# Patient Record
Sex: FEMALE | Race: WHITE | NOT HISPANIC OR LATINO | Employment: OTHER | URBAN - METROPOLITAN AREA
[De-identification: names, ages, dates, MRNs, and addresses within clinical notes are randomized per-mention and may not be internally consistent; named-entity substitution may affect disease eponyms.]

---

## 2017-01-05 ENCOUNTER — TELEPHONE (OUTPATIENT)
Dept: INTERNAL MEDICINE | Facility: CLINIC | Age: 67
End: 2017-01-05

## 2017-01-05 NOTE — TELEPHONE ENCOUNTER
----- Message from Kerry Larson sent at 1/5/2017  2:46 PM EST -----  Contact: KARINE MONTALVO PH:[phone]  KARINE MONTALVO SAW EAVNSKEENA HENRY FOR BRONCHITIS SHE WAS PRESCRIBED SOMETHING FOR HER COUGH BUT IT HAS NOT HELPED. SHE WOULD LIKE SOMETHING ELSE FOR THE COUGH AS IT KEEPS HER UP AT NIGHT. SHE USES THE [facility] ON [facility]. SHE CAN BE REACHED [phone]

## 2017-01-10 RX ORDER — LEVOTHYROXINE SODIUM 175 UG/1
TABLET ORAL
Qty: 30 TABLET | Refills: 0 | Status: SHIPPED | OUTPATIENT
Start: 2017-01-10 | End: 2017-03-13 | Stop reason: SDUPTHER

## 2017-01-10 RX ORDER — MONTELUKAST SODIUM 10 MG/1
TABLET ORAL
Qty: 90 TABLET | Refills: 0 | Status: SHIPPED | OUTPATIENT
Start: 2017-01-10 | End: 2017-03-23 | Stop reason: SDUPTHER

## 2017-01-10 RX ORDER — IBUPROFEN 600 MG/1
TABLET ORAL
Qty: 90 TABLET | Refills: 1 | Status: SHIPPED | OUTPATIENT
Start: 2017-01-10 | End: 2017-05-15 | Stop reason: SDUPTHER

## 2017-01-18 ENCOUNTER — TELEPHONE (OUTPATIENT)
Dept: INTERNAL MEDICINE | Facility: CLINIC | Age: 67
End: 2017-01-18

## 2017-02-27 RX ORDER — LEVOTHYROXINE SODIUM 175 UG/1
TABLET ORAL
Qty: 30 TABLET | Refills: 0 | Status: SHIPPED | OUTPATIENT
Start: 2017-02-27 | End: 2017-03-23 | Stop reason: SDUPTHER

## 2017-02-27 RX ORDER — LOSARTAN POTASSIUM 100 MG/1
TABLET ORAL
Qty: 90 TABLET | Refills: 0 | Status: SHIPPED | OUTPATIENT
Start: 2017-02-27 | End: 2017-03-13 | Stop reason: DRUGHIGH

## 2017-02-27 RX ORDER — AMLODIPINE BESYLATE 5 MG/1
TABLET ORAL
Qty: 90 TABLET | Refills: 0 | Status: SHIPPED | OUTPATIENT
Start: 2017-02-27 | End: 2017-03-23 | Stop reason: SDUPTHER

## 2017-02-27 RX ORDER — LEVOCETIRIZINE DIHYDROCHLORIDE 5 MG/1
TABLET, FILM COATED ORAL
Qty: 30 TABLET | Refills: 5 | Status: SHIPPED | OUTPATIENT
Start: 2017-02-27 | End: 2017-08-31

## 2017-03-13 ENCOUNTER — OFFICE VISIT (OUTPATIENT)
Dept: INTERNAL MEDICINE | Facility: CLINIC | Age: 67
End: 2017-03-13

## 2017-03-13 VITALS
BODY MASS INDEX: 33.04 KG/M2 | HEIGHT: 68 IN | WEIGHT: 218 LBS | HEART RATE: 68 BPM | RESPIRATION RATE: 18 BRPM | SYSTOLIC BLOOD PRESSURE: 130 MMHG | DIASTOLIC BLOOD PRESSURE: 72 MMHG | TEMPERATURE: 97.7 F

## 2017-03-13 DIAGNOSIS — Z12.31 ENCOUNTER FOR SCREENING MAMMOGRAM FOR BREAST CANCER: ICD-10-CM

## 2017-03-13 DIAGNOSIS — Z00.00 PHYSICAL EXAM: Primary | ICD-10-CM

## 2017-03-13 DIAGNOSIS — E03.8 OTHER SPECIFIED HYPOTHYROIDISM: ICD-10-CM

## 2017-03-13 DIAGNOSIS — K21.9 GASTROESOPHAGEAL REFLUX DISEASE WITHOUT ESOPHAGITIS: ICD-10-CM

## 2017-03-13 DIAGNOSIS — E78.5 HYPERLIPIDEMIA, UNSPECIFIED HYPERLIPIDEMIA TYPE: ICD-10-CM

## 2017-03-13 DIAGNOSIS — I10 ESSENTIAL HYPERTENSION: ICD-10-CM

## 2017-03-13 LAB
ALBUMIN SERPL-MCNC: 4.1 G/DL (ref 3.2–4.8)
ALBUMIN/GLOB SERPL: 1.6 G/DL (ref 1.5–2.5)
ALP SERPL-CCNC: 97 U/L (ref 25–100)
ALT SERPL W P-5'-P-CCNC: 24 U/L (ref 7–40)
ANION GAP SERPL CALCULATED.3IONS-SCNC: 5 MMOL/L (ref 3–11)
ARTICHOKE IGE QN: 138 MG/DL (ref 0–130)
AST SERPL-CCNC: 25 U/L (ref 0–33)
BASOPHILS # BLD AUTO: 0.04 10*3/MM3 (ref 0–0.2)
BASOPHILS NFR BLD AUTO: 0.8 % (ref 0–1)
BILIRUB SERPL-MCNC: 0.3 MG/DL (ref 0.3–1.2)
BUN BLD-MCNC: 15 MG/DL (ref 9–23)
BUN/CREAT SERPL: 16.7 (ref 7–25)
CALCIUM SPEC-SCNC: 9.9 MG/DL (ref 8.7–10.4)
CHLORIDE SERPL-SCNC: 99 MMOL/L (ref 99–109)
CHOLEST SERPL-MCNC: 225 MG/DL (ref 0–200)
CO2 SERPL-SCNC: 36 MMOL/L (ref 20–31)
CREAT BLD-MCNC: 0.9 MG/DL (ref 0.6–1.3)
DEPRECATED RDW RBC AUTO: 43 FL (ref 37–54)
EOSINOPHIL # BLD AUTO: 0.11 10*3/MM3 (ref 0.1–0.3)
EOSINOPHIL NFR BLD AUTO: 2.1 % (ref 0–3)
ERYTHROCYTE [DISTWIDTH] IN BLOOD BY AUTOMATED COUNT: 13.6 % (ref 11.3–14.5)
GFR SERPL CREATININE-BSD FRML MDRD: 63 ML/MIN/1.73
GLOBULIN UR ELPH-MCNC: 2.6 GM/DL
GLUCOSE BLD-MCNC: 94 MG/DL (ref 70–100)
HCT VFR BLD AUTO: 41 % (ref 34.5–44)
HCV AB SER DONR QL: NORMAL
HDLC SERPL-MCNC: 52 MG/DL (ref 40–60)
HGB BLD-MCNC: 13.6 G/DL (ref 11.5–15.5)
IMM GRANULOCYTES # BLD: 0.01 10*3/MM3 (ref 0–0.03)
IMM GRANULOCYTES NFR BLD: 0.2 % (ref 0–0.6)
LYMPHOCYTES # BLD AUTO: 1.81 10*3/MM3 (ref 0.6–4.8)
LYMPHOCYTES NFR BLD AUTO: 34.2 % (ref 24–44)
MCH RBC QN AUTO: 28.6 PG (ref 27–31)
MCHC RBC AUTO-ENTMCNC: 33.2 G/DL (ref 32–36)
MCV RBC AUTO: 86.3 FL (ref 80–99)
MONOCYTES # BLD AUTO: 0.44 10*3/MM3 (ref 0–1)
MONOCYTES NFR BLD AUTO: 8.3 % (ref 0–12)
NEUTROPHILS # BLD AUTO: 2.89 10*3/MM3 (ref 1.5–8.3)
NEUTROPHILS NFR BLD AUTO: 54.4 % (ref 41–71)
PLATELET # BLD AUTO: 286 10*3/MM3 (ref 150–450)
PMV BLD AUTO: 9.9 FL (ref 6–12)
POTASSIUM BLD-SCNC: 3.7 MMOL/L (ref 3.5–5.5)
PROT SERPL-MCNC: 6.7 G/DL (ref 5.7–8.2)
RBC # BLD AUTO: 4.75 10*6/MM3 (ref 3.89–5.14)
SODIUM BLD-SCNC: 140 MMOL/L (ref 132–146)
TRIGL SERPL-MCNC: 124 MG/DL (ref 0–150)
TSH SERPL DL<=0.05 MIU/L-ACNC: 0.16 MIU/ML (ref 0.35–5.35)
WBC NRBC COR # BLD: 5.3 10*3/MM3 (ref 3.5–10.8)

## 2017-03-13 PROCEDURE — 80053 COMPREHEN METABOLIC PANEL: CPT | Performed by: INTERNAL MEDICINE

## 2017-03-13 PROCEDURE — 86803 HEPATITIS C AB TEST: CPT | Performed by: INTERNAL MEDICINE

## 2017-03-13 PROCEDURE — 36415 COLL VENOUS BLD VENIPUNCTURE: CPT | Performed by: INTERNAL MEDICINE

## 2017-03-13 PROCEDURE — 84443 ASSAY THYROID STIM HORMONE: CPT | Performed by: INTERNAL MEDICINE

## 2017-03-13 PROCEDURE — 80061 LIPID PANEL: CPT | Performed by: INTERNAL MEDICINE

## 2017-03-13 PROCEDURE — 99213 OFFICE O/P EST LOW 20 MIN: CPT | Performed by: INTERNAL MEDICINE

## 2017-03-13 PROCEDURE — 85025 COMPLETE CBC W/AUTO DIFF WBC: CPT | Performed by: INTERNAL MEDICINE

## 2017-03-13 PROCEDURE — 99397 PER PM REEVAL EST PAT 65+ YR: CPT | Performed by: INTERNAL MEDICINE

## 2017-03-13 RX ORDER — LOSARTAN POTASSIUM 50 MG/1
50 TABLET ORAL DAILY
COMMUNITY
End: 2017-08-03 | Stop reason: DRUGHIGH

## 2017-03-13 RX ORDER — PHENOL 1.4 %
1 AEROSOL, SPRAY (ML) MUCOUS MEMBRANE NIGHTLY PRN
COMMUNITY

## 2017-03-13 NOTE — PROGRESS NOTES
Chief Complaint   Patient presents with   • EXTENDED FOLLOW UP AND PHYSICAL EXAMINATION       History of Present Illness      The patient presents for an established patient physical examination and health maintenance visit. The patient has had a colonoscopy. The last colonoscopy was on January 9, 2014. Most recent colonoscopy findings: did not reveal polyps. She has had a prior mammogram. Her last mammogram was in December of 2013 and it revealed no abnormalities.    The patient is on ranitidine for her gastroesophageal reflux. The medication is taken on a regular basis and gives complete relief of the symptoms. She reports no abdominal pain, belching, chest pain, diarrhea, dysphagia, early satiety, heartburn, hoarseness, nausea, rectal bleeding, vomiting or weight loss. The GERD has no known aggravating factors.    The patient presents for a follow-up related to hyperlipidemia. She is following a low fat diet. She reports that she is exercising. She is not taking medication for hyperlipidemia. She denies shortness of breath, orthopnea, paroxysmal nocturnal dyspnea, dyspnea on exertion, edema, palpitations or syncope.    The patient presents for a follow-up related to hypertension. The patient reports that she has had no headaches or blurred vision. She states that she is taking her medication as prescribed. She is not having medication side effects. She checks her blood pressures at home. The home readings are normal.    As relates to hypothyroidism, the patient reports a good energy level. She reports no hair loss, heat intolerance, cold intolerance, constipation or sweats. She is taking her medication as prescribed her medication as prescribed.    Review of Systems    GENERAL- Denies Fever, Chills or Sweats.    HEENT- Denies Eye Pain, Eye Drainage, Nasal Discharge, Sore Throat, Ear Pain, Ear Drainage, Decreased Vision, Sinus Pain, Nasal Congestion or Decreased Hearing.    NECK- Denies Decreased Range of Motion,  Stiffness, Thyroid Nodules, Enlarged Lymph Nodes or Goiter.    CARDIOVASCULAR- Denies Claudication.    PULMONARY- Denies Wheezing, Sputum Production, Cough or Hemoptysis.    GENITOURINARY- Denies Dysuria, Hematuria, Urinary Frequency, Urinary Leakage, Pelvic Pain, Vaginal Prolapse, Vaginal Discharge, Dyspareunia or Abnormal Menses.    ENDOCRINE- Denies Depression, Memory Loss, Polydypsia, Polyphagia, Polyuria, Sleep Disturbance or Weight Gain.    NEUROLOGIC- Denies Seizures, Confusion or Excessive Daytime Sleepiness.    MUSCULOSKELETAL- Denies Joint Pain, Joint Stiffness, Decreased Range of Motion, Joint Swelling or Erythema of Joints.    INTEGUMENTARY- Denies Rash, Lumps, Sores, Itching, Dryness, Color Change, Changes in Hair, Brittle Nails, Discolored Nails, Thickened Nails or Growths.    Medications      Current Outpatient Prescriptions:   •  albuterol (PROAIR HFA) 108 (90 BASE) MCG/ACT inhaler, Inhale 2 puffs every 6 (six) hours as needed., Disp: , Rfl:   •  amLODIPine (NORVASC) 5 MG tablet, TAKE ONE TABLET BY MOUTH ONCE DAILY, Disp: 90 tablet, Rfl: 0  •  aspirin 81 MG EC tablet, Take 1 tablet by mouth daily., Disp: , Rfl:   •  Cholecalciferol (VITAMIN D PO), Take 1,000 Units by mouth daily. Vitamin D 1000 UNIT CAPS; TAKE 1 TABLET DAILY, Disp: , Rfl:   •  citalopram (CeleXA) 40 MG tablet, Take 1 tablet by mouth daily. For: Anxiety, Disp: , Rfl:   •  dicyclomine (BENTYL) 10 MG capsule, Take 1 capsule by mouth 4 (four) times a day as needed. For: Abdominal bloating, Disp: , Rfl:   •  diphenhydrAMINE (BENADRYL) 25 mg capsule, Take 1 capsule by mouth every 6 (six) hours as needed., Disp: , Rfl:   •  diphenhydrAMINE (BENADRYL) 50 MG/ML injection, INJECT AS NEEDED FOR ALLERGIC REACTION, Disp: 100 mL, Rfl: 0  •  EPINEPHrine (EPIPEN 2-KIRA) 0.3 MG/0.3ML solution auto-injector injection, Inject 1 Units into the shoulder, thigh, or buttocks. as directed; For: Allergic rhinitis, Disp: , Rfl:   •  guaiFENesin (MUCINEX) 600  MG 12 hr tablet, Take 1 tablet by mouth 2 (two) times a day., Disp: , Rfl:   •  hydrOXYzine (ATARAX) 25 MG tablet, Take 1 tablet by mouth at night as needed. at bedtime as needed, Disp: , Rfl:   •  ibuprofen (ADVIL,MOTRIN) 600 MG tablet, TAKE ONE TABLET BY MOUTH THREE TIMES DAILY AS NEEDED, Disp: 90 tablet, Rfl: 1  •  levocetirizine (XYZAL) 5 MG tablet, TAKE ONE TABLET BY MOUTH ONCE DAILY, Disp: 30 tablet, Rfl: 5  •  levothyroxine (SYNTHROID, LEVOTHROID) 175 MCG tablet, TAKE ONE TABLET BY MOUTH ONCE DAILY, Disp: 30 tablet, Rfl: 0  •  losartan (COZAAR) 50 MG tablet, Take 50 mg by mouth Daily., Disp: , Rfl:   •  Melatonin 10 MG tablet, Take 1 tablet by mouth At Night As Needed., Disp: , Rfl:   •  montelukast (SINGULAIR) 10 MG tablet, TAKE ONE TABLET BY MOUTH ONCE DAILY, Disp: 90 tablet, Rfl: 0  •  ranitidine (ZANTAC) 150 MG tablet, Take 1 tablet by mouth 2 (two) times a day as needed. AM&PM, Disp: , Rfl:   •  Triamcinolone Acetonide (NASACORT ALLERGY 24HR) 55 MCG/ACT nasal inhaler, 2 sprays into each nostril daily., Disp: , Rfl:   •  triamterene-hydrochlorothiazide (MAXZIDE-25) 37.5-25 MG per tablet, TAKE ONE TABLET BY MOUTH ONCE DAILY, Disp: 30 tablet, Rfl: 5     Allergies    Allergies   Allergen Reactions   • Other      MSG - HIVES    • Cefuroxime      Other reaction(s): UNKNOWN   • Cephalexin    • Clindamycin Nausea Only and Nausea And Vomiting   • Erythromycin Nausea Only   • Flu Virus Vaccine    • Metronidazole      Other reaction(s): UNKNOWN   • Naproxen Dizziness   • Thimerosal      Other reaction(s): UNKNOWN   • Varicella Virus Vaccine Live    • Zostavax [Zoster Vaccine Live]      59598 UNT /0.65 ML subcutaneous solution Recontituted    • Penicillins Rash   • Sulfa Antibiotics Nausea Only and Rash     Other reaction(s): Headache, Vomiting       Problem List    Patient Active Problem List   Diagnosis   • Atopic rhinitis   • Anxiety   • Dysphagia   • Gastroesophageal reflux disease without esophagitis   •  "Hyperlipidemia   • Hypertension   • Hypothyroidism   • Fatigue   • Petechial rash   • Acute pharyngitis   • Cataract   • Upper respiratory infection       Physical Examination    Visit Vitals   • /72 (BP Location: Left arm, Patient Position: Sitting, Cuff Size: Adult)   • Pulse 68   • Temp 97.7 °F (36.5 °C) (Axillary)   • Resp 18   • Ht 68\" (172.7 cm)   • Wt 218 lb (98.9 kg)   • LMP Comment: LAST PAP 5 YEARS AGO     • Breastfeeding No   • BMI 33.15 kg/m2       HEENT: Head- Normocephalic Atraumatic. Facies- Within normal limits. Pinnas- Normal texture and shape bilaterally. Canals- Normal bilaterally. TMs- Normal bilaterally. Nares- Patent bilaterally. Nasal Septum- is normal. There is no tenderness to palpation over the frontal or maxillary sinuses. Lids- Normal bilaterally. Conjunctiva- Clear bilaterally. Sclera- Anicteric bilaterally. Oropharynx- Moist with no lesions. Tonsils- No enlargement, erythema or exudate.    Neck: Thyroid- non enlarged, symmetric and has no nodules. No bruits are detected. ROM- Normal Range of Motion with no rigidity.    Lymph Nodes: Cervical- no enlarged lymph nodes noted. Clavicular- Deferred. Axillary- Deferred. Inguinal- Deferred.    Lungs: Auscultation- Clear to auscultation bilaterally. There are no retractions, clubbing or cyanosis. The Expiratory to Inspiratory ratio is equal.    Cardiovascular: There are no carotid bruits. Heart- Normal Rate with Regular rhythm and no murmurs. There are no gallops. There are no rubs. In the lower extremities there is no edema. The upper extremities do not have edema.    Abdomen: Soft, benign, non-tender with no masses, hernias, organomegaly or scars.    Breast: Normal contours bilaterally with no masses, discharge, skin changes or lumps. There are no scars noted.    The patient has no worrisome or suspicious skin lesions noted.    Impression and Assessment    Normal Physical Examination.    Gastroesophageal Reflux Disease. "     Hyperlipidemia.     Hypertension.     Hypothyroidism.    Plan    Gastroesophageal Reflux Disease Plan: The current plan was continued.    Hyperlipidemia Plan: She was instructed to eat a low fat diet. She was encouraged to exercise daily.    Hypertension Plan: The current plan was continued.    Hypothyroidism Plan: The current plan was continued.    Counseling was provided regarding: Adequate Aerobic Exercise, Dental Visits, Flossing Teeth, Heart Healthy Diet, Ideal Body Weight and Seat Belt Utilization.    The following was ordered for screening and health maintenance: Hepatitis C Antibody and Screening Mammogram.       Immunizations Ordered and Administered: None.    Sarah was seen today for extended follow up.    Diagnoses and all orders for this visit:    Physical exam  -     Mammo Screening Digital Tomosynthesis Bilateral With CAD; Future  -     Hepatitis C Antibody    Encounter for screening mammogram for breast cancer  -     Mammo Screening Digital Tomosynthesis Bilateral With CAD; Future    Other specified hypothyroidism  -     TSH    Essential hypertension  -     Comprehensive Metabolic Panel  -     TSH  -     POC Urinalysis Dipstick, Automated    Hyperlipidemia, unspecified hyperlipidemia type  -     Comprehensive Metabolic Panel  -     Lipid Panel    Gastroesophageal reflux disease without esophagitis  -     CBC & Differential          Return to Office    The patient was instructed to return for follow-up in 6 months. Next Visit: Follow-up.    The patient was instructed to return sooner if the condition changes, worsens, or doesn't resolve.

## 2017-03-19 ENCOUNTER — TELEPHONE (OUTPATIENT)
Dept: INTERNAL MEDICINE | Facility: CLINIC | Age: 67
End: 2017-03-19

## 2017-03-19 DIAGNOSIS — Z12.31 ENCOUNTER FOR SCREENING MAMMOGRAM FOR MALIGNANT NEOPLASM OF BREAST: Primary | ICD-10-CM

## 2017-03-19 NOTE — TELEPHONE ENCOUNTER
----- Message from Marianne Jay sent at 3/14/2017 11:17 AM EDT -----  Concerning pts mammogram--they need dx code to be Z12.31 instead of z00.00  Please reenter order.

## 2017-03-23 RX ORDER — CITALOPRAM 40 MG/1
TABLET ORAL
Qty: 90 TABLET | Refills: 1 | Status: SHIPPED | OUTPATIENT
Start: 2017-03-23 | End: 2017-08-31

## 2017-03-24 RX ORDER — LOSARTAN POTASSIUM 100 MG/1
TABLET ORAL
Qty: 90 TABLET | Refills: 0 | Status: SHIPPED | OUTPATIENT
Start: 2017-03-24 | End: 2017-08-31

## 2017-03-24 RX ORDER — LEVOTHYROXINE SODIUM 175 UG/1
TABLET ORAL
Qty: 30 TABLET | Refills: 0 | Status: SHIPPED | OUTPATIENT
Start: 2017-03-24 | End: 2017-08-31

## 2017-03-24 RX ORDER — AMLODIPINE BESYLATE 5 MG/1
TABLET ORAL
Qty: 90 TABLET | Refills: 0 | Status: SHIPPED | OUTPATIENT
Start: 2017-03-24 | End: 2017-08-31

## 2017-03-24 RX ORDER — MONTELUKAST SODIUM 10 MG/1
TABLET ORAL
Qty: 90 TABLET | Refills: 0 | Status: SHIPPED | OUTPATIENT
Start: 2017-03-24 | End: 2017-08-31

## 2017-03-24 RX ORDER — LEVOTHYROXINE SODIUM 175 UG/1
TABLET ORAL
Qty: 30 TABLET | Refills: 0 | Status: SHIPPED | OUTPATIENT
Start: 2017-03-24 | End: 2017-07-03 | Stop reason: SDUPTHER

## 2017-03-30 ENCOUNTER — TRANSCRIBE ORDERS (OUTPATIENT)
Dept: INTERNAL MEDICINE | Facility: CLINIC | Age: 67
End: 2017-03-30

## 2017-04-13 ENCOUNTER — TELEPHONE (OUTPATIENT)
Dept: INTERNAL MEDICINE | Facility: CLINIC | Age: 67
End: 2017-04-13

## 2017-04-13 NOTE — TELEPHONE ENCOUNTER
----- Message from Kishor Perkins sent at 4/13/2017 11:27 AM EDT -----  PT IS IN [place], SHE HAD HAD A COUGH AND YELLOW MUCUS. SHE IS WANTING TO SPEAK TO YOU.     [phone]

## 2017-04-13 NOTE — TELEPHONE ENCOUNTER
S/W patient and she states she is running a slight fever.  She is hoarse and has a cough.  She is coughing up yellow mucus.  She has been sick for 5 days.  She has been taking mucinex, nyquil cough and benadryl.  She is flying home next.    [facility] on [address]-call in and tell them not to fill it and she will have them fill an rx in [place].

## 2017-04-13 NOTE — TELEPHONE ENCOUNTER
Called rx into [facility] pharmacy on [facility] [address] and asked them to place the rx on hold because the patient is going to have it transferred to [place].  Notified patient that rx had been called in.  She verbalized understanding and appreciation.

## 2017-04-20 ENCOUNTER — OFFICE VISIT (OUTPATIENT)
Dept: INTERNAL MEDICINE | Facility: CLINIC | Age: 67
End: 2017-04-20

## 2017-04-20 VITALS
DIASTOLIC BLOOD PRESSURE: 92 MMHG | WEIGHT: 218.2 LBS | TEMPERATURE: 97.4 F | BODY MASS INDEX: 33.07 KG/M2 | HEART RATE: 72 BPM | HEIGHT: 68 IN | SYSTOLIC BLOOD PRESSURE: 158 MMHG | RESPIRATION RATE: 16 BRPM

## 2017-04-20 DIAGNOSIS — Z13.820 SCREENING FOR OSTEOPOROSIS: ICD-10-CM

## 2017-04-20 DIAGNOSIS — Z00.00 MEDICARE WELCOME VISIT: Primary | ICD-10-CM

## 2017-04-20 PROCEDURE — G0402 INITIAL PREVENTIVE EXAM: HCPCS | Performed by: PHYSICIAN ASSISTANT

## 2017-04-20 RX ORDER — DOXYCYCLINE 100 MG/1
100 CAPSULE ORAL DAILY
COMMUNITY
End: 2017-06-08 | Stop reason: SDUPTHER

## 2017-04-20 NOTE — PATIENT INSTRUCTIONS
Breast Self-Awareness  Breast self-awareness allows you to notice a breast problem early while it is still small. Do a breast self-exam:  · Every month, 5-7 days after your period (menstrual period).  · At the same time each month if you do not have periods anymore.  Look for any:  · Difference between your breasts (size, shape, or position).  · Change in breast shape or size.  · Fluid or blood coming from your nipples.  · Changes in your nipples (dimpling, nipple movement).  ·  Change in skin color or texture (redness, scaly areas).  Feel for:  · Lumps.  · Bumps.  · Dips.  · Any other changes.  HOW TO DO A BREAST SELF-EXAM  Look at your breasts and nipples.  1. Take off all your clothes above your waist.  2.  front of a mirror in a room with good lighting.  3. Put your hands on your hips and push your hands downward.  Feel your breasts.   1. Lie flat on your back or  the shower or tub. If you are in the shower or tub, have wet, soapy hands.  2. Place your right arm above your head.  3. Place your left hand in the right underarm area.  4. Make small circles using the pads (not the fingertips) of your 3 middle fingers. Press lightly and then with medium and firm pressure.  5. Move your fingers a little lower and make the small circles at the 3 pressures (light, medium, and firm).  6. Continue moving your fingers lower and making circles until you reach the bottom of your breast.  7. Move your fingers one finger-width towards the center of the body.  8. Continue making the circles, this time moving upward until you reach the bottom of your neck.  9. Move your fingers one finger-width towards the center of your body.  10. Make circles downward when starting at the bottom of the neck. Make circles upward when starting at the bottom of the breast. Stop when you reach the middle of the chest.  11.  Repeat these steps on the other breast.  Write down what looks and feels normal for each breast. Also  write down any changes you notice.  GET HELP RIGHT AWAY IF:  · You see any changes in your breasts or nipples.  · You see skin changes.  · You have unusual discharge from your nipples.  · You feel a new lump.  · You feel unusually thick areas.     This information is not intended to replace advice given to you by your health care provider. Make sure you discuss any questions you have with your health care provider.     Document Released: 06/05/2009 Document Revised: 12/04/2013 Document Reviewed: 11/06/2016  [employer] Interactive Patient Education ©2016 [employer]      Exercising to Lose Weight  Exercising can help you to lose weight. In order to lose weight through exercise, you need to do vigorous-intensity exercise. You can tell that you are exercising with vigorous intensity if you are breathing very hard and fast and cannot hold a conversation while exercising.  Moderate-intensity exercise helps to maintain your current weight. You can tell that you are exercising at a moderate level if you have a higher heart rate and faster breathing, but you are still able to hold a conversation.  HOW OFTEN SHOULD I EXERCISE?  Choose an activity that you enjoy and set realistic goals. Your health care provider can help you to make an activity plan that works for you. Exercise regularly as directed by your health care provider. This may include:  · Doing resistance training twice each week, such as:    Push-ups.    Sit-ups.    Lifting weights.    Using resistance bands.  · Doing a given intensity of exercise for a given amount of time. Choose from these options:    150 minutes of moderate-intensity exercise every week.    75 minutes of vigorous-intensity exercise every week.    A mix of moderate-intensity and vigorous-intensity exercise every week.  Children, pregnant women, people who are out of shape, people who are overweight, and older adults may need to consult a health care provider for individual recommendations. If  you have any sort of medical condition, be sure to consult your health care provider before starting a new exercise program.  WHAT ARE SOME ACTIVITIES THAT CAN HELP ME TO LOSE WEIGHT?   · Walking at a rate of at least 4.5 miles an hour.  · Jogging or running at a rate of 5 miles per hour.  · Biking at a rate of at least 10 miles per hour.  · Lap swimming.  · Roller-skating or in-line skating.  · Cross-country skiing.  · Vigorous competitive sports, such as football, basketball, and soccer.  · Jumping rope.  · Aerobic dancing.  HOW CAN I BE MORE ACTIVE IN MY DAY-TO-DAY ACTIVITIES?  · Use the stairs instead of the elevator.  · Take a walk during your lunch break.  · If you drive, park your car farther away from work or school.  · If you take public transportation, get off one stop early and walk the rest of the way.  · Make all of your phone calls while standing up and walking around.  · Get up, stretch, and walk around every 30 minutes throughout the day.  WHAT GUIDELINES SHOULD I FOLLOW WHILE EXERCISING?  · Do not exercise so much that you hurt yourself, feel dizzy, or get very short of breath.  · Consult your health care provider prior to starting a new exercise program.  · Wear comfortable clothes and shoes with good support.  · Drink plenty of water while you exercise to prevent dehydration or heat stroke. Body water is lost during exercise and must be replaced.  · Work out until you breathe faster and your heart beats faster.     This information is not intended to replace advice given to you by your health care provider. Make sure you discuss any questions you have with your health care provider.     Document Released: 01/20/2012 Document Revised: 01/08/2016 Document Reviewed: 05/21/2015  1[employer] Interactive Patient Education ©2016 [employer]    Fall Prevention in the Home   Falls can cause injuries and can affect people from all age groups. There are many simple things that you can do to make your home  safe and to help prevent falls.  WHAT CAN I DO ON THE OUTSIDE OF MY HOME?  · Regularly repair the edges of walkways and driveways and fix any cracks.  · Remove high doorway thresholds.  · Trim any shrubbery on the main path into your home.  · Use bright outdoor lighting.  · Clear walkways of debris and clutter, including tools and rocks.  · Regularly check that handrails are securely fastened and in good repair. Both sides of any steps should have handrails.  · Install guardrails along the edges of any raised decks or porches.  · Have leaves, snow, and ice cleared regularly.  · Use sand or salt on walkways during winter months.  · In the garage, clean up any spills right away, including grease or oil spills.  WHAT CAN I DO IN THE BATHROOM?  · Use night lights.  · Install grab bars by the toilet and in the tub and shower. Do not use towel bars as grab bars.  · Use non-skid mats or decals on the floor of the tub or shower.  · If you need to sit down while you are in the shower, use a plastic, non-slip stool..  · Keep the floor dry. Immediately clean up any water that spills on the floor.  · Remove soap buildup in the tub or shower on a regular basis.  · Attach bath mats securely with double-sided non-slip rug tape.  · Remove throw rugs and other tripping hazards from the floor.  WHAT CAN I DO IN THE BEDROOM?  · Use night lights.  · Make sure that a bedside light is easy to reach.  · Do not use oversized bedding that drapes onto the floor.  · Have a firm chair that has side arms to use for getting dressed.  · Remove throw rugs and other tripping hazards from the floor.  WHAT CAN I DO IN THE KITCHEN?   · Clean up any spills right away.  · Avoid walking on wet floors.  · Place frequently used items in easy-to-reach places.  · If you need to reach for something above you, use a sturdy step stool that has a grab bar.  · Keep electrical cables out of the way.  · Do not use floor polish or wax that makes floors slippery. If  you have to use wax, make sure that it is non-skid floor wax.  · Remove throw rugs and other tripping hazards from the floor.  WHAT CAN I DO IN THE STAIRWAYS?  · Do not leave any items on the stairs.  · Make sure that there are handrails on both sides of the stairs. Fix handrails that are broken or loose. Make sure that handrails are as long as the stairways.  · Check any carpeting to make sure that it is firmly attached to the stairs. Fix any carpet that is loose or worn.  · Avoid having throw rugs at the top or bottom of stairways, or secure the rugs with carpet tape to prevent them from moving.  · Make sure that you have a light switch at the top of the stairs and the bottom of the stairs. If you do not have them, have them installed.  WHAT ARE SOME OTHER FALL PREVENTION TIPS?  · Wear closed-toe shoes that fit well and support your feet. Wear shoes that have rubber soles or low heels.  · When you use a stepladder, make sure that it is completely opened and that the sides are firmly locked. Have someone hold the ladder while you are using it. Do not climb a closed stepladder.  · Add color or contrast paint or tape to grab bars and handrails in your home. Place contrasting color strips on the first and last steps.  · Use mobility aids as needed, such as canes, walkers, scooters, and crutches.  · Turn on lights if it is dark. Replace any light bulbs that burn out.  · Set up furniture so that there are clear paths. Keep the furniture in the same spot.  · Fix any uneven floor surfaces.  · Choose a carpet design that does not hide the edge of steps of a stairway.  · Be aware of any and all pets.  · Review your medicines with your healthcare provider. Some medicines can cause dizziness or changes in blood pressure, which increase your risk of falling.  Talk with your health care provider about other ways that you can decrease your risk of falls. This may include working with a physical therapist or  to improve  your strength, balance, and endurance.     This information is not intended to replace advice given to you by your health care provider. Make sure you discuss any questions you have with your health care provider.     Document Released: 12/08/2003 Document Revised: 05/03/2016 Document Reviewed: 01/22/2016  [employer] Interactive Patient Education ©2016 [employer]

## 2017-04-20 NOTE — PROGRESS NOTES
QUICK REFERENCE INFORMATION:  The ABCs of the Annual Wellness Visit    [facility]    HEALTH RISK ASSESSMENT    1950    Recent Hospitalizations:  No recent hospitalization(s)..      Current Medical Providers:  Patient Care Team:  Tyrese Recinos MD as PCP - General      Smoking Status:  History   Smoking Status   • Never Smoker   Smokeless Tobacco   • Never Used       Alcohol Consumption:  History   Alcohol Use   • Yes     Comment: one or less per week       Depression Screen:   PHQ-9 Depression Screening 4/20/2017   Little interest or pleasure in doing things 0   Feeling down, depressed, or hopeless 0   Trouble falling or staying asleep, or sleeping too much 1   Feeling tired or having little energy 0   Poor appetite or overeating 0   Feeling bad about yourself - or that you are a failure or have let yourself or your family down 0   Trouble concentrating on things, such as reading the newspaper or watching television 0   Moving or speaking so slowly that other people could have noticed. Or the opposite - being so fidgety or restless that you have been moving around a lot more than usual 0   Thoughts that you would be better off dead, or of hurting yourself in some way 0   PHQ-9 Total Score 1   If you checked off any problems, how difficult have these problems made it for you to do your work, take care of things at home, or get along with other people? Not difficult at all       Health Habits and Functional and Cognitive Screening:  Functional & Cognitive Status 4/20/2017   Do you have difficulty preparing food and eating? No   Do you have difficulty bathing yourself? No   Do you have difficulty getting dressed? No   Do you have difficulty using the toilet? No   Do you have difficulty moving around from place to place? No   In the past year have you fallen or experienced a near fall? No   Do you need help using the phone?  No   Are you deaf or do you have serious difficulty hearing?  No   Do  you need help with transportation? No   Do you need help shopping? No   Do you need help preparing meals?  No   Do you need help with housework?  No   Do you need help with laundry? No   Do you need help taking your medications? No   Do you need help managing money? No       Health Habits  Current Diet: Well Balanced Diet  Dental Exam: Up to date  Eye Exam: Up to date  Exercise (times per week): 2 times per week  Current Exercise Activities Include: Swimming ([place], AQUA EXERCISE)        Does the patient have evidence of cognitive impairment? No    Aspirin use counseling? Taking ASA appropriately as indicated      Recent Lab Results:  CMP:  Lab Results   Component Value Date    BUN 15 03/13/2017    CREATININE 0.90 03/13/2017    EGFRIFNONA 63 03/13/2017    BCR 16.7 03/13/2017     03/13/2017    K 3.7 03/13/2017    CO2 36.0 (H) 03/13/2017    CALCIUM 9.9 03/13/2017    ALBUMIN 4.10 03/13/2017    LABIL2 1.6 03/13/2017    BILITOT 0.3 03/13/2017    ALKPHOS 97 03/13/2017    AST 25 03/13/2017    ALT 24 03/13/2017     Lipid Panel:  Lab Results   Component Value Date    TRIG 124 03/13/2017    HDL 52 03/13/2017          Visual Acuity:   Visual Acuity Screening    Right eye Left eye Both eyes   Without correction:      With correction: 20/20 20/200 20/20       Age-appropriate Screening Schedule:  Refer to the list below for future screening recommendations based on patient's age, sex and/or medical conditions. Orders for these recommended tests are listed in the plan section. The patient has been provided with a written plan.    Health Maintenance   Topic Date Due   • MAMMOGRAM  05/06/2016   • PNEUMOCOCCAL VACCINES (65+ LOW/MEDIUM RISK) (2 of 2 - PPSV23) 09/08/2017   • LIPID PANEL  03/13/2018   • PAP SMEAR  03/13/2020   • TDAP/TD VACCINES (2 - Td) 11/01/2022   • COLONOSCOPY  01/09/2024   • INFLUENZA VACCINE  Completed   • ZOSTER VACCINE  Completed        Subjective   History of Present Illness    Sarah Raphael is a 66 y.o.  female an established patient presenting for a Welcome to Medicare Visit.     The following portions of the patient's history were reviewed and updated as appropriate: allergies, current medications, past family history, past medical history, past social history, past surgical history and problem list.    Outpatient Medications Prior to Visit   Medication Sig Dispense Refill   • albuterol (PROAIR HFA) 108 (90 BASE) MCG/ACT inhaler Inhale 2 puffs every 6 (six) hours as needed.     • amLODIPine (NORVASC) 5 MG tablet TAKE ONE TABLET BY MOUTH ONCE DAILY 90 tablet 0   • aspirin 81 MG EC tablet Take 1 tablet by mouth daily.     • Cholecalciferol (VITAMIN D PO) Take 1,000 Units by mouth daily. Vitamin D 1000 UNIT CAPS; TAKE 1 TABLET DAILY     • citalopram (CeleXA) 40 MG tablet TAKE ONE TABLET BY MOUTH ONCE DAILY 90 tablet 1   • dicyclomine (BENTYL) 10 MG capsule Take 1 capsule by mouth 4 (four) times a day as needed. For: Abdominal bloating     • diphenhydrAMINE (BENADRYL) 25 mg capsule Take 1 capsule by mouth every 6 (six) hours as needed.     • diphenhydrAMINE (BENADRYL) 50 MG/ML injection INJECT AS NEEDED FOR ALLERGIC REACTION 100 mL 0   • EPINEPHrine (EPIPEN 2-KIRA) 0.3 MG/0.3ML solution auto-injector injection Inject 1 Units into the shoulder, thigh, or buttocks. as directed; For: Allergic rhinitis     • guaiFENesin (MUCINEX) 600 MG 12 hr tablet Take 1 tablet by mouth 2 (two) times a day.     • hydrOXYzine (ATARAX) 25 MG tablet Take 1 tablet by mouth at night as needed. at bedtime as needed     • ibuprofen (ADVIL,MOTRIN) 600 MG tablet TAKE ONE TABLET BY MOUTH THREE TIMES DAILY AS NEEDED 90 tablet 1   • levocetirizine (XYZAL) 5 MG tablet TAKE ONE TABLET BY MOUTH ONCE DAILY 30 tablet 5   • levothyroxine (SYNTHROID, LEVOTHROID) 175 MCG tablet TAKE ONE TABLET BY MOUTH ONCE DAILY 30 tablet 0   • levothyroxine (SYNTHROID, LEVOTHROID) 175 MCG tablet TAKE ONE TABLET BY MOUTH ONCE DAILY 30 tablet 0   • losartan (COZAAR) 100 MG  "tablet TAKE ONE TABLET BY MOUTH ONCE DAILY 90 tablet 0   • losartan (COZAAR) 50 MG tablet Take 50 mg by mouth Daily.     • Melatonin 10 MG tablet Take 1 tablet by mouth At Night As Needed.     • montelukast (SINGULAIR) 10 MG tablet TAKE ONE TABLET BY MOUTH ONCE DAILY 90 tablet 0   • ranitidine (ZANTAC) 150 MG tablet Take 1 tablet by mouth 2 (two) times a day as needed. AM&PM     • Triamcinolone Acetonide (NASACORT ALLERGY 24HR) 55 MCG/ACT nasal inhaler 2 sprays into each nostril daily.     • triamterene-hydrochlorothiazide (MAXZIDE-25) 37.5-25 MG per tablet TAKE ONE TABLET BY MOUTH ONCE DAILY 30 tablet 5     No facility-administered medications prior to visit.        Patient Active Problem List   Diagnosis   • Atopic rhinitis   • Anxiety   • Dysphagia   • Gastroesophageal reflux disease without esophagitis   • Hyperlipidemia   • Hypertension   • Hypothyroidism   • Fatigue   • Petechial rash   • Acute pharyngitis   • Cataract   • Upper respiratory infection       Advance Care Planning:  has an advance directive - a copy has been provided and is in file    Identification of Risk Factors:  Risk factors include: weight  and vision limitations.    Review of Systems    Compared to one year ago, the patient feels her physical health is better.  Compared to one year ago, the patient feels her mental health is better.    Objective    Physical Exam    Vitals:    04/20/17 1259   BP: 158/92   Pulse: 72   Resp: 16   Temp: 97.4 °F (36.3 °C)   TempSrc: Temporal Artery    Weight: 218 lb 3.2 oz (99 kg)   Height: 68\" (172.7 cm)   PainSc: 0-No pain     Finger Rub Hearing{Test (right ear):passed  Finger Rub Hearing{Test (left ear):passed   Right eye: 20/20 with correction  Left eye: 20/200 with correction  Both: 20/20 with correction      Body mass index is 33.18 kg/(m^2).  Discussed the patient's BMI with her. The BMI is above average; no BMI management plan is appropriate..    Procedure   Procedures       Assessment/Plan   Patient " Self-Management and Personalized Health Advice  The patient has been provided with information about: diet, exercise and fall prevention and preventive services including:   · Bone densitometry screening, Exercise counseling provided, Nutrition counseling provided.    Visit Diagnoses:    ICD-10-CM ICD-9-CM   1. Medicare welcome visit Z00.00 V70.0   2. Screening for osteoporosis Z13.820 V82.81       Orders Placed This Encounter   Procedures   • Dexa Bone Density, Axial (Every 2 Years)     Standing Status:   Future     Standing Expiration Date:   4/20/2018     Order Specific Question:   Reason for Exam:     Answer:   SCREENING       Outpatient Encounter Prescriptions as of 4/20/2017   Medication Sig Dispense Refill   • albuterol (PROAIR HFA) 108 (90 BASE) MCG/ACT inhaler Inhale 2 puffs every 6 (six) hours as needed.     • amLODIPine (NORVASC) 5 MG tablet TAKE ONE TABLET BY MOUTH ONCE DAILY 90 tablet 0   • aspirin 81 MG EC tablet Take 1 tablet by mouth daily.     • Cholecalciferol (VITAMIN D PO) Take 1,000 Units by mouth daily. Vitamin D 1000 UNIT CAPS; TAKE 1 TABLET DAILY     • citalopram (CeleXA) 40 MG tablet TAKE ONE TABLET BY MOUTH ONCE DAILY 90 tablet 1   • dicyclomine (BENTYL) 10 MG capsule Take 1 capsule by mouth 4 (four) times a day as needed. For: Abdominal bloating     • diphenhydrAMINE (BENADRYL) 25 mg capsule Take 1 capsule by mouth every 6 (six) hours as needed.     • diphenhydrAMINE (BENADRYL) 50 MG/ML injection INJECT AS NEEDED FOR ALLERGIC REACTION 100 mL 0   • doxycycline (MONODOX) 100 MG capsule Take 100 mg by mouth Daily.     • EPINEPHrine (EPIPEN 2-KIRA) 0.3 MG/0.3ML solution auto-injector injection Inject 1 Units into the shoulder, thigh, or buttocks. as directed; For: Allergic rhinitis     • guaiFENesin (MUCINEX) 600 MG 12 hr tablet Take 1 tablet by mouth 2 (two) times a day.     • hydrOXYzine (ATARAX) 25 MG tablet Take 1 tablet by mouth at night as needed. at bedtime as needed     • ibuprofen  (ADVIL,MOTRIN) 600 MG tablet TAKE ONE TABLET BY MOUTH THREE TIMES DAILY AS NEEDED 90 tablet 1   • levocetirizine (XYZAL) 5 MG tablet TAKE ONE TABLET BY MOUTH ONCE DAILY 30 tablet 5   • levothyroxine (SYNTHROID, LEVOTHROID) 175 MCG tablet TAKE ONE TABLET BY MOUTH ONCE DAILY 30 tablet 0   • levothyroxine (SYNTHROID, LEVOTHROID) 175 MCG tablet TAKE ONE TABLET BY MOUTH ONCE DAILY 30 tablet 0   • losartan (COZAAR) 100 MG tablet TAKE ONE TABLET BY MOUTH ONCE DAILY 90 tablet 0   • losartan (COZAAR) 50 MG tablet Take 50 mg by mouth Daily.     • Melatonin 10 MG tablet Take 1 tablet by mouth At Night As Needed.     • montelukast (SINGULAIR) 10 MG tablet TAKE ONE TABLET BY MOUTH ONCE DAILY 90 tablet 0   • ranitidine (ZANTAC) 150 MG tablet Take 1 tablet by mouth 2 (two) times a day as needed. AM&PM     • Triamcinolone Acetonide (NASACORT ALLERGY 24HR) 55 MCG/ACT nasal inhaler 2 sprays into each nostril daily.     • triamterene-hydrochlorothiazide (MAXZIDE-25) 37.5-25 MG per tablet TAKE ONE TABLET BY MOUTH ONCE DAILY 30 tablet 5     No facility-administered encounter medications on file as of 4/20/2017.        Reviewed use of high risk medication in the elderly: yes  Reviewed for potential of harmful drug interactions in the elderly: yes    Follow Up:  Return in 1 year (on 4/20/2018).     An After Visit Summary and PPPS with all of these plans were given to the patient.

## 2017-04-24 ENCOUNTER — HOSPITAL ENCOUNTER (OUTPATIENT)
Dept: MAMMOGRAPHY | Facility: HOSPITAL | Age: 67
Discharge: HOME | End: 2017-04-24
Attending: INTERNAL MEDICINE | Admitting: INTERNAL MEDICINE

## 2017-04-24 DIAGNOSIS — Z12.31 ENCOUNTER FOR SCREENING MAMMOGRAM FOR MALIGNANT NEOPLASM OF BREAST: ICD-10-CM

## 2017-04-24 PROCEDURE — G0202 SCR MAMMO BI INCL CAD: HCPCS | Performed by: RADIOLOGY

## 2017-04-24 PROCEDURE — G0202 SCR MAMMO BI INCL CAD: HCPCS

## 2017-04-24 PROCEDURE — 77063 BREAST TOMOSYNTHESIS BI: CPT

## 2017-04-24 PROCEDURE — 77063 BREAST TOMOSYNTHESIS BI: CPT | Performed by: RADIOLOGY

## 2017-04-25 DIAGNOSIS — R60.9 EDEMA: ICD-10-CM

## 2017-04-25 RX ORDER — TRIAMTERENE AND HYDROCHLOROTHIAZIDE 37.5; 25 MG/1; MG/1
TABLET ORAL
Qty: 30 TABLET | Refills: 5 | Status: SHIPPED | OUTPATIENT
Start: 2017-04-25 | End: 2017-08-31

## 2017-04-25 RX ORDER — DIPHENHYDRAMINE HYDROCHLORIDE 50 MG/ML
INJECTION INTRAMUSCULAR; INTRAVENOUS
Qty: 100 ML | Refills: 0 | Status: SHIPPED | OUTPATIENT
Start: 2017-04-25 | End: 2017-06-19 | Stop reason: SDUPTHER

## 2017-05-01 ENCOUNTER — CLINICAL SUPPORT (OUTPATIENT)
Dept: FAMILY MEDICINE CLINIC | Facility: CLINIC | Age: 67
End: 2017-05-01

## 2017-05-01 DIAGNOSIS — R29.890 HEIGHT LOSS: ICD-10-CM

## 2017-05-01 DIAGNOSIS — Z13.820 OSTEOPOROSIS SCREENING: Primary | ICD-10-CM

## 2017-05-01 DIAGNOSIS — Z78.0 POSTMENOPAUSAL: ICD-10-CM

## 2017-05-01 PROCEDURE — 77080 DXA BONE DENSITY AXIAL: CPT | Performed by: FAMILY MEDICINE

## 2017-05-08 DIAGNOSIS — Z13.820 SCREENING FOR OSTEOPOROSIS: ICD-10-CM

## 2017-05-09 ENCOUNTER — HOSPITAL ENCOUNTER (OUTPATIENT)
Dept: ULTRASOUND IMAGING | Facility: HOSPITAL | Age: 67
Discharge: HOME | End: 2017-05-09

## 2017-05-09 ENCOUNTER — HOSPITAL ENCOUNTER (OUTPATIENT)
Dept: MAMMOGRAPHY | Facility: HOSPITAL | Age: 67
Discharge: HOME | End: 2017-05-09
Admitting: INTERNAL MEDICINE

## 2017-05-09 ENCOUNTER — TRANSCRIBE ORDERS (OUTPATIENT)
Dept: MAMMOGRAPHY | Facility: HOSPITAL | Age: 67
End: 2017-05-09

## 2017-05-09 DIAGNOSIS — R92.8 ABNORMAL MAMMOGRAM: ICD-10-CM

## 2017-05-09 DIAGNOSIS — R92.8 ABNORMAL MAMMOGRAM: Primary | ICD-10-CM

## 2017-05-09 PROCEDURE — 76642 ULTRASOUND BREAST LIMITED: CPT | Performed by: RADIOLOGY

## 2017-05-09 PROCEDURE — G0279 TOMOSYNTHESIS, MAMMO: HCPCS

## 2017-05-09 PROCEDURE — G0204 DX MAMMO INCL CAD BI: HCPCS | Performed by: RADIOLOGY

## 2017-05-09 PROCEDURE — 76642 ULTRASOUND BREAST LIMITED: CPT

## 2017-05-09 PROCEDURE — G0204 DX MAMMO INCL CAD BI: HCPCS

## 2017-05-09 PROCEDURE — G0279 TOMOSYNTHESIS, MAMMO: HCPCS | Performed by: RADIOLOGY

## 2017-05-15 ENCOUNTER — TELEPHONE (OUTPATIENT)
Dept: INTERNAL MEDICINE | Facility: CLINIC | Age: 67
End: 2017-05-15

## 2017-05-15 RX ORDER — IBUPROFEN 600 MG/1
TABLET ORAL
Qty: 90 TABLET | Refills: 0 | Status: SHIPPED | OUTPATIENT
Start: 2017-05-15 | End: 2017-08-04 | Stop reason: SDUPTHER

## 2017-06-06 ENCOUNTER — HOSPITAL ENCOUNTER (OUTPATIENT)
Dept: MAMMOGRAPHY | Facility: HOSPITAL | Age: 67
Discharge: HOME | End: 2017-06-06

## 2017-06-06 ENCOUNTER — HOSPITAL ENCOUNTER (OUTPATIENT)
Dept: ULTRASOUND IMAGING | Facility: HOSPITAL | Age: 67
Discharge: HOME | End: 2017-06-06

## 2017-06-06 ENCOUNTER — HOSPITAL ENCOUNTER (OUTPATIENT)
Dept: MAMMOGRAPHY | Facility: HOSPITAL | Age: 67
Discharge: HOME | End: 2017-06-06
Admitting: RADIOLOGY

## 2017-06-06 DIAGNOSIS — R92.8 ABNORMAL MAMMOGRAM: ICD-10-CM

## 2017-06-06 PROCEDURE — 88360 TUMOR IMMUNOHISTOCHEM/MANUAL: CPT | Performed by: RADIOLOGY

## 2017-06-06 PROCEDURE — G0206 DX MAMMO INCL CAD UNI: HCPCS | Performed by: RADIOLOGY

## 2017-06-06 PROCEDURE — 88305 TISSUE EXAM BY PATHOLOGIST: CPT | Performed by: RADIOLOGY

## 2017-06-06 PROCEDURE — 19081 BX BREAST 1ST LESION STRTCTC: CPT | Performed by: RADIOLOGY

## 2017-06-06 PROCEDURE — 88342 IMHCHEM/IMCYTCHM 1ST ANTB: CPT | Performed by: RADIOLOGY

## 2017-06-06 PROCEDURE — 19083 BX BREAST 1ST LESION US IMAG: CPT | Performed by: RADIOLOGY

## 2017-06-06 RX ORDER — LIDOCAINE HYDROCHLORIDE 10 MG/ML
5 INJECTION, SOLUTION INFILTRATION; PERINEURAL ONCE
Status: COMPLETED | OUTPATIENT
Start: 2017-06-06 | End: 2017-06-06

## 2017-06-06 RX ORDER — LIDOCAINE HYDROCHLORIDE AND EPINEPHRINE 10; 10 MG/ML; UG/ML
10 INJECTION, SOLUTION INFILTRATION; PERINEURAL ONCE
Status: COMPLETED | OUTPATIENT
Start: 2017-06-06 | End: 2017-06-06

## 2017-06-06 RX ADMIN — LIDOCAINE HYDROCHLORIDE,EPINEPHRINE BITARTRATE 10 ML: 10; .01 INJECTION, SOLUTION INFILTRATION; PERINEURAL at 09:56

## 2017-06-06 RX ADMIN — LIDOCAINE HYDROCHLORIDE 4 ML: 10 INJECTION, SOLUTION INFILTRATION; PERINEURAL at 09:55

## 2017-06-06 RX ADMIN — LIDOCAINE HYDROCHLORIDE 1 ML: 10 INJECTION, SOLUTION INFILTRATION; PERINEURAL at 08:43

## 2017-06-06 RX ADMIN — LIDOCAINE HYDROCHLORIDE,EPINEPHRINE BITARTRATE 2 ML: 10; .01 INJECTION, SOLUTION INFILTRATION; PERINEURAL at 08:43

## 2017-06-07 LAB
CYTO UR: NORMAL
LAB AP CASE REPORT: NORMAL
LAB AP CLINICAL INFORMATION: NORMAL
LAB AP DIAGNOSIS COMMENT: NORMAL
Lab: NORMAL
PATH REPORT.FINAL DX SPEC: NORMAL
PATH REPORT.GROSS SPEC: NORMAL

## 2017-06-08 ENCOUNTER — TELEPHONE (OUTPATIENT)
Dept: INTERNAL MEDICINE | Facility: CLINIC | Age: 67
End: 2017-06-08

## 2017-06-08 LAB
CYTO UR: NORMAL
LAB AP CASE REPORT: NORMAL
LAB AP CLINICAL INFORMATION: NORMAL
LAB AP DIAGNOSIS COMMENT: NORMAL
LAB AP SPECIAL STAINS: NORMAL
Lab: NORMAL
PATH REPORT.FINAL DX SPEC: NORMAL
PATH REPORT.GROSS SPEC: NORMAL

## 2017-06-08 RX ORDER — DOXYCYCLINE 100 MG/1
100 CAPSULE ORAL 2 TIMES DAILY
Qty: 20 CAPSULE | Refills: 0 | Status: SHIPPED | OUTPATIENT
Start: 2017-06-08 | End: 2017-06-18

## 2017-06-08 NOTE — TELEPHONE ENCOUNTER
----- Message from Becky Blevins sent at 6/8/2017  3:02 PM EDT -----  [phone]    PT HAS REALLY BAD PRODUCTIVE COUGH/CONGESTION/YELLOW MUCUS/ NO FEVER.  CAN YOU CALL IN MEDS?    [facility] [place]

## 2017-06-08 NOTE — TELEPHONE ENCOUNTER
RX SENT VIA [facility].    S/W PT, INFORMED OF RX WITH DIRECTIONS GIVEN.  VERB GOOD UNDERSTANDING AND GREAT APPREC.  INST TO CALL IF WORSENS OR DOESN'T IMPROVE.  AGREED.

## 2017-06-09 ENCOUNTER — TELEPHONE (OUTPATIENT)
Dept: MAMMOGRAPHY | Facility: HOSPITAL | Age: 67
End: 2017-06-09

## 2017-06-09 ENCOUNTER — TELEPHONE (OUTPATIENT)
Dept: INTERNAL MEDICINE | Facility: CLINIC | Age: 67
End: 2017-06-09

## 2017-06-09 NOTE — TELEPHONE ENCOUNTER
06.09.17 @ 1445: PCP office notified pathology returned as cancer & patient will be notified. Pathology results and recommendation given to pt. Verbalizes understanding. Denies discomfort. Denies any signs and symptoms of infection.Patient desires Dr Green for surgical consult. Patient notified of appointment on 07.06.17 @ 1100. Told to bring photo ID, insurance cards & list of current medications. Patient was encouraged to call back with any questions or concerns. Patient verbalized understanding. Breast cancer information packet offered and accepted.

## 2017-06-09 NOTE — TELEPHONE ENCOUNTER
----- Message from Gaby Del Valle sent at 6/9/2017 10:36 AM EDT -----  Contact: [facility]  [facility] SAYS THAT THE PATIENT HAS BEEN DIAGNOSED WITH BREAST CANCER AND WILL BE INFORMED OF THAT TODAY. A GOOD CALL BACK NUMBER [phone]. THANK YOU.

## 2017-06-15 ENCOUNTER — TELEPHONE (OUTPATIENT)
Dept: INTERNAL MEDICINE | Facility: CLINIC | Age: 67
End: 2017-06-15

## 2017-06-15 RX ORDER — METHYLPREDNISOLONE 4 MG/1
TABLET ORAL
Qty: 21 TABLET | Refills: 0 | Status: SHIPPED | OUTPATIENT
Start: 2017-06-15 | End: 2017-08-03

## 2017-06-15 RX ORDER — DOXYCYCLINE HYCLATE 100 MG/1
100 TABLET, DELAYED RELEASE ORAL 2 TIMES DAILY
Qty: 20 TABLET | Refills: 0 | Status: SHIPPED | OUTPATIENT
Start: 2017-06-15 | End: 2017-06-25

## 2017-06-15 NOTE — TELEPHONE ENCOUNTER
Please call in a medrol dose pack and doxycycline 100 mg po BID x 10 days,.   If symptoms worsen, don't improve, develops a fever needs to be seen.   Tyrese Recinos MD  1:13 PM  06/15/17

## 2017-06-15 NOTE — TELEPHONE ENCOUNTER
RX's sent to pharmacy . Spoke with Pt and informed them of Rx. Pt verbalized understanding and much appr'c.

## 2017-06-15 NOTE — TELEPHONE ENCOUNTER
----- Message from Cindy He sent at 6/15/2017 11:17 AM EDT -----  PT CALLING  STATED HAS TAKEN ALL OF ANTIBIOTIC BUT STILL HAVING A LOT COUGHING WITH MUCUS HEADACHE EAR PAIN.     [phone]    [address] [phone] [facility] [phone] FX

## 2017-06-20 RX ORDER — DIPHENHYDRAMINE HYDROCHLORIDE 50 MG/ML
INJECTION INTRAMUSCULAR; INTRAVENOUS
Qty: 100 ML | Refills: 3 | Status: SHIPPED | OUTPATIENT
Start: 2017-06-20 | End: 2017-08-03 | Stop reason: SDUPTHER

## 2017-07-03 RX ORDER — LEVOTHYROXINE SODIUM 175 UG/1
TABLET ORAL
Qty: 30 TABLET | Refills: 5 | Status: SHIPPED | OUTPATIENT
Start: 2017-07-03 | End: 2017-08-03 | Stop reason: SDUPTHER

## 2017-07-11 ENCOUNTER — DOCUMENTATION (OUTPATIENT)
Dept: OTHER | Facility: HOSPITAL | Age: 67
End: 2017-07-11

## 2017-07-11 NOTE — PROGRESS NOTES
I saw the patient with Dr. SHANKAR. He discussed the pathology report and surgical options - left breast Stage IA LG IDC, ER/VT positive. The patient also had a biopsy of her right breast which was benign. Dr. SHANKAR has ordered a MRI as patient prefers BCT. Arm measurements completed and given to Eusebia Issa for EMR. Educational and Supportive materials were reviewed and given to patient. She agreed to be consented for GRAIL study and she was consented for decision study.

## 2017-07-17 ENCOUNTER — HOSPITAL ENCOUNTER (OUTPATIENT)
Dept: MRI IMAGING | Facility: HOSPITAL | Age: 67
Discharge: HOME | End: 2017-07-17
Attending: SURGERY | Admitting: SURGERY

## 2017-07-17 DIAGNOSIS — C50.412 BREAST CANCER OF UPPER-OUTER QUADRANT OF LEFT FEMALE BREAST (HCC): ICD-10-CM

## 2017-07-17 PROCEDURE — 0 GADOBENATE DIMEGLUMINE 529 MG/ML SOLUTION: Performed by: SURGERY

## 2017-07-17 PROCEDURE — 77059 MRI BREAST BILATERAL DIAGNOSTIC W WO CONTRAST: CPT | Performed by: RADIOLOGY

## 2017-07-17 PROCEDURE — A9577 INJ MULTIHANCE: HCPCS | Performed by: SURGERY

## 2017-07-17 PROCEDURE — 0159T PR BREAST MRI, COMPUTER AIDED DETECTION: CPT | Performed by: RADIOLOGY

## 2017-07-17 PROCEDURE — C8908 MRI W/O FOL W/CONT, BREAST,: HCPCS

## 2017-07-17 RX ADMIN — GADOBENATE DIMEGLUMINE 20 ML: 529 INJECTION, SOLUTION INTRAVENOUS at 14:46

## 2017-07-18 ENCOUNTER — DOCUMENTATION (OUTPATIENT)
Dept: OTHER | Facility: HOSPITAL | Age: 67
End: 2017-07-18

## 2017-07-18 ENCOUNTER — TELEPHONE (OUTPATIENT)
Dept: MRI IMAGING | Facility: HOSPITAL | Age: 67
End: 2017-07-18

## 2017-07-18 NOTE — TELEPHONE ENCOUNTER
Called pt with results of her MRI. Pt has decided on a left mastectomy. She verbalized understanding of her MRI results. No Biopsies will be scheduled.

## 2017-07-18 NOTE — PROGRESS NOTES
Pt called and was wanting to talk to Dr. SHANKAR about having a mastectomy instead of a lumpectomy. Pt says that she does not want to have to come back often for repeat mammograms after a lumpectomy and has decided that she wants a mastectomy. I called [facility] office and made pt and appointment with Dr. SHANKAR on 7/27/2017 at 10am to discuss setting up a mastectomy. Pt does have questions about postoperative recovery for a mastectomy. I called pt back and let her know the time/date of her FU with Dr. SHANKAR and pt verbalized understanding. I encouraged pt to call back if she had any other questions. AG

## 2017-08-03 ENCOUNTER — OFFICE VISIT (OUTPATIENT)
Dept: INTERNAL MEDICINE | Facility: CLINIC | Age: 67
End: 2017-08-03

## 2017-08-03 VITALS
TEMPERATURE: 97.8 F | RESPIRATION RATE: 16 BRPM | SYSTOLIC BLOOD PRESSURE: 160 MMHG | HEART RATE: 64 BPM | WEIGHT: 217.4 LBS | DIASTOLIC BLOOD PRESSURE: 88 MMHG | BODY MASS INDEX: 33.06 KG/M2

## 2017-08-03 DIAGNOSIS — F41.9 ANXIETY: ICD-10-CM

## 2017-08-03 DIAGNOSIS — E78.5 HYPERLIPIDEMIA, UNSPECIFIED HYPERLIPIDEMIA TYPE: ICD-10-CM

## 2017-08-03 DIAGNOSIS — E03.8 OTHER SPECIFIED HYPOTHYROIDISM: Primary | ICD-10-CM

## 2017-08-03 DIAGNOSIS — K21.9 GASTROESOPHAGEAL REFLUX DISEASE WITHOUT ESOPHAGITIS: ICD-10-CM

## 2017-08-03 DIAGNOSIS — I10 ESSENTIAL HYPERTENSION: ICD-10-CM

## 2017-08-03 LAB
ALBUMIN SERPL-MCNC: 4.3 G/DL (ref 3.2–4.8)
ALBUMIN/GLOB SERPL: 1.5 G/DL (ref 1.5–2.5)
ALP SERPL-CCNC: 115 U/L (ref 25–100)
ALT SERPL W P-5'-P-CCNC: 26 U/L (ref 7–40)
ANION GAP SERPL CALCULATED.3IONS-SCNC: 8 MMOL/L (ref 3–11)
ARTICHOKE IGE QN: 161 MG/DL (ref 0–130)
AST SERPL-CCNC: 25 U/L (ref 0–33)
BILIRUB BLD-MCNC: NEGATIVE MG/DL
BILIRUB SERPL-MCNC: 0.3 MG/DL (ref 0.3–1.2)
BUN BLD-MCNC: 14 MG/DL (ref 9–23)
BUN/CREAT SERPL: 15.6 (ref 7–25)
CALCIUM SPEC-SCNC: 9.4 MG/DL (ref 8.7–10.4)
CHLORIDE SERPL-SCNC: 103 MMOL/L (ref 99–109)
CHOLEST SERPL-MCNC: 233 MG/DL (ref 0–200)
CLARITY, POC: CLEAR
CO2 SERPL-SCNC: 27 MMOL/L (ref 20–31)
COLOR UR: YELLOW
CREAT BLD-MCNC: 0.9 MG/DL (ref 0.6–1.3)
EXPIRATION DATE: NORMAL
GFR SERPL CREATININE-BSD FRML MDRD: 63 ML/MIN/1.73
GLOBULIN UR ELPH-MCNC: 2.8 GM/DL
GLUCOSE BLD-MCNC: 87 MG/DL (ref 70–100)
GLUCOSE UR STRIP-MCNC: NEGATIVE MG/DL
HDLC SERPL-MCNC: 49 MG/DL (ref 40–60)
KETONES UR QL: NEGATIVE
LEUKOCYTE EST, POC: NEGATIVE
Lab: NORMAL
MAGNESIUM SERPL-MCNC: 1.9 MG/DL (ref 1.3–2.7)
NITRITE UR-MCNC: NEGATIVE MG/ML
PH UR: 5 [PH] (ref 5–8)
POTASSIUM BLD-SCNC: 3.7 MMOL/L (ref 3.5–5.5)
PROT SERPL-MCNC: 7.1 G/DL (ref 5.7–8.2)
PROT UR STRIP-MCNC: NEGATIVE MG/DL
RBC # UR STRIP: NEGATIVE /UL
SODIUM BLD-SCNC: 138 MMOL/L (ref 132–146)
SP GR UR: 1.01 (ref 1–1.03)
T4 FREE SERPL-MCNC: 1.27 NG/DL (ref 0.89–1.76)
TRIGL SERPL-MCNC: 139 MG/DL (ref 0–150)
TSH SERPL DL<=0.05 MIU/L-ACNC: 1.25 MIU/ML (ref 0.35–5.35)
UROBILINOGEN UR QL: NORMAL

## 2017-08-03 PROCEDURE — 84439 ASSAY OF FREE THYROXINE: CPT | Performed by: INTERNAL MEDICINE

## 2017-08-03 PROCEDURE — 36415 COLL VENOUS BLD VENIPUNCTURE: CPT | Performed by: INTERNAL MEDICINE

## 2017-08-03 PROCEDURE — 99214 OFFICE O/P EST MOD 30 MIN: CPT | Performed by: INTERNAL MEDICINE

## 2017-08-03 PROCEDURE — 83735 ASSAY OF MAGNESIUM: CPT | Performed by: INTERNAL MEDICINE

## 2017-08-03 PROCEDURE — 80061 LIPID PANEL: CPT | Performed by: INTERNAL MEDICINE

## 2017-08-03 PROCEDURE — 80053 COMPREHEN METABOLIC PANEL: CPT | Performed by: INTERNAL MEDICINE

## 2017-08-03 PROCEDURE — 81003 URINALYSIS AUTO W/O SCOPE: CPT | Performed by: INTERNAL MEDICINE

## 2017-08-03 PROCEDURE — 84443 ASSAY THYROID STIM HORMONE: CPT | Performed by: INTERNAL MEDICINE

## 2017-08-03 NOTE — PROGRESS NOTES
Chief Complaint   Patient presents with   • Follow-up       History of Present Illness    The patient is on ranitidine for her gastroesophageal reflux. The medication is taken on a regular basis and gives complete relief of the symptoms. She reports no abdominal pain, belching, chest pain, diarrhea, dysphagia, early satiety, heartburn, hoarseness, nausea, odynophagia, rectal bleeding, vomiting or weight loss. The GERD has no known aggravating factors.    The patient presents for a follow-up related to hyperlipidemia. She is following a low fat diet. She reports that she is exercising. She is not taking medication for hyperlipidemia. She denies shortness of breath, orthopnea, paroxysmal nocturnal dyspnea, dyspnea on exertion, edema, palpitations or syncope.    The patient presents for a follow-up related to hypertension. The patient reports that she has had no headaches or blurred vision. She states that she is taking her medication as prescribed. She is not having medication side effects. She does not check her blood pressures at home.    As relates to hypothyroidism, the patient reports a good energy level. She reports no hair loss, heat intolerance, cold intolerance, constipation or sweats. She is taking her medication as prescribed her medication as prescribed.    The patient presents for follow-up of anxiety. She denies currently having anxiety symptoms. She is not having panic attacks. Her energy level is good. She denies agorophobia. She sleeps well. She is currently taking a medication. She states that her current anxiety symptoms are stable. The current medication regimen consists of citalopram. The patient denies having medication side effects including nausea, headaches, anxiety, increased depression or fatigue.    Medications      Current Outpatient Prescriptions:   •  albuterol (PROAIR HFA) 108 (90 BASE) MCG/ACT inhaler, Inhale 2 puffs every 6 (six) hours as needed., Disp: , Rfl:   •  amLODIPine  (NORVASC) 5 MG tablet, TAKE ONE TABLET BY MOUTH ONCE DAILY, Disp: 90 tablet, Rfl: 0  •  Cholecalciferol (VITAMIN D PO), Take 1,000 Units by mouth daily. Vitamin D 1000 UNIT CAPS; TAKE 1 TABLET DAILY, Disp: , Rfl:   •  citalopram (CeleXA) 40 MG tablet, TAKE ONE TABLET BY MOUTH ONCE DAILY, Disp: 90 tablet, Rfl: 1  •  dicyclomine (BENTYL) 10 MG capsule, Take 1 capsule by mouth 4 (four) times a day as needed. For: Abdominal bloating, Disp: , Rfl:   •  diphenhydrAMINE (BENADRYL) 25 mg capsule, Take 1 capsule by mouth every 6 (six) hours as needed., Disp: , Rfl:   •  diphenhydrAMINE (BENADRYL) 50 MG/ML injection, INJECT AS NEEDED FOR ALLERGIC REACTION, Disp: 100 mL, Rfl: 0  •  EPINEPHrine (EPIPEN 2-KIRA) 0.3 MG/0.3ML solution auto-injector injection, Inject 1 Units into the shoulder, thigh, or buttocks. as directed; For: Allergic rhinitis, Disp: , Rfl:   •  guaiFENesin (MUCINEX) 600 MG 12 hr tablet, Take 1 tablet by mouth 2 (two) times a day., Disp: , Rfl:   •  hydrOXYzine (ATARAX) 25 MG tablet, Take 1 tablet by mouth at night as needed. at bedtime as needed, Disp: , Rfl:   •  ibuprofen (ADVIL,MOTRIN) 600 MG tablet, TAKE ONE TABLET BY MOUTH THREE TIMES DAILY AS NEEDED, Disp: 90 tablet, Rfl: 0  •  levocetirizine (XYZAL) 5 MG tablet, TAKE ONE TABLET BY MOUTH ONCE DAILY, Disp: 30 tablet, Rfl: 5  •  levothyroxine (SYNTHROID, LEVOTHROID) 175 MCG tablet, TAKE ONE TABLET BY MOUTH ONCE DAILY, Disp: 30 tablet, Rfl: 0  •  losartan (COZAAR) 100 MG tablet, TAKE ONE TABLET BY MOUTH ONCE DAILY, Disp: 90 tablet, Rfl: 0  •  Melatonin 10 MG tablet, Take 1 tablet by mouth At Night As Needed., Disp: , Rfl:   •  montelukast (SINGULAIR) 10 MG tablet, TAKE ONE TABLET BY MOUTH ONCE DAILY, Disp: 90 tablet, Rfl: 0  •  ranitidine (ZANTAC) 150 MG tablet, Take 1 tablet by mouth 2 (two) times a day as needed. AM&PM, Disp: , Rfl:   •  Triamcinolone Acetonide (NASACORT ALLERGY 24HR) 55 MCG/ACT nasal inhaler, 2 sprays into each nostril daily., Disp: ,  Rfl:   •  triamterene-hydrochlorothiazide (MAXZIDE-25) 37.5-25 MG per tablet, TAKE ONE TABLET BY MOUTH ONCE DAILY, Disp: 30 tablet, Rfl: 5  •  aspirin 81 MG EC tablet, Take 1 tablet by mouth daily., Disp: , Rfl:      Allergies    Allergies   Allergen Reactions   • Other      MSG - HIVES    • Cefuroxime      Other reaction(s): UNKNOWN   • Cephalexin    • Clindamycin Nausea Only and Nausea And Vomiting   • Erythromycin Nausea Only   • Flu Virus Vaccine    • Metronidazole      Other reaction(s): UNKNOWN   • Naproxen Dizziness   • Thimerosal      Other reaction(s): UNKNOWN   • Varicella Virus Vaccine Live    • Zostavax [Zoster Vaccine Live]      24293 UNT /0.65 ML subcutaneous solution Recontituted    • Penicillins Rash   • Sulfa Antibiotics Nausea Only and Rash     Other reaction(s): Headache, Vomiting       Problem List    Patient Active Problem List   Diagnosis   • Anxiety   • Gastroesophageal reflux disease without esophagitis   • Hyperlipidemia   • Hypertension   • Hypothyroidism   • Cataract       Medications, Allergies, Problems List and Past History were reviewed and updated.    Physical Examination    /88 (BP Location: Left arm, Patient Position: Sitting, Cuff Size: Adult)  Pulse 64  Temp 97.8 °F (36.6 °C) (Temporal Artery )   Resp 16  Wt 217 lb 6.4 oz (98.6 kg)  BMI 33.06 kg/m2    Neck: Thyroid- non enlarged, symmetric and has no nodules. No bruits are detected.    Lungs: Auscultation- Clear to auscultation bilaterally. There are no retractions, clubbing or cyanosis. The Expiratory to Inspiratory ratio is equal.    Cardiovascular: There are no carotid bruits. Heart- Normal Rate with Regular rhythm and no murmurs. There are no gallops. There are no rubs. In the lower extremities there is no edema. The upper extremities do not have edema.    Abdomen: Soft, benign, non-tender with no masses, hernias, organomegaly or scars.    Impression and Assessment    Gastroesophageal Reflux  Disease.    Hyperlipidemia.    Essential Hypertension.    Hypothyroidism.    Anxiety Disorder.    Plan    Gastroesophageal Reflux Disease Plan: The current plan was continued.    Hyperlipidemia Plan: She was instructed to eat a low fat diet. She was encouraged to exercise daily.    Essential Hypertension Plan: The current plan was continued.    Hypothyroidism Plan: The current plan was continued.    Anxiety Disorder Plan: The condition is stable. No change is needed in the current plan.    Sarah was seen today for follow-up.    Diagnoses and all orders for this visit:    Other specified hypothyroidism  -     TSH  -     T4, Free    Essential hypertension  -     Comprehensive Metabolic Panel  -     POC Urinalysis Dipstick, Automated    Hyperlipidemia, unspecified hyperlipidemia type  -     Comprehensive Metabolic Panel  -     Lipid Panel    Gastroesophageal reflux disease without esophagitis  -     Comprehensive Metabolic Panel  -     Magnesium    Anxiety  -     TSH        Return to Office    The patient was instructed to return for follow-up in 3 months.    The patient was instructed to return sooner if the condition changes, worsens, or doesn't resolve.

## 2017-08-04 RX ORDER — IBUPROFEN 600 MG/1
TABLET ORAL
Qty: 90 TABLET | Refills: 0 | Status: SHIPPED | OUTPATIENT
Start: 2017-08-04 | End: 2017-08-31

## 2017-08-22 ENCOUNTER — TELEPHONE (OUTPATIENT)
Dept: INTERNAL MEDICINE | Facility: CLINIC | Age: 67
End: 2017-08-22

## 2017-08-23 NOTE — TELEPHONE ENCOUNTER
Attempted to call patient.    No answer.  Please call and let her know that her lab results are essentially normal.  Tyrese Recinos MD  9:19 PM  08/22/17

## 2017-08-23 NOTE — TELEPHONE ENCOUNTER
S/W PT, INFORMED THAT DR BLANC SAID HER LABS WERE ESSENTIALLY NORMAL.  VERB UNDERSTANDING AND APPREC.

## 2017-08-23 NOTE — TELEPHONE ENCOUNTER
----- Message from Zina Larson sent at 8/16/2017  2:24 PM EDT -----  PT CALLED WANTING HER LAB WORK RESULTS. SHE CAN BE REACHED [phone]

## 2017-08-31 ENCOUNTER — APPOINTMENT (OUTPATIENT)
Dept: PREADMISSION TESTING | Facility: HOSPITAL | Age: 67
End: 2017-08-31

## 2017-08-31 VITALS — WEIGHT: 208.78 LBS | BODY MASS INDEX: 29.89 KG/M2 | HEIGHT: 70 IN

## 2017-08-31 LAB
ALBUMIN SERPL-MCNC: 4.4 G/DL (ref 3.2–4.8)
ALBUMIN/GLOB SERPL: 1.6 G/DL (ref 1.5–2.5)
ALP SERPL-CCNC: 122 U/L (ref 25–100)
ALT SERPL W P-5'-P-CCNC: 30 U/L (ref 7–40)
ANION GAP SERPL CALCULATED.3IONS-SCNC: 13 MMOL/L (ref 3–11)
AST SERPL-CCNC: 36 U/L (ref 0–33)
BILIRUB SERPL-MCNC: 0.3 MG/DL (ref 0.3–1.2)
BUN BLD-MCNC: 29 MG/DL (ref 9–23)
BUN/CREAT SERPL: 22.3 (ref 7–25)
CALCIUM SPEC-SCNC: 9.5 MG/DL (ref 8.7–10.4)
CHLORIDE SERPL-SCNC: 101 MMOL/L (ref 99–109)
CO2 SERPL-SCNC: 23 MMOL/L (ref 20–31)
CREAT BLD-MCNC: 1.3 MG/DL (ref 0.6–1.3)
DEPRECATED RDW RBC AUTO: 40.5 FL (ref 37–54)
ERYTHROCYTE [DISTWIDTH] IN BLOOD BY AUTOMATED COUNT: 13.4 % (ref 11.3–14.5)
GFR SERPL CREATININE-BSD FRML MDRD: 41 ML/MIN/1.73
GLOBULIN UR ELPH-MCNC: 2.8 GM/DL
GLUCOSE BLD-MCNC: 115 MG/DL (ref 70–100)
HCT VFR BLD AUTO: 42 % (ref 34.5–44)
HGB BLD-MCNC: 14.4 G/DL (ref 11.5–15.5)
MCH RBC QN AUTO: 28.3 PG (ref 27–31)
MCHC RBC AUTO-ENTMCNC: 34.3 G/DL (ref 32–36)
MCV RBC AUTO: 82.7 FL (ref 80–99)
PLATELET # BLD AUTO: 238 10*3/MM3 (ref 150–450)
PMV BLD AUTO: 9.4 FL (ref 6–12)
POTASSIUM BLD-SCNC: 3.3 MMOL/L (ref 3.5–5.5)
PROT SERPL-MCNC: 7.2 G/DL (ref 5.7–8.2)
RBC # BLD AUTO: 5.08 10*6/MM3 (ref 3.89–5.14)
SODIUM BLD-SCNC: 137 MMOL/L (ref 132–146)
WBC NRBC COR # BLD: 5.84 10*3/MM3 (ref 3.5–10.8)

## 2017-08-31 PROCEDURE — 85027 COMPLETE CBC AUTOMATED: CPT | Performed by: ANESTHESIOLOGY

## 2017-08-31 PROCEDURE — 80053 COMPREHEN METABOLIC PANEL: CPT | Performed by: SURGERY

## 2017-08-31 PROCEDURE — 93010 ELECTROCARDIOGRAM REPORT: CPT | Performed by: INTERNAL MEDICINE

## 2017-08-31 PROCEDURE — 93005 ELECTROCARDIOGRAM TRACING: CPT

## 2017-08-31 PROCEDURE — 36415 COLL VENOUS BLD VENIPUNCTURE: CPT

## 2017-08-31 RX ORDER — IBUPROFEN 600 MG/1
600 TABLET ORAL EVERY 6 HOURS PRN
COMMUNITY
End: 2018-02-06 | Stop reason: SDUPTHER

## 2017-08-31 RX ORDER — AMLODIPINE BESYLATE 5 MG/1
5 TABLET ORAL DAILY
COMMUNITY
End: 2017-09-25 | Stop reason: SDUPTHER

## 2017-08-31 RX ORDER — LANOLIN ALCOHOL/MO/W.PET/CERES
1000 CREAM (GRAM) TOPICAL DAILY
COMMUNITY

## 2017-08-31 RX ORDER — CITALOPRAM 40 MG/1
40 TABLET ORAL DAILY
COMMUNITY
End: 2018-02-06 | Stop reason: SDUPTHER

## 2017-08-31 RX ORDER — LEVOCETIRIZINE DIHYDROCHLORIDE 5 MG/1
5 TABLET, FILM COATED ORAL 2 TIMES DAILY
COMMUNITY
End: 2018-02-06 | Stop reason: SDUPTHER

## 2017-08-31 RX ORDER — TRIAMTERENE AND HYDROCHLOROTHIAZIDE 37.5; 25 MG/1; MG/1
1 TABLET ORAL DAILY
COMMUNITY
End: 2018-02-06 | Stop reason: SDUPTHER

## 2017-08-31 RX ORDER — CEFAZOLIN SODIUM 2 G/100ML
2 INJECTION, SOLUTION INTRAVENOUS ONCE
Status: CANCELLED | OUTPATIENT
Start: 2017-09-05

## 2017-08-31 RX ORDER — MONTELUKAST SODIUM 10 MG/1
10 TABLET ORAL NIGHTLY
COMMUNITY
End: 2017-10-30 | Stop reason: SDUPTHER

## 2017-08-31 RX ORDER — LOSARTAN POTASSIUM 100 MG/1
100 TABLET ORAL DAILY
COMMUNITY
End: 2017-10-30 | Stop reason: SDUPTHER

## 2017-08-31 RX ORDER — LEVOTHYROXINE SODIUM 175 UG/1
175 TABLET ORAL DAILY
COMMUNITY
End: 2018-02-06 | Stop reason: SDUPTHER

## 2017-08-31 RX ORDER — CELECOXIB 200 MG/1
200 CAPSULE ORAL ONCE
Status: CANCELLED | OUTPATIENT
Start: 2017-09-05

## 2017-09-05 ENCOUNTER — HOSPITAL ENCOUNTER (OUTPATIENT)
Dept: NUCLEAR MEDICINE | Facility: HOSPITAL | Age: 67
Discharge: HOME | End: 2017-09-05

## 2017-09-05 ENCOUNTER — HOSPITAL ENCOUNTER (OUTPATIENT)
Facility: HOSPITAL | Age: 67
Discharge: HOME | End: 2017-09-06
Attending: SURGERY | Admitting: SURGERY

## 2017-09-05 ENCOUNTER — ANESTHESIA (OUTPATIENT)
Dept: PERIOP | Facility: HOSPITAL | Age: 67
End: 2017-09-05

## 2017-09-05 ENCOUNTER — ANESTHESIA EVENT (OUTPATIENT)
Dept: PERIOP | Facility: HOSPITAL | Age: 67
End: 2017-09-05

## 2017-09-05 DIAGNOSIS — C50.912 MALIGNANT NEOPLASM OF LEFT FEMALE BREAST (HCC): ICD-10-CM

## 2017-09-05 DIAGNOSIS — C50.919 BREAST CA (HCC): ICD-10-CM

## 2017-09-05 LAB — POTASSIUM BLDA-SCNC: 3.42 MMOL/L (ref 3.5–5.3)

## 2017-09-05 PROCEDURE — 38792 RA TRACER ID OF SENTINL NODE: CPT

## 2017-09-05 PROCEDURE — 25010000003 CEFAZOLIN IN DEXTROSE 2-4 GM/100ML-% SOLUTION: Performed by: NURSE ANESTHETIST, CERTIFIED REGISTERED

## 2017-09-05 PROCEDURE — 88331 PATH CONSLTJ SURG 1 BLK 1SPC: CPT | Performed by: SURGERY

## 2017-09-05 PROCEDURE — 0 TECHNETIUM FILTERED SULFUR COLLOID: Performed by: SURGERY

## 2017-09-05 PROCEDURE — 25010000002 PROPOFOL 10 MG/ML EMULSION: Performed by: NURSE ANESTHETIST, CERTIFIED REGISTERED

## 2017-09-05 PROCEDURE — 88307 TISSUE EXAM BY PATHOLOGIST: CPT | Performed by: SURGERY

## 2017-09-05 PROCEDURE — 25010000002 BUPRENORPHINE PER 0.1 MG: Performed by: NURSE ANESTHETIST, CERTIFIED REGISTERED

## 2017-09-05 PROCEDURE — 25010000002 DEXAMETHASONE SODIUM PHOSPHATE 10 MG/ML SOLUTION 1 ML VIAL: Performed by: NURSE ANESTHETIST, CERTIFIED REGISTERED

## 2017-09-05 PROCEDURE — 88360 TUMOR IMMUNOHISTOCHEM/MANUAL: CPT | Performed by: SURGERY

## 2017-09-05 PROCEDURE — 84132 ASSAY OF SERUM POTASSIUM: CPT | Performed by: SURGERY

## 2017-09-05 PROCEDURE — 25010000002 NEOSTIGMINE PER 0.5 MG: Performed by: NURSE ANESTHETIST, CERTIFIED REGISTERED

## 2017-09-05 PROCEDURE — 25010000003 CEFAZOLIN IN DEXTROSE 2-4 GM/100ML-% SOLUTION: Performed by: SURGERY

## 2017-09-05 PROCEDURE — 25010000002 HYDROMORPHONE PER 4 MG: Performed by: NURSE ANESTHETIST, CERTIFIED REGISTERED

## 2017-09-05 PROCEDURE — A9541 TC99M SULFUR COLLOID: HCPCS | Performed by: SURGERY

## 2017-09-05 PROCEDURE — 88342 IMHCHEM/IMCYTCHM 1ST ANTB: CPT | Performed by: SURGERY

## 2017-09-05 PROCEDURE — 25010000002 ONDANSETRON PER 1 MG: Performed by: NURSE ANESTHETIST, CERTIFIED REGISTERED

## 2017-09-05 PROCEDURE — 25010000002 DEXAMETHASONE PER 1 MG: Performed by: NURSE ANESTHETIST, CERTIFIED REGISTERED

## 2017-09-05 RX ORDER — GUAIFENESIN 600 MG/1
600 TABLET, EXTENDED RELEASE ORAL 2 TIMES DAILY
Status: DISCONTINUED | OUTPATIENT
Start: 2017-09-05 | End: 2017-09-06 | Stop reason: HOSPADM

## 2017-09-05 RX ORDER — DIPHENHYDRAMINE HYDROCHLORIDE 50 MG/ML
12.5 INJECTION INTRAMUSCULAR; INTRAVENOUS EVERY 6 HOURS PRN
Status: DISCONTINUED | OUTPATIENT
Start: 2017-09-05 | End: 2017-09-06 | Stop reason: HOSPADM

## 2017-09-05 RX ORDER — GLYCOPYRROLATE 0.2 MG/ML
INJECTION INTRAMUSCULAR; INTRAVENOUS AS NEEDED
Status: DISCONTINUED | OUTPATIENT
Start: 2017-09-05 | End: 2017-09-05 | Stop reason: SURG

## 2017-09-05 RX ORDER — ONDANSETRON 2 MG/ML
4 INJECTION INTRAMUSCULAR; INTRAVENOUS EVERY 6 HOURS PRN
Status: DISCONTINUED | OUTPATIENT
Start: 2017-09-05 | End: 2017-09-06 | Stop reason: HOSPADM

## 2017-09-05 RX ORDER — OXYCODONE HYDROCHLORIDE 5 MG/1
5 TABLET ORAL EVERY 4 HOURS PRN
Status: DISCONTINUED | OUTPATIENT
Start: 2017-09-05 | End: 2017-09-06 | Stop reason: HOSPADM

## 2017-09-05 RX ORDER — BACITRACIN ZINC AND POLYMYXIN B SULFATE 500; 10000 [USP'U]/G; [USP'U]/G
OINTMENT TOPICAL AS NEEDED
Status: DISCONTINUED | OUTPATIENT
Start: 2017-09-05 | End: 2017-09-05 | Stop reason: HOSPADM

## 2017-09-05 RX ORDER — HYDROCODONE BITARTRATE AND ACETAMINOPHEN 5; 325 MG/1; MG/1
1 TABLET ORAL ONCE AS NEEDED
Status: DISCONTINUED | OUTPATIENT
Start: 2017-09-05 | End: 2017-09-05 | Stop reason: HOSPADM

## 2017-09-05 RX ORDER — PROMETHAZINE HYDROCHLORIDE 25 MG/ML
6.25 INJECTION, SOLUTION INTRAMUSCULAR; INTRAVENOUS EVERY 6 HOURS PRN
Status: DISCONTINUED | OUTPATIENT
Start: 2017-09-05 | End: 2017-09-06 | Stop reason: HOSPADM

## 2017-09-05 RX ORDER — TRIAMTERENE AND HYDROCHLOROTHIAZIDE 37.5; 25 MG/1; MG/1
1 TABLET ORAL DAILY
Status: DISCONTINUED | OUTPATIENT
Start: 2017-09-05 | End: 2017-09-06 | Stop reason: HOSPADM

## 2017-09-05 RX ORDER — PREGABALIN 75 MG/1
75 CAPSULE ORAL ONCE
Status: COMPLETED | OUTPATIENT
Start: 2017-09-05 | End: 2017-09-05

## 2017-09-05 RX ORDER — LEVOTHYROXINE SODIUM 175 UG/1
175 TABLET ORAL
Status: DISCONTINUED | OUTPATIENT
Start: 2017-09-05 | End: 2017-09-06 | Stop reason: HOSPADM

## 2017-09-05 RX ORDER — ACETAMINOPHEN 500 MG
1000 TABLET ORAL EVERY 6 HOURS SCHEDULED
Status: DISCONTINUED | OUTPATIENT
Start: 2017-09-05 | End: 2017-09-06 | Stop reason: HOSPADM

## 2017-09-05 RX ORDER — ENALAPRILAT 2.5 MG/2ML
1.25 INJECTION INTRAVENOUS EVERY 6 HOURS PRN
Status: DISCONTINUED | OUTPATIENT
Start: 2017-09-05 | End: 2017-09-06 | Stop reason: HOSPADM

## 2017-09-05 RX ORDER — ATRACURIUM BESYLATE 10 MG/ML
INJECTION, SOLUTION INTRAVENOUS AS NEEDED
Status: DISCONTINUED | OUTPATIENT
Start: 2017-09-05 | End: 2017-09-05 | Stop reason: SURG

## 2017-09-05 RX ORDER — ONDANSETRON 2 MG/ML
INJECTION INTRAMUSCULAR; INTRAVENOUS AS NEEDED
Status: DISCONTINUED | OUTPATIENT
Start: 2017-09-05 | End: 2017-09-05 | Stop reason: SURG

## 2017-09-05 RX ORDER — EPINEPHRINE 0.3 MG/.3ML
0.3 INJECTION SUBCUTANEOUS AS NEEDED
Status: DISCONTINUED | OUTPATIENT
Start: 2017-09-05 | End: 2017-09-06 | Stop reason: HOSPADM

## 2017-09-05 RX ORDER — FAMOTIDINE 20 MG/1
20 TABLET, FILM COATED ORAL 2 TIMES DAILY
Status: DISCONTINUED | OUTPATIENT
Start: 2017-09-05 | End: 2017-09-06 | Stop reason: HOSPADM

## 2017-09-05 RX ORDER — LIDOCAINE HYDROCHLORIDE 10 MG/ML
INJECTION, SOLUTION INFILTRATION; PERINEURAL AS NEEDED
Status: DISCONTINUED | OUTPATIENT
Start: 2017-09-05 | End: 2017-09-05 | Stop reason: SURG

## 2017-09-05 RX ORDER — SODIUM CHLORIDE 0.9 % (FLUSH) 0.9 %
1-10 SYRINGE (ML) INJECTION AS NEEDED
Status: DISCONTINUED | OUTPATIENT
Start: 2017-09-05 | End: 2017-09-05 | Stop reason: HOSPADM

## 2017-09-05 RX ORDER — PROPOFOL 10 MG/ML
VIAL (ML) INTRAVENOUS CONTINUOUS PRN
Status: DISCONTINUED | OUTPATIENT
Start: 2017-09-05 | End: 2017-09-05 | Stop reason: SURG

## 2017-09-05 RX ORDER — PROMETHAZINE HYDROCHLORIDE 25 MG/ML
6.25 INJECTION, SOLUTION INTRAMUSCULAR; INTRAVENOUS ONCE AS NEEDED
Status: DISCONTINUED | OUTPATIENT
Start: 2017-09-05 | End: 2017-09-05 | Stop reason: HOSPADM

## 2017-09-05 RX ORDER — HYDROMORPHONE HYDROCHLORIDE 1 MG/ML
0.3 INJECTION, SOLUTION INTRAMUSCULAR; INTRAVENOUS; SUBCUTANEOUS
Status: DISCONTINUED | OUTPATIENT
Start: 2017-09-05 | End: 2017-09-06 | Stop reason: HOSPADM

## 2017-09-05 RX ORDER — CEFAZOLIN SODIUM 2 G/100ML
2 INJECTION, SOLUTION INTRAVENOUS EVERY 8 HOURS
Status: COMPLETED | OUTPATIENT
Start: 2017-09-05 | End: 2017-09-06

## 2017-09-05 RX ORDER — NALOXONE HCL 0.4 MG/ML
0.1 VIAL (ML) INJECTION
Status: DISCONTINUED | OUTPATIENT
Start: 2017-09-05 | End: 2017-09-06 | Stop reason: HOSPADM

## 2017-09-05 RX ORDER — SODIUM CHLORIDE 9 MG/ML
75 INJECTION, SOLUTION INTRAVENOUS CONTINUOUS
Status: DISCONTINUED | OUTPATIENT
Start: 2017-09-05 | End: 2017-09-06 | Stop reason: HOSPADM

## 2017-09-05 RX ORDER — SODIUM CHLORIDE, SODIUM LACTATE, POTASSIUM CHLORIDE, CALCIUM CHLORIDE 600; 310; 30; 20 MG/100ML; MG/100ML; MG/100ML; MG/100ML
9 INJECTION, SOLUTION INTRAVENOUS CONTINUOUS
Status: DISCONTINUED | OUTPATIENT
Start: 2017-09-05 | End: 2017-09-05

## 2017-09-05 RX ORDER — FAMOTIDINE 10 MG/ML
20 INJECTION, SOLUTION INTRAVENOUS ONCE
Status: DISCONTINUED | OUTPATIENT
Start: 2017-09-05 | End: 2017-09-05

## 2017-09-05 RX ORDER — CITALOPRAM 40 MG/1
40 TABLET ORAL DAILY
Status: DISCONTINUED | OUTPATIENT
Start: 2017-09-05 | End: 2017-09-06 | Stop reason: HOSPADM

## 2017-09-05 RX ORDER — PROMETHAZINE HYDROCHLORIDE 25 MG/1
25 TABLET ORAL ONCE AS NEEDED
Status: DISCONTINUED | OUTPATIENT
Start: 2017-09-05 | End: 2017-09-05 | Stop reason: HOSPADM

## 2017-09-05 RX ORDER — HYDROXYZINE HYDROCHLORIDE 25 MG/1
25 TABLET, FILM COATED ORAL NIGHTLY PRN
Status: DISCONTINUED | OUTPATIENT
Start: 2017-09-05 | End: 2017-09-06 | Stop reason: HOSPADM

## 2017-09-05 RX ORDER — SCOLOPAMINE TRANSDERMAL SYSTEM 1 MG/1
1 PATCH, EXTENDED RELEASE TRANSDERMAL ONCE
Status: DISCONTINUED | OUTPATIENT
Start: 2017-09-05 | End: 2017-09-05

## 2017-09-05 RX ORDER — PROMETHAZINE HYDROCHLORIDE 25 MG/1
25 SUPPOSITORY RECTAL ONCE AS NEEDED
Status: DISCONTINUED | OUTPATIENT
Start: 2017-09-05 | End: 2017-09-05 | Stop reason: HOSPADM

## 2017-09-05 RX ORDER — HYDROMORPHONE HYDROCHLORIDE 1 MG/ML
0.5 INJECTION, SOLUTION INTRAMUSCULAR; INTRAVENOUS; SUBCUTANEOUS
Status: DISCONTINUED | OUTPATIENT
Start: 2017-09-05 | End: 2017-09-05 | Stop reason: HOSPADM

## 2017-09-05 RX ORDER — DEXAMETHASONE SODIUM PHOSPHATE 4 MG/ML
INJECTION, SOLUTION INTRA-ARTICULAR; INTRALESIONAL; INTRAMUSCULAR; INTRAVENOUS; SOFT TISSUE AS NEEDED
Status: DISCONTINUED | OUTPATIENT
Start: 2017-09-05 | End: 2017-09-05 | Stop reason: SURG

## 2017-09-05 RX ORDER — LOSARTAN POTASSIUM 50 MG/1
100 TABLET ORAL DAILY
Status: DISCONTINUED | OUTPATIENT
Start: 2017-09-05 | End: 2017-09-06 | Stop reason: HOSPADM

## 2017-09-05 RX ORDER — AMLODIPINE BESYLATE 5 MG/1
5 TABLET ORAL DAILY
Status: DISCONTINUED | OUTPATIENT
Start: 2017-09-05 | End: 2017-09-06 | Stop reason: HOSPADM

## 2017-09-05 RX ORDER — DICYCLOMINE HYDROCHLORIDE 10 MG/1
10 CAPSULE ORAL 4 TIMES DAILY PRN
Status: DISCONTINUED | OUTPATIENT
Start: 2017-09-05 | End: 2017-09-06 | Stop reason: HOSPADM

## 2017-09-05 RX ORDER — ONDANSETRON 2 MG/ML
4 INJECTION INTRAMUSCULAR; INTRAVENOUS ONCE AS NEEDED
Status: DISCONTINUED | OUTPATIENT
Start: 2017-09-05 | End: 2017-09-05 | Stop reason: HOSPADM

## 2017-09-05 RX ORDER — FENTANYL CITRATE 50 UG/ML
50 INJECTION, SOLUTION INTRAMUSCULAR; INTRAVENOUS
Status: DISCONTINUED | OUTPATIENT
Start: 2017-09-05 | End: 2017-09-05 | Stop reason: HOSPADM

## 2017-09-05 RX ORDER — LIDOCAINE HYDROCHLORIDE 10 MG/ML
0.5 INJECTION, SOLUTION EPIDURAL; INFILTRATION; INTRACAUDAL; PERINEURAL ONCE AS NEEDED
Status: COMPLETED | OUTPATIENT
Start: 2017-09-05 | End: 2017-09-05

## 2017-09-05 RX ORDER — DIAZEPAM 2 MG/1
2 TABLET ORAL EVERY 8 HOURS PRN
Status: DISCONTINUED | OUTPATIENT
Start: 2017-09-05 | End: 2017-09-06 | Stop reason: HOSPADM

## 2017-09-05 RX ORDER — PROPOFOL 10 MG/ML
VIAL (ML) INTRAVENOUS AS NEEDED
Status: DISCONTINUED | OUTPATIENT
Start: 2017-09-05 | End: 2017-09-05 | Stop reason: SURG

## 2017-09-05 RX ORDER — FLUTICASONE PROPIONATE 50 MCG
2 SPRAY, SUSPENSION (ML) NASAL 2 TIMES DAILY
Status: DISCONTINUED | OUTPATIENT
Start: 2017-09-05 | End: 2017-09-06 | Stop reason: HOSPADM

## 2017-09-05 RX ORDER — FAMOTIDINE 20 MG/1
20 TABLET, FILM COATED ORAL ONCE
Status: COMPLETED | OUTPATIENT
Start: 2017-09-05 | End: 2017-09-05

## 2017-09-05 RX ORDER — CEFAZOLIN SODIUM 2 G/100ML
INJECTION, SOLUTION INTRAVENOUS AS NEEDED
Status: DISCONTINUED | OUTPATIENT
Start: 2017-09-05 | End: 2017-09-05 | Stop reason: SURG

## 2017-09-05 RX ORDER — MAGNESIUM HYDROXIDE 1200 MG/15ML
LIQUID ORAL AS NEEDED
Status: DISCONTINUED | OUTPATIENT
Start: 2017-09-05 | End: 2017-09-05 | Stop reason: HOSPADM

## 2017-09-05 RX ADMIN — LIDOCAINE HYDROCHLORIDE 50 MG: 10 INJECTION, SOLUTION INFILTRATION; PERINEURAL at 08:05

## 2017-09-05 RX ADMIN — CEFAZOLIN SODIUM 2 G: 2 INJECTION, SOLUTION INTRAVENOUS at 08:00

## 2017-09-05 RX ADMIN — LEVOTHYROXINE SODIUM 175 MCG: 175 TABLET ORAL at 15:44

## 2017-09-05 RX ADMIN — CEFAZOLIN SODIUM 2 G: 2 INJECTION, SOLUTION INTRAVENOUS at 23:31

## 2017-09-05 RX ADMIN — LIDOCAINE HYDROCHLORIDE 0.5 ML: 10 INJECTION, SOLUTION EPIDURAL; INFILTRATION; INTRACAUDAL; PERINEURAL at 06:32

## 2017-09-05 RX ADMIN — LOSARTAN POTASSIUM 100 MG: 50 TABLET ORAL at 15:46

## 2017-09-05 RX ADMIN — DEXAMETHASONE SODIUM PHOSPHATE 60 ML: 10 INJECTION, SOLUTION INTRAMUSCULAR; INTRAVENOUS at 08:06

## 2017-09-05 RX ADMIN — CEFAZOLIN SODIUM 2 G: 2 INJECTION, SOLUTION INTRAVENOUS at 17:46

## 2017-09-05 RX ADMIN — TRIAMTERENE AND HYDROCHLOROTHIAZIDE 1 TABLET: 37.5; 25 TABLET ORAL at 15:46

## 2017-09-05 RX ADMIN — FAMOTIDINE 20 MG: 20 TABLET ORAL at 06:31

## 2017-09-05 RX ADMIN — PREGABALIN 75 MG: 75 CAPSULE ORAL at 06:31

## 2017-09-05 RX ADMIN — ACETAMINOPHEN 1000 MG: 500 TABLET ORAL at 17:46

## 2017-09-05 RX ADMIN — CITALOPRAM HYDROBROMIDE 40 MG: 40 TABLET ORAL at 17:47

## 2017-09-05 RX ADMIN — ATRACURIUM BESYLATE 50 MG: 10 INJECTION, SOLUTION INTRAVENOUS at 08:05

## 2017-09-05 RX ADMIN — HYDROMORPHONE HYDROCHLORIDE 0.5 MG: 1 INJECTION, SOLUTION INTRAMUSCULAR; INTRAVENOUS; SUBCUTANEOUS at 11:44

## 2017-09-05 RX ADMIN — FLUTICASONE PROPIONATE 2 SPRAY: 50 SPRAY, METERED NASAL at 17:46

## 2017-09-05 RX ADMIN — SODIUM CHLORIDE, POTASSIUM CHLORIDE, SODIUM LACTATE AND CALCIUM CHLORIDE 9 ML/HR: 600; 310; 30; 20 INJECTION, SOLUTION INTRAVENOUS at 06:31

## 2017-09-05 RX ADMIN — GUAIFENESIN 600 MG: 600 TABLET, EXTENDED RELEASE ORAL at 17:47

## 2017-09-05 RX ADMIN — ACETAMINOPHEN 1000 MG: 500 TABLET ORAL at 15:44

## 2017-09-05 RX ADMIN — ONDANSETRON 4 MG: 2 INJECTION INTRAMUSCULAR; INTRAVENOUS at 10:20

## 2017-09-05 RX ADMIN — HYDROMORPHONE HYDROCHLORIDE 0.5 MG: 1 INJECTION, SOLUTION INTRAMUSCULAR; INTRAVENOUS; SUBCUTANEOUS at 11:23

## 2017-09-05 RX ADMIN — Medication 2 MG: at 11:04

## 2017-09-05 RX ADMIN — AMLODIPINE BESYLATE 5 MG: 5 TABLET ORAL at 15:45

## 2017-09-05 RX ADMIN — PROPOFOL 188 MCG/KG/MIN: 10 INJECTION, EMULSION INTRAVENOUS at 08:05

## 2017-09-05 RX ADMIN — DEXAMETHASONE SODIUM PHOSPHATE 4 MG: 4 INJECTION, SOLUTION INTRAMUSCULAR; INTRAVENOUS at 08:42

## 2017-09-05 RX ADMIN — ROBINUL 0.2 MG: 0.2 INJECTION INTRAMUSCULAR; INTRAVENOUS at 11:04

## 2017-09-05 RX ADMIN — FAMOTIDINE 20 MG: 20 TABLET ORAL at 17:46

## 2017-09-05 RX ADMIN — ACETAMINOPHEN 1000 MG: 500 TABLET ORAL at 23:30

## 2017-09-05 RX ADMIN — ROBINUL 0.2 MG: 0.2 INJECTION INTRAMUSCULAR; INTRAVENOUS at 08:44

## 2017-09-05 RX ADMIN — ATRACURIUM BESYLATE 10 MG: 10 INJECTION, SOLUTION INTRAVENOUS at 09:08

## 2017-09-05 RX ADMIN — SCOPALAMINE 1 PATCH: 1 PATCH, EXTENDED RELEASE TRANSDERMAL at 06:31

## 2017-09-05 RX ADMIN — SODIUM CHLORIDE 75 ML/HR: 9 INJECTION, SOLUTION INTRAVENOUS at 23:30

## 2017-09-05 RX ADMIN — PROPOFOL 200 MG: 10 INJECTION, EMULSION INTRAVENOUS at 08:05

## 2017-09-05 RX ADMIN — TECHNETIUM TC 99M SULFUR COLLOID 1 DOSE: KIT at 08:13

## 2017-09-05 NOTE — BRIEF OP NOTE
BREAST MASTECTOMY WITH SENTINEL NODE BIOPSY AND AXILLARY NODE DISSECTION BILATERAL  Procedure Note    Sarah Raphael  9/5/2017    Pre-op Diagnosis:   Left breast cancer  Post-op Diagnosis:     Post-Op Diagnosis Codes:     * Carcinoma of breast upper outer quadrant, left [C50.412]    Procedure/CPT® Codes:      Procedure(s):  LEFT BREAST MASTECTOMY WITH LEFT  SENTINEL NODE BIOPSY AND RIGHT PROPHYLACTIC MASTECTOMY    Surgeon(s):  Caitlin Green MD    Anesthesia: General    Staff:   Circulator: Karina Beth RN; Em Fontana RN  Scrub Person: Alejandra Ramachandran  Nursing Assistant: Audrey Humphrey  Assistant: EDILMA Ely    Estimated Blood Loss: *No blood loss documented*  Urine Voided: * No values recorded between 9/5/2017  8:00 AM and 9/5/2017 10:56 AM *    Specimens:                  ID Type Source Tests Collected by Time Destination   A : SUTURE IS LATERAL Tissue Breast, Right TISSUE EXAM Caitlin Green MD 9/5/2017 0918    B : suture is lateral, 1.85kg Tissue Breast, Left TISSUE EXAM Caitlin Green MD 9/5/2017 1019    C : left sentinel node Tissue [place] Lymph Node TISSUE EXAM Caitlin Green MD 9/5/2017 0907          Drains:   Drain/Device Site 09/05/17 1034 Right breast collapsible closed device 1 (Active)       Drain/Device Site 09/05/17 1035 Left breast collapsible closed device 1 (Active)           Findings: 2 left SLN negative    Complications: none      Caitlin Green MD     Date: 9/5/2017  Time: 10:56 AM

## 2017-09-05 NOTE — PROGRESS NOTES
Sarah Raphael  1950  [id number]    Surgery Post - Operative Note    Date of visit: 9/5/2017    Subjective:sleeping comfortably    Objective:    /77  Pulse 62  Temp 98.3 °F (36.8 °C)  Resp 16  SpO2 92%    Intake/Output Summary (Last 24 hours) at 09/05/17 1436  Last data filed at 09/05/17 1240   Gross per 24 hour   Intake              600 ml   Output               35 ml   Net              565 ml         CV: Regular rate and rhythm  L: normal air entry  BREAST:dressings dry with no swelling. SHANON drainage adequate      LABS:      Results from last 7 days  Lab Units 08/31/17  0928   WBC 10*3/mm3 5.84   HEMOGLOBIN g/dL 14.4   HEMATOCRIT % 42.0   PLATELETS 10*3/mm3 238       Results from last 7 days  Lab Units 08/31/17  1027   SODIUM mmol/L 137   POTASSIUM mmol/L 3.3*   CHLORIDE mmol/L 101   CO2 mmol/L 23.0   BUN mg/dL 29*   CREATININE mg/dL 1.30   CALCIUM mg/dL 9.5   BILIRUBIN mg/dL 0.3   ALK PHOS U/L 122*   ALT (SGPT) U/L 30   AST (SGOT) U/L 36*   GLUCOSE mg/dL 115*       Results from last 7 days  Lab Units 08/31/17  1027   SODIUM mmol/L 137   POTASSIUM mmol/L 3.3*   CHLORIDE mmol/L 101   CO2 mmol/L 23.0   BUN mg/dL 29*   CREATININE mg/dL 1.30   GLUCOSE mg/dL 115*   CALCIUM mg/dL 9.5     No results found for: LIPASE    Assessment/ Plan: stable post-op course  Continue current management    Problem List Items Addressed This Visit     Breast CA    Relevant Orders    Tissue Pathology Exam            Caitlin Green MD  9/5/2017  2:36 PM

## 2017-09-05 NOTE — ANESTHESIA PROCEDURE NOTES
Airway  Urgency: elective    Airway not difficult    General Information and Staff    Patient location during procedure: OR  CRNA: LANCE MEHTA    Indications and Patient Condition  Indications for airway management: airway protection    Preoxygenated: yes  MILS not maintained throughout  Mask difficulty assessment: 1 - vent by mask    Final Airway Details  Final airway type: endotracheal airway      Successful airway: ETT  Cuffed: yes   Successful intubation technique: direct laryngoscopy  Endotracheal tube insertion site: oral  Blade: Jennie  Blade size: #3  ETT size: 7.0 mm  Cormack-Lehane Classification: grade I - full view of glottis  Placement verified by: chest auscultation and capnometry   Measured from: lips  ETT to lips (cm): 20  Number of attempts at approach: 1    Additional Comments  Negative epigastric sounds, Breath sound equal bilaterally with symmetric chest rise and fall

## 2017-09-05 NOTE — ANESTHESIA POSTPROCEDURE EVALUATION
Patient: Sarah Raphael    Procedure Summary     Date Anesthesia Start Anesthesia Stop Room / Location    09/05/17 0800   [facility] / [facility]       Procedure Diagnosis Surgeon Provider    LEFT BREAST MASTECTOMY WITH LEFT  SENTINEL NODE BIOPSY AND RIGHT PROPHYLACTIC MASTECTOMY (Bilateral Breast) Carcinoma of breast upper outer quadrant, left MD Bibi Santillan MD          Anesthesia Type: general  Last vitals  BP        Temp        Pulse       Resp        SpO2          Post Anesthesia Care and Evaluation    Patient location during evaluation: PACU  Patient participation: complete - patient participated  Level of consciousness: awake and alert  Pain score: 0  Pain management: adequate  Airway patency: patent  Anesthetic complications: No anesthetic complications  PONV Status: none  Cardiovascular status: hemodynamically stable and acceptable  Respiratory status: nonlabored ventilation, acceptable and nasal cannula  Hydration status: acceptable

## 2017-09-05 NOTE — ANESTHESIA PREPROCEDURE EVALUATION
Anesthesia Evaluation     Patient summary reviewed and Nursing notes reviewed   no history of anesthetic complications:  NPO Solid Status: > 8 hours  NPO Liquid Status: > 8 hours     Airway   Mallampati: I  TM distance: >3 FB  Neck ROM: full  no difficulty expected  Dental      Pulmonary - negative pulmonary ROS    breath sounds clear to auscultation  Cardiovascular   Exercise tolerance: good (4-7 METS)    ECG reviewed  Rhythm: regular  Rate: normal    (+) hypertension, hyperlipidemia  (-) dysrhythmias, angina, GRIER      Neuro/Psych  (+) psychiatric history Anxiety and Depression,    GI/Hepatic/Renal/Endo    (+)  GERD well controlled, hepatitis (as child) A, hypothyroidism,   (-) liver disease, no renal disease, diabetes    Musculoskeletal     (+) arthralgias, back pain,   Abdominal   (+) obese,    Substance History - negative use     OB/GYN          Other   (+) arthritis   history of cancer                                    Anesthesia Plan    ASA 3     general   (Labs/studies reviwed  pecs blocks)  intravenous induction   Anesthetic plan and risks discussed with patient.  Use of blood products discussed with consented to blood products.   Plan discussed with CRNA.

## 2017-09-05 NOTE — H&P
Pre-Op H&P  Sarah Raphael  [id number]  1950    Chief complaint: Left breast Cancer    HPI:    Patient is a 67 y.o.female presents with left breast cancer, Grade I invasive ductal carcinoma and here today to undergo left mastectomy, left sentinel node biopsy, possible axillary dissection, prophylactic right mastectomy     Review of Systems:  General ROS: negative for chills, fever or skin lesions;  No changes since last office visit  Cardiovascular ROS: no chest pain or dyspnea on exertion  Respiratory ROS: no cough, shortness of breath, or wheezing    Allergies:   Allergies   Allergen Reactions   • Nuts Shortness Of Breath     rash   • Other      MSG - HIVES    • Penicillins Shortness Of Breath     rash   • Soybean-Containing Drug Products Shortness Of Breath     rash   • Sunflower Oil Shortness Of Breath     rash   • Cefuroxime      Stomach problems   • Cephalexin      Stomach problems   • Clindamycin Nausea Only and Nausea And Vomiting   • Erythromycin Nausea Only   • Flu Virus Vaccine      Redness and red rash to area   • Gluten Meal    • Metronidazole Hives   • Naproxen Dizziness   • Thimerosal      Redness and swelling at site of injection   • Varicella Virus Vaccine Live      Redness and swelling at site   • Zostavax [Zoster Vaccine Live]      85789 UNT /0.65 ML subcutaneous solution Recontituted  - redness and swelling at site   • Sulfa Antibiotics Nausea Only and Rash     Other reaction(s): Headache, Vomiting       Home Meds:    Prescriptions Prior to Admission   Medication Sig Dispense Refill Last Dose   • albuterol (PROAIR HFA) 108 (90 BASE) MCG/ACT inhaler Inhale 2 puffs Every 6 (Six) Hours As Needed for Wheezing or Shortness of Air.   Past Month at Unknown time   • amLODIPine (NORVASC) 5 MG tablet Take 5 mg by mouth Daily.   9/4/2017 at 1900   • Cholecalciferol (VITAMIN D PO) Take 1,000 Units by mouth daily. Vitamin D 1000 UNIT CAPS; TAKE 1 TABLET DAILY   9/4/2017 at 1900   • citalopram (CeleXA)  40 MG tablet Take 40 mg by mouth Daily.   9/4/2017 at 1900   • dicyclomine (BENTYL) 10 MG capsule Take 1 capsule by mouth 4 (Four) Times a Day As Needed (abd bloating). For: Abdominal bloating   9/4/2017 at 1900   • diphenhydrAMINE (BENADRYL) 25 mg capsule Take 1 capsule by mouth Every 6 (Six) Hours As Needed for Itching or Allergies.   9/4/2017 at 1600   • guaiFENesin (MUCINEX) 600 MG 12 hr tablet Take 600 mg by mouth 2 (Two) Times a Day.   9/4/2017 at 1900   • hydrOXYzine (ATARAX) 25 MG tablet Take 25 mg by mouth At Night As Needed for Allergies. at bedtime as needed   9/4/2017 at 1900   • levocetirizine (XYZAL) 5 MG tablet Take 5 mg by mouth 2 (Two) Times a Day.   9/4/2017 at 1700   • levothyroxine (SYNTHROID, LEVOTHROID) 175 MCG tablet Take 175 mcg by mouth Daily.   9/4/2017 at 1700   • losartan (COZAAR) 100 MG tablet Take 100 mg by mouth Daily.   9/4/2017 at 1900   • Melatonin 10 MG tablet Take 1 tablet by mouth At Night As Needed (sleep).   9/4/2017 at 2100   • montelukast (SINGULAIR) 10 MG tablet Take 10 mg by mouth Every Night.   9/4/2017 at 1900   • ranitidine (ZANTAC) 150 MG tablet Take 150 mg by mouth 2 (Two) Times a Day. AM&PM   9/4/2017 at 1900   • Triamcinolone Acetonide (NASACORT ALLERGY 24HR) 55 MCG/ACT nasal inhaler 2 sprays into each nostril daily.   9/5/2017 at 0430   • triamterene-hydrochlorothiazide (MAXZIDE-25) 37.5-25 MG per tablet Take 1 tablet by mouth Daily.   9/4/2017 at 1900   • vitamin B-12 (CYANOCOBALAMIN) 1000 MCG tablet Take 1,000 mcg by mouth Daily.   9/4/2017 at 1900   • aspirin 81 MG EC tablet Take 81 mg by mouth Daily.   More than a month at Unknown time   • EPINEPHrine (EPIPEN 2-KIRA) 0.3 MG/0.3ML solution auto-injector injection Inject 1 Units into the shoulder, thigh, or buttocks As Needed (allergy). as directed; For: Allergic rhinitis   Taking   • ibuprofen (ADVIL,MOTRIN) 600 MG tablet Take 600 mg by mouth Every 6 (Six) Hours As Needed for Mild Pain .   8/23/2017       PMH:    Past Medical History:   Diagnosis Date   • Arthritis    • Back pain    • Breast cancer    • Celiac disease    • Disease of thyroid gland    • GERD (gastroesophageal reflux disease)    • Hypertension    • MSG intolerance     Hives     PSH:    Past Surgical History:   Procedure Laterality Date   • COLONOSCOPY      2011   • EYE SURGERY      bilateral cataracts removed   • HYSTERECTOMY  05/1983   • OOPHORECTOMY     • SECONDARY INTRAOCULAR LENSE IMPLANTATION  12/2015    also in 12/2016   • SHOULDER ARTHROSCOPY Left 06/04/2014   • SKIN CANCER EXCISION  10/2015    basal cell   • THYROID SURGERY  06/1996   • TONSILLECTOMY  11/1976       Immunization History:  Influenza: no  Pneumococcal: yes 2016  Tetanus: unknown    Social History:   Tobacco:   History   Smoking Status   • Never Smoker   Smokeless Tobacco   • Never Used      Alcohol:     History   Alcohol Use   • Yes     Comment: one or less per week       Vitals:           /66 (BP Location: Right arm, Patient Position: Lying)  Pulse 60  Temp 98 °F (36.7 °C) (Temporal Artery )   Resp 20  SpO2 94%    Physical Exam:  General Appearance:    Alert, cooperative, no distress, appears stated age   Head:    Normocephalic, without obvious abnormality, atraumatic   Lungs:     Clear to auscultation bilaterally, respirations unlabored    Heart:   Regular rate and rhythm, S1 and S2 normal, no murmur, rub    or gallop    Abdomen:    Soft, non-tender.  +bowel sounds   Breast Exam:    deferred   Genitalia:    deferred   Extremities:   Extremities normal, atraumatic, no cyanosis or edema   Skin:   Skin color, texture, turgor normal, no rashes or lesions   Neurologic:   Grossly intact   Results Review  I reviewed the patient's new clinical results.    Cancer Staging (if applicable)  Cancer Patient: _x_ yes __no __unknown; If yes, clinical stage T:_1_ N:_X_M:_x_, stage group or __N/A    Impression: Left breast cancer, Grade I invasive ductal carcinoma    Plan:  Left mastectomy,  left sentinel node biopsy, possible axillary dissection, prophylactic right mastectomy     Jennifer Daniels, APRN   9/5/2017   6:37 AM

## 2017-09-05 NOTE — ANESTHESIA PROCEDURE NOTES
PECS I/II Block    Patient location during procedure: OR  Start time: 9/5/2017 8:06 AM  Reason for block: at surgeon's request and post-op pain management  Performed by  CRNA: GWEN WERNER  Assisted by: ADELAIDE STEWARD  Preanesthetic Checklist  Completed: patient identified, site marked, surgical consent, pre-op evaluation, timeout performed, IV checked, risks and benefits discussed and monitors and equipment checked  Prep:  Sterile barriers:cap, gloves, gown and mask  Prep: ChloraPrep  Patient monitoring: blood pressure monitoring, continuous pulse oximetry and EKG  Procedure    Guidance:ultrasound guided and landmark technique  Images:still images obtained    Laterality:Bilateral  Block Type:PECS I and PECS II  Injection Technique:single-shot  Needle Type:short-bevel  Needle Gauge:20 G  Resistance on Injection: none  Medications  Preservative Free Saline:10ml  Comment:Block Injection:  Total volume of LA divided between Right and Left sided blocks       Adjuncts:   Buprenorphine 0.30 mcg ,Decadron 4 mg PSF  Local Injected:bupivacaine 0.25% Local Amount Injected:60mL  Post Assessment  Injection Assessment: negative aspiration for heme and incremental injection  Patient Tolerance:comfortable throughout block  Complications:no  Additional Notes  The pt. Was placed in the Supine Position and GA was induced     The insertion site was prepped with CHG and Ultrasound guidance with In-Plane techniquewas  a 4inch BBraun 360 degree echogenic needle was visualized.  Normal Saline PSF was  utilized for hydrodissection of tissue. PECS 1 Block- Pectoralis Major and Minor where identified and LA was injected between PMM and PmM at the level of the 3rd Rib(10ml),  PECS 2-  Pectoralis Minor and Serratus muscle where identified and the needle was advanced laterally in-plane with the 4th rib as a backstop, pleura was monitored.  LA was injected between SA and PmM at the level of 4th rib( 20ml).  LA injection spread was  visualized, injection was incremental 1-5ml, normal or low injection pressure, no intravascular injection, no pneumothorax appreciated.  Thank You.

## 2017-09-05 NOTE — OP NOTE
Operative Note    Date of Surgery:  9/5/2017    Pre-Operative Diagnosis: Left breast cancer in the upper outer quadrant    Post-Operative Diagnosis: Same    Procedure:  Left sentinel lymph node injection                        Left simple mastectomy                        Left sentinel lymph node biopsy                        Right prophylactic simple mastectomy    Anesthesia:  General    Surgeon:  Caitlin Green MD    Assistant:  Vinnie Alonzo    Estimated Blood Loss:  Very minimal    Findings: 2 left sentinel lymph nodes negative for metastases    Complications: None      Indication for Procedure: Mrs. Raphael is a 67 years old pleasant lady who presented with abnormal left breast mammogram with a finding of low-grade invasive ductal carcinoma stage I in the upper outer quadrant with MRI study showing multiple other similar foci.  She opted to proceed with the above procedure with no immediate or delayed reconstruction.    Procedure: Patient was taken to the operating room by anesthesia and placed supine on the table.  Following induction of general endotracheal anesthesia she received 2 g of Kefzol IV. TEDs and SCDs were placed.  Anesthesia placed bilateral ultrasound-guided pectoralis nerve blocks.  515 mCi of radioactive technetium was injected in the left subareolar region.  The breasts, chest, axilla and upper arms were then prepped and draped and a sterile fashion.  Timeout was observed.  We proceeded with the prophylactic right simple mastectomy first which was done via transverse elliptical incision encompassing the areola and nipple complex.  Superior and inferior flaps were raised dissecting the breast tissue away from the flaps down toward the muscle was exposed.  We then proceeded by removing the breast off of the underlying pectoralis major muscle taking the fascia along with the specimen starting medially and proceeding laterally with no difficulty.  Some of the larger vessels of the breasts were  ligated with 3-0 silk suture.  The breast was then tagged with a silk suture laterally and removed as a whole and sent fresh to pathology.  It weighed 1.77 kg.  The wound was then irrigated with warm water with clear fluid noted.  We then proceeded with the left simple mastectomy which was done in a similar fashion however once in the axilla we identified 2 sentinel lymph nodes that had high radioactive count.  They were somewhat large.  No other radioactivity was noted in the axilla.  The nodes were sent for frozen section and noted to be negative for metastases.  The breast was also removed off the underlying pectoralis major muscle taking the fascia along with the specimen.  The breast was packed with a silk suture laterally and removed as a whole and sent fresh to pathology.  It weighed 1.8 kg.  The wound was then irrigated with warm water with clear fluid noted.  15 Maltese round Luis-Da Silva drains were placed bilaterally and percutaneously through the inferior flaps and anchored to the skin with 2-0 silk suture.  The subcutaneous tissue was then approximated with interrupted 3-0 Vicryl sutures.  The skin incisions were approximated with staples.  Polysporin ointment, with Covaderm was then applied.  The patient tolerated the procedure well with no complications.  She was extubated and taken to the recovery room in a stable condition.  Sponge count and needle count were correct at the end of the procedure.    Caitlin Green MD  09/05/17  4:16 PM

## 2017-09-06 VITALS
WEIGHT: 208.78 LBS | HEIGHT: 70 IN | TEMPERATURE: 98.6 F | BODY MASS INDEX: 29.89 KG/M2 | HEART RATE: 63 BPM | OXYGEN SATURATION: 94 % | RESPIRATION RATE: 18 BRPM | DIASTOLIC BLOOD PRESSURE: 71 MMHG | SYSTOLIC BLOOD PRESSURE: 155 MMHG

## 2017-09-06 RX ORDER — OXYCODONE HYDROCHLORIDE 5 MG/1
5 TABLET ORAL EVERY 4 HOURS PRN
Qty: 20 TABLET | Refills: 0 | Status: SHIPPED | OUTPATIENT
Start: 2017-09-06 | End: 2017-09-15

## 2017-09-06 RX ADMIN — ACETAMINOPHEN 1000 MG: 500 TABLET ORAL at 06:00

## 2017-09-06 RX ADMIN — DIAZEPAM 2 MG: 2 TABLET ORAL at 06:18

## 2017-09-06 NOTE — PLAN OF CARE
Problem: Patient Care Overview (Adult)  Goal: Plan of Care Review  Outcome: Ongoing (interventions implemented as appropriate)    09/05/17 2000 09/06/17 0518   Outcome Evaluation   Outcome Summary/Follow up Plan --  Patient resting well during the night. VSS, dressing clean, dry, intact. Pain well controlled with scheduled medication.   Coping/Psychosocial Response Interventions   Plan Of Care Reviewed With patient --        Goal: Adult Individualization and Mutuality  Outcome: Ongoing (interventions implemented as appropriate)  Goal: Discharge Needs Assessment  Outcome: Ongoing (interventions implemented as appropriate)    Problem: Perioperative Period (Adult)  Goal: Signs and Symptoms of Listed Potential Problems Will be Absent or Manageable (Perioperative Period)  Outcome: Ongoing (interventions implemented as appropriate)

## 2017-09-06 NOTE — PROGRESS NOTES
Discharge Planning Assessment  [facility]     Patient Name: Sarah Raphael  MRN: [id number]  Today's Date: 9/6/2017    Admit Date: 9/5/2017          Discharge Needs Assessment       09/06/17 1050    Living Environment    Lives With alone    Living Arrangements house   3 level home    Home Accessibility no concerns    Type of Financial/Environmental Concern none    Transportation Available car    Living Environment    Provides Primary Care For no one    Primary Care Provided By other (see comments)   Her sister and daughter to help as needed    Quality Of Family Relationships supportive    Able to Return to Prior Living Arrangements yes    Discharge Needs Assessment    Concerns To Be Addressed no discharge needs identified;basic needs concerns;grief and loss concerns    Readmission Within The Last 30 Days no previous admission in last 30 days    Anticipated Changes Related to Illness other (see comments)   Post surgical recuperation    Equipment Currently Used at Home none    Equipment Needed After Discharge none    Discharge Disposition home or self-care    Discharge Contact Information if Applicable [phone]            Discharge Plan       09/06/17 1052    Case Management/Social Work Plan    Plan Home    Patient/Family In Agreement With Plan yes    Additional Comments Anticipates discharge to home. Anticipates discharge to home with no discharge planning needs identified. Will follow. Jodie Griffin RN         Discharge Placement     No information found        Expected Discharge Date and Time     Expected Discharge Date Expected Discharge Time    Sep 6, 2017               Demographic Summary       09/06/17 1047    Referral Information    Admission Type outpatient in a bed    Referral Source admission list    Record Reviewed medical record    Contact Information    Permission Granted to Share Information With     Primary Care Physician Information    Name Tyrese Polanco  Status       09/06/17 1049    Functional Status Current    Ambulation 2-->assistive person    Transferring 0-->independent    Toileting 2-->assistive person    Bathing 2-->assistive person    Dressing 0-->independent    Eating 0-->independent    Communication 0-->understands/communicates without difficulty    Change in Functional Status Since Onset of Current Illness/Injury no    Functional Status Prior    Ambulation 0-->independent    Transferring 0-->independent    Toileting 0-->independent    Bathing 0-->independent    Dressing 0-->independent    Eating 0-->independent    Communication 0-->understands/communicates without difficulty    IADL    Medications independent    Meal Preparation independent    Housekeeping independent    Laundry independent    Shopping independent    Oral Care independent    Activity Tolerance    Current Activity Limitations other (see comments)   Post surgical restrictions    Usual Activity Tolerance good    Current Activity Tolerance good    Employment/Financial    Employment/Finance Comments Has [employer] Medicare            Psychosocial     None            Abuse/Neglect     None            Legal     None            Substance Abuse     None            Patient Forms     None          Jodie Griffin RN

## 2017-09-06 NOTE — PROGRESS NOTES
"Sarah Raphael  1950  [id number]    Surgery Progress Note    Date of visit: 9/6/2017    Subjective:no complaints of pain    Objective:    /71  Pulse 63  Temp 98.6 °F (37 °C)  Resp 18  Ht 70\" (177.8 cm)  Wt 208 lb 12.4 oz (94.7 kg)  SpO2 94%  BMI 29.96 kg/m2    Intake/Output Summary (Last 24 hours) at 09/06/17 0917  Last data filed at 09/06/17 0910   Gross per 24 hour   Intake              800 ml   Output             1845 ml   Net            -1045 ml       CV: Regular rate and rhythm  L: normal air entry  BREAST: dressings dry. No swelling. SHANON drainage adequate      LABS:      Results from last 7 days  Lab Units 08/31/17  0928   WBC 10*3/mm3 5.84   HEMOGLOBIN g/dL 14.4   HEMATOCRIT % 42.0   PLATELETS 10*3/mm3 238       Results from last 7 days  Lab Units 08/31/17  1027   SODIUM mmol/L 137   POTASSIUM mmol/L 3.3*   CHLORIDE mmol/L 101   CO2 mmol/L 23.0   BUN mg/dL 29*   CREATININE mg/dL 1.30   CALCIUM mg/dL 9.5   BILIRUBIN mg/dL 0.3   ALK PHOS U/L 122*   ALT (SGPT) U/L 30   AST (SGOT) U/L 36*   GLUCOSE mg/dL 115*       Results from last 7 days  Lab Units 08/31/17  1027   SODIUM mmol/L 137   POTASSIUM mmol/L 3.3*   CHLORIDE mmol/L 101   CO2 mmol/L 23.0   BUN mg/dL 29*   CREATININE mg/dL 1.30   GLUCOSE mg/dL 115*   CALCIUM mg/dL 9.5     No results found for: LIPASE      Assessment/ Plan: stable post-op course  DC home   F/U in one week    Problem List Items Addressed This Visit     Breast CA    Relevant Orders    Tissue Pathology Exam            Caitlin Green MD  9/6/2017  9:17 AM    "

## 2017-09-13 ENCOUNTER — CONSULT (OUTPATIENT)
Dept: ONCOLOGY | Facility: CLINIC | Age: 67
End: 2017-09-13

## 2017-09-13 VITALS
HEIGHT: 67 IN | BODY MASS INDEX: 32.02 KG/M2 | SYSTOLIC BLOOD PRESSURE: 126 MMHG | TEMPERATURE: 97.3 F | WEIGHT: 204 LBS | DIASTOLIC BLOOD PRESSURE: 83 MMHG | HEART RATE: 85 BPM | RESPIRATION RATE: 16 BRPM

## 2017-09-13 DIAGNOSIS — C50.811 CANCER OF OVERLAPPING SITES OF RIGHT FEMALE BREAST (HCC): Primary | ICD-10-CM

## 2017-09-13 PROCEDURE — 99205 OFFICE O/P NEW HI 60 MIN: CPT | Performed by: INTERNAL MEDICINE

## 2017-09-13 NOTE — PROGRESS NOTES
Subjective     PROBLEM LIST:  1.  Left breast cancer  A.  Left mastectomy for well-differentiated invasive ductal breast cancer, stage T1b(2) N1a M0, stage IIA  B.  [place] lymph nodes negative but one positive intramammary node  C.  ER and [place] positive and HER-2 negative   2.  Atypical ductal hyperplasia of the right breast, status post right simple mastectomy  3.  Hypothyroidism after prior thyroid surgery for goiter  4.  Hypertension      CHIEF COMPLAINT: Here to discuss adjuvant treatment of breast cancer      HISTORY OF PRESENT ILLNESS:  The patient is a 67 y.o. year old female, referred for recommendations about a new diagnosis of breast cancer.  She had an abnormal mammogram and biopsy followed by stereotactic biopsy on the left breast showing well differentiated invasive ductal cancer along with a right breast biopsy done via ultrasound showing atypical ductal hyperplasia.  She had an MRI of the breasts showing multiple abnormal areas and at this point she decided to have mastectomies versus additional biopsies and attempted breast conservation.  She is recovering well from her surgery.  She does not have any symptoms of metastatic disease.  She is here to discuss adjuvant treatment options.    REVIEW OF SYSTEMS:  The review of systems page was discussed with her and is included in the record.  She noted pain after her surgery but no symptoms to suggest metastatic disease.  A 14 point review of systems was performed and is negative except as noted above.    Past Medical History:   Diagnosis Date   • Arthritis    • Back pain    • Breast cancer    • Celiac disease    • Disease of thyroid gland    • GERD (gastroesophageal reflux disease)    • Hypertension    • MSG intolerance     Hives   She had a hysterectomy done in 1983      Current Outpatient Prescriptions on File Prior to Visit   Medication Sig Dispense Refill   • albuterol (PROAIR HFA) 108 (90 BASE) MCG/ACT inhaler Inhale 2 puffs Every 6 (Six) Hours As  Needed for Wheezing or Shortness of Air.     • amLODIPine (NORVASC) 5 MG tablet Take 5 mg by mouth Daily.     • Cholecalciferol (VITAMIN D PO) Take 1,000 Units by mouth daily. Vitamin D 1000 UNIT CAPS; TAKE 1 TABLET DAILY     • citalopram (CeleXA) 40 MG tablet Take 40 mg by mouth Daily.     • dicyclomine (BENTYL) 10 MG capsule Take 1 capsule by mouth 4 (Four) Times a Day As Needed (abd bloating). For: Abdominal bloating     • diphenhydrAMINE (BENADRYL) 25 mg capsule Take 1 capsule by mouth Every 6 (Six) Hours As Needed for Itching or Allergies.     • EPINEPHrine (EPIPEN 2-KIRA) 0.3 MG/0.3ML solution auto-injector injection Inject 1 Units into the shoulder, thigh, or buttocks As Needed (allergy). as directed; For: Allergic rhinitis     • guaiFENesin (MUCINEX) 600 MG 12 hr tablet Take 600 mg by mouth 2 (Two) Times a Day.     • hydrOXYzine (ATARAX) 25 MG tablet Take 25 mg by mouth At Night As Needed for Allergies. at bedtime as needed     • ibuprofen (ADVIL,MOTRIN) 600 MG tablet Take 600 mg by mouth Every 6 (Six) Hours As Needed for Mild Pain .     • levocetirizine (XYZAL) 5 MG tablet Take 5 mg by mouth 2 (Two) Times a Day.     • levothyroxine (SYNTHROID, LEVOTHROID) 175 MCG tablet Take 175 mcg by mouth Daily.     • losartan (COZAAR) 100 MG tablet Take 100 mg by mouth Daily.     • Melatonin 10 MG tablet Take 1 tablet by mouth At Night As Needed (sleep).     • montelukast (SINGULAIR) 10 MG tablet Take 10 mg by mouth Every Night.     • oxyCODONE (ROXICODONE) 5 MG immediate release tablet Take 1 tablet by mouth Every 4 (Four) Hours As Needed for Severe Pain  for up to 9 days. 20 tablet 0   • ranitidine (ZANTAC) 150 MG tablet Take 150 mg by mouth 2 (Two) Times a Day. AM&PM     • Triamcinolone Acetonide (NASACORT ALLERGY 24HR) 55 MCG/ACT nasal inhaler 2 sprays into each nostril daily.     • triamterene-hydrochlorothiazide (MAXZIDE-25) 37.5-25 MG per tablet Take 1 tablet by mouth Daily.     • vitamin B-12 (CYANOCOBALAMIN)  1000 MCG tablet Take 1,000 mcg by mouth Daily.       No current facility-administered medications on file prior to visit.        Allergies   Allergen Reactions   • Nuts Shortness Of Breath     rash   • Other      MSG - HIVES    • Penicillins Shortness Of Breath     rash   • Soybean-Containing Drug Products Shortness Of Breath     rash   • Sunflower Oil Shortness Of Breath     rash   • Apple    • Cefuroxime      Stomach problems   • Cephalexin      Stomach problems   • Chicken Allergy    • Clindamycin Nausea Only and Nausea And Vomiting   • Erythromycin Nausea Only   • Flu Virus Vaccine      Redness and red rash to area   • Gluten Meal    • Metronidazole Hives   • Naproxen Dizziness   • Parsley Seed    • Thimerosal      Redness and swelling at site of injection   • Varicella Virus Vaccine Live      Redness and swelling at site   • Zostavax [Zoster Vaccine Live]      78852 UNT /0.65 ML subcutaneous solution Recontituted  - redness and swelling at site   • Sulfa Antibiotics Nausea Only and Rash     Other reaction(s): Headache, Vomiting       Past Surgical History:   Procedure Laterality Date   • COLONOSCOPY      2011   • EYE SURGERY      bilateral cataracts removed   • HYSTERECTOMY  05/1983   • MASTECTOMY WITH SENTINEL NODE BIOPSY AND AXILLARY NODE DISSECTION Bilateral 9/5/2017    Procedure: LEFT BREAST MASTECTOMY WITH LEFT  SENTINEL NODE BIOPSY AND RIGHT PROPHYLACTIC MASTECTOMY;  Surgeon: Caitlin Green MD;  Location: [facility];  Service:    • OOPHORECTOMY     • SECONDARY INTRAOCULAR LENSE IMPLANTATION  12/2015    also in 12/2016   • SHOULDER ARTHROSCOPY Left 06/04/2014   • SKIN CANCER EXCISION  10/2015    basal cell   • THYROID SURGERY  06/1996   • TONSILLECTOMY  11/1976       Social History     Social History   • Marital status:      Spouse name: N/A   • Number of children: N/A   • Years of education: N/A     Social History Main Topics   • Smoking status: Never Smoker   • Smokeless tobacco: Never Used  "  • Alcohol use Yes      Comment: one or less per week   • Drug use: No   • Sexual activity: Defer     Other Topics Concern   • None     Social History Narrative   She lives in [place] and lives alone.  She is accompanied by her sister during the interview today.  She is a retired teacher.  She does not smoke cigarettes and never did    Family History   Problem Relation Age of Onset   • Hypertension Mother    • Hyperlipidemia Mother    • Thyroid disease Mother    • Aneurysm Father    • Heart disease Father    • Breast cancer Neg Hx    • Ovarian cancer Neg Hx    Mother  at 73 from complications of Parkinson's disease.  Her father  at 83 from heart disease after previous CABG    Objective     /83 Comment: RUE  Pulse 85  Temp 97.3 °F (36.3 °C) (Temporal Artery )   Resp 16  Ht 67\" (170.2 cm)  Wt 204 lb (92.5 kg)  BMI 31.95 kg/m2  Performance Status: ECOG 0  General: well appearing female in no acute distress  Neuro: alert and oriented, normal gait and station  HEENT: sclera anicteric, oropharynx clear with no ulcers or candida  Lymphatics: no cervical, supraclavicular, or axillary adenopathy with examination of the axilla limited on the left by her recent incision  Cardiovascular: regular rate and rhythm, no murmurs  Lungs: clear to auscultation bilaterally without crackles or wheezes  Abdomen: soft, nontender, nondistended.  No palpable organomegaly  Breasts: She has bilateral mastectomy incisions without exudate or abnormal erythema  Extremeties: no lower extremity edema  Joints: No erythema  or joint effusions or any chronic joint deformity  Skin: no rashes, lesions, bruising, or petechiae  Psych: mood and affect appropriate    Labs: Her pathology reports were reviewed showing 2 separate small primary lesions in the breast with the largest 1 cm with negative sentinel lymph nodes but with involvement of an intramammary node.  The cancer was low-grade with positive staining for estrogen receptor " and progesterone receptor and negative for HER-2.        Assessment/Plan     Sarah Raphael is a 67 y.o. year old female with a new diagnosis of stage II breast cancer with a single positive node that was an intramammary node.  She is generally in good health.    Plan: 1.  I discussed in detail with Mrs. Raphael and her sister the current findings.  We discussed options about adjuvant treatment.  I have strongly recommended adjuvant endocrine therapy either with tamoxifen or an aromatase inhibitor and after discussing the rationale for this she she agrees with this part of her treatment.  2.  We then discussed the possibility of adjuvant chemotherapy noting that the incremental benefit is smaller and with more side effects and risks.  She is agreeable to chemotherapy if it is clearly indicated.  We then discussed  molecular profiling either with Oncotype or Mammaprint and after discussing this option she is very interested and I recommendeda good way to help estimate potential benefit from adjuvant chemotherapy and she is agreeable to this we have ordered Oncotype and I'll see her back in a few weeks when it should be available to make final decisions.  I discussed this case with Dr. SHANKAR and multiple other physicians at our multidisciplinary breast conference also.    I would like to thank Dr. SHANKAR asking me to see this nice lady with him       Lm Holloway MD    9/13/2017

## 2017-09-21 RX ORDER — IBUPROFEN 600 MG/1
TABLET ORAL
Qty: 90 TABLET | Refills: 3 | Status: SHIPPED | OUTPATIENT
Start: 2017-09-21 | End: 2018-02-06 | Stop reason: SDUPTHER

## 2017-09-25 RX ORDER — AMLODIPINE BESYLATE 5 MG/1
5 TABLET ORAL DAILY
Qty: 90 TABLET | Refills: 1 | Status: SHIPPED | OUTPATIENT
Start: 2017-09-25 | End: 2018-02-06 | Stop reason: SDUPTHER

## 2017-09-27 ENCOUNTER — OFFICE VISIT (OUTPATIENT)
Dept: ONCOLOGY | Facility: CLINIC | Age: 67
End: 2017-09-27

## 2017-09-27 VITALS
BODY MASS INDEX: 32.49 KG/M2 | HEART RATE: 59 BPM | HEIGHT: 67 IN | TEMPERATURE: 97.2 F | DIASTOLIC BLOOD PRESSURE: 65 MMHG | RESPIRATION RATE: 16 BRPM | SYSTOLIC BLOOD PRESSURE: 119 MMHG | WEIGHT: 207 LBS

## 2017-09-27 DIAGNOSIS — C50.811 CANCER OF OVERLAPPING SITES OF RIGHT FEMALE BREAST (HCC): Primary | ICD-10-CM

## 2017-09-27 PROCEDURE — 99214 OFFICE O/P EST MOD 30 MIN: CPT | Performed by: INTERNAL MEDICINE

## 2017-09-27 RX ORDER — TAMOXIFEN CITRATE 20 MG/1
20 TABLET ORAL DAILY
Qty: 90 TABLET | Refills: 1 | Status: SHIPPED | OUTPATIENT
Start: 2017-09-27 | End: 2017-11-08 | Stop reason: SDUPTHER

## 2017-09-27 NOTE — PROGRESS NOTES
PROBLEM LIST:  1.  Left breast cancer  A.  Left mastectomy for well-differentiated invasive ductal breast cancer        stage T1b(2) N1a M0, stage IIA  B.  [place] lymph nodes negative but one positive intramammary node  C.  ER and [place] positive and HER-2 negative   2.  Atypical ductal hyperplasia of the right breast, status post right simple mastectomy  3.  Hypothyroidism after prior thyroid surgery for goiter  4.  Hypertension    Subjective     HISTORY OF PRESENT ILLNESS:   Chief complaint: Here about adjuvant treatment of breast cancer.  Ms. Raphael hasn't noted any bone pain, cough, unexplained weight loss or other symptoms to suggest metastatic breast cancer.  She is following up with Dr. SHANKAR in surgery and she does have an area of impaired healing along the right mastectomy incision.  He has evidently suggested he may need to do some revision on this.  She hasn't noticed any other new problems are made any recent medicine changes.    Past Medical History, Past Surgical History, Social History, Family History have been reviewed and are without significant changes except as mentioned.    Review of Systems   A comprehensive 14 point review of systems was performed and was negative except as mentioned.    Medications:  The current medication list was reviewed in the EMR    ALLERGIES:    Allergies   Allergen Reactions   • Nuts Shortness Of Breath     rash   • Other      MSG - HIVES    • Penicillins Shortness Of Breath     rash   • Soybean-Containing Drug Products Shortness Of Breath     rash   • Sunflower Oil Shortness Of Breath     rash   • Apple    • Cefuroxime      Stomach problems   • Cephalexin      Stomach problems   • Chicken Allergy    • Clindamycin Nausea Only and Nausea And Vomiting   • Erythromycin Nausea Only   • Flu Virus Vaccine      Redness and red rash to area   • Gluten Meal    • Metronidazole Hives   • Naproxen Dizziness   • Parsley Seed    • Thimerosal      Redness and swelling at site of  "injection   • Varicella Virus Vaccine Live      Redness and swelling at site   • Zostavax [Zoster Vaccine Live]      64909 UNT /0.65 ML subcutaneous solution Recontituted  - redness and swelling at site   • Sulfa Antibiotics Nausea Only and Rash     Other reaction(s): Headache, Vomiting       Objective      /65 Comment: RUE  Pulse 59  Temp 97.2 °F (36.2 °C) (Temporal Artery )   Resp 16  Ht 67\" (170.2 cm)  Wt 207 lb (93.9 kg)  BMI 32.42 kg/m2     Performance Status: ECOG 0    General: well appearing, in no acute distress  HEENT: sclera anicteric, oropharynx clear  Lymphatics: no cervical, supraclavicular, or axillary adenopathy  Cardiovascular: regular rate and rhythm, no murmurs  Lungs: clear to auscultation bilaterally  Abdomen: soft, nontender, nondistended.  No palpable organomegaly  Extremeties: no lower extremity edema  Skin: no rashes, lesions, bruising, or petechiae    RECENT LABS:   WBC   Date Value Ref Range Status   08/31/2017 5.84 3.50 - 10.80 10*3/mm3 Final     Hemoglobin   Date Value Ref Range Status   08/31/2017 14.4 11.5 - 15.5 g/dL Final     Hematocrit   Date Value Ref Range Status   08/31/2017 42.0 34.5 - 44.0 % Final     MCV   Date Value Ref Range Status   08/31/2017 82.7 80.0 - 99.0 fL Final     RDW   Date Value Ref Range Status   08/31/2017 13.4 11.3 - 14.5 % Final     MPV   Date Value Ref Range Status   08/31/2017 9.4 6.0 - 12.0 fL Final     Platelets   Date Value Ref Range Status   08/31/2017 238 150 - 450 10*3/mm3 Final     Immature Grans %   Date Value Ref Range Status   03/13/2017 0.2 0.0 - 0.6 % Final     Neutrophils, Absolute   Date Value Ref Range Status   03/13/2017 2.89 1.50 - 8.30 10*3/mm3 Final     Lymphocytes, Absolute   Date Value Ref Range Status   03/13/2017 1.81 0.60 - 4.80 10*3/mm3 Final     Monocytes, Absolute   Date Value Ref Range Status   03/13/2017 0.44 0.00 - 1.00 10*3/mm3 Final     Eosinophils, Absolute   Date Value Ref Range Status   03/13/2017 0.11 0.10 - " 0.30 10*3/mm3 Final     Basophils, Absolute   Date Value Ref Range Status   03/13/2017 0.04 0.00 - 0.20 10*3/mm3 Final     Immature Grans, Absolute   Date Value Ref Range Status   03/13/2017 0.01 0.00 - 0.03 10*3/mm3 Final       Glucose   Date Value Ref Range Status   08/31/2017 115 (H) 70 - 100 mg/dL Final     Sodium   Date Value Ref Range Status   08/31/2017 137 132 - 146 mmol/L Final     Potassium   Date Value Ref Range Status   08/31/2017 3.3 (L) 3.5 - 5.5 mmol/L Final     CO2   Date Value Ref Range Status   08/31/2017 23.0 20.0 - 31.0 mmol/L Final     Chloride   Date Value Ref Range Status   08/31/2017 101 99 - 109 mmol/L Final     Anion Gap   Date Value Ref Range Status   08/31/2017 13.0 (H) 3.0 - 11.0 mmol/L Final     Creatinine   Date Value Ref Range Status   08/31/2017 1.30 0.60 - 1.30 mg/dL Final     BUN   Date Value Ref Range Status   08/31/2017 29 (H) 9 - 23 mg/dL Final     BUN/Creatinine Ratio   Date Value Ref Range Status   08/31/2017 22.3 7.0 - 25.0 Final     Calcium   Date Value Ref Range Status   08/31/2017 9.5 8.7 - 10.4 mg/dL Final     eGFR Non  Amer   Date Value Ref Range Status   08/31/2017 41 (L) >60 mL/min/1.73 Final     Alkaline Phosphatase   Date Value Ref Range Status   08/31/2017 122 (H) 25 - 100 U/L Final     Total Protein   Date Value Ref Range Status   08/31/2017 7.2 5.7 - 8.2 g/dL Final     ALT (SGPT)   Date Value Ref Range Status   08/31/2017 30 7 - 40 U/L Final     AST (SGOT)   Date Value Ref Range Status   08/31/2017 36 (H) 0 - 33 U/L Final     Total Bilirubin   Date Value Ref Range Status   08/31/2017 0.3 0.3 - 1.2 mg/dL Final     Albumin   Date Value Ref Range Status   08/31/2017 4.40 3.20 - 4.80 g/dL Final     Globulin   Date Value Ref Range Status   08/31/2017 2.8 gm/dL Final     A/G Ratio   Date Value Ref Range Status   08/31/2017 1.6 1.5 - 2.5 g/dL Final       No results found for: LDH, URICACID    No results found for: MSPIKE, KAPPALAMB, IGLFLC, FREEKAPPAL         Oncotype DX recurrence score was 16 and in the low risk group.    Assessment/Plan   Impression: 1.  Breast cancer.  This is an ER and [place] positive and HER-2 negative breast cancer with a low risk group Oncotype DX recurrence score    Plan: 1.  I reviewed with Ms. Raphael the Oncotype DX recurrent score.  I reinforced that this was only valid when she did take tamoxifen or other adjuvant endocrine therapy.  We also discussed their data suggesting no benefit from adjuvant chemotherapy.  She seems to understand this well and is comfortable with the idea of adjuvant endocrine therapy.  We then discussed treatment options and I have suggested tamoxifen but we did discuss the use of aromatase inhibitors.  I reviewed with her the potential risks and benefits including a small increased risk of deep vein thrombosis over placebo and the most common side effects and she seems to understand this well.  We will start her on tamoxifen 20 mg daily and I'll have her return in a month to see [facility] nurse practitioner to review her tolerance and side effects.      Lm Holloway MD   [facility] Hematology and Oncology    9/27/2017          CC:

## 2017-10-24 RX ORDER — CITALOPRAM 40 MG/1
TABLET ORAL
Qty: 90 TABLET | Refills: 1 | Status: SHIPPED | OUTPATIENT
Start: 2017-10-24 | End: 2018-02-06 | Stop reason: SDUPTHER

## 2017-10-24 RX ORDER — LEVOCETIRIZINE DIHYDROCHLORIDE 5 MG/1
TABLET, FILM COATED ORAL
Qty: 30 TABLET | Refills: 5 | Status: SHIPPED | OUTPATIENT
Start: 2017-10-24 | End: 2019-09-16

## 2017-10-30 RX ORDER — LOSARTAN POTASSIUM 100 MG/1
100 TABLET ORAL DAILY
Qty: 90 TABLET | Refills: 1 | Status: SHIPPED | OUTPATIENT
Start: 2017-10-30 | End: 2017-11-01 | Stop reason: SDUPTHER

## 2017-10-30 RX ORDER — MONTELUKAST SODIUM 10 MG/1
10 TABLET ORAL NIGHTLY
Qty: 90 TABLET | Refills: 1 | Status: SHIPPED | OUTPATIENT
Start: 2017-10-30 | End: 2017-11-01 | Stop reason: SDUPTHER

## 2017-11-01 RX ORDER — MONTELUKAST SODIUM 10 MG/1
10 TABLET ORAL NIGHTLY
Qty: 90 TABLET | Refills: 1 | Status: SHIPPED | OUTPATIENT
Start: 2017-11-01 | End: 2018-02-06 | Stop reason: SDUPTHER

## 2017-11-01 RX ORDER — LOSARTAN POTASSIUM 100 MG/1
100 TABLET ORAL DAILY
Qty: 90 TABLET | Refills: 1 | Status: SHIPPED | OUTPATIENT
Start: 2017-11-01 | End: 2018-02-06 | Stop reason: SDUPTHER

## 2017-11-08 ENCOUNTER — OFFICE VISIT (OUTPATIENT)
Dept: ONCOLOGY | Facility: CLINIC | Age: 67
End: 2017-11-08

## 2017-11-08 VITALS
WEIGHT: 206 LBS | BODY MASS INDEX: 32.33 KG/M2 | HEART RATE: 61 BPM | HEIGHT: 67 IN | TEMPERATURE: 97.6 F | DIASTOLIC BLOOD PRESSURE: 59 MMHG | SYSTOLIC BLOOD PRESSURE: 125 MMHG | RESPIRATION RATE: 16 BRPM

## 2017-11-08 DIAGNOSIS — Z17.0 MALIGNANT NEOPLASM OF OVERLAPPING SITES OF RIGHT BREAST IN FEMALE, ESTROGEN RECEPTOR POSITIVE (HCC): Primary | ICD-10-CM

## 2017-11-08 DIAGNOSIS — C50.811 MALIGNANT NEOPLASM OF OVERLAPPING SITES OF RIGHT BREAST IN FEMALE, ESTROGEN RECEPTOR POSITIVE (HCC): Primary | ICD-10-CM

## 2017-11-08 PROCEDURE — 99213 OFFICE O/P EST LOW 20 MIN: CPT | Performed by: NURSE PRACTITIONER

## 2017-11-08 RX ORDER — TAMOXIFEN CITRATE 20 MG/1
20 TABLET ORAL DAILY
Qty: 90 TABLET | Refills: 1 | Status: SHIPPED | OUTPATIENT
Start: 2017-11-08 | End: 2018-09-10 | Stop reason: SDUPTHER

## 2017-11-08 NOTE — PROGRESS NOTES
PROBLEM LIST:  1.  Left breast cancer  A.  Left mastectomy for well-differentiated invasive ductal breast cancer        stage T1b(2) N1a M0, stage IIA  B.  [place] lymph nodes negative but one positive intramammary node  C.  ER and [place] positive and HER-2 negative   2.  Atypical ductal hyperplasia of the right breast, status post right simple mastectomy  3.  Hypothyroidism after prior thyroid surgery for goiter  4.  Hypertension     Chief complaint: Here about adjuvant treatment of breast cancer.    Subjective     HISTORY OF PRESENT ILLNESS:   Ms. Raphael is here for follow up evaluation of breast cancer management. She continues on tamoxifen for adjuvant endocrine therapy and is tolerating it well. She denies any hot flashes or night sweats. She has occasional muscle cramping in the bilateral upper chest wall areas that improve with massage. She has arthritic pain that has not changed  in frequency or intensity. No cough or wheezing, headaches or diplopia. She is concerned that her right mastectomy incision has yellow drainage from incision.     Past Medical History, Past Surgical History, Social History, Family History have been reviewed and are without significant changes except as mentioned.    Review of Systems   A comprehensive 14 point review of systems was performed and was negative except as mentioned.    Medications:  The current medication list was reviewed in the EMR    ALLERGIES:    Allergies   Allergen Reactions   • Nuts Shortness Of Breath     rash   • Other      MSG - HIVES    • Penicillins Shortness Of Breath     rash   • Soybean-Containing Drug Products Shortness Of Breath     rash   • Sunflower Oil Shortness Of Breath     rash   • Apple    • Cefuroxime      Stomach problems   • Cephalexin      Stomach problems   • Chicken Allergy    • Clindamycin Nausea Only and Nausea And Vomiting   • Erythromycin Nausea Only   • Flu Virus Vaccine      Redness and red rash to area   • Gluten Meal    •  "Metronidazole Hives   • Naproxen Dizziness   • Parsley Seed    • Thimerosal      Redness and swelling at site of injection   • Varicella Virus Vaccine Live      Redness and swelling at site   • Zostavax [Zoster Vaccine Live]      24533 UNT /0.65 ML subcutaneous solution Recontituted  - redness and swelling at site   • Sulfa Antibiotics Nausea Only and Rash     Other reaction(s): Headache, Vomiting       Objective      /59  Pulse 61  Temp 97.6 °F (36.4 °C)  Resp 16  Ht 67\" (170.2 cm)  Wt 206 lb (93.4 kg)  BMI 32.26 kg/m2         General: well appearing, in no acute distress   HEENT: sclera anicteric, oropharynx clear  Lymphatics: no cervical, supraclavicular, or axillary adenopathy  Cardiovascular: regular rate and rhythm, no murmurs  Lungs: clear to auscultation bilaterally  Abdomen: soft, nontender, nondistended.  No palpable masses or organomegaly  Extremeties: no lower extremity edema, cords or calf tenderness  Skin: no rashes, lesions, bruising, or petechiae  Breasts: left mastectomy incision healing well with no drainage or erythema. Right mastectomy incision with small amount of erythema and yellow discharge noted No masses noted bilaterally     RECENT LABS:  Hematology WBC   Date Value Ref Range Status   08/31/2017 5.84 3.50 - 10.80 10*3/mm3 Final     Hemoglobin   Date Value Ref Range Status   08/31/2017 14.4 11.5 - 15.5 g/dL Final     Hematocrit   Date Value Ref Range Status   08/31/2017 42.0 34.5 - 44.0 % Final     MCV   Date Value Ref Range Status   08/31/2017 82.7 80.0 - 99.0 fL Final     RDW   Date Value Ref Range Status   08/31/2017 13.4 11.3 - 14.5 % Final     MPV   Date Value Ref Range Status   08/31/2017 9.4 6.0 - 12.0 fL Final     Platelets   Date Value Ref Range Status   08/31/2017 238 150 - 450 10*3/mm3 Final     Immature Grans %   Date Value Ref Range Status   03/13/2017 0.2 0.0 - 0.6 % Final     Neutrophils, Absolute   Date Value Ref Range Status   03/13/2017 2.89 1.50 - 8.30 " 10*3/mm3 Final     Lymphocytes, Absolute   Date Value Ref Range Status   03/13/2017 1.81 0.60 - 4.80 10*3/mm3 Final     Monocytes, Absolute   Date Value Ref Range Status   03/13/2017 0.44 0.00 - 1.00 10*3/mm3 Final     Eosinophils, Absolute   Date Value Ref Range Status   03/13/2017 0.11 0.10 - 0.30 10*3/mm3 Final     Basophils, Absolute   Date Value Ref Range Status   03/13/2017 0.04 0.00 - 0.20 10*3/mm3 Final     Immature Grans, Absolute   Date Value Ref Range Status   03/13/2017 0.01 0.00 - 0.03 10*3/mm3 Final       Glucose   Date Value Ref Range Status   08/31/2017 115 (H) 70 - 100 mg/dL Final     Sodium   Date Value Ref Range Status   08/31/2017 137 132 - 146 mmol/L Final     Potassium   Date Value Ref Range Status   08/31/2017 3.3 (L) 3.5 - 5.5 mmol/L Final     CO2   Date Value Ref Range Status   08/31/2017 23.0 20.0 - 31.0 mmol/L Final     Chloride   Date Value Ref Range Status   08/31/2017 101 99 - 109 mmol/L Final     Anion Gap   Date Value Ref Range Status   08/31/2017 13.0 (H) 3.0 - 11.0 mmol/L Final     Creatinine   Date Value Ref Range Status   08/31/2017 1.30 0.60 - 1.30 mg/dL Final     BUN   Date Value Ref Range Status   08/31/2017 29 (H) 9 - 23 mg/dL Final     BUN/Creatinine Ratio   Date Value Ref Range Status   08/31/2017 22.3 7.0 - 25.0 Final     Calcium   Date Value Ref Range Status   08/31/2017 9.5 8.7 - 10.4 mg/dL Final     eGFR Non  Amer   Date Value Ref Range Status   08/31/2017 41 (L) >60 mL/min/1.73 Final     Alkaline Phosphatase   Date Value Ref Range Status   08/31/2017 122 (H) 25 - 100 U/L Final     Total Protein   Date Value Ref Range Status   08/31/2017 7.2 5.7 - 8.2 g/dL Final     ALT (SGPT)   Date Value Ref Range Status   08/31/2017 30 7 - 40 U/L Final     AST (SGOT)   Date Value Ref Range Status   08/31/2017 36 (H) 0 - 33 U/L Final     Total Bilirubin   Date Value Ref Range Status   08/31/2017 0.3 0.3 - 1.2 mg/dL Final     Albumin   Date Value Ref Range Status   08/31/2017  4.40 3.20 - 4.80 g/dL Final     Globulin   Date Value Ref Range Status   08/31/2017 2.8 gm/dL Final     A/G Ratio   Date Value Ref Range Status   08/31/2017 1.6 1.5 - 2.5 g/dL Final       No results found for: LDH, URICACID       Assessment/Plan   Impression: 1.  Breast cancer.  This is an ER and [place] positive and HER-2 negative breast cancer with a low risk group Oncotype DX recurrence score. She was started on Tamoxifen 9/27/2017 and is tolerating it well. She does have some drainage and small amount of erythema around her mastectomy incision. I recommend she follow back up with Dr. AVELAR for evaluation.      Plan:   1. Continue tamoxifen unchanged.   2. Return to see Dr. AVELAR Regarding right mastectomy incision.   3. We will see her back in 6 months for follow up evaluation. She has been instructed to contact us in the interm if any new symptoms arise.             CHRISTY Onofre  [facility] Hematology and Oncology    11/8/2017          CC:

## 2017-11-13 ENCOUNTER — FLU SHOT (OUTPATIENT)
Dept: INTERNAL MEDICINE | Facility: CLINIC | Age: 67
End: 2017-11-13

## 2017-11-13 PROCEDURE — 90662 IIV NO PRSV INCREASED AG IM: CPT | Performed by: INTERNAL MEDICINE

## 2017-11-13 PROCEDURE — G0008 ADMIN INFLUENZA VIRUS VAC: HCPCS | Performed by: INTERNAL MEDICINE

## 2017-12-06 ENCOUNTER — TRANSCRIBE ORDERS (OUTPATIENT)
Dept: PHYSICAL THERAPY | Facility: HOSPITAL | Age: 67
End: 2017-12-06

## 2017-12-06 DIAGNOSIS — C50.412 MALIGNANT NEOPLASM OF UPPER-OUTER QUADRANT OF LEFT FEMALE BREAST, UNSPECIFIED ESTROGEN RECEPTOR STATUS (HCC): Primary | ICD-10-CM

## 2017-12-07 ENCOUNTER — HOSPITAL ENCOUNTER (OUTPATIENT)
Dept: PHYSICAL THERAPY | Facility: HOSPITAL | Age: 67
Setting detail: THERAPIES SERIES
Discharge: HOME | End: 2017-12-07

## 2017-12-07 DIAGNOSIS — L90.5 SCAR CONDITIONS AND FIBROSIS OF SKIN: Primary | ICD-10-CM

## 2017-12-07 DIAGNOSIS — M79.602 PAIN IN LEFT ARM: ICD-10-CM

## 2017-12-07 DIAGNOSIS — I97.2 POSTMASTECTOMY LYMPHEDEMA SYNDROME OF LEFT UPPER EXTREMITY: ICD-10-CM

## 2017-12-07 PROCEDURE — G8985 CARRY GOAL STATUS: HCPCS | Performed by: PHYSICAL THERAPIST

## 2017-12-07 PROCEDURE — G8984 CARRY CURRENT STATUS: HCPCS | Performed by: PHYSICAL THERAPIST

## 2017-12-07 PROCEDURE — 97162 PT EVAL MOD COMPLEX 30 MIN: CPT | Performed by: PHYSICAL THERAPIST

## 2017-12-07 PROCEDURE — 97140 MANUAL THERAPY 1/> REGIONS: CPT | Performed by: PHYSICAL THERAPIST

## 2017-12-07 NOTE — THERAPY EVALUATION
Physical Therapy Lymphedema Initial Evaluation  [facility]     Patient Name: Sarah Raphael  : 1950  MRN: [id number]  Today's Date: 2017      Visit Date: 2017    Visit Dx:    ICD-10-CM ICD-9-CM   1. Scar conditions and fibrosis of skin L90.5 709.2   2. 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 Pain in left arm M79.602 729.5       Patient Active Problem List   Diagnosis   • Anxiety   • Gastroesophageal reflux disease without esophagitis   • Hyperlipidemia   • Hypertension   • Hypothyroidism   • Cataract   • Cancer of overlapping sites of right female breast        Past Medical History:   Diagnosis Date   • Arthritis    • Back pain    • Breast cancer    • Celiac disease    • Disease of thyroid gland    • GERD (gastroesophageal reflux disease)    • Hypertension    • MSG intolerance     Hives        Past Surgical History:   Procedure Laterality Date   • COLONOSCOPY         • EYE SURGERY      bilateral cataracts removed   • HYSTERECTOMY  1983   • MASTECTOMY WITH SENTINEL NODE BIOPSY AND AXILLARY NODE DISSECTION Bilateral 2017    Procedure: LEFT BREAST MASTECTOMY WITH LEFT  SENTINEL NODE BIOPSY AND RIGHT PROPHYLACTIC MASTECTOMY;  Surgeon: Caitlin Green MD;  Location: Replaced by [facility];  Service:    • OOPHORECTOMY     • SECONDARY INTRAOCULAR LENSE IMPLANTATION  2015    also in 2016   • SHOULDER ARTHROSCOPY Left 2014   • SKIN CANCER EXCISION  10/2015    basal cell   • THYROID SURGERY  1996   • TONSILLECTOMY  1976       Visit Dx:    ICD-10-CM ICD-9-CM   1. Scar conditions and fibrosis of skin L90.5 709.2   2. 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 Pain in left arm M79.602 729.5             Patient History       17 0900             Chief Complaint Pain;Other 1 (comment)   Tightness left shoulder and bilateral chest   -MW    Type of Pain Upper Extremity / Arm   Left posterior arm, axilla and chest    -MW    Date Current Problem(s) Began 09/06/17  -MW    Brief Description of Current Complaint Pt reports right chest was slower to heal, but left is more painful. Left posterior arm, underarm and chest are tight, but whole chest feels tight. She reports relatively good shoulder motion but has more pain at night, especially if she has been busy. She presents to PT for assistance with decreasing pain and tightness.   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 No  -MW    How has patient tried to help current problem? being active and pain meds  -MW       Pain Location Arm;Chest   Lt posterior arm, axilla and bilateral chest.   -MW    Pain at Present 2  -MW    Pain at Best 0  -MW    Pain at Worst 9  -MW    Pain Frequency Constant/continuous;Intermittent   intermittent intense pain   -MW    Pain Description Tightness;Aching;Sharp  -MW    What Performance Factors Make the Current Problem(s) WORSE? driving and being active   -MW    What Performance Factors Make the Current Problem(s) BETTER? rest and medication   -MW    Is your sleep disturbed? Yes   difficulty falling asleep   -MW    Is medication used to assist with sleep? Yes  -MW    Difficulties with ADL's? over head reaching   -MW       Primary Language English  -MW    Are you able to read Yes  -MW    Are you able to write Yes  -MW    How does patient learn best? Reading  -MW    Teaching needs identified Management of Condition;Home Exercise Program  -MW    Patient is concerned  about/has problems with Reaching over head;Repetitive movements of the hand, arm, shoulder;Other (comment)   driving and sleeping   -MW    Does patient have problems with the following? Depression  -MW    Barriers to learning None  -MW    Pt Participated in POC and Goals Yes  -MW       Are you being hurt, hit, or frightened by anyone at home or in your life? No  -MW    Are you being neglected by a caregiver No  -MW      User Key  (r) = Recorded By, (t) = Taken By, (c) = Cosigned By    Initials Name Provider Type    MW Jennifer Davis, PT Physical Therapist                Lymphedema       12/07/17 0900             Subjective Comments Pain limits driving; pain increases with fatigue. Pain is worse at night when trying to fall asleep.   -MW       Lymphedema Classification LUE:;secondary;stage 1 (Spontaneously Reversible)  -MW    Lymphedema Cancer Related Sx radical mastectomy;sentinel node biopsy  -MW    Lymphedema Surgery Comments Lt mastectomy for diffuse dz; Rt profilactic. LT axillary node sampling.   -MW    Lymph Nodes Removed # 3  -MW    Positive Lymph Nodes # 1   intramamary   -MW    Stage of Cancer Stage II  -MW    Chemo Received yes  -MW    Chemo Treatments #/Timeframe Tamoxafin   -MW    Radiation Therapy Received no  -MW    Infections or Cellulitis? no  -MW       Able to rate subjective pain? yes  -MW    Pre-Treatment Pain Level 2  -MW    Post-Treatment Pain Level 2  -MW       Ptting Edema Category By severity  -MW    Pitting Edema Mild  -MW    Edema Assessment Comment mild Lt upper arm and trunk wall edema.   -MW       Location/Assessment Upper Quadrant;Upper Extremity  -MW    Upper Extremity Conditions bilateral:;intact;clean  -MW    Upper Quadrant Conditions bilateral:;intact;clean;dry;crust   min crusts in incision line folds   -MW    Upper Quadrant Color/Pigment right:;erythema;bilateral:;fibrosis   Rt incision line pink, but intact  -MW    Upper Quadrant Skin Details Bilat incision lines with severe  soft tissue restrictions; adherent to chest wall. LT anterior axilla with thick area of restriction from inferior lateral breast area edge into upper arm   -MW    Skin Observations Comment many freckles, blemishes, mole like areas   -MW       Lymphedema Sensation Comments numb around incision and into lateral chest   -MW       Lymphedema Pulses/Capillary Refill capillary refill  -MW    Capillary Refill upper extremity capillary refill  -MW    Upper Extremity Capillary Refill right:;left:;less than 3 seconds  -MW       Measurement Type(s) Quick Girth  -MW    Quick Girth Areas Upper extremities  -MW       Axilla 35 cm  -MW    Mid upper arm 33.3 cm  -MW    Elbow 28.3 cm  -MW    Mid forearm 23.8 cm  -MW    Wrist crease 17 cm  -MW    Web space 18.8 cm  -MW    Met-heads 17.8 cm  -MW       Axilla 33.5 cm  -MW    Mid upper arm 32 cm  -MW    Elbow 27.4 cm  -MW    Mid forearm 22.2 cm  -MW    Wrist crease 16.4 cm  -MW    Web space 19.5 cm  -MW    Met-heads 18.7 cm  -MW       Manual Lymphatic Drainage initial sequence;opened regional lymph nodes;opened anastamoses;extremity treatment;astym  -MW    Initial Sequence supraclavicular;shoulder collectors  -MW    Supraclavicular right;left  -MW    Shoulder Collectors right;left  -MW    Opened Regional Lymph Nodes inguinal  -MW    Inguinal left  -MW    Opened Anastamoses axillo-inguinal  -MW    Axillo-Inguinal left  -MW    Extremity Treatment simple/brief MLD  -MW    Simple/Brief MLD LUE focused on upper arm   -MW    Astym mastectomy protocol;scar(s) / incision line(s);UE sequence  -MW    Mastectomy Protocol supine  -MW    Mastectomy Protocol Comment Bilateral chest, lateral trunk; then repositioned in shoulder flex/ ABD to open axilla. Evaluator and localizer to all. Isolator in LT axilla. Extra strokes at Lt axilla over thigh tissue restriction. Noted min fine soft tissue disruptions in RT chest, lateral trunk and medial upper arm. Lt chest with min to moderate soft tissue  "disruptions some rough to fine in superior chest, lateral trunk/ ribs, and fine disruptions into medial upper arm.   -MW    Scar(s) / Incision Line(s) Localizer and isolator to Lt chest incision line; deferred Rt due to less mature scar, incision pink to red. Rough disruptions noted around Lt incision line.   -MW    UE Sequence left  -MW    UE Sequence Comment Modified; completed only flexor surface with evaluator and localizer. Min fine disruptions noted in forearm/ wrist.   -MW    Manual Lymphatic Drainage Comments MLD completed post ASTYM / STM   -MW       Compression/Skin Care Comments cooca butter   -MW      User Key  (r) = Recorded By, (t) = Taken By, (c) = Cosigned By    Initials Name Provider Type    MW Jennifer Davis, PT Physical Therapist                  PT Ortho       12/07/17 0900    Posture/Observations    Alignment Options Forward head;Thoracic kyphosis;Rounded shoulders  -MW    Forward Head Mild  -MW    Thoracic Kyphosis Mild;Increased  -MW    Rounded Shoulders Bilateral:;Mild  -MW    ROM (Range of Motion)    General ROM upper extremity range of motion deficits identified  -MW    Left Shoulder    Flexion AROM Deficit 170  -MW    Extension AROM Deficit 70  -MW    ABduction AROM Deficit 145  -MW    Horizontal ABd AROM Deficit 30  -MW    External Rotation AROM Deficit WFL   -MW    Internal Rotation AROM Deficit WFL (hand to mid back) notes tighness   -MW    Right Shoulder    Flexion AROM Deficit 155  -MW    Extension AROM Deficit 30  -MW    ABduction AROM Deficit 150 then begins to substitue   -MW    Horizontal ABd AROM Deficit 15  -MW    External Rotation AROM Deficit 45 painful   -MW    Internal Rotation AROM Deficit mild limitation compared to Lt   -MW    General UE Assessment    ROM Detail elbow, wrist/ hand WFL   -MW    MMT (Manual Muscle Testing)    General MMT Assessment upper extremity strength deficits identified  -MW    General MMT Assessment Detail Rt shoulder deferred due to known \"old\" " shoulder problems  -MW    Upper Extremity    Upper Ext Manual Muscle Testing Detail  strength second position: RT 50, 58, 55; LT 62, 55, 60  -MW    Upper Extremity Flexibility    Overall UE Flexibility Mildly limited  -MW    UE Flexibility Comments Lt median nerve tension tests positive in upper arm (discomfort), wrist and hand (tension to pain); fingers tingling when position held > 10 seconds.   -MW      User Key  (r) = Recorded By, (t) = Taken By, (c) = Cosigned By    Initials Name Provider Type    KEYA Davis PT Physical Therapist                          Therapy Education       12/07/17 0900          Therapy Education    Education Details HEP: draw sword, median nerve stretch, Self MLD (towel under arm)   -MW      Given HEP;Symptoms/condition management;Edema management  -MW      Program New  -MW      How Provided Written;Demonstration;Verbal  -MW      Provided to Patient  -MW      Level of Understanding Verbalized;Demonstrated;Teach back education performed  -MW        User Key  (r) = Recorded By, (t) = Taken By, (c) = Cosigned By    Initials Name Provider Type    KEYA Davis PT Physical Therapist                  Exercises       12/07/17 0900          Subjective Comments    Subjective Comments Pain limits driving; pain increases with fatigue. Pain is worse at night when trying to fall asleep.   -MW      Subjective Pain    Able to rate subjective pain? yes  -MW      Pre-Treatment Pain Level 2  -MW      Post-Treatment Pain Level 2  -MW      Exercise 1    Exercise Name 1 draw sword (Lt)   -MW      Cueing 1 Demo;Verbal  -MW      Reps 1 3  -MW      Additional Comments focused on Lt; Rt prn; bilat if tolerated   -MW      Exercise 2    Exercise Name 2 Median nerve 1  -MW      Cueing 2 Demo;Verbal  -MW      Reps 2 2  -MW      Additional Comments moving between head position and elbow ext; focus on 30 seconds at less tension vs max tension  -MW        User Key  (r) = Recorded By, (t) = Taken By,  (c) = Cosigned By    Initials Name Provider Type    KEYA Davis, PT Physical Therapist                       Manual Rx (last 36 hours)      Manual Treatments       12/07/17 0900          Manual Rx 1    Manual Rx 1 Location Lt chest/ Axilla   -MW      Manual Rx 1 Type STM: tissue bending; long slow stretch at anterior axillar fold   -MW      Manual Rx 1 Grade Moderate pressures   -MW      Manual Rx 1 Duration 5  -MW        User Key  (r) = Recorded By, (t) = Taken By, (c) = Cosigned By    Initials Name Provider Type    MW Jennifer Davis PT Physical Therapist                PT OP Goals       12/07/17 0900    PT Short Term Goals    STG Date to Achieve 01/04/18  -MW    STG 1 Pt independent with home program for improved mobility and lymph massage.   -MW    STG 2 Pt to demonstrate / report at least 25% improvment in Lt shoulder flexibility/ decreased tightness to allow improved function.   -MW    STG 3 Pt to report at least 25% decrease in pain at night / DASH sleep rating to improve to at least 3/5.   -MW    STG 4 Pt to report at least 25% improvement in driving; able to drive greater than 30 miles without pain greater than 4/10.   -MW    Long Term Goals    LTG 1 Pt to demonstrate / report greater than  50% improvment in Lt shoulder flexibility/ decreased tightness to allow improved function.   -MW    LTG 2 Pt to report greater than 50% decrease in pain at night / DASH sleep rating to improve to at least 3/5.   -MW    LTG 3 Pt to report greater than 50% improvement in driving; able to drive greater than 30 miles without pain greater than 4/10.   -MW    LTG 4 Soft tissue restrictions to decrease by > 50% Lt chest to allow improved upper quarter flexibility and decreased pain.   -MW    LTG 5 Pt independent with lymphedema management, including self massage and compression garment as needed.   -MW    Time Calculation    PT Goal Re-Cert Due Date 03/07/18  -MW      User Key  (r) = Recorded By, (t) = Taken By, (c) =  Cosigned By    Initials Name Provider Type    MW Jennifer Davis, PT Physical Therapist                PT Assessment/Plan       12/07/17 0900          Functional Limitations Performance in self-care ADL;Limitation in home management;Limitations in community activities  -MW    Impairments Pain;Range of motion;Impaired flexibility;Impaired lymphatic circulation;Integumentary integrity;Edema  -MW    Assessment Comments This pt of moderate complexity presents to PT with Lt upper quadrant pain, swelling and soft tissue restrictions s/p mastectomy.  She also has ROM and soft tissue restrictions in the Rt upper quadrant from mastectomy on top of previous rotator cuff impairment; this was previously treated by ortho with injections. The Lt shoulder is also s/p rotator cuff surgery involving biceps tendon work (pt uncertain of exact procedure), which also may be impacting her loss of flexibility. Her Lt upper extremity was also positive for median nerve restriction. She lives alone and with her impaired Rt shoulder will be limited in her ability to complete self lymph massage for the Lt upper extremity.    -MW    Please refer to paper survey for additional self-reported information Yes  -MW    Rehab Potential Good  -MW    Patient/caregiver participated in establishment of treatment plan and goals Yes  -MW    Patient would benefit from skilled therapy intervention Yes  -MW       PT Frequency 2x/week  -MW    Predicted Duration of Therapy Intervention (days/wks) 12 weeks   -MW    Planned CPT's? PT EVAL MOD COMPLELITY: 23900;PT MANUAL THERAPY EA 15 MIN: 31767;PT THER PROC EA 15 MIN: 87321  -MW    Physical Therapy Interventions (Optional Details) manual therapy techniques;manual lymphatic drainage;patient/family education;stretching;ROM (Range of Motion);taping;home exercise program;strengthening  -MW    PT Plan Comments Cont ASTYM, MLD, progress HEP as tolerated. Add Kinesiotape as tolerated to lift and loosen soft tissue  restrictions.   -MW      User Key  (r) = Recorded By, (t) = Taken By, (c) = Cosigned By    Initials Name Provider Type    KEYA Davis PT Physical Therapist                 Outcome Measures       12/07/17 0900          DASH    Open a tight or new jar. 3  -MW      Write 1  -MW      Turn a key 1  -MW      Prepare a meal 1  -MW      Push open a heavy door 1  -MW      Place an object on a shelf above your head 3  -MW      Do heavy household chores (e.g., wash walls, wash floors) 3  -MW      Garden or do yard work 2  -MW      Make a bed 1  -MW      Carry a shopping bag or briefcase 1  -MW      Carry a heavy object (over 10 lbs) 2  -MW      Change a lightbulb overhead 3  -MW      Wash or blow dry your hair 2  -MW      Wash your back 1  -MW      Put on a pullover sweater 1  -MW      Use a knife to cut food 1  -MW      Recreational activities in which require little effort (e.g., cardplaying, knitting, etc.) 1  -MW      Recreational activities in which you take some force or impact through your arm, should or hand (e.g. golf, hammering, tennis, etc.) 5  -MW      Recreational Activities in which you move your arm freely (e.g., frisbee, badminton, etc.) 5  -MW      Manage transportation needs (getting from one place to another) 1  -MW      During the past week, to what extent has your arm, shoulder, or hand problem interfered with your normal social activites with family, friends, neighbors or groups? 3  -MW      During the past week, were you limited in your work or other regular daily activities as a result of your arm, shoulder or hand problem? 3  -MW      Arm, Shoulder, or hand pain 5  -MW      Arm, shoulder or hand pain when you performed any specific activity 5  -MW      Tingling (pins and needles) in your arm, shoulder, or hand 5  -MW      Weakness in your arm, shoulder or hand 3  -MW      Stiffness in your arm, shoulder or hand 2  -MW      During the past week, how much difficulty have you had sleeping because  of the pain in your arm, shoulder or hand? 4  -MW      I feel less capable, less confident or less useful because of my arm, shoulder or hand problem 2  -MW      DASH Sum  71  -MW      Number of Questions Answered 29  -MW      DASH Score 36.21  -MW      Functional Assessment    Outcome Measure Options Disabilities of the Arm, Shoulder, and Hand (DASH)  -MW        User Key  (r) = Recorded By, (t) = Taken By, (c) = Cosigned By    Initials Name Provider Type    MW Jennifer Davsi, PT Physical Therapist            Time Calculation:   Start Time: 0900  Total Timed Code Minutes- PT: 45 minute(s)     Therapy Charges for Today     Code Description Service Date Service Provider Modifiers Qty    30671340355 HC PT CARRY MOV HAND OBJ CURRENT 12/7/2017 Jennifer Davis, PT GP, CJ 1    39681147311 HC PT CARRY MOV HAND OBJ PROJECTED 12/7/2017 Jennifer Davis, PT GP, CI 1    27534628446 HC PT EVAL MOD COMPLEXITY 4 12/7/2017 Jennifer Davis, PT GP 1    13979402231 HC PT MANUAL THERAPY EA 15 MIN 12/7/2017 Jennifer Davis, PT GP 3          PT G-Codes  PT Professional Judgement Used?: Yes  Outcome Measure Options: Disabilities of the Arm, Shoulder, and Hand (DASH)  Score: raw score 17; 36% impaired.  Rt shoulder limited by previous rotator cuff impairment; Lt shoulder currently with pain from soft tissue restictions s/p mastectomy and axillary node sampling.   Functional Limitation: Carrying, moving and handling objects  Carrying, Moving and Handling Objects Current Status (): At least 20 percent but less than 40 percent impaired, limited or restricted  Carrying, Moving and Handling Objects Goal Status (): At least 1 percent but less than 20 percent impaired, limited or restricted         Jennifer Davis PT  12/7/2017

## 2017-12-12 ENCOUNTER — HOSPITAL ENCOUNTER (OUTPATIENT)
Dept: PHYSICAL THERAPY | Facility: HOSPITAL | Age: 67
Setting detail: THERAPIES SERIES
Discharge: HOME | End: 2017-12-12

## 2017-12-12 DIAGNOSIS — I97.2 POSTMASTECTOMY LYMPHEDEMA SYNDROME OF LEFT UPPER EXTREMITY: ICD-10-CM

## 2017-12-12 DIAGNOSIS — L90.5 SCAR CONDITIONS AND FIBROSIS OF SKIN: Primary | ICD-10-CM

## 2017-12-12 DIAGNOSIS — M79.602 PAIN IN LEFT ARM: ICD-10-CM

## 2017-12-12 PROCEDURE — 97140 MANUAL THERAPY 1/> REGIONS: CPT | Performed by: PHYSICAL THERAPIST

## 2017-12-12 NOTE — THERAPY TREATMENT NOTE
"    Outpatient Physical Therapy Lymphedema Treatment Note  [facility]     Patient Name: Sarah Raphael  : 1950  MRN: [id number]  Today's Date: 2017        Visit Date: 2017    Visit Dx:    ICD-10-CM ICD-9-CM   1. Scar conditions and fibrosis of skin L90.5 709.2   2. 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 Pain in left arm M79.602 729.5       Patient Active Problem List   Diagnosis   • Anxiety   • Gastroesophageal reflux disease without esophagitis   • Hyperlipidemia   • Hypertension   • Hypothyroidism   • Cataract   • Cancer of overlapping sites of right female breast              Lymphedema       17 1030             Subjective Comments Pt reports last night Lt axilla was swollen and pink/ warm but looks fine this morning; tho the Rt is more \"poochy\" now. Also c/o being tender over her sternum.   -MW    Able to rate subjective pain? yes  -MW    Pre-Treatment Pain Level 2  -MW    Post-Treatment Pain Level 2  -MW    Subjective Pain Comment tender at sternum; also shoulder pain with lowering du eto rotator cuff impairment  -MW       Ptting Edema Category By severity  -MW    Pitting Edema Mild  -MW    Edema Assessment Comment mild bilateral axillary edema Lt>Rt; mild RT upper arm edema   -MW       Location/Assessment Upper Quadrant;Upper Extremity  -MW    Upper Extremity Conditions bilateral:;intact;clean  -MW    Upper Quadrant Conditions bilateral:;intact;clean;dry  -MW    Upper Quadrant Color/Pigment right:;erythema;bilateral:;fibrosis   Rt incision line pink, but intact  -MW    Upper Quadrant Skin Details Tight area persists in lateral pectoralis region which extends into upper arm   -MW       Lymphedema Pulses/Capillary Refill capillary refill  -MW    Capillary Refill upper extremity capillary refill  -MW    Upper Extremity Capillary Refill right:;left:;less than 3 seconds  -MW       Measurement Type(s) --  -MW    Quick Girth Areas --  -MW       Manual " Lymphatic Drainage initial sequence;opened regional lymph nodes;opened anastamoses;extremity treatment;astym  -MW    Initial Sequence supraclavicular;shoulder collectors  -MW    Supraclavicular right;left  -MW    Shoulder Collectors right;left  -MW    Opened Regional Lymph Nodes inguinal  -MW    Inguinal left;right  -MW    Opened Anastamoses axillo-inguinal  -MW    Axillo-Inguinal left;right  -MW    Extremity Treatment simple/brief MLD  -MW    Simple/Brief MLD Bilateral lateral trunk/ axillas and LUE upper arm. Moving all towards inguinal nodes   -MW    Astym mastectomy protocol;scar(s) / incision line(s);upper traps  -MW    Mastectomy Protocol supine  -MW    Mastectomy Protocol Comment Bilateral chest, lateral trunk; then repositioned in shoulder flex/ ABD to open axilla. Evaluator and localizer to all. Isolator in LT axilla. Extra strokes at Lt axilla over thigh tissue restriction. Noted min fine soft tissue disruptions in RT chest, lateral trunk and medial upper arm. Lt chest with min to moderate soft tissue disruptions some rough to fine in superior chest, lateral trunk/ ribs, and fine disruptions into medial upper arm.   -MW    Scar(s) / Incision Line(s) Localizer and isolator to Lt chest incision line; deferred Rt due to less mature scar, incision pink to red. Rough disruptions noted around Lt incision line.   -MW    Upper Traps left  -MW    Upper Traps Comment Initiated in sitting: evaluator and localizer to area; min fine soft tissue disruptions noted along supraspinatus and teres mm areas.   -MW    UE Sequence left  -MW    Manual Lymphatic Drainage Comments MLD completed post ASTYM / STM   -MW       Compression/Skin Care skin care  -MW    Skin Care moisturizing lotion applied   cocoa butter   -MW    Compression/Skin Care Comments kinesiotape applied over LT incision line to loosen and lift scar; bilateral axillary drain tapes with 3 fingers placed to facilitate drainage from axilla into inguinal nodes/  lower quadrant nodes   -      User Key  (r) = Recorded By, (t) = Taken By, (c) = Cosigned By    Initials Name Provider Type    KEYA Davis, PT Physical Therapist                              PT Assessment/Plan       12/12/17 1030       PT Assessment    Functional Limitations Performance in self-care ADL;Limitation in home management;Limitations in community activities  -     Impairments Pain;Range of motion;Impaired flexibility;Impaired lymphatic circulation;Integumentary integrity;Edema  -     Assessment Comments Pt returns with report of mid chest sorenss/ tenderness, suspect this may be from her PNF UE exercises as this may pull into the sternal area; encouraged pt to monitor area with exercise. Added Kinesiotape to provide additional scar tissue remodeling at Lt incision line as well as tapes for lymph flow out of axillary region into lower quadrant to decrease lymph pooling in axilla superior to incision lines. Monitor neural tension symptoms.   -     Rehab Potential Good  -MW     Patient/caregiver participated in establishment of treatment plan and goals Yes  -MW     Patient would benefit from skilled therapy intervention Yes  -MW     PT Plan    PT Frequency 2x/week  -     Physical Therapy Interventions (Optional Details) manual therapy techniques;manual lymphatic drainage;patient/family education;stretching;ROM (Range of Motion);taping;home exercise program;strengthening  -     PT Plan Comments Cont ASTYM, MLD, progress HEP as tolerated. Monitor Kinesiotape effects.   -       User Key  (r) = Recorded By, (t) = Taken By, (c) = Cosigned By    Initials Name Provider Type    KEYA Davis, PT Physical Therapist                    Manual Rx (last 36 hours)      Manual Treatments       12/12/17 1030          Manual Rx 1    Manual Rx 1 Location Lt chest/ Axilla   -      Manual Rx 1 Type STM: tissue bending; long slow stretch at anterior axillar fold   -MW      Manual Rx 1 Grade Moderate  pressures   -MW      Manual Rx 1 Duration 10  -MW      Manual Rx 2    Manual Rx 2 Location Rt chest/ axilla   -MW      Manual Rx 2 Type STM: tissue bending   -MW      Manual Rx 2 Grade minimal pressures  -MW      Manual Rx 2 Duration 5  -MW        User Key  (r) = Recorded By, (t) = Taken By, (c) = Cosigned By    Initials Name Provider Type    KEYA Davis PT Physical Therapist                PT OP Goals       12/12/17 1418       Time Calculation    PT Goal Re-Cert Due Date 03/07/18  -MW       User Key  (r) = Recorded By, (t) = Taken By, (c) = Cosigned By    Initials Name Provider Type    KEYA Davis PT Physical Therapist          Therapy Education  Education Details: monitor central chest with drawing sword; reviewed Kinesiotape care and removal  Given: HEP, Symptoms/condition management, Edema management  Program: Reinforced  How Provided: Written, Demonstration, Verbal  Provided to: Patient  Level of Understanding: Verbalized              Time Calculation:   Start Time: 1030  Total Timed Code Minutes- PT: 60 minute(s)     Therapy Charges for Today     Code Description Service Date Service Provider Modifiers Qty    78501197369 HC PT MANUAL THERAPY EA 15 MIN 12/12/2017 Jennifer Davis, PT GP 4                    Jennifer Davis PT  12/12/2017

## 2017-12-19 ENCOUNTER — HOSPITAL ENCOUNTER (OUTPATIENT)
Dept: PHYSICAL THERAPY | Facility: HOSPITAL | Age: 67
Setting detail: THERAPIES SERIES
Discharge: HOME | End: 2017-12-19

## 2017-12-19 DIAGNOSIS — M79.602 PAIN IN LEFT ARM: ICD-10-CM

## 2017-12-19 DIAGNOSIS — I97.2 POSTMASTECTOMY LYMPHEDEMA SYNDROME OF LEFT UPPER EXTREMITY: ICD-10-CM

## 2017-12-19 DIAGNOSIS — L90.5 SCAR CONDITIONS AND FIBROSIS OF SKIN: Primary | ICD-10-CM

## 2017-12-19 PROCEDURE — 97140 MANUAL THERAPY 1/> REGIONS: CPT | Performed by: PHYSICAL THERAPIST

## 2017-12-19 NOTE — THERAPY TREATMENT NOTE
Outpatient Physical Therapy Lymphedema Treatment Note   [facility]     Patient Name: Sarah Raphael  : 1950  MRN: [id number]  Today's Date: 2017        Visit Date: 2017    Visit Dx:    ICD-10-CM ICD-9-CM   1. Scar conditions and fibrosis of skin L90.5 709.2   2. 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 Pain in left arm M79.602 729.5       Patient Active Problem List   Diagnosis   • Anxiety   • Gastroesophageal reflux disease without esophagitis   • Hyperlipidemia   • Hypertension   • Hypothyroidism   • Cataract   • Cancer of overlapping sites of right female breast              Lymphedema       17 1500          Subjective Comments    Subjective Comments Pt reports tape on sides is bugging her, one on Lt chest is good. Lt underarm seems less full, Rt unchanged.   -MW      Subjective Pain    Able to rate subjective pain? yes  -MW      Pre-Treatment Pain Level 1  -MW      Post-Treatment Pain Level 1  -MW      Subjective Pain Comment shoulder aches   -MW      Lymphedema Edema Assessment    Ptting Edema Category By severity  -MW      Pitting Edema Mild  -MW      Edema Assessment Comment mild to moderate axillary edema Rt > LT   -MW      Skin Changes/Observations    Location/Assessment Upper Quadrant;Upper Extremity  -MW      Upper Extremity Conditions bilateral:;intact;clean  -MW      Upper Quadrant Conditions bilateral:;intact;clean  -MW      Upper Quadrant Color/Pigment right:;bilateral:;fibrosis   LT incision line Kinesiotape intact; RT incision pink   -MW      Upper Quadrant Skin Details Noted area of creeking tissue Rt chest, superior and lateral; denies tenderness but mostly numb in area   -MW      Lymphedema Pulses/Capillary Refill    Lymphedema Pulses/Capillary Refill --  -MW      Capillary Refill --  -MW      Upper Extremity Capillary Refill --  -MW      Manual Lymphatic Drainage    Manual Lymphatic Drainage initial sequence;opened regional lymph  nodes;opened anastamoses;extremity treatment;astym  -MW      Initial Sequence supraclavicular;shoulder collectors  -MW      Supraclavicular right;left  -MW      Shoulder Collectors right;left  -MW      Opened Regional Lymph Nodes inguinal  -MW      Inguinal left;right  -MW      Opened Anastamoses axillo-inguinal  -MW      Axillo-Inguinal left;right  -MW      Extremity Treatment simple/brief MLD  -MW      Simple/Brief MLD Bilat axillas/ lateral trunk moving towards inguinal nodes   -MW      Astym mastectomy protocol;scar(s) / incision line(s);upper traps;other astym  -MW      Mastectomy Protocol supine  -MW      Mastectomy Protocol Comment Bilateral chest, lateral trunk/ ribs with evaluator and localizer. Moderate soft tissue disruptions bilaterally fine to course with thick disruptions / scar fibrosis superior and inferior to incision lines.   -MW      Scar(s) / Incision Line(s) Localizer and isolator to bilat chest incision lines working perpendicular and parallel to incision line.   -MW      Upper Traps right  -MW      Upper Traps Comment Initiated in sitting: evaluator and localizer to area; min fine to coures soft tissue disruptions noted along supraspinatus and teres mm areas.   -MW      UE Sequence --  -MW      Other Astym Repositioned in shoulder flex/ ABD to open axilla. Evaluator and localizer bilaterally; isolator in LT axilla for extra strokes at tight tissue restriction. Noted min to mod fine soft tissue disruptions in bilateral medial upper arms. Moderate course soft tissue disruptions in LT axilla.   -MW      Manual Lymphatic Drainage Comments MLD completed post ASTYM / STM   -MW      Compression/Skin Care    Compression/Skin Care skin care  -MW      Skin Care moisturizing lotion applied   cocoa butter   -MW      Compression/Skin Care Comments Kinesiotape intact at Lt chest; applied to Rt chest incision line with tension over deepest scar areas (ant -lat) with posterior tail into mid back. No axillary  "drains placed due to [facility] visiting and unsure of her reaction to new image of her post surgery, and tx.   -        User Key  (r) = Recorded By, (t) = Taken By, (c) = Cosigned By    Initials Name Provider Type    KEYA Davis, PT Physical Therapist                              PT Assessment/Plan       12/19/17 1500       PT Assessment    Functional Limitations Performance in self-care ADL;Limitation in home management;Limitations in community activities  -     Impairments Pain;Range of motion;Impaired flexibility;Impaired lymphatic circulation;Integumentary integrity;Edema  -     Assessment Comments Pt returns with Kinesiotape in place but with mixed response; Lt axilla less full, Rt seems unchanged both axillary drain tapes were \"irritating\" but incision line tape was \"good.\" Added Rt chest incision line tape to attempt to lift and loosen scar tissue, wrapped tail farther posterior to attempt to assist with fluid movement posteriorly rather than inferiorly. Noted area of unusual tissue texture at Rt ant-lateral chest superior to incision line; will monitor.   -     Rehab Potential Good  -MW     Patient/caregiver participated in establishment of treatment plan and goals Yes  -MW     Patient would benefit from skilled therapy intervention Yes  -MW     PT Plan    PT Frequency 2x/week  -     Physical Therapy Interventions (Optional Details) manual therapy techniques;manual lymphatic drainage;patient/family education;stretching;ROM (Range of Motion);taping;home exercise program;strengthening  -     PT Plan Comments Cont ASTYM, MLD, progress HEP as tolerated. Monitor Kinesiotape effects.   -       User Key  (r) = Recorded By, (t) = Taken By, (c) = Cosigned By    Initials Name Provider Type    KEYA Davis, PT Physical Therapist                    Manual Rx (last 36 hours)      Manual Treatments       12/19/17 1500          Manual Rx 1    Manual Rx 1 Location Lt chest/ Axilla   -      " Manual Rx 1 Type STM: tissue bending; long slow stretch at anterior axillar fold   -MW      Manual Rx 1 Grade Moderate pressures   -MW      Manual Rx 1 Duration 10  -MW      Manual Rx 2    Manual Rx 2 Location Rt chest/ axilla   -MW      Manual Rx 2 Type STM: tissue bending, long slow fascial stretches at lateral pec area and adjacent to incision line   -MW      Manual Rx 2 Grade minimal pressures  -MW      Manual Rx 2 Duration 10  -MW        User Key  (r) = Recorded By, (t) = Taken By, (c) = Cosigned By    Initials Name Provider Type    KEYA Davis PT Physical Therapist                PT OP Goals       12/19/17 1714       Time Calculation    PT Goal Re-Cert Due Date 03/07/18  -       User Key  (r) = Recorded By, (t) = Taken By, (c) = Cosigned By    Initials Name Provider Type    KEYA Davis PT Physical Therapist          Therapy Education  Education Details: monitor Kinesiotape placement, trim prn   Given: Symptoms/condition management, Edema management  Program: Reinforced  How Provided: Verbal  Provided to: Patient  Level of Understanding: Verbalized              Time Calculation:   Start Time: 1500  Total Timed Code Minutes- PT: 60 minute(s)     Therapy Charges for Today     Code Description Service Date Service Provider Modifiers Qty    38189325592 HC PT MANUAL THERAPY EA 15 MIN 12/19/2017 Jennifer Davis, PT GP 4                    Jennifer Davis PT  12/19/2017

## 2017-12-28 ENCOUNTER — HOSPITAL ENCOUNTER (OUTPATIENT)
Dept: PHYSICAL THERAPY | Facility: HOSPITAL | Age: 67
Setting detail: THERAPIES SERIES
Discharge: HOME | End: 2017-12-28

## 2017-12-28 DIAGNOSIS — M79.602 PAIN IN LEFT ARM: ICD-10-CM

## 2017-12-28 DIAGNOSIS — L90.5 SCAR CONDITIONS AND FIBROSIS OF SKIN: Primary | ICD-10-CM

## 2017-12-28 DIAGNOSIS — I97.2 POSTMASTECTOMY LYMPHEDEMA SYNDROME OF LEFT UPPER EXTREMITY: ICD-10-CM

## 2017-12-28 PROCEDURE — 97140 MANUAL THERAPY 1/> REGIONS: CPT | Performed by: PHYSICAL THERAPIST

## 2017-12-28 NOTE — THERAPY TREATMENT NOTE
"    Outpatient Physical Therapy Lymphedema Treatment Note  [facility]     Patient Name: Sarah Raphael  : 1950  MRN: [id number]  Today's Date: 2017        Visit Date: 2017    Visit Dx:    ICD-10-CM ICD-9-CM   1. Scar conditions and fibrosis of skin L90.5 709.2   2. 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 Pain in left arm M79.602 729.5       Patient Active Problem List   Diagnosis   • Anxiety   • Gastroesophageal reflux disease without esophagitis   • Hyperlipidemia   • Hypertension   • Hypothyroidism   • Cataract   • Cancer of overlapping sites of right female breast              Lymphedema       17 0900          Subjective Comments    Subjective Comments Pt reports tape was a \"little itchy\" so she took it off in the shower yesterday, but her skin is still not happy. Also generally feels \"tight\" in chest / shoulders.   -MW      Subjective Pain    Able to rate subjective pain? yes  -MW      Pre-Treatment Pain Level 2  -MW      Post-Treatment Pain Level 1  -MW      Lymphedema Edema Assessment    Ptting Edema Category By severity  -MW      Pitting Edema Mild  -MW      Skin Changes/Observations    Location/Assessment Upper Quadrant;Upper Extremity  -MW      Upper Extremity Conditions bilateral:;intact;clean  -MW      Upper Quadrant Conditions bilateral:;intact;clean;inflamed  -MW      Upper Quadrant Color/Pigment right:;bilateral:;fibrosis;erythema;blanchable  -MW      Upper Quadrant Skin Details Bilateral areas of erythema / irritation in adhesive pattern from Kinesiotape especially along lateral aspect of trunk Rt>Lt. Also posterior lateral area of irritation and tenderness LT; deep in skin fold.   -MW      LUE Quick Girth (cm)    Axilla 34.3 cm  -MW      Mid upper arm 33.7 cm  -MW      Elbow 28 cm  -MW      Mid forearm 24.2 cm  -MW      Wrist crease 16.8 cm  -MW      Manual Lymphatic Drainage    Manual Lymphatic Drainage initial sequence;opened regional " lymph nodes;opened anastamoses;extremity treatment;astym  -MW      Initial Sequence supraclavicular;shoulder collectors  -MW      Supraclavicular right;left  -MW      Shoulder Collectors right;left  -MW      Opened Regional Lymph Nodes inguinal  -MW      Inguinal left  -MW      Opened Anastamoses axillo-inguinal  -MW      Axillo-Inguinal left  -MW      Extremity Treatment simple/brief MLD  -MW      Simple/Brief MLD LT axilla/ LUE   -MW      Astym mastectomy protocol;upper traps;other astym  -MW      Mastectomy Protocol supine  -MW      Mastectomy Protocol Comment Bilateral chest, lateral trunk/ ribs with evaluator and localizer. Moderate soft tissue disruptions bilaterally; avoided areas of irritated skin lateral trunk / along incision lines.   -MW      Upper Traps right;left  -MW      Upper Traps Comment Initiated in sitting bilateral UT/ scapular regions: evaluator and localizer to area; min fine soft tissue disruptions noted along supraspinatus and teres mm areas.   -MW      Other Astym Repositioned in shoulder flex/ ABD to open axilla. Evaluator and localizer bilaterally; isolator in LT axilla for extra strokes at tight tissue restriction. Noted min to mod fine soft tissue disruptions in bilateral medial upper arms. Moderate course soft tissue disruptions in LT axilla; min fine soft tissue disruptions Rt axilla.   -MW      Manual Lymphatic Drainage Comments MLD completed post ASTYM / STM   -MW      Compression/Skin Care    Compression/Skin Care skin care  -MW      Skin Care moisturizing lotion applied   cocoa butter   -MW      Compression/Skin Care Comments Encouraged pt to use healing / skin calming topical of her choice on irritated skin.   -MW        User Key  (r) = Recorded By, (t) = Taken By, (c) = Cosigned By    Initials Name Provider Type    KEYA Davis, PT Physical Therapist                              PT Assessment/Plan       12/28/17 0900       PT Assessment    Functional Limitations  "Performance in self-care ADL;Limitation in home management;Limitations in community activities  -MW     Impairments Pain;Range of motion;Impaired flexibility;Impaired lymphatic circulation;Integumentary integrity;Edema  -MW     Assessment Comments Pt returns with moderate skin irritation from Kinesiotape over bilateral chest incision lines; more irritation laterally than anteriorly. Less soft tissue disruptions noted with ASTYM. LUE circumferential measurements stable; minor fluctuations (0.5 cm changes) but some are decreased and some are increased.   -MW     Rehab Potential Good  -MW     Patient/caregiver participated in establishment of treatment plan and goals Yes  -MW     Patient would benefit from skilled therapy intervention Yes  -MW     PT Plan    PT Frequency 2x/week  -MW     Physical Therapy Interventions (Optional Details) manual therapy techniques;manual lymphatic drainage;patient/family education;stretching;ROM (Range of Motion);taping;home exercise program;strengthening  -MW     PT Plan Comments Cont ASTYM, MLD, progress HEP as tolerated. Monitor skin post Kinesiotape irritation.   -MW       User Key  (r) = Recorded By, (t) = Taken By, (c) = Cosigned By    Initials Name Provider Type    MW Jennifer Davis, PT Physical Therapist                     Exercises       12/28/17 0900          Subjective Comments    Subjective Comments Pt reports tape was a \"little itchy\" so she took it off in the shower yesterday, but her skin is still not happy. Also generally feels \"tight\" in chest / shoulders.   -MW      Subjective Pain    Able to rate subjective pain? yes  -MW      Pre-Treatment Pain Level 2  -MW      Post-Treatment Pain Level 1  -MW      Exercise 1    Exercise Name 1 Lt Median nerve tension   -MW      Cueing 1 Tactile;Verbal  -MW      Reps 1 3  -MW      Additional Comments various angles of shoulder ABD; wrist ext. Also with elbow flexion and pronation / supination and wrist ext.   -MW        User Key  " (r) = Recorded By, (t) = Taken By, (c) = Cosigned By    Initials Name Provider Type    KEYA Davis PT Physical Therapist                       Manual Rx (last 36 hours)      Manual Treatments       12/28/17 0900          Manual Rx 1    Manual Rx 1 Location Lt chest/ Axilla   -MW      Manual Rx 1 Type STM: tissue bending; long slow stretch at anterior axillar fold   -MW      Manual Rx 1 Grade Min to moderate  -MW      Manual Rx 1 Duration 8  -MW      Manual Rx 2    Manual Rx 2 Location Rt chest/ axilla   -MW      Manual Rx 2 Type STM: tissue bending, at lateral pec area   -MW      Manual Rx 2 Grade minimal pressures  -MW      Manual Rx 2 Duration 5  -MW        User Key  (r) = Recorded By, (t) = Taken By, (c) = Cosigned By    Initials Name Provider Type    KEYA Davis PT Physical Therapist                PT OP Goals       12/28/17 1627       Time Calculation    PT Goal Re-Cert Due Date 03/07/18  -MW       User Key  (r) = Recorded By, (t) = Taken By, (c) = Cosigned By    Initials Name Provider Type    KEYA Davis PT Physical Therapist          Therapy Education  Education Details: Cont gentle skin care; healing or calming topicals. Cont HEP / ROM   Given: Symptoms/condition management, Edema management  Program: Modified  How Provided: Verbal  Provided to: Patient  Level of Understanding: Verbalized              Time Calculation:   Start Time: 0900  Total Timed Code Minutes- PT: 45 minute(s)     Therapy Charges for Today     Code Description Service Date Service Provider Modifiers Qty    82449310879 HC PT MANUAL THERAPY EA 15 MIN 12/28/2017 Jennifer Davis PT GP 3                    Jennifer Davis PT  12/28/2017

## 2018-01-02 ENCOUNTER — APPOINTMENT (OUTPATIENT)
Dept: PHYSICAL THERAPY | Facility: HOSPITAL | Age: 68
End: 2018-01-02

## 2018-01-04 ENCOUNTER — APPOINTMENT (OUTPATIENT)
Dept: PHYSICAL THERAPY | Facility: HOSPITAL | Age: 68
End: 2018-01-04

## 2018-01-09 ENCOUNTER — HOSPITAL ENCOUNTER (OUTPATIENT)
Dept: PHYSICAL THERAPY | Facility: HOSPITAL | Age: 68
Setting detail: THERAPIES SERIES
Discharge: HOME | End: 2018-01-09

## 2018-01-09 DIAGNOSIS — L90.5 SCAR CONDITIONS AND FIBROSIS OF SKIN: Primary | ICD-10-CM

## 2018-01-09 DIAGNOSIS — I97.2 POSTMASTECTOMY LYMPHEDEMA SYNDROME OF LEFT UPPER EXTREMITY: ICD-10-CM

## 2018-01-09 DIAGNOSIS — M79.602 PAIN IN LEFT ARM: ICD-10-CM

## 2018-01-09 PROCEDURE — 97140 MANUAL THERAPY 1/> REGIONS: CPT | Performed by: PHYSICAL THERAPIST

## 2018-01-09 PROCEDURE — G8985 CARRY GOAL STATUS: HCPCS | Performed by: PHYSICAL THERAPIST

## 2018-01-09 PROCEDURE — G8984 CARRY CURRENT STATUS: HCPCS | Performed by: PHYSICAL THERAPIST

## 2018-01-09 NOTE — THERAPY PROGRESS REPORT/RE-CERT
Outpatient Physical Therapy Lymphedema Progress Note   [place]     Patient Name: Sarah Raphael  : 1950  MRN: [id number]  Today's Date: 2018        Visit Date: 2018    Visit Dx:    ICD-10-CM ICD-9-CM   1. Scar conditions and fibrosis of skin L90.5 709.2   2. 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 Pain in left arm M79.602 729.5       Patient Active Problem List   Diagnosis   • Anxiety   • Gastroesophageal reflux disease without esophagitis   • Hyperlipidemia   • Hypertension   • Hypothyroidism   • Cataract   • Cancer of overlapping sites of right female breast              Lymphedema       18 1300             Subjective Comments Reports new onset Lt underarm/ posterior arm pain; mild soreness constant but intermittent sharp, grabbing / mm spasm pain. Also notes soreness in Rt central chest/ tender to touch. tight over sternum    -MW       Able to rate subjective pain? yes  -MW    Pre-Treatment Pain Level 3  -MW    Post-Treatment Pain Level 2  -MW    Subjective Pain Comment Lt underarm / posterior arm   -MW       Ptting Edema Category By severity  -MW    Pitting Edema Mild  -MW    Edema Assessment Comment mild to moderate fullness lt lateral trunk / axilla   -MW       Location/Assessment Upper Quadrant;Upper Extremity  -MW    Upper Extremity Conditions bilateral:;intact;clean  -MW    Upper Quadrant Conditions bilateral:;intact;clean  -MW    Upper Quadrant Color/Pigment bilateral:;fibrosis;right:;red   Right incsions pink/ red   -MW       Manual Lymphatic Drainage initial sequence;opened regional lymph nodes;opened anastamoses;extremity treatment;astym  -MW    Initial Sequence supraclavicular;shoulder collectors  -MW    Supraclavicular right;left  -MW    Shoulder Collectors right;left  -MW    Opened Regional Lymph Nodes inguinal  -MW    Inguinal left  -MW    Opened Anastamoses axillo-inguinal  -MW    Axillo-Inguinal left  -MW    Extremity Treatment  simple/brief MLD  -MW    Simple/Brief MLD LT axilla/ lateral trunk post ASTYM / STM   -MW    Astym mastectomy protocol;upper traps;other astym  -MW    Mastectomy Protocol supine  -MW    Mastectomy Protocol Comment Bilateral chest with evaluator and localizer working around tissue folds. Localizer and isolator into lateral trunk / ribs. Moderate course soft tissue disruptions anteriorly and lateral trunk, moderate fine soft tissue disruptions in superior chest and over sternum/ mid chest bilaterally.   -MW    Scar(s) / Incision Line(s) Localizer and isolator to bilat chest incision lines working perpendicular and parallel to incision line; between skin folds as able.   -MW    Upper Traps right;left  -MW    Upper Traps Comment Initiated in sitting bilateral UT/ scapular regions: evaluator and localizer to area; min fine soft tissue disruptions noted along supraspinatus and teres mm areas. Tender over Lt teres mm group.  Also worked Lt triceps region in sitting; moderate soft tissue disruptions noted over triceps.   -MW    Other Astym Repositioned in shoulder flex/ ABD to open axilla. Evaluator and localizer bilaterally; localizer and isolator in both axilla for extra strokes at tight tissue restriction. Noted min to mod fine soft tissue disruptions in bilateral medial upper arms. Moderate course soft tissue disruptions in LT axilla; min fine soft tissue disruptions Rt axilla.   -MW    Manual Lymphatic Drainage Comments MLD completed post ASTYM / STM   -MW       Compression/Skin Care skin care  -MW    Skin Care moisturizing lotion applied   cocoa butter   -      User Key  (r) = Recorded By, (t) = Taken By, (c) = Cosigned By    Initials Name Provider Type    KEYA Davis, PT Physical Therapist                              PT Assessment/Plan       01/09/18 1300       PT Assessment    Functional Limitations Performance in self-care ADL;Limitation in home management;Limitations in community activities  -      Impairments Pain;Range of motion;Impaired flexibility;Impaired lymphatic circulation;Integumentary integrity;Edema  -MW     Assessment Comments Pt returns with c/o increased Lt axilla/ trunk pain and new areas of pain Rt chest. Unsure if tissues are responding to longer gap between sessions or increased stress from family visiting and anniversary of her 's death are contributing to increased c/o pain. Tender intermittently with ASTYM. Monitor pain and modify treatment prn; cont with POC.   -MW     Rehab Potential Good  -MW     Patient/caregiver participated in establishment of treatment plan and goals Yes  -MW     Patient would benefit from skilled therapy intervention Yes  -MW     PT Plan    PT Frequency 2x/week  -MW     Predicted Duration of Therapy Intervention (days/wks) 8 weeks   -MW     Planned CPT's? PT MANUAL THERAPY EA 15 MIN: 99065;PT THER PROC EA 15 MIN: 68167  -MW     Physical Therapy Interventions (Optional Details) manual therapy techniques;stretching;ROM (Range of Motion);home exercise program;manual lymphatic drainage;patient/family education  -     PT Plan Comments Cont ASTYM/ STM, MLD; progress HEP as able; monitor pain.   -MW       User Key  (r) = Recorded By, (t) = Taken By, (c) = Cosigned By    Initials Name Provider Type    MW Jennifer Davis, PT Physical Therapist                             PT OP Goals       01/09/18 1300    PT Short Term Goals    STG Date to Achieve 01/04/18  -MW    STG 1 Pt independent with home program for improved mobility and lymph massage.   -MW    STG 1 Progress Met  -MW    STG 2 Pt to demonstrate / report at least 25% improvment in Lt shoulder flexibility/ decreased tightness to allow improved function.   -MW    STG 2 Progress Partially Met  -MW    STG 3 Pt to report at least 25% decrease in pain at night / DASH sleep rating to improve to at least 3/5.   -MW    STG 3 Progress Ongoing  -MW    STG 4 Pt to report at least 25% improvement in driving; able to  drive greater than 30 miles without pain greater than 4/10.   -MW    STG 4 Progress Ongoing  -MW    Long Term Goals    LTG 1 Pt to demonstrate / report greater than  50% improvment in Lt shoulder flexibility/ decreased tightness to allow improved function.   -MW    LTG 1 Progress Ongoing  -MW    LTG 2 Pt to report greater than 50% decrease in pain at night / DASH sleep rating to improve to at least 3/5.   -MW    LTG 2 Progress Ongoing  -MW    LTG 3 Pt to report greater than 50% improvement in driving; able to drive greater than 30 miles without pain greater than 4/10.   -MW    LTG 3 Progress Ongoing  -MW    LTG 4 Soft tissue restrictions to decrease by > 50% Lt chest to allow improved upper quarter flexibility and decreased pain.   -MW    LTG 4 Progress Ongoing  -MW    LTG 5 Pt independent with lymphedema management, including self massage and compression garment as needed.   -MW    LTG 5 Progress Partially Met  -MW    Time Calculation    PT Goal Re-Cert Due Date 03/07/18  -MW      User Key  (r) = Recorded By, (t) = Taken By, (c) = Cosigned By    Initials Name Provider Type    KEYA Davis, PT Physical Therapist          Therapy Education  Education Details: Monitor Lt axilla/ arm pain. Ice prn post ASTYM.   Given: Symptoms/condition management, Edema management  Program: Progressed  How Provided: Verbal  Provided to: Patient  Level of Understanding: Verbalized    Outcome Measure Options: Disabilities of the Arm, Shoulder, and Hand (DASH)  DASH  Open a tight or new jar.: Moderate Difficulty  Write: No Difficulty  Turn a key: Mild Difficulty  Prepare a meal: Mild Difficulty  Push open a heavy door: Mild Difficulty  Place an object on a shelf above your head: Moderate Difficulty  Do heavy household chores (e.g., wash walls, wash floors): Moderate Difficulty  Garden or do yard work: Moderate Difficulty  Make a bed: No Difficulty  Carry a shopping bag or briefcase: No Difficulty  Carry a heavy object (over 10  lbs): Mild Difficulty  Change a lightbulb overhead: Moderate Difficulty  Wash or blow dry your hair: Moderate Difficulty  Wash your back: Mild Difficulty  Put on a pullover sweater: No Difficulty  Use a knife to cut food: No Difficulty  Recreational activities in which require little effort (e.g., cardplaying, knitting, etc.): No Difficulty  Recreational activities in which you take some force or impact through your arm, should or hand (e.g. golf, hammering, tennis, etc.): Unable  Recreational Activities in which you move your arm freely (e.g., frisbee, badminton, etc.): Unable  Manage transportation needs (getting from one place to another): No Difficulty  During the past week, to what extent has your arm, shoulder, or hand problem interfered with your normal social activites with family, friends, neighbors or groups?: Moderately  During the past week, were you limited in your work or other regular daily activities as a result of your arm, shoulder or hand problem?: Moderately Limited  Arm, Shoulder, or hand pain: Extreme  Arm, shoulder or hand pain when you performed any specific activity: Extreme  Tingling (pins and needles) in your arm, shoulder, or hand: Moderate  Weakness in your arm, shoulder or hand: Moderate  Stiffness in your arm, shoulder or hand: Mild  During the past week, how much difficulty have you had sleeping because of the pain in your arm, shoulder or hand?: Severe Difficulty  I feel less capable, less confident or less useful because of my arm, shoulder or hand problem: Disagree  DASH Sum : 75  Number of Questions Answered: 29  DASH Score: 39.66  DASH COMMENTS: Increased pain in Lt axilla/ arm; new c/o pain Rt chest.          Time Calculation:   Start Time: 1300  Total Timed Code Minutes- PT: 55 minute(s)     Therapy Charges for Today     Code Description Service Date Service Provider Modifiers Qty    86179647825 HC PT CARRY MOV HAND OBJ CURRENT 1/9/2018 Jennifer Davis, PT GP, CJ 1     70412043091 HC PT CARRY MOV HAND OBJ PROJECTED 1/9/2018 Jennifer Davis, PT GP, CI 1    30593519977 HC PT MANUAL THERAPY EA 15 MIN 1/9/2018 Jennifer Davis, PT GP 4          PT G-Codes  PT Professional Judgement Used?: Yes  Outcome Measure Options: Disabilities of the Arm, Shoulder, and Hand (DASH)  Score: raw score 75; 40% impaired; having more pain this visit.   Functional Limitation: Carrying, moving and handling objects  Carrying, Moving and Handling Objects Current Status (): At least 20 percent but less than 40 percent impaired, limited or restricted  Carrying, Moving and Handling Objects Goal Status (): At least 1 percent but less than 20 percent impaired, limited or restricted         Jennifer Davis, PT  1/9/2018

## 2018-01-11 ENCOUNTER — APPOINTMENT (OUTPATIENT)
Dept: PHYSICAL THERAPY | Facility: HOSPITAL | Age: 68
End: 2018-01-11

## 2018-01-12 ENCOUNTER — TELEPHONE (OUTPATIENT)
Dept: INTERNAL MEDICINE | Facility: CLINIC | Age: 68
End: 2018-01-12

## 2018-01-12 ENCOUNTER — OFFICE VISIT (OUTPATIENT)
Dept: INTERNAL MEDICINE | Facility: CLINIC | Age: 68
End: 2018-01-12

## 2018-01-12 VITALS
TEMPERATURE: 98.9 F | BODY MASS INDEX: 32.73 KG/M2 | SYSTOLIC BLOOD PRESSURE: 132 MMHG | HEART RATE: 64 BPM | WEIGHT: 209 LBS | DIASTOLIC BLOOD PRESSURE: 76 MMHG

## 2018-01-12 DIAGNOSIS — R05.9 COUGH: Primary | ICD-10-CM

## 2018-01-12 DIAGNOSIS — J40 BRONCHITIS: ICD-10-CM

## 2018-01-12 DIAGNOSIS — J06.9 ACUTE URI: ICD-10-CM

## 2018-01-12 LAB
EXPIRATION DATE: NORMAL
FLUAV AG NPH QL: NEGATIVE
FLUBV AG NPH QL: NEGATIVE
INTERNAL CONTROL: NORMAL
Lab: NORMAL

## 2018-01-12 PROCEDURE — 87804 INFLUENZA ASSAY W/OPTIC: CPT | Performed by: NURSE PRACTITIONER

## 2018-01-12 PROCEDURE — 99213 OFFICE O/P EST LOW 20 MIN: CPT | Performed by: NURSE PRACTITIONER

## 2018-01-12 RX ORDER — METHYLPREDNISOLONE 4 MG/1
TABLET ORAL
Qty: 1 EACH | Refills: 0 | Status: SHIPPED | OUTPATIENT
Start: 2018-01-12 | End: 2018-02-06

## 2018-01-12 RX ORDER — DOXYCYCLINE HYCLATE 100 MG/1
100 TABLET, DELAYED RELEASE ORAL 2 TIMES DAILY
Qty: 20 TABLET | Refills: 0 | Status: SHIPPED | OUTPATIENT
Start: 2018-01-12 | End: 2018-02-06 | Stop reason: SINTOL

## 2018-01-12 RX ORDER — BENZONATATE 200 MG/1
200 CAPSULE ORAL 3 TIMES DAILY PRN
Qty: 30 CAPSULE | Refills: 0 | Status: SHIPPED | OUTPATIENT
Start: 2018-01-12 | End: 2018-08-07

## 2018-01-12 NOTE — PROGRESS NOTES
Chief Complaint   Patient presents with   • Sinus Problem     cough x 1 week        Subjective     History of Present Illness   Patient here today and reports that her granddaughters recently been sick with a non-flu virus she's had upper respiratory symptoms for a week she's been using her albuterol last use was last p.m. and also using Mucinex and Nasacort as well as nasal saline rinses she's had no fever or nausea vomiting or diarrhea.      The following portions of the patient's history were reviewed and updated as appropriate: allergies, current medications, past family history, past medical history, past social history, past surgical history and problem list.    Review of Systems   HENT: Positive for congestion, postnasal drip and sinus pressure.    Respiratory: Positive for cough.    All other systems reviewed and are negative.        Objective   Physical Exam   Constitutional: She is oriented to person, place, and time. She appears well-developed and well-nourished.   HENT:   Head: Normocephalic and atraumatic.   Right Ear: Hearing, tympanic membrane, external ear and ear canal normal.   Left Ear: Hearing, tympanic membrane, external ear and ear canal normal.   Nose: Right sinus exhibits no maxillary sinus tenderness and no frontal sinus tenderness. Left sinus exhibits no maxillary sinus tenderness and no frontal sinus tenderness.   Mouth/Throat: Uvula is midline and mucous membranes are normal.   Nasal mucosa boggy clear postnasal drainage   Eyes: Conjunctivae are normal. Pupils are equal, round, and reactive to light. Right eye exhibits no discharge. Left eye exhibits no discharge. No scleral icterus.   Neck: Normal range of motion. Neck supple. No thyromegaly present.   Cardiovascular: Normal rate, regular rhythm, normal heart sounds and intact distal pulses.  Exam reveals no friction rub.    No murmur heard.  Pulmonary/Chest: Effort normal and breath sounds normal.   Congested cough   Abdominal: Soft.  Bowel sounds are normal. She exhibits no distension and no mass. There is no tenderness. There is no rebound and no guarding. No hernia.   Musculoskeletal: Normal range of motion. She exhibits no edema.   Lymphadenopathy:     She has no cervical adenopathy.   Neurological: She is alert and oriented to person, place, and time. She has normal reflexes.   Skin: Skin is warm and dry.   Psychiatric: She has a normal mood and affect. Her behavior is normal. Judgment and thought content normal.   Nursing note and vitals reviewed.          Results for orders placed or performed in visit on 01/12/18   POCT Influenza A/B   Result Value Ref Range    Rapid Influenza A Ag NEGATIVE     Rapid Influenza B Ag NEGATIVE     Internal Control Passed Passed    Lot Number [id number]     Expiration Date 6/19         Assessment/Plan   Sarah was seen today for sinus problem.    Diagnoses and all orders for this visit:    Cough  -     POCT Influenza A/B    Bronchitis  -     MethylPREDNISolone (MEDROL, KIRA,) 4 MG tablet; Take as directed on package instructions.  -     doxycycline (DORYX) 100 MG enteric coated tablet; Take 1 tablet by mouth 2 (Two) Times a Day.  -     benzonatate (TESSALON) 200 MG capsule; Take 1 capsule by mouth 3 (Three) Times a Day As Needed for Cough.  -     Discontinue: HYDROcod Polst-CPM Polst ER (TUSSIONEX PENNKINETIC) 10-8 MG/5ML ER suspension; Take 5 mL by mouth Every 12 (Twelve) Hours As Needed for Cough.  -     HYDROcod Polst-CPM Polst ER (TUSSIONEX PENNKINETIC) 10-8 MG/5ML ER suspension; Take 5 mL by mouth Every 12 (Twelve) Hours As Needed for Cough.    Acute URI    csa uds    increase to bid albuterol  Use afrin 5 d      Return if symptoms worsen or fail to improve.  RTC/call  If symptoms worsen  Meds MOA and SE's reviewed and pt v/u

## 2018-01-16 ENCOUNTER — HOSPITAL ENCOUNTER (OUTPATIENT)
Dept: PHYSICAL THERAPY | Facility: HOSPITAL | Age: 68
Setting detail: THERAPIES SERIES
Discharge: HOME | End: 2018-01-16

## 2018-01-16 DIAGNOSIS — M79.602 PAIN IN LEFT ARM: ICD-10-CM

## 2018-01-16 DIAGNOSIS — L90.5 SCAR CONDITIONS AND FIBROSIS OF SKIN: Primary | ICD-10-CM

## 2018-01-16 DIAGNOSIS — I97.2 POSTMASTECTOMY LYMPHEDEMA SYNDROME OF LEFT UPPER EXTREMITY: ICD-10-CM

## 2018-01-16 PROCEDURE — 97140 MANUAL THERAPY 1/> REGIONS: CPT | Performed by: PHYSICAL THERAPIST

## 2018-01-16 NOTE — THERAPY TREATMENT NOTE
"    Outpatient Physical Therapy Lymphedema Treatment Note   [facility]     Patient Name: Sarah Raphael  : 1950  MRN: [id number]  Today's Date: 2018        Visit Date: 2018    Visit Dx:    ICD-10-CM ICD-9-CM   1. Scar conditions and fibrosis of skin L90.5 709.2   2. 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 Pain in left arm M79.602 729.5       Patient Active Problem List   Diagnosis   • Anxiety   • Gastroesophageal reflux disease without esophagitis   • Hyperlipidemia   • Hypertension   • Hypothyroidism   • Cataract   • Cancer of overlapping sites of right female breast              Lymphedema       18 1300             Subjective Comments Reports has been on Antibx since Thursday for severe sinus infx; some of pain at last visit was lung tightness and should have used her inhaler (as she felt better after using her inhaler).   -MW       Able to rate subjective pain? yes  -MW    Pre-Treatment Pain Level 2  -MW    Post-Treatment Pain Level 1  -MW    Subjective Pain Comment tightness / a little sore not really 'pain\"   -MW       Ptting Edema Category By severity  -MW    Pitting Edema Mild  -MW    Edema Assessment Comment mild to moderate fullness lt lateral trunk / axilla   -MW       Location/Assessment Upper Quadrant;Upper Extremity  -MW    Upper Extremity Conditions bilateral:;intact;clean  -MW    Upper Quadrant Conditions bilateral:;intact;clean  -MW    Upper Quadrant Color/Pigment bilateral:;fibrosis;right:;red   Right incsions pink/ red   -MW    Upper Quadrant Skin Details mild areas of red dots scattered acros her back \"Itching from mild antibx allergy\"   -MW       Manual Lymphatic Drainage initial sequence;opened regional lymph nodes;opened anastamoses;extremity treatment;astym  -MW    Initial Sequence supraclavicular;shoulder collectors  -MW    Supraclavicular right;left  -MW    Shoulder Collectors right;left  -MW    Opened Regional Lymph Nodes " inguinal  -MW    Inguinal left  -MW    Opened Anastamoses axillo-inguinal  -MW    Axillo-Inguinal left  -MW    Extremity Treatment simple/brief MLD  -MW    Simple/Brief MLD LT axilla/ lateral trunk post ASTYM / STM; working down AIA several times   -MW    Astym mastectomy protocol;upper traps;other astym  -MW    Mastectomy Protocol supine  -MW    Mastectomy Protocol Comment Bilateral chest with evaluator and localizer working around tissue folds. Localizer and isolator into lateral trunk / ribs. Minimal to moderate course to fine soft tissue disruptions anteriorly and laterally around trunk. Moderate thick, rough disruptions adjacent and superior to incision lines, mid chest/ breast area Rt >LT.    -MW    Scar(s) / Incision Line(s) Localizer and isolator to bilat chest incision lines working perpendicular and parallel to incision line; between skin folds as able. Noted moderate rough disruptions superior but adjacent to incision lines Rt> Lt.   -MW    Upper Traps right;left  -MW    Upper Traps Comment Initiated in sitting Lt UT/ scapular region: evaluator and localizer to area; min fine soft tissue disruptions noted along supraspinatus and teres mm areas.   -MW    Other Astym Repositioned in shoulder flex/ ABD to open axilla. Evaluator and localizer bilaterally; localizer and isolator in both axilla for extra strokes at tight tissue restriction. Noted min to mod fine soft tissue disruptions in bilateral medial upper arms. Moderate course soft tissue disruptions in LT axilla; min fine soft tissue disruptions Rt axilla. Noted tight fine cord in LT axilla/ anterior fold into lateral upper arm; tender.   -MW    Manual Lymphatic Drainage Comments MLD completed post ASTYM / STM   -MW       Compression/Skin Care skin care  -MW    Skin Care moisturizing lotion applied   cocoa butter   -MW      User Key  (r) = Recorded By, (t) = Taken By, (c) = Cosigned By    Initials Name Provider Type    MW Jennifer Davis, PT Physical  Therapist                              PT Assessment/Plan       01/16/18 1300       PT Assessment    Functional Limitations Performance in self-care ADL;Limitation in home management;Limitations in community activities  -     Impairments Pain;Range of motion;Impaired flexibility;Impaired lymphatic circulation;Integumentary integrity;Edema  -     Assessment Comments Chest pain/ tightness back to typical (seems intense pain last visit was related to lung restrictions/ need for inhaler use). Overall soft tissue disruptions seem to be decreasing - smoother in most areas, but noted new areas of thickness superior to incision lines Rt > Lt. Also fine axillary cord noted on LT; area of soft tissue tightness/ restriction is persistent as well. Encouraged pt to try compression tony to aid in lateral trunk/ breast area fullness.   -MW     Rehab Potential Good  -MW     Patient/caregiver participated in establishment of treatment plan and goals Yes  -MW     Patient would benefit from skilled therapy intervention Yes  -MW     PT Plan    PT Frequency 2x/week  -MW     Physical Therapy Interventions (Optional Details) manual therapy techniques;stretching;ROM (Range of Motion);home exercise program;manual lymphatic drainage;patient/family education  -     PT Plan Comments Cont ASTYM/ STM, MLD; progress HEP as able; monitor pain. Reassess after travel next week.   -       User Key  (r) = Recorded By, (t) = Taken By, (c) = Cosigned By    Initials Name Provider Type    KEYA Davis, PT Physical Therapist                     Exercises       01/16/18 1300          Subjective Comments    Subjective Comments Reports has been on Antibx since Thursday for severe sinus infx; some of pain at last visit was lung tightness and should have used her inhaler (as she felt better after using her inhaler).   -MW      Subjective Pain    Able to rate subjective pain? yes  -MW      Pre-Treatment Pain Level 2  -MW      Post-Treatment Pain  "Level 1  -MW      Subjective Pain Comment tightness / a little sore not really 'pain\"   -MW      Exercise 1    Exercise Name 1 Lt Median nerve tension   -MW      Cueing 1 Tactile;Verbal  -MW      Reps 1 3  -MW      Additional Comments various angles; moderate tingling with full elbow ext and wrist ext, finger ext. No tingles with wrist / fingers in neutral.   -MW      Exercise 2    Exercise Name 2 Rt median nerve tension   -MW      Reps 2 1  -MW      Additional Comments denies tingling; mild tightness   -MW        User Key  (r) = Recorded By, (t) = Taken By, (c) = Cosigned By    Initials Name Provider Type    KEYA Davis PT Physical Therapist                       Manual Rx (last 36 hours)      Manual Treatments       01/16/18 1300          Manual Rx 1    Manual Rx 1 Location Lt chest/ Axilla   -MW      Manual Rx 1 Type STM: tissue bending; long slow stretch at anterior axillar fold; struming at corded area   -MW      Manual Rx 1 Grade Min to moderate  -MW      Manual Rx 1 Duration 10  -MW      Manual Rx 2    Manual Rx 2 Location Rt chest/ axilla   -MW      Manual Rx 2 Type STM: tissue bending, at lateral pec area   -MW      Manual Rx 2 Grade minimal pressures  -MW      Manual Rx 2 Duration 8  -MW        User Key  (r) = Recorded By, (t) = Taken By, (c) = Cosigned By    Initials Name Provider Type    KEYA Davis PT Physical Therapist                PT OP Goals       01/16/18 1520       Time Calculation    PT Goal Re-Cert Due Date 03/07/18  -MW       User Key  (r) = Recorded By, (t) = Taken By, (c) = Cosigned By    Initials Name Provider Type    KEYA Davis PT Physical Therapist          Therapy Education  Education Details: Consider gentle trunk compression to decrease axillary / lateral trunk fullness.   Given: Edema management, Symptoms/condition management  Program: Reinforced  How Provided: Verbal (reviewed HEP )  Provided to: Patient  Level of Understanding: Verbalized        "       Time Calculation:   Start Time: 1300  Total Timed Code Minutes- PT: 55 minute(s)     Therapy Charges for Today     Code Description Service Date Service Provider Modifiers Qty    50976445526 HC PT MANUAL THERAPY EA 15 MIN 1/16/2018 Jennifer Davis, PT GP 4                    Jennifer Davis, PT  1/16/2018

## 2018-01-18 ENCOUNTER — TELEPHONE (OUTPATIENT)
Dept: INTERNAL MEDICINE | Facility: CLINIC | Age: 68
End: 2018-01-18

## 2018-01-18 RX ORDER — ALBUTEROL SULFATE 90 UG/1
2 AEROSOL, METERED RESPIRATORY (INHALATION) EVERY 6 HOURS PRN
Qty: 1 INHALER | Refills: 5 | Status: SHIPPED | OUTPATIENT
Start: 2018-01-18

## 2018-01-18 NOTE — TELEPHONE ENCOUNTER
Please call in 6 month supply (Either 1 month with RF 5 or 3 months with RF 1).  Tyrese Recinos MD  5:08 PM  01/18/18

## 2018-01-18 NOTE — TELEPHONE ENCOUNTER
----- Message from Jaqueline Guerrero sent at 1/18/2018  4:30 PM EST -----  [phone]  REFILL ON [employer] AIR INHALER   [facility] / [facility]

## 2018-01-30 ENCOUNTER — HOSPITAL ENCOUNTER (OUTPATIENT)
Dept: PHYSICAL THERAPY | Facility: HOSPITAL | Age: 68
Setting detail: THERAPIES SERIES
Discharge: HOME | End: 2018-01-30

## 2018-01-30 DIAGNOSIS — L90.5 SCAR CONDITIONS AND FIBROSIS OF SKIN: Primary | ICD-10-CM

## 2018-01-30 DIAGNOSIS — I97.2 POSTMASTECTOMY LYMPHEDEMA SYNDROME OF LEFT UPPER EXTREMITY: ICD-10-CM

## 2018-01-30 DIAGNOSIS — M79.602 PAIN IN LEFT ARM: ICD-10-CM

## 2018-01-30 PROCEDURE — 97140 MANUAL THERAPY 1/> REGIONS: CPT | Performed by: PHYSICAL THERAPIST

## 2018-01-30 NOTE — THERAPY TREATMENT NOTE
Outpatient Physical Therapy Lymphedema Treatment Note   [facility]     Patient Name: Sarah Raphael  : 1950  MRN: [id number]  Today's Date: 2018        Visit Date: 2018    Visit Dx:    ICD-10-CM ICD-9-CM   1. Scar conditions and fibrosis of skin L90.5 709.2   2. 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 Pain in left arm M79.602 729.5       Patient Active Problem List   Diagnosis   • Anxiety   • Gastroesophageal reflux disease without esophagitis   • Hyperlipidemia   • Hypertension   • Hypothyroidism   • Cataract   • Cancer of overlapping sites of right female breast              Lymphedema       18 1300             Subjective Comments Reports trip went well other than in bed by 9pm every night; stamina is still low. Also notes fullness Lt >RT lateral trunk - axilla   -MW       Able to rate subjective pain? yes  -MW    Pre-Treatment Pain Level 1  -MW    Post-Treatment Pain Level 1  -MW    Subjective Pain Comment tightness chest, back, underarms full   -MW       Ptting Edema Category By severity  -MW    Pitting Edema Mild;Moderate  -MW    Edema Assessment Comment mild to moderate fullness Rt lateral trunk; moderate fullness Lt lateral trunk   -MW       Location/Assessment Upper Quadrant;Upper Extremity  -MW    Upper Extremity Conditions bilateral:;intact;clean  -MW    Upper Quadrant Conditions bilateral:;intact;clean  -MW    Upper Quadrant Color/Pigment bilateral:;fibrosis;right:   Right incisions bright pink to pale pink   -MW       Axilla 33.7 cm  -MW    Mid upper arm 32.9 cm  -MW    Elbow 27.9 cm  -MW    Mid forearm 22.3 cm  -MW    Wrist crease 16.4 cm  -MW    Web space 20 cm  -MW    Met-heads 18.4 cm  -MW       Manual Lymphatic Drainage initial sequence;opened regional lymph nodes;opened anastamoses;extremity treatment;astym  -MW    Initial Sequence supraclavicular;shoulder collectors  -MW    Supraclavicular right;left  -MW    Shoulder Collectors  right;left  -MW    Opened Regional Lymph Nodes inguinal  -MW    Inguinal left  -MW    Opened Anastamoses axillo-inguinal  -MW    Axillo-Inguinal left  -MW    Extremity Treatment simple/brief MLD  -MW    Simple/Brief MLD RT and LT axilla / lateral trunk working toward AIA; greater focus on LT. Pt positioned in hand to opposite shoulder to open lateral trunk and posterior axilla for MLD.    -MW    Astym mastectomy protocol;upper traps;other astym  -MW    Mastectomy Protocol supine  -MW    Mastectomy Protocol Comment Bilateral chest with evaluator and localizer working around / across tissue folds. Localizer and isolator into lateral trunk / ribs. Minimal to moderate course to fine soft tissue disruptions anteriorly and laterally around trunk. Moderate thick, rough disruptions adjacent and superior to incision lines, mid chest/ breast area Rt >LT. Localizer over sternum laterally and parallel; moderate disruptions.   -MW    Scar(s) / Incision Line(s) Localizer and isolator to bilat chest incision lines working perpendicular and parallel to incision line; between skin folds as able. Noted moderate rough disruptions superior but adjacent to incision lines Rt> Lt.   -MW    Upper Traps right;left  -MW    Upper Traps Comment Initiated in sitting Bilat UT/ scapular region: evaluator and localizer to area; min fine soft tissue disruptions noted along teres mm areas. Also notes tenderness Lt posterior axillary fold/ teres area.   -MW    Manual Lymphatic Drainage Comments MLD completed post ASTYM / STM   -MW       Compression/Skin Care skin care  -MW    Skin Care moisturizing lotion applied   cocoa butter   -MW    Compression/Skin Care Comments Cleaned Lt trunk with Theraworx foam. Applied skin prep, then placed Kinesiotape with anchor below ribs, fingers into anteior and mid lateral trunk, just inferior to axilla.   -MW      User Key  (r) = Recorded By, (t) = Taken By, (c) = Cosigned By    Initials Name Provider Type    MW  Jennifer Davis, PT Physical Therapist                              PT Assessment/Plan       01/30/18 1300       PT Assessment    Functional Limitations Performance in self-care ADL;Limitation in home management;Limitations in community activities  -MW     Impairments Pain;Range of motion;Impaired flexibility;Impaired lymphatic circulation;Integumentary integrity;Edema  -MW     Assessment Comments Pt returns after cruise; LUE measurements slightly decreased but LT >Rt trunk fullness into axilla. Soft tissue restricitons seem more tight, but soft tissue disruptions (roughness with ASTYM) stable from previous visit. Resuming Kinesiotape with skin prep to attempt to minimize skin irritation but improve lateral trunk drainage.   -MW     Rehab Potential Good  -MW     Patient/caregiver participated in establishment of treatment plan and goals Yes  -MW     Patient would benefit from skilled therapy intervention Yes  -MW     PT Plan    PT Frequency 2x/week  -MW     Physical Therapy Interventions (Optional Details) manual therapy techniques;stretching;ROM (Range of Motion);home exercise program;manual lymphatic drainage;patient/family education  -     PT Plan Comments Cont ASTYM/ STM, MLD; ck skin and effectiveness of KT LT trunk.   -MW       User Key  (r) = Recorded By, (t) = Taken By, (c) = Cosigned By    Initials Name Provider Type    KEYA Davis, PT Physical Therapist                     Exercises       01/30/18 1300          Subjective Comments    Subjective Comments Reports trip went well other than in bed by 9pm every night; stamina is still low. Also notes fullness Lt >RT lateral trunk - axilla   -MW      Subjective Pain    Able to rate subjective pain? yes  -MW      Pre-Treatment Pain Level 1  -MW      Post-Treatment Pain Level 1  -MW      Subjective Pain Comment tightness chest, back, underarms full   -MW        User Key  (r) = Recorded By, (t) = Taken By, (c) = Cosigned By    Initials Name Provider Type     KEYA Davis, PT Physical Therapist                       Manual Rx (last 36 hours)      Manual Treatments       01/30/18 1300          Manual Rx 1    Manual Rx 1 Location Lt chest/ Axilla   -MW      Manual Rx 1 Type STM: tissue bending; long slow stretch at anterior axillar fold  -MW      Manual Rx 1 Grade Min to moderate  -MW      Manual Rx 1 Duration 8  -MW      Manual Rx 2    Manual Rx 2 Location Rt chest/ axilla   -MW      Manual Rx 2 Type STM: tissue bending, at lateral pec area   -MW      Manual Rx 2 Grade minimal pressures  -MW      Manual Rx 2 Duration 5  -MW        User Key  (r) = Recorded By, (t) = Taken By, (c) = Cosigned By    Initials Name Provider Type    KEYA Davis PT Physical Therapist                PT OP Goals       01/30/18 1649       Time Calculation    PT Goal Re-Cert Due Date 03/07/18  -MW       User Key  (r) = Recorded By, (t) = Taken By, (c) = Cosigned By    Initials Name Provider Type    KEYA Davis PT Physical Therapist          Therapy Education  Education Details: Monitor skin / KT; remove gently if itching or irritation occur.   Given: Edema management, Symptoms/condition management  Program: Reinforced  How Provided: Verbal  Provided to: Patient  Level of Understanding: Verbalized              Time Calculation:   Start Time: 1300  Total Timed Code Minutes- PT: 55 minute(s)     Therapy Charges for Today     Code Description Service Date Service Provider Modifiers Qty    [id number] HC PT MANUAL THERAPY EA 15 MIN 1/30/2018 Jennifer Davis, PT GP 4                    Jennifer Davis, PT  1/30/2018

## 2018-02-05 ENCOUNTER — HOSPITAL ENCOUNTER (OUTPATIENT)
Dept: PHYSICAL THERAPY | Facility: HOSPITAL | Age: 68
Setting detail: THERAPIES SERIES
Discharge: HOME | End: 2018-02-05

## 2018-02-05 DIAGNOSIS — M79.602 PAIN IN LEFT ARM: ICD-10-CM

## 2018-02-05 DIAGNOSIS — I97.2 POSTMASTECTOMY LYMPHEDEMA SYNDROME OF LEFT UPPER EXTREMITY: ICD-10-CM

## 2018-02-05 DIAGNOSIS — L90.5 SCAR CONDITIONS AND FIBROSIS OF SKIN: Primary | ICD-10-CM

## 2018-02-05 PROCEDURE — 97140 MANUAL THERAPY 1/> REGIONS: CPT | Performed by: PHYSICAL THERAPIST

## 2018-02-05 NOTE — THERAPY TREATMENT NOTE
Outpatient Physical Therapy Lymphedema Treatment Note   [facility]     Patient Name: Sarah Raphael  : 1950  MRN: [id number]  Today's Date: 2018        Visit Date: 2018    Visit Dx:    ICD-10-CM ICD-9-CM   1. Scar conditions and fibrosis of skin L90.5 709.2   2. 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 Pain in left arm M79.602 729.5       Patient Active Problem List   Diagnosis   • Anxiety   • Gastroesophageal reflux disease without esophagitis   • Hyperlipidemia   • Hypertension   • Hypothyroidism   • Cataract   • Cancer of overlapping sites of right female breast              Lymphedema       18 1400             Subjective Comments Reports tape was too itchy so she removed it on Saturday. Still feels full though some better; but seems more tight especially in Lt chest / axilla   -MW       Able to rate subjective pain? yes  -MW    Pre-Treatment Pain Level 1  -MW    Post-Treatment Pain Level 1  -MW    Subjective Pain Comment tightness   -MW       Ptting Edema Category By severity  -MW    Pitting Edema Mild;Moderate  -MW    Edema Assessment Comment mild to moderate fullness Rt lateral trunk; moderate fullness Lt lateral trunk   -MW       Location/Assessment Upper Quadrant;Upper Extremity  -MW    Upper Extremity Conditions bilateral:;intact;clean  -MW    Upper Quadrant Conditions bilateral:;intact;clean  -MW    Upper Quadrant Color/Pigment bilateral:;fibrosis;right:   Right incisions pale pink; bright pink post ASTYM   -MW    Skin Observations Comment very faint pink lines Lt post lateral trunk from previous Kinesiotape   -MW       Manual Lymphatic Drainage initial sequence;opened regional lymph nodes;opened anastamoses;extremity treatment;astym  -MW    Initial Sequence supraclavicular;shoulder collectors  -MW    Supraclavicular right;left  -MW    Shoulder Collectors right;left  -MW    Opened Regional Lymph Nodes inguinal  -MW    Inguinal left;right  -MW     Opened Anastamoses axillo-inguinal  -MW    Axillo-Inguinal left;right  -MW    Extremity Treatment simple/brief MLD  -MW    Simple/Brief MLD RT and LT axilla / lateral trunk working toward AIA. Pt positioned in hand to opposite shoulder to open lateral trunk and posterior axilla for MLD.    -MW    Astym mastectomy protocol;upper traps;other astym  -MW    Mastectomy Protocol supine  -MW    Mastectomy Protocol Comment Bilateral chest with evaluator and localizer working around / across tissue folds. Localizer and isolator into lateral trunk / ribs. Minimal to moderate course to fine soft tissue disruptions anteriorly and laterally around trunk. Moderate thick, rough disruptions adjacent and superior to incision lines, mid chest/ breast area Rt >LT. Localizer over sternum parallel; moderate rough disruptions.   -MW    Scar(s) / Incision Line(s) Localizer and isolator to bilat chest incision lines working perpendicular and parallel to incision line; between skin folds as able. Noted moderate rough disruptions superior but adjacent to incision lines Rt> Lt.   -MW    Upper Traps right;left  -MW    Upper Traps Comment Initiated in sitting Bilat UT/ scapular region: evaluator and localizer to area; min fine soft tissue disruptions noted along teres mm areas.   -MW    Other Astym Repositioned in shoulder flex/ ABD to open axilla. Evaluator and localizer bilaterally; localizer and isolator in LT axilla for extra strokes at tight tissue restriction. Noted min to mod fine soft tissue disruptions in bilateral medial upper arms. Moderate course soft tissue disruptions in LT axilla; min fine soft tissue disruptions Rt axilla. LT axilla/ anterior fold Cord no longer visible or palpable.   -MW    Manual Lymphatic Drainage Comments MLD completed post ASTYM / STM   -MW       Compression/Skin Care skin care  -MW    Skin Care moisturizing lotion applied   cocoa butter   -MW      User Key  (r) = Recorded By, (t) = Taken By, (c) =  Cosigned By    Initials Name Provider Type    KEYA Davis, PT Physical Therapist                              PT Assessment/Plan       02/05/18 1400       PT Assessment    Functional Limitations Performance in self-care ADL;Limitation in home management;Limitations in community activities  -     Impairments Pain;Range of motion;Impaired flexibility;Impaired lymphatic circulation;Integumentary integrity;Edema  -     Assessment Comments Pt recovering from upper respiratory illness; c/o increased chest / axilla tightness especially on LT. Axillary / lateral trunk fullness somewhat improved but difficult to manage with bilateral mastectomies and excess tissue folds at lateral aspect of chest/ residual soft tissue. LT axillary cord not palpable or visible this session. Fair tolerance to Kinesiotape with skin prep but pt did not feel much benefit. Trial of self axillary massage with soft ball; pt to obtain ball.   -     Rehab Potential Good  -MW     Patient/caregiver participated in establishment of treatment plan and goals Yes  -MW     Patient would benefit from skilled therapy intervention Yes  -MW     PT Plan    PT Frequency 2x/week  -     Physical Therapy Interventions (Optional Details) manual therapy techniques;stretching;ROM (Range of Motion);home exercise program;manual lymphatic drainage;patient/family education  -     PT Plan Comments Cont ASTYM/ STM, MLD; trial of ball for axillary massage.   -       User Key  (r) = Recorded By, (t) = Taken By, (c) = Cosigned By    Initials Name Provider Type    KEYA Davis, PT Physical Therapist                     Exercises       02/05/18 1400          Subjective Comments    Subjective Comments Reports tape was too itchy so she removed it on Saturday. Still feels full though some better; but seems more tight especially in Lt chest / axilla   -      Subjective Pain    Able to rate subjective pain? yes  -MW      Pre-Treatment Pain Level 1  -MW       "Post-Treatment Pain Level 1  -MW      Subjective Pain Comment tightness   -MW        User Key  (r) = Recorded By, (t) = Taken By, (c) = Cosigned By    Initials Name Provider Type    KEYA Davis PT Physical Therapist                       Manual Rx (last 36 hours)      Manual Treatments       02/05/18 1400          Manual Rx 1    Manual Rx 1 Location Lt chest/ Axilla   -MW      Manual Rx 1 Type STM: tissue bending; long slow stretch at anterior axillar fold; also at insertion point of tighness along ribs  -MW      Manual Rx 1 Grade Min to moderate  -MW      Manual Rx 1 Duration 12  -MW      Manual Rx 2    Manual Rx 2 Location Rt chest/ axilla   -MW      Manual Rx 2 Type STM: tissue bending, at lateral pec area and incision lines   -MW      Manual Rx 2 Grade minimal pressures  -MW      Manual Rx 2 Duration 8  -MW        User Key  (r) = Recorded By, (t) = Taken By, (c) = Cosigned By    Initials Name Provider Type    KEYA Davis PT Physical Therapist                PT OP Goals       02/05/18 1721       Time Calculation    PT Goal Re-Cert Due Date 03/07/18  -MW       User Key  (r) = Recorded By, (t) = Taken By, (c) = Cosigned By    Initials Name Provider Type    KEYA Davis PT Physical Therapist          Therapy Education  Education Details: Trial of soft 8\" ball for self axillary / lateral trunk massage.   Given: Edema management, Symptoms/condition management  Program: Modified  How Provided: Verbal, Demonstration  Provided to: Patient  Level of Understanding: Verbalized, Demonstrated              Time Calculation:   Start Time: 1400  Total Timed Code Minutes- PT: 60 minute(s)     Therapy Charges for Today     Code Description Service Date Service Provider Modifiers Qty    [id number]  PT MANUAL THERAPY EA 15 MIN 2/5/2018 Jennifer Davis, PT GP 4                    Jennifer Davis PT  2/5/2018     "

## 2018-02-06 ENCOUNTER — OFFICE VISIT (OUTPATIENT)
Dept: INTERNAL MEDICINE | Facility: CLINIC | Age: 68
End: 2018-02-06

## 2018-02-06 VITALS
SYSTOLIC BLOOD PRESSURE: 116 MMHG | BODY MASS INDEX: 30.62 KG/M2 | TEMPERATURE: 97.5 F | HEART RATE: 68 BPM | DIASTOLIC BLOOD PRESSURE: 62 MMHG | HEIGHT: 68 IN | WEIGHT: 202 LBS | RESPIRATION RATE: 18 BRPM

## 2018-02-06 DIAGNOSIS — E78.5 HYPERLIPIDEMIA, UNSPECIFIED HYPERLIPIDEMIA TYPE: ICD-10-CM

## 2018-02-06 DIAGNOSIS — Z23 NEED FOR PNEUMOCOCCAL VACCINE: ICD-10-CM

## 2018-02-06 DIAGNOSIS — K21.9 GASTROESOPHAGEAL REFLUX DISEASE WITHOUT ESOPHAGITIS: ICD-10-CM

## 2018-02-06 DIAGNOSIS — E03.8 OTHER SPECIFIED HYPOTHYROIDISM: ICD-10-CM

## 2018-02-06 DIAGNOSIS — Z00.00 PHYSICAL EXAM: Primary | ICD-10-CM

## 2018-02-06 DIAGNOSIS — I10 ESSENTIAL HYPERTENSION: ICD-10-CM

## 2018-02-06 LAB
BILIRUB BLD-MCNC: NEGATIVE MG/DL
CLARITY, POC: CLEAR
COLOR UR: YELLOW
EXPIRATION DATE: NORMAL
GLUCOSE UR STRIP-MCNC: NEGATIVE MG/DL
KETONES UR QL: NEGATIVE
LEUKOCYTE EST, POC: NEGATIVE
Lab: NORMAL
NITRITE UR-MCNC: NEGATIVE MG/ML
PH UR: 7 [PH] (ref 5–8)
PROT UR STRIP-MCNC: NEGATIVE MG/DL
RBC # UR STRIP: NEGATIVE /UL
SP GR UR: 1.01 (ref 1–1.03)
UROBILINOGEN UR QL: NORMAL

## 2018-02-06 PROCEDURE — 90732 PPSV23 VACC 2 YRS+ SUBQ/IM: CPT | Performed by: INTERNAL MEDICINE

## 2018-02-06 PROCEDURE — 99397 PER PM REEVAL EST PAT 65+ YR: CPT | Performed by: INTERNAL MEDICINE

## 2018-02-06 PROCEDURE — G0009 ADMIN PNEUMOCOCCAL VACCINE: HCPCS | Performed by: INTERNAL MEDICINE

## 2018-02-06 PROCEDURE — 99212 OFFICE O/P EST SF 10 MIN: CPT | Performed by: INTERNAL MEDICINE

## 2018-02-06 PROCEDURE — 81003 URINALYSIS AUTO W/O SCOPE: CPT | Performed by: INTERNAL MEDICINE

## 2018-02-06 RX ORDER — MONTELUKAST SODIUM 10 MG/1
10 TABLET ORAL NIGHTLY
Qty: 90 TABLET | Refills: 1 | Status: SHIPPED | OUTPATIENT
Start: 2018-02-06 | End: 2019-09-17

## 2018-02-06 RX ORDER — DIPHENHYDRAMINE HCL 25 MG
25 CAPSULE ORAL EVERY 6 HOURS PRN
Qty: 360 CAPSULE | Refills: 1 | Status: SHIPPED | OUTPATIENT
Start: 2018-02-06 | End: 2020-02-25 | Stop reason: SDUPTHER

## 2018-02-06 RX ORDER — AMLODIPINE BESYLATE 5 MG/1
5 TABLET ORAL DAILY
Qty: 90 TABLET | Refills: 1 | Status: SHIPPED | OUTPATIENT
Start: 2018-02-06 | End: 2018-09-10 | Stop reason: SDUPTHER

## 2018-02-06 RX ORDER — LOSARTAN POTASSIUM 100 MG/1
100 TABLET ORAL DAILY
Qty: 90 TABLET | Refills: 1 | Status: SHIPPED | OUTPATIENT
Start: 2018-02-06 | End: 2018-11-15 | Stop reason: SDUPTHER

## 2018-02-06 RX ORDER — LEVOTHYROXINE SODIUM 175 UG/1
175 TABLET ORAL DAILY
Qty: 90 TABLET | Refills: 1 | Status: SHIPPED | OUTPATIENT
Start: 2018-02-06 | End: 2018-11-19 | Stop reason: SDUPTHER

## 2018-02-06 RX ORDER — TRIAMTERENE AND HYDROCHLOROTHIAZIDE 37.5; 25 MG/1; MG/1
1 TABLET ORAL DAILY
Qty: 90 TABLET | Refills: 1 | Status: SHIPPED | OUTPATIENT
Start: 2018-02-06 | End: 2018-08-22 | Stop reason: SDUPTHER

## 2018-02-06 RX ORDER — CITALOPRAM 40 MG/1
40 TABLET ORAL DAILY
Qty: 90 TABLET | Refills: 1 | Status: SHIPPED | OUTPATIENT
Start: 2018-02-06 | End: 2018-08-07

## 2018-02-06 RX ORDER — IBUPROFEN 600 MG/1
600 TABLET ORAL EVERY 8 HOURS PRN
Qty: 270 TABLET | Refills: 1 | Status: SHIPPED | OUTPATIENT
Start: 2018-02-06 | End: 2019-03-25 | Stop reason: SDUPTHER

## 2018-02-06 NOTE — PROGRESS NOTES
Chief Complaint   Patient presents with   • Annual Exam       History of Present Illness      The patient presents for an established patient physical examination and health maintenance visit. The patient has had a colonoscopy. The last colonoscopy was in 2014. Most recent colonoscopy findings: did not reveal polyps.    As relates to hypothyroidism, the patient reports a good energy level. She reports no hair loss, heat intolerance, cold intolerance, diarrhea, constipation, sweats or palpitations. She is taking her medication as prescribed.    The patient presents for follow-up of anxiety. She denies currently having anxiety symptoms. She is not having panic attacks. Her energy level is good. She denies agorophobia. She sleeps well. She is currently taking a medication. She states that her current anxiety symptoms are stable. The current medication regimen consists of citalopram. The patient denies having medication side effects including nausea, headaches, anxiety, increased depression, anorgasmia or fatigue.    The patient presents for a follow-up related to hyperlipidemia. She is following a low fat diet. She reports that she is exercising. She is not taking medication for hyperlipidemia. She denies chest pain, shortness of breath, orthopnea, paroxysmal nocturnal dyspnea, dyspnea on exertion, edema or syncope.    The patient presents for a follow-up related to hypertension. The patient reports that she has had no headaches or blurred vision. She states that she is taking her medication as prescribed. She is not having medication side effects.    The patient is on ranitidine for her gastroesophageal reflux. The medication is taken on a regular basis and gives complete relief of the symptoms. She reports no abdominal pain, belching, dysphagia, early satiety, heartburn, hoarseness, nausea, odynophagia, rectal bleeding, vomiting or weight loss. The GERD has no known aggravating factors.    Review of  Systems    GENERAL- Denies Fever, Chills, Sweats, Weakness or Malaise.    HEENT- Reports: Nasal Congestion. Denies: Eye Pain, Eye Drainage, Nasal Discharge, Sore Throat, Ear Pain, Ear Drainage, Decreased Vision, Sinus Pain, Decreased Hearing, Visual Disturbance, Diplopia or Tinnitus.    NECK- Denies Decreased Range of Motion, Stiffness, Thyroid Nodules, Enlarged Lymph Nodes or Goiter.    CARDIOVASCULAR- Denies Claudication.    PULMONARY- Denies Wheezing, Sputum Production, Cough, Hemoptysis or Pleuritic Chest Pain.    GENITOURINARY- Denies Dysuria, Hematuria, Urinary Frequency, Urinary Leakage, Pelvic Pain, Vaginal Prolapse, Vaginal Discharge, Dyspareunia or Abnormal Menses.    ENDOCRINE- Denies Depression, Memory Loss, Polydypsia, Polyphagia, Polyuria or Weight Gain.    NEUROLOGIC- Denies Seizures, Confusion or Excessive Daytime Sleepiness.    MUSCULOSKELETAL- Denies Joint Pain, Joint Stiffness, Decreased Range of Motion, Joint Swelling or Erythema of Joints.    INTEGUMENTARY- Denies Rash, Lumps, Sores, Itching, Dryness, Color Change, Changes in Hair, Brittle Nails, Discolored Nails, Thickened Nails, Growths or Alopecia.    Medications      Current Outpatient Prescriptions:   •  albuterol (PROAIR HFA) 108 (90 Base) MCG/ACT inhaler, Inhale 2 puffs Every 6 (Six) Hours As Needed for Wheezing or Shortness of Air., Disp: 1 inhaler, Rfl: 5  •  amLODIPine (NORVASC) 5 MG tablet, Take 1 tablet by mouth Daily., Disp: 90 tablet, Rfl: 1  •  benzonatate (TESSALON) 200 MG capsule, Take 1 capsule by mouth 3 (Three) Times a Day As Needed for Cough., Disp: 30 capsule, Rfl: 0  •  Cholecalciferol (VITAMIN D PO), Take 1,000 Units by mouth daily. Vitamin D 1000 UNIT CAPS; TAKE 1 TABLET DAILY, Disp: , Rfl:   •  citalopram (CeleXA) 40 MG tablet, Take 1 tablet by mouth Daily., Disp: 90 tablet, Rfl: 1  •  dicyclomine (BENTYL) 10 MG capsule, Take 1 capsule by mouth 4 (Four) Times a Day As Needed (abd bloating). For: Abdominal bloating,  Disp: , Rfl:   •  diphenhydrAMINE (BENADRYL) 25 mg capsule, Take 1 capsule by mouth Every 6 (Six) Hours As Needed for Itching or Allergies., Disp: 360 capsule, Rfl: 1  •  EPINEPHrine (EPIPEN 2-KIRA) 0.3 MG/0.3ML solution auto-injector injection, Inject 1 Units into the shoulder, thigh, or buttocks As Needed (allergy). as directed; For: Allergic rhinitis, Disp: , Rfl:   •  guaiFENesin (MUCINEX) 600 MG 12 hr tablet, Take 600 mg by mouth 2 (Two) Times a Day., Disp: , Rfl:   •  hydrOXYzine (ATARAX) 25 MG tablet, Take 25 mg by mouth At Night As Needed for Allergies. at bedtime as needed, Disp: , Rfl:   •  ibuprofen (ADVIL,MOTRIN) 600 MG tablet, Take 1 tablet by mouth Every 8 (Eight) Hours As Needed for Mild Pain ., Disp: 270 tablet, Rfl: 1  •  levocetirizine (XYZAL) 5 MG tablet, TAKE ONE TABLET BY MOUTH ONCE DAILY, Disp: 30 tablet, Rfl: 5  •  levothyroxine (SYNTHROID, LEVOTHROID) 175 MCG tablet, Take 1 tablet by mouth Daily., Disp: 90 tablet, Rfl: 1  •  losartan (COZAAR) 100 MG tablet, Take 1 tablet by mouth Daily., Disp: 90 tablet, Rfl: 1  •  Melatonin 10 MG tablet, Take 1 tablet by mouth At Night As Needed (sleep)., Disp: , Rfl:   •  montelukast (SINGULAIR) 10 MG tablet, Take 1 tablet by mouth Every Night., Disp: 90 tablet, Rfl: 1  •  ranitidine (ZANTAC) 150 MG tablet, Take 150 mg by mouth 2 (Two) Times a Day. AM&PM, Disp: , Rfl:   •  tamoxifen (NOLVADEX) 20 MG chemo tablet, Take 1 tablet by mouth Daily., Disp: 90 tablet, Rfl: 1  •  Triamcinolone Acetonide (NASACORT ALLERGY 24HR) 55 MCG/ACT nasal inhaler, 2 sprays into each nostril daily., Disp: , Rfl:   •  triamterene-hydrochlorothiazide (MAXZIDE-25) 37.5-25 MG per tablet, Take 1 tablet by mouth Daily., Disp: 90 tablet, Rfl: 1  •  vitamin B-12 (CYANOCOBALAMIN) 1000 MCG tablet, Take 1,000 mcg by mouth Daily., Disp: , Rfl:      Allergies    Allergies   Allergen Reactions   • Nuts Shortness Of Breath     rash   • Other      MSG - HIVES    • Penicillins Shortness Of Breath  "    rash   • Soybean-Containing Drug Products Shortness Of Breath     rash   • Sunflower Oil Shortness Of Breath     rash   • Apple    • Cefuroxime      Stomach problems   • Cephalexin      Stomach problems   • Chicken Allergy    • Clindamycin Nausea Only and Nausea And Vomiting   • Erythromycin Nausea Only   • Flu Virus Vaccine      Redness and red rash to area   • Gluten Meal    • Metronidazole Hives   • Naproxen Dizziness   • Parsley Seed    • Thimerosal      Redness and swelling at site of injection   • Varicella Virus Vaccine Live      Redness and swelling at site   • Zostavax [Zoster Vaccine Live]      54341 UNT /0.65 ML subcutaneous solution Recontituted  - redness and swelling at site   • Doxycycline Itching and Rash   • Sulfa Antibiotics Nausea Only and Rash     Other reaction(s): Headache, Vomiting       Problem List    Patient Active Problem List   Diagnosis   • Anxiety   • Gastroesophageal reflux disease without esophagitis   • Hyperlipidemia   • Hypertension   • Hypothyroidism   • Cataract   • Cancer of overlapping sites of right female breast       Medications, Allergies, Problems List and Past History were reviewed and updated.    Physical Examination    /62 (BP Location: Right arm, Patient Position: Sitting, Cuff Size: Adult)  Pulse 68  Temp 97.5 °F (36.4 °C) (Temporal Artery )   Resp 18  Ht 172.7 cm (68\")  Wt 91.6 kg (202 lb)  LMP  (LMP Unknown)  Breastfeeding? No  BMI 30.71 kg/m2  HEENT: Head- Normocephalic Atraumatic. Facies- Within normal limits. Pinnas- Normal texture and shape bilaterally. Canals- Normal bilaterally. TMs- Normal bilaterally. Nares- Patent bilaterally. Nasal Septum- is normal. There is no tenderness to palpation over the frontal or maxillary sinuses. Lids- Normal bilaterally. Conjunctiva- Clear bilaterally. Sclera- Anicteric bilaterally. Oropharynx- Moist with no lesions. Tonsils- No enlargement, erythema or exudate.    Neck: Thyroid- non enlarged, symmetric and " has no nodules. No bruits are detected. ROM- Normal Range of Motion with no rigidity.    Lymph Nodes: Cervical- no enlarged lymph nodes noted. Clavicular- Deferred. Axillary- Deferred. Inguinal- Deferred.    Lungs: Auscultation- Clear to auscultation bilaterally. There are no retractions, clubbing or cyanosis. The Expiratory to Inspiratory ratio is equal.    Cardiovascular: There are no carotid bruits. Heart- Normal Rate with Regular rhythm and no murmurs. There are no gallops. There are no rubs. In the lower extremities there is no edema. The upper extremities do not have edema.    Abdomen: Soft, benign, non-tender with no masses, hernias, organomegaly or scars.    Breast: Examination reveals that the patient has undergone a right mastectomy and the patient has undergone a left mastectomy. There are scars seen on the right chest wall and on the left chest wall.    The patient has no worrisome or suspicious skin lesions noted.    Impression and Assessment    Normal Physical Examination.    Encounter for Immunization Administration.    Hypothyroidism.    Anxiety Disorder Unspecified.    Hyperlipidemia.    Essential Hypertension.    Gastroesophageal Reflux Disease.    Plan    Gastroesophageal Reflux Disease Plan: The current plan was continued.    Hyperlipidemia Plan: She was instructed to eat a low fat diet. She was encouraged to exercise daily.    Essential Hypertension Plan: The current plan was continued.    Hypothyroidism Plan: Further plans will be formulated after test results are reviewed.    Anxiety Disorder Unspecified Plan: The current plan was continued.    Counseling was provided regarding: Dental Visits, Flossing Teeth, Heart Healthy Diet and Seat Belt Utilization.    No screening tests are indicated at this time.    Counseled regarding immunizations and applicable VIS given.    Immunizations Ordered and Administered: Pneumovax 23.    Sarah was seen today for annual exam.    Diagnoses and all orders for  this visit:    Physical exam    Essential hypertension  -     Comprehensive Metabolic Panel  -     POC Urinalysis Dipstick, Automated    Other specified hypothyroidism  -     TSH    Hyperlipidemia, unspecified hyperlipidemia type  -     Comprehensive Metabolic Panel  -     Lipid Panel    Gastroesophageal reflux disease without esophagitis  -     Comprehensive Metabolic Panel  -     CBC & Differential    Need for pneumococcal vaccine  -     Pneumococcal polysaccharide vaccine 23-valent >= 3yo subcutaneous/IM (PPSV23)    Other orders  -     ibuprofen (ADVIL,MOTRIN) 600 MG tablet; Take 1 tablet by mouth Every 8 (Eight) Hours As Needed for Mild Pain .  -     diphenhydrAMINE (BENADRYL) 25 mg capsule; Take 1 capsule by mouth Every 6 (Six) Hours As Needed for Itching or Allergies.  -     levothyroxine (SYNTHROID, LEVOTHROID) 175 MCG tablet; Take 1 tablet by mouth Daily.  -     amLODIPine (NORVASC) 5 MG tablet; Take 1 tablet by mouth Daily.  -     citalopram (CeleXA) 40 MG tablet; Take 1 tablet by mouth Daily.  -     losartan (COZAAR) 100 MG tablet; Take 1 tablet by mouth Daily.  -     triamterene-hydrochlorothiazide (MAXZIDE-25) 37.5-25 MG per tablet; Take 1 tablet by mouth Daily.  -     montelukast (SINGULAIR) 10 MG tablet; Take 1 tablet by mouth Every Night.        Return to Office    The patient was instructed to return for follow-up in 6 months. Next Visit: Follow-up.    The patient was instructed to return sooner if the condition changes, worsens, or doesn't resolve.

## 2018-02-07 LAB
ALBUMIN SERPL-MCNC: 3.9 G/DL (ref 3.6–4.8)
ALBUMIN/GLOB SERPL: 1.4 {RATIO} (ref 1.2–2.2)
ALP SERPL-CCNC: 80 IU/L (ref 39–117)
ALT SERPL-CCNC: 17 IU/L (ref 0–32)
AMBIG ABBREV CMP14 DEFAULT: NORMAL
AMBIG ABBREV LP DEFAULT: NORMAL
AST SERPL-CCNC: 23 IU/L (ref 0–40)
BASOPHILS # BLD AUTO: 0 X10E3/UL (ref 0–0.2)
BASOPHILS NFR BLD AUTO: 0 %
BILIRUB SERPL-MCNC: 0.2 MG/DL (ref 0–1.2)
BUN SERPL-MCNC: 14 MG/DL (ref 8–27)
BUN/CREAT SERPL: 15 (ref 12–28)
CALCIUM SERPL-MCNC: 9.2 MG/DL (ref 8.7–10.3)
CHLORIDE SERPL-SCNC: 99 MMOL/L (ref 96–106)
CHOLEST SERPL-MCNC: 204 MG/DL (ref 100–199)
CO2 SERPL-SCNC: 26 MMOL/L (ref 18–29)
CREAT SERPL-MCNC: 0.93 MG/DL (ref 0.57–1)
EOSINOPHIL # BLD AUTO: 0.1 X10E3/UL (ref 0–0.4)
EOSINOPHIL NFR BLD AUTO: 3 %
ERYTHROCYTE [DISTWIDTH] IN BLOOD BY AUTOMATED COUNT: 14.6 % (ref 12.3–15.4)
GFR SERPLBLD CREATININE-BSD FMLA CKD-EPI: 64 ML/MIN/1.73
GFR SERPLBLD CREATININE-BSD FMLA CKD-EPI: 74 ML/MIN/1.73
GLOBULIN SER CALC-MCNC: 2.7 G/DL (ref 1.5–4.5)
GLUCOSE SERPL-MCNC: 102 MG/DL (ref 65–99)
HCT VFR BLD AUTO: 38.7 % (ref 34–46.6)
HDLC SERPL-MCNC: 46 MG/DL
HGB BLD-MCNC: 13.2 G/DL (ref 11.1–15.9)
IMM GRANULOCYTES # BLD: 0 X10E3/UL (ref 0–0.1)
IMM GRANULOCYTES NFR BLD: 0 %
LDLC SERPL CALC-MCNC: 119 MG/DL (ref 0–99)
LYMPHOCYTES # BLD AUTO: 2.2 X10E3/UL (ref 0.7–3.1)
LYMPHOCYTES NFR BLD AUTO: 48 %
MCH RBC QN AUTO: 27.4 PG (ref 26.6–33)
MCHC RBC AUTO-ENTMCNC: 34.1 G/DL (ref 31.5–35.7)
MCV RBC AUTO: 81 FL (ref 79–97)
MONOCYTES # BLD AUTO: 0.3 X10E3/UL (ref 0.1–0.9)
MONOCYTES NFR BLD AUTO: 7 %
NEUTROPHILS # BLD AUTO: 1.8 X10E3/UL (ref 1.4–7)
NEUTROPHILS NFR BLD AUTO: 42 %
POTASSIUM SERPL-SCNC: 4.4 MMOL/L (ref 3.5–5.2)
PROT SERPL-MCNC: 6.6 G/DL (ref 6–8.5)
RBC # BLD AUTO: 4.81 X10E6/UL (ref 3.77–5.28)
SODIUM SERPL-SCNC: 140 MMOL/L (ref 134–144)
TRIGL SERPL-MCNC: 195 MG/DL (ref 0–149)
TSH SERPL DL<=0.005 MIU/L-ACNC: 0.25 UIU/ML (ref 0.45–4.5)
VLDLC SERPL CALC-MCNC: 39 MG/DL (ref 5–40)
WBC # BLD AUTO: 4.4 X10E3/UL (ref 3.4–10.8)

## 2018-02-08 ENCOUNTER — HOSPITAL ENCOUNTER (OUTPATIENT)
Dept: PHYSICAL THERAPY | Facility: HOSPITAL | Age: 68
Setting detail: THERAPIES SERIES
Discharge: HOME | End: 2018-02-08

## 2018-02-08 DIAGNOSIS — I97.2 POSTMASTECTOMY LYMPHEDEMA SYNDROME OF LEFT UPPER EXTREMITY: ICD-10-CM

## 2018-02-08 DIAGNOSIS — L90.5 SCAR CONDITIONS AND FIBROSIS OF SKIN: Primary | ICD-10-CM

## 2018-02-08 PROCEDURE — G8984 CARRY CURRENT STATUS: HCPCS | Performed by: PHYSICAL THERAPIST

## 2018-02-08 PROCEDURE — G8985 CARRY GOAL STATUS: HCPCS | Performed by: PHYSICAL THERAPIST

## 2018-02-08 PROCEDURE — 97140 MANUAL THERAPY 1/> REGIONS: CPT | Performed by: PHYSICAL THERAPIST

## 2018-02-08 NOTE — THERAPY PROGRESS REPORT/RE-CERT
Outpatient Physical Therapy Lymphedema Progress Note  [facility]     Patient Name: Sarah Raphael  : 1950  MRN: [id number]  Today's Date: 2018        Visit Date: 2018    Visit Dx:    ICD-10-CM ICD-9-CM   1. Scar conditions and fibrosis of skin L90.5 709.2   2. 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   -MW       Able to rate subjective pain? yes  -MW    Pre-Treatment Pain Level 0  -MW    Post-Treatment Pain Level 0  -MW       Ptting Edema Category By severity  -MW    Pitting Edema Mild;Moderate  -MW    Edema Assessment Comment Mild fullness LT axillar/ lateral trunk; moderate Rt axilla / lateral trunk   -MW       Location/Assessment Upper Quadrant;Upper Extremity  -MW    Upper Extremity Conditions bilateral:;intact;clean  -MW    Upper Quadrant Conditions bilateral:;intact;clean  -MW    Upper Quadrant Color/Pigment bilateral:;fibrosis;right:   Right incisions pale pink; bright pink post ASTYM   -MW       Axilla 33.7 cm  -MW    Mid upper arm 33.3 cm  -MW    Elbow 27.7 cm  -MW    Mid forearm 23.3 cm  -MW    Wrist crease 16.8 cm  -MW    Web space 18.7 cm  -MW    Met-heads 18 cm  -MW       Manual Lymphatic Drainage initial sequence;opened regional lymph nodes;opened anastamoses;extremity treatment;astym  -MW    Initial Sequence supraclavicular;shoulder collectors  -MW    Supraclavicular right;left  -MW    Shoulder Collectors right;left  -MW    Opened Regional Lymph Nodes inguinal  -MW    Inguinal left;right  -MW    Opened Anastamoses axillo-inguinal  -MW    Axillo-Inguinal left;right   -MW    Extremity Treatment simple/brief MLD  -MW    Simple/Brief MLD RT and LT axilla / lateral trunk working toward AIA. Pt positioned in hand to opposite shoulder to open lateral trunk and posterior axilla for MLD.  More focus on RT axilla/ lateral trunk.   -MW    Astym mastectomy protocol;upper traps;other astym  -MW    Mastectomy Protocol supine  -MW    Mastectomy Protocol Comment Bilateral chest with evaluator and localizer working around / across tissue folds. Localizer and isolator into lateral trunk / ribs. Minimal to moderate course to fine soft tissue disruptions anteriorly and laterally around trunk. Moderate thick, rough disruptions adjacent and superior to incision lines, mid chest/ breast area Rt >LT. Localizer over sternum parallel; moderate to minimal rough disruptions.   -MW    Scar(s) / Incision Line(s) Localizer and isolator to bilat chest incision lines working perpendicular and parallel to incision line; between skin folds as able. Noted moderate rough disruptions superior but adjacent to incision lines Rt> Lt.   -MW    Upper Traps right;left  -MW    Upper Traps Comment Initiated in sitting Bilat UT/ scapular region: evaluator and localizer to area; min fine soft tissue disruptions noted along teres mm areas.   -MW    Other Astym Repositioned in shoulder flex/ ABD to open axilla: Evaluator and localizer bilaterally; localizer and isolator in LT axilla for extra strokes at tight tissue restriction. Noted min fine soft tissue disruptions in bilateral medial upper arms. Moderate course soft tissue disruptions in LT axilla; min fine soft tissue disruptions Rt axilla.   -MW    Manual Lymphatic Drainage Comments MLD completed post ASTYM / STM   -MW       Compression/Skin Care skin care  -MW    Skin Care moisturizing lotion applied   cocoa butter   -MW      User Key  (r) = Recorded By, (t) = Taken By, (c) = Cosigned By    Initials Name Provider Type    MW Jennifer Davis, PT Physical Therapist                               PT Assessment/Plan       02/08/18 1400       PT Assessment    Functional Limitations Performance in self-care ADL;Limitation in home management;Limitations in community activities  -     Impairments Pain;Range of motion;Impaired flexibility;Impaired lymphatic circulation;Integumentary integrity;Edema  -     Assessment Comments Pt progressing towards functional goals; with improved selfcare and home management skills. Decreased reports of pain and decrease in numbness tingling in hands. Soft tissue restrictions with moderate improvement including decrease in soft tissue disruptions (tissues smoother during ASTYM treatments). 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 Cont PT toward remaining goals.   -MW     Rehab Potential Good  -MW     Patient/caregiver participated in establishment of treatment plan and goals Yes  -MW     Patient would benefit from skilled therapy intervention Yes  -MW     PT Plan    PT Frequency 2x/week;1x/week   Decrease frequency as symptoms cont to improve   -     Predicted Duration of Therapy Intervention (days/wks) 4 weeks   -MW     Planned CPT's? PT MANUAL THERAPY EA 15 MIN: 91860;PT THER PROC EA 15 MIN: 63122  -     Physical Therapy Interventions (Optional Details) manual therapy techniques;ROM (Range of Motion);strengthening;manual lymphatic drainage;home exercise program  -     PT Plan Comments Cont ASTYM, STM; MLD prn; progress HEP.   -       User Key  (r) = Recorded By, (t) = Taken By, (c) = Cosigned By    Initials Name Provider Type    KEYA Davis, PT Physical Therapist                      Manual Rx (last 36 hours)      Manual Treatments       02/08/18 1400          Manual Rx 1    Manual Rx 1 Location Lt chest/ Axilla   -      Manual Rx 1 Type STM: tissue bending; long slow stretch at anterior axillar fold; also at insertion point of tighness along ribs  -MW      Manual Rx 1 Grade Min to moderate  -MW      Manual Rx 1  Duration 8  -MW      Manual Rx 2    Manual Rx 2 Location Rt chest/ axilla   -MW      Manual Rx 2 Type STM: tissue bending, at lateral pec area and incision lines   -MW      Manual Rx 2 Grade minimal pressures  -MW      Manual Rx 2 Duration 8  -MW        User Key  (r) = Recorded By, (t) = Taken By, (c) = Cosigned By    Initials Name Provider Type    MW Jennifer Davis, PT Physical Therapist                PT OP Goals       02/08/18 1400    PT Short Term Goals    STG Date to Achieve 01/04/18  -MW    STG 1 Pt independent with home program for improved mobility and lymph massage.   -MW    STG 1 Progress Met  -MW    STG 2 Pt to demonstrate / report at least 25% improvment in Lt shoulder flexibility/ decreased tightness to allow improved function.   -MW    STG 2 Progress Met  -MW    STG 3 Pt to report at least 25% decrease in pain at night / DASH sleep rating to improve to at least 3/5.   -MW    STG 3 Progress Met  -MW    STG 4 Pt to report at least 25% improvement in driving; able to drive greater than 30 miles without pain greater than 4/10.   -MW    STG 4 Progress Progressing  -MW    Long Term Goals    LTG 1 Pt to demonstrate / report greater than  50% improvment in Lt shoulder flexibility/ decreased tightness to allow improved function.   -MW    LTG 1 Progress Ongoing;Progressing  -MW    LTG 2 Pt to report greater than 50% decrease in pain at night / DASH sleep rating to improve to at least 3/5.   -MW    LTG 2 Progress Met  -MW    LTG 3 Pt to report greater than 50% improvement in driving; able to drive greater than 30 miles without pain greater than 4/10.   -MW    LTG 3 Progress Ongoing;Progressing  -MW    LTG 4 Soft tissue restrictions to decrease by > 50% Lt chest to allow improved upper quarter flexibility and decreased pain.   -MW    LTG 4 Progress Ongoing;Progressing  -MW    LTG 5 Pt independent with lymphedema management, including self massage and compression garment as needed.   -MW    LTG 5 Progress  Partially Met;Progressing  -MW    Time Calculation    PT Goal Re-Cert Due Date       User Key  (r) = Recorded By, (t) = Taken By, (c) = Cosigned By    Initials Name Provider Type    KEYA Davis PT Physical Therapist          Therapy Education  Education Details: Cont self massage/ ball massage   Given: Edema management, Symptoms/condition management  Program: Reinforced  How Provided: Verbal  Provided to: Patient  Level of Understanding: Verbalized    Outcome Measure Options: Disabilities of the Arm, Shoulder, and Hand (DASH)  DASH  Open a tight or new jar.: Mild Difficulty  Write: No Difficulty  Turn a key: No Difficulty  Prepare a meal: No Difficulty  Push open a heavy door: No Difficulty  Place an object on a shelf above your head: Mild Difficulty  Do heavy household chores (e.g., wash walls, wash floors): Moderate Difficulty  Garden or do yard work: No Difficulty  Make a bed: No Difficulty  Carry a shopping bag or briefcase: No Difficulty  Carry a heavy object (over 10 lbs): Mild Difficulty  Change a lightbulb overhead: Mild Difficulty  Wash or blow dry your hair: No Difficulty  Wash your back: No Difficulty  Put on a pullover sweater: No Difficulty  Use a knife to cut food: No Difficulty  Recreational activities in which require little effort (e.g., cardplaying, knitting, etc.): No Difficulty  Recreational activities in which you take some force or impact through your arm, should or hand (e.g. golf, hammering, tennis, etc.): Unable  Recreational Activities in which you move your arm freely (e.g., frisbee, badminton, etc.): Unable  Manage transportation needs (getting from one place to another): No Difficulty  During the past week, to what extent has your arm, shoulder, or hand problem interfered with your normal social activites with family, friends, neighbors or groups?: Not at all  During the past week, were you limited in your work or other regular daily activities as a result of your arm, shoulder  or hand problem?: Not limited at all  Arm, Shoulder, or hand pain: Mild  Arm, shoulder or hand pain when you performed any specific activity: Mild  Tingling (pins and needles) in your arm, shoulder, or hand: Mild  Weakness in your arm, shoulder or hand: Mild  Stiffness in your arm, shoulder or hand: Mild  During the past week, how much difficulty have you had sleeping because of the pain in your arm, shoulder or hand?: Mild Difficulty  I feel less capable, less confident or less useful because of my arm, shoulder or hand problem: Strongly disagree  DASH Sum : 49  Number of Questions Answered: 29  DASH Score: 17.24         Time Calculation:   Start Time: 1400  Total Timed Code Minutes- PT: 55 minute(s)     Therapy Charges for Today     Code Description Service Date Service Provider Modifiers Qty    42369668906 HC PT CARRY MOV HAND OBJ CURRENT 2/8/2018 Jennifer Davis, PT GP, CI 1    97229028846 HC PT CARRY MOV HAND OBJ PROJECTED 2/8/2018 Jennifer Davis, PT GP, CI 1    16196029065 HC PT MANUAL THERAPY EA 15 MIN 2/8/2018 Jennifer Davis, PT GP 4          PT G-Codes  PT Professional Judgement Used?: Yes  Outcome Measure Options: Disabilities of the Arm, Shoulder, and Hand (DASH)  Score: raw score 49; 17 % impaired   Functional Limitation: Carrying, moving and handling objects  Carrying, Moving and Handling Objects Current Status (): At least 1 percent but less than 20 percent impaired, limited or restricted  Carrying, Moving and Handling Objects Goal Status (): At least 1 percent but less than 20 percent impaired, limited or restricted         Jennifer Davis, PT  2/8/2018

## 2018-02-12 ENCOUNTER — HOSPITAL ENCOUNTER (OUTPATIENT)
Dept: PHYSICAL THERAPY | Facility: HOSPITAL | Age: 68
Setting detail: THERAPIES SERIES
Discharge: HOME | End: 2018-02-12

## 2018-02-12 DIAGNOSIS — I97.2 POSTMASTECTOMY LYMPHEDEMA SYNDROME OF LEFT UPPER EXTREMITY: ICD-10-CM

## 2018-02-12 DIAGNOSIS — L90.5 SCAR CONDITIONS AND FIBROSIS OF SKIN: Primary | ICD-10-CM

## 2018-02-12 PROCEDURE — 97140 MANUAL THERAPY 1/> REGIONS: CPT | Performed by: PHYSICAL THERAPIST

## 2018-02-12 NOTE — THERAPY TREATMENT NOTE
Outpatient Physical Therapy Lymphedema Treatment Note   [place]     Patient Name: Sarah Raphael  : 1950  MRN: [id number]  Today's Date: 2018        Visit Date: 2018    Visit Dx:    ICD-10-CM ICD-9-CM   1. Scar conditions and fibrosis of skin L90.5 709.2   2. 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   -MW       Able to rate subjective pain? yes  -MW    Pre-Treatment Pain Level 0  -MW    Post-Treatment Pain Level 0  -MW       Ptting Edema Category By severity  -MW    Pitting Edema Mild;Moderate  -MW    Edema Assessment Comment Mild to moderate fullness bilat axilla/ lateral trunk; UE stabl e  -MW       Location/Assessment Upper Quadrant;Upper Extremity  -MW    Upper Extremity Conditions bilateral:;intact;clean  -MW    Upper Quadrant Conditions bilateral:;intact;clean  -MW    Upper Quadrant Color/Pigment bilateral:;fibrosis;right:   Right incisions pale pink;   -MW       Manual Lymphatic Drainage initial sequence;opened regional lymph nodes;opened anastamoses;extremity treatment;astym  -MW    Initial Sequence supraclavicular;shoulder collectors  -MW    Supraclavicular right;left  -MW    Shoulder Collectors right;left  -MW    Opened Regional Lymph Nodes inguinal  -MW    Inguinal left;right  -MW    Opened Anastamoses axillo-inguinal  -MW    Axillo-Inguinal left;right  -MW    Extremity Treatment simple/brief MLD  -MW    Simple/Brief MLD RT and LT axilla / lateral trunk working toward AIA. Pt positioned in hand to opposite shoulder to open lateral trunk and  posterior axilla for MLD.    -MW    Astym mastectomy protocol;upper traps;other astym  -MW    Mastectomy Protocol supine  -MW    Mastectomy Protocol Comment Bilateral chest with evaluator and localizer working around / across tissue folds. Localizer and isolator into lateral trunk / ribs. Minimal to moderate course to fine soft tissue disruptions anteriorly and laterally around trunk. Moderate thick, rough disruptions adjacent and superior to incision lines, mid chest/ breast area Rt >LT. Localizer over sternum parallel; moderate to minimal rough disruptions.   -MW    Scar(s) / Incision Line(s) Localizer and isolator to bilat chest incision lines working perpendicular and parallel to incision line; between skin folds as able. Noted moderate rough disruptions superior but adjacent to incision lines Rt> Lt.   -MW    Upper Traps right;left  -MW    Upper Traps Comment Initiated in sitting Bilat UT/ scapular region: evaluator and localizer to area; min fine soft tissue disruptions noted along teres mm areas. Extra strokes at middle traps / rhomboids.   -MW    Other Astym Repositioned in shoulder flex/ ABD to open axilla: Evaluator and localizer bilaterally; localizer and isolator in LT axilla for extra strokes at tight tissue restriction. Noted min fine soft tissue disruptions in bilateral medial upper arms. Moderate course soft tissue disruptions in LT axilla; min fine soft tissue disruptions Rt axilla.   -MW    Manual Lymphatic Drainage Comments MLD completed post ASTYM / STM   -MW       Compression/Skin Care skin care  -MW    Skin Care moisturizing lotion applied   cocoa butter   -MW      User Key  (r) = Recorded By, (t) = Taken By, (c) = Cosigned By    Initials Name Provider Type    KEYA Davis, PT Physical Therapist                              PT Assessment/Plan       02/12/18 1300       PT Assessment    Functional Limitations Performance in self-care ADL;Limitation in home management;Limitations in  "community activities  -MW     Impairments Pain;Range of motion;Impaired flexibility;Impaired lymphatic circulation;Integumentary integrity;Edema  -     Assessment Comments Pt continues to progress with gradual decrease in soft tissue restrictions, improved flexibility though still \"feels tight.\" Less c/o N/T.   -MW     Rehab Potential Good  -MW     Patient/caregiver participated in establishment of treatment plan and goals Yes  -MW     Patient would benefit from skilled therapy intervention Yes  -MW     PT Plan    PT Frequency 2x/week;1x/week   taper as able   -MW     Physical Therapy Interventions (Optional Details) manual therapy techniques;ROM (Range of Motion);strengthening;manual lymphatic drainage;home exercise program  -     PT Plan Comments Cont ASTYM, STM; MLD prn; progress HEP.   -       User Key  (r) = Recorded By, (t) = Taken By, (c) = Cosigned By    Initials Name Provider Type    KEYA Davis, PT Physical Therapist                     Exercises       02/12/18 1300          Subjective Comments    Subjective Comments Pt reports feeling more full after busy weekend; ball massage didn't help as much as expected.   -MW      Subjective Pain    Able to rate subjective pain? yes  -MW      Pre-Treatment Pain Level 0  -MW      Post-Treatment Pain Level 0  -MW        User Key  (r) = Recorded By, (t) = Taken By, (c) = Cosigned By    Initials Name Provider Type    KEYA Davis, PT Physical Therapist                       Manual Rx (last 36 hours)      Manual Treatments       02/12/18 1300          Manual Rx 1    Manual Rx 1 Location Lt chest/ Axilla   -MW      Manual Rx 1 Type STM: tissue bending; long slow stretch at anterior axillar fold; also at insertion point of tighness along ribs  -MW      Manual Rx 1 Grade moderate  -MW      Manual Rx 1 Duration 10  -MW      Manual Rx 2    Manual Rx 2 Location Rt chest/ axilla   -MW      Manual Rx 2 Type STM: tissue bending, at lateral pec area and " incision lines   -MW      Manual Rx 2 Grade minimal to moderate pressures  -MW      Manual Rx 2 Duration 8  -MW        User Key  (r) = Recorded By, (t) = Taken By, (c) = Cosigned By    Initials Name Provider Type    KEYA Davis PT Physical Therapist                PT OP Goals       02/12/18 1800       Time Calculation    PT Goal Re-Cert Due Date 03/07/18  -MW       User Key  (r) = Recorded By, (t) = Taken By, (c) = Cosigned By    Initials Name Provider Type    EKYA Davis PT Physical Therapist                         Time Calculation:   Start Time: 1300  Total Timed Code Minutes- PT: 55 minute(s)     Therapy Charges for Today     Code Description Service Date Service Provider Modifiers Qty    71465189047 HC PT MANUAL THERAPY EA 15 MIN 2/12/2018 Jennifer Davis, PT GP 4                    Jennifer Davis, PT  2/12/2018

## 2018-02-19 ENCOUNTER — TELEPHONE (OUTPATIENT)
Dept: INTERNAL MEDICINE | Facility: CLINIC | Age: 68
End: 2018-02-19

## 2018-02-19 ENCOUNTER — HOSPITAL ENCOUNTER (OUTPATIENT)
Dept: PHYSICAL THERAPY | Facility: HOSPITAL | Age: 68
Setting detail: THERAPIES SERIES
Discharge: HOME | End: 2018-02-19

## 2018-02-19 DIAGNOSIS — I97.2 POSTMASTECTOMY LYMPHEDEMA SYNDROME OF LEFT UPPER EXTREMITY: ICD-10-CM

## 2018-02-19 DIAGNOSIS — L90.5 SCAR CONDITIONS AND FIBROSIS OF SKIN: Primary | ICD-10-CM

## 2018-02-19 PROCEDURE — 97140 MANUAL THERAPY 1/> REGIONS: CPT | Performed by: PHYSICAL THERAPIST

## 2018-02-19 NOTE — TELEPHONE ENCOUNTER
S/W PT, STATES SHE RECEIVED A COPY OF HER LABS AND NOTED THAT HER TSH 0.252.  STATES THERE WAS NO INFO ON WHAT TO DO, STATES SHE FEELS LIKE IT IS REALLY LOW AND WANTS TO KNOW WHAT TO DO.

## 2018-02-19 NOTE — THERAPY TREATMENT NOTE
Outpatient Physical Therapy Lymphedema Treatment Note   [facility]     Patient Name: Sarah Raphael  : 1950  MRN: [id number]  Today's Date: 2018        Visit Date: 2018    Visit Dx:    ICD-10-CM ICD-9-CM   1. Scar conditions and fibrosis of skin L90.5 709.2   2. 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 Tired from busy week so rested this weekend.   -MW       Able to rate subjective pain? yes  -MW    Pre-Treatment Pain Level 0  -MW    Post-Treatment Pain Level 0  -MW       Ptting Edema Category By severity  -MW    Pitting Edema Mild;Moderate  -MW    Edema Assessment Comment Mild fullness Lt axilla; moderate Rt   -MW       Location/Assessment Upper Quadrant;Upper Extremity  -MW    Upper Extremity Conditions bilateral:;intact;clean  -MW    Upper Quadrant Conditions bilateral:;intact;clean  -MW    Upper Quadrant Color/Pigment bilateral:;fibrosis;right:   Right incisions pale pink;   -MW       Manual Lymphatic Drainage initial sequence;opened regional lymph nodes;opened anastamoses;extremity treatment;astym  -MW    Initial Sequence supraclavicular;shoulder collectors  -MW    Supraclavicular right;left  -MW    Shoulder Collectors right;left  -MW    Opened Regional Lymph Nodes inguinal  -MW    Inguinal right;left  -MW    Opened Anastamoses axillo-inguinal  -MW    Axillo-Inguinal right;left  -MW    Extremity Treatment simple/brief MLD  -MW    Simple/Brief MLD Rt > LT axilla MLD post ASTYM / STM   -MW    Astym mastectomy  protocol;upper traps;other astym  -MW    Mastectomy Protocol supine  -MW    Mastectomy Protocol Comment Bilateral chest with evaluator and localizer working around / across tissue folds. Localizer and isolator into lateral trunk / ribs. Minimal to moderate course to fine soft tissue disruptions anteriorly and laterally around trunk RT> LT. Moderate thick, rough disruptions adjacent and superior to incision lines. Localizer over sternum parallel; moderate to minimal rough disruptions.   -MW    Scar(s) / Incision Line(s) Localizer and isolator to bilat chest incision lines working perpendicular and parallel to incision line; between skin folds as able. Noted moderate rough disruptions superior but adjacent to incision lines Rt> Lt.   -MW    Upper Traps right;left  -MW    Upper Traps Comment Initiated in sitting Bilat UT/ scapular region: evaluator and localizer to area; min fine soft tissue disruptions noted along teres mm areas. Extra strokes at middle traps / rhomboids.   -MW    Other Astym Repositioned in shoulder flex/ ABD to open axilla: Evaluator and localizer bilaterally; localizer and isolator in LT axilla for extra strokes at tight tissue restriction. Noted min fine soft tissue disruptions in bilateral medial upper arms. Moderate course soft tissue disruptions in LT axilla; min fine soft tissue disruptions Rt axilla.   -MW    Manual Lymphatic Drainage Comments MLD completed post ASTYM / STM   -MW       Compression/Skin Care skin care  -MW    Skin Care moisturizing lotion applied   cocoa butter   -MW      User Key  (r) = Recorded By, (t) = Taken By, (c) = Cosigned By    Initials Name Provider Type    MW Jennifer Davis, PT Physical Therapist                              PT Assessment/Plan       02/19/18 1400       PT Assessment    Functional Limitations Performance in self-care ADL;Limitation in home management;Limitations in community activities  -MW     Impairments Pain;Range of motion;Impaired  flexibility;Impaired lymphatic circulation;Integumentary integrity;Edema  -MW     Assessment Comments Pt continues to demonstrate less soft tissue disruptions during ASTYM treatment; fibrosis and scarring are becoming smoohter. LT axilla / lateral trunk edema improved; Rt remains persistent.   -MW     Rehab Potential Good  -MW     Patient/caregiver participated in establishment of treatment plan and goals Yes  -MW     Patient would benefit from skilled therapy intervention Yes  -MW     PT Plan    PT Frequency 2x/week;1x/week  -MW     Physical Therapy Interventions (Optional Details) manual therapy techniques;ROM (Range of Motion);strengthening;manual lymphatic drainage;home exercise program  -     PT Plan Comments Cont ASTYM, STM; MLD prn; progress HEP.   -       User Key  (r) = Recorded By, (t) = Taken By, (c) = Cosigned By    Initials Name Provider Type    KEYA Davis PT Physical Therapist                        Manual Rx (last 36 hours)      Manual Treatments       02/19/18 1400          Manual Rx 1    Manual Rx 1 Location Lt chest/ Axilla   -MW      Manual Rx 1 Type STM: tissue bending; long slow stretch at anterior axillar fold; also at insertion point of tighness along ribs  -MW      Manual Rx 1 Grade moderate  -MW      Manual Rx 1 Duration 10  -MW      Manual Rx 2    Manual Rx 2 Location Rt chest/ axilla   -MW      Manual Rx 2 Type STM: tissue bending, at lateral pec area and incision lines   -MW      Manual Rx 2 Grade minimal to moderate pressures  -MW      Manual Rx 2 Duration 8  -MW        User Key  (r) = Recorded By, (t) = Taken By, (c) = Cosigned By    Initials Name Provider Type    KEYA Davis PT Physical Therapist                PT OP Goals       02/19/18 4091       Time Calculation    PT Goal Re-Cert Due Date 03/07/18  -       User Key  (r) = Recorded By, (t) = Taken By, (c) = Cosigned By    Initials Name Provider Type    KEYA Davis PT Physical Therapist                          Time Calculation:   Start Time: 1400  Total Timed Code Minutes- PT: 55 minute(s)     Therapy Charges for Today     Code Description Service Date Service Provider Modifiers Qty    14603378209 HC PT MANUAL THERAPY EA 15 MIN 2/19/2018 Jennifer Davis, PT GP 4                    Jennifer Davis, PT  2/19/2018

## 2018-02-19 NOTE — TELEPHONE ENCOUNTER
----- Message from Kerry Larson sent at 2/19/2018 12:17 PM EST -----  Contact: SELF  KARINE MONTALVO CALLING WITH QUESTIONS REGARDING HER LAB RESULTS. SHE CAN BE REACHED [phone]

## 2018-02-20 NOTE — TELEPHONE ENCOUNTER
This is actually an indication that her thyroid dosage is at the upper end of normal but I don't feel that we need to decrease her dosage at this time.  Tyrese Recinos MD  8:17 AM  02/20/18

## 2018-02-22 ENCOUNTER — HOSPITAL ENCOUNTER (OUTPATIENT)
Dept: PHYSICAL THERAPY | Facility: HOSPITAL | Age: 68
Setting detail: THERAPIES SERIES
Discharge: HOME | End: 2018-02-22

## 2018-02-22 DIAGNOSIS — I97.2 POSTMASTECTOMY LYMPHEDEMA SYNDROME OF LEFT UPPER EXTREMITY: ICD-10-CM

## 2018-02-22 DIAGNOSIS — L90.5 SCAR CONDITIONS AND FIBROSIS OF SKIN: Primary | ICD-10-CM

## 2018-02-22 PROCEDURE — 97140 MANUAL THERAPY 1/> REGIONS: CPT | Performed by: PHYSICAL THERAPIST

## 2018-02-22 NOTE — THERAPY TREATMENT NOTE
"    Outpatient Physical Therapy Lymphedema Treatment Note  [facility]     Patient Name: Sarah Raphael  : 1950  MRN: [id number]  Today's Date: 2018        Visit Date: 2018    Visit Dx:    ICD-10-CM ICD-9-CM   1. Scar conditions and fibrosis of skin L90.5 709.2   2. 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 Also reports that she slipped off the ladder and has bruises but is \"ok\" other than back is more touchy.   -MW       Able to rate subjective pain? yes  -MW    Pre-Treatment Pain Level 2  -MW    Post-Treatment Pain Level 2  -MW    Subjective Pain Comment back   -MW       Ptting Edema Category By severity  -MW    Pitting Edema Mild;Moderate  -MW    Edema Assessment Comment Mild fullness Lt axilla; moderate Rt   -MW       Location/Assessment Upper Quadrant;Upper Extremity  -MW    Upper Extremity Conditions bilateral:;intact;clean  -MW    Upper Quadrant Conditions bilateral:;intact;clean  -MW    Upper Quadrant Color/Pigment bilateral:;fibrosis;right:   Right incisions pale pink;   -MW       Manual Lymphatic Drainage initial sequence;opened regional lymph nodes;opened anastamoses;extremity treatment;astym  -MW    Initial Sequence supraclavicular;shoulder collectors  -MW    Supraclavicular right;left  -MW    Shoulder Collectors right;left  -MW    Opened Regional Lymph Nodes inguinal  -MW    Inguinal right;left  -MW    Opened Anastamoses axillo-inguinal  -MW    Axillo-Inguinal right;left  -MW    Extremity Treatment simple/brief MLD  -MW    Simple/Brief " MLD Rt > LT axilla MLD post ASTYM / STM   -MW    Astym mastectomy protocol;upper traps;other astym  -MW    Mastectomy Protocol supine  -MW    Mastectomy Protocol Comment Bilateral chest with evaluator and localizer working around / across tissue folds. Localizer and isolator into lateral trunk / ribs. Minimal to moderate course to fine soft tissue disruptions anteriorly and laterally around trunk RT> LT. Moderate thick, rough disruptions adjacent and superior to incision lines. Localizer over sternum parallel; moderate to minimal rough disruptions.   -MW    Scar(s) / Incision Line(s) Localizer and isolator to bilat chest incision lines working perpendicular and parallel to incision line; between skin folds as able. Noted min to moderate rough disruptions superior to incision lines Rt> Lt.   -MW    Upper Traps right;left  -MW    Upper Traps Comment Initiated in sitting Bilat UT/ scapular region: evaluator and localizer to area; min fine soft tissue disruptions noted along teres mm areas.   -MW    Other Astym Repositioned in shoulder flex/ ABD to open axilla: Evaluator and localizer bilaterally; localizer and isolator in LT axilla for extra strokes at tight tissue restriction. Noted min fine soft tissue disruptions in bilateral medial upper arms. Moderate course soft tissue disruptions in LT axilla; min fine soft tissue disruptions Rt axilla.   -MW    Manual Lymphatic Drainage Comments MLD completed post ASTYM / STM   -MW       Compression/Skin Care skin care  -MW    Skin Care moisturizing lotion applied   cocoa butter   -MW      User Key  (r) = Recorded By, (t) = Taken By, (c) = Cosigned By    Initials Name Provider Type     Jennifer Davis, PT Physical Therapist                              PT Assessment/Plan       02/22/18 1300       PT Assessment    Functional Limitations Performance in self-care ADL;Limitation in home management;Limitations in community activities  -MW     Impairments Pain;Range of  motion;Impaired flexibility;Impaired lymphatic circulation;Integumentary integrity;Edema  -     Assessment Comments RT axilla / lateral trunk edema improved but remains more full than LT. Pt continues to demonstrate less soft tissue disruptions during ASTYM treatment with smoothing of fibrosis. Pt reports driving has been fine, but will be driving to [place] this weekend which is farther than she has driven yet; expects to be fine.   -MW     Rehab Potential Good  -MW     Patient/caregiver participated in establishment of treatment plan and goals Yes  -MW     Patient would benefit from skilled therapy intervention Yes  -MW     PT Plan    PT Frequency 2x/week;1x/week  -MW     Physical Therapy Interventions (Optional Details) manual therapy techniques;ROM (Range of Motion);strengthening;manual lymphatic drainage;home exercise program  -     PT Plan Comments Trial of UBE for warm up; Cont ASTYM, STM; MLD; progress HEP.   -       User Key  (r) = Recorded By, (t) = Taken By, (c) = Cosigned By    Initials Name Provider Type    KEYA Davis PT Physical Therapist                        Manual Rx (last 36 hours)      Manual Treatments       02/22/18 1300          Manual Rx 1    Manual Rx 1 Location Lt chest/ Axilla   -MW      Manual Rx 1 Type STM: tissue bending; long slow stretch at anterior axillar fold; also at insertion point of tighness along ribs  -MW      Manual Rx 1 Grade moderate  -MW      Manual Rx 1 Duration 10  -MW      Manual Rx 2    Manual Rx 2 Location Rt chest/ axilla   -MW      Manual Rx 2 Type STM: tissue bending, at lateral pec area and incision lines   -MW      Manual Rx 2 Grade minimal to moderate pressures  -MW      Manual Rx 2 Duration 5  -MW        User Key  (r) = Recorded By, (t) = Taken By, (c) = Cosigned By    Initials Name Provider Type    KEYA Davis PT Physical Therapist                PT OP Goals       02/22/18 1641       Time Calculation    PT Goal Re-Cert Due Date  03/07/18  -KEYA       User Key  (r) = Recorded By, (t) = Taken By, (c) = Cosigned By    Initials Name Provider Type    KEYA Davis, PT Physical Therapist                         Time Calculation:   Start Time: 1300  Total Timed Code Minutes- PT: 55 minute(s)     Therapy Charges for Today     Code Description Service Date Service Provider Modifiers Qty    [id number] HC PT MANUAL THERAPY EA 15 MIN 2/22/2018 Jennifer Davis, PT GP 4                    Jennifer Davis, PT  2/22/2018

## 2018-02-26 ENCOUNTER — HOSPITAL ENCOUNTER (OUTPATIENT)
Dept: PHYSICAL THERAPY | Facility: HOSPITAL | Age: 68
Setting detail: THERAPIES SERIES
Discharge: HOME | End: 2018-02-26

## 2018-02-26 DIAGNOSIS — I97.2 POSTMASTECTOMY LYMPHEDEMA SYNDROME OF LEFT UPPER EXTREMITY: ICD-10-CM

## 2018-02-26 DIAGNOSIS — L90.5 SCAR CONDITIONS AND FIBROSIS OF SKIN: Primary | ICD-10-CM

## 2018-02-26 DIAGNOSIS — M79.602 PAIN IN LEFT ARM: ICD-10-CM

## 2018-02-26 PROCEDURE — 97140 MANUAL THERAPY 1/> REGIONS: CPT | Performed by: PHYSICAL THERAPIST

## 2018-02-26 NOTE — THERAPY TREATMENT NOTE
Outpatient Physical Therapy Lymphedema Treatment Note   [place]     Patient Name: Sarah Raphael  : 1950  MRN: [id number]  Today's Date: 2018        Visit Date: 2018    Visit Dx:    ICD-10-CM ICD-9-CM   1. Scar conditions and fibrosis of skin L90.5 709.2   2. 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 Pain in left arm M79.602 729.5       Patient Active Problem List   Diagnosis   • Anxiety   • Gastroesophageal reflux disease without esophagitis   • Hyperlipidemia   • Hypertension   • Hypothyroidism   • Cataract   • Cancer of overlapping sites of right female breast              Lymphedema       18 1300             Subjective Comments less intense but busy weekend. Did ok driving to [place]; was tired on way back home. Noted LUE swelling in upper arm and underarm ache in upper arm. Rt axilla ok, but still has some swelling.   -MW       Able to rate subjective pain? yes  -MW    Pre-Treatment Pain Level 1  -MW    Post-Treatment Pain Level 1  -MW    Subjective Pain Comment Lt upper arm; posterior   -MW       Ptting Edema Category By severity  -MW    Pitting Edema Mild;Moderate  -MW    Edema Assessment Comment midl to moderate fullness bilat axilla; also fullness Lt upper arm   -MW       Location/Assessment Upper Quadrant;Upper Extremity  -MW    Upper Extremity Conditions bilateral:;intact;clean  -MW    Upper Quadrant Conditions bilateral:;intact;clean  -MW    Upper Quadrant Color/Pigment bilateral:;fibrosis;right:   Right incisions pale pink;   -MW       Manual Lymphatic Drainage initial sequence;opened regional lymph nodes;opened anastamoses;extremity treatment;astym  -MW    Initial Sequence supraclavicular;shoulder collectors  -MW    Supraclavicular right;left  -MW    Shoulder Collectors right;left  -MW    Opened Regional Lymph Nodes inguinal  -MW    Inguinal right;left  -MW    Opened Anastamoses axillo-inguinal  -MW    Axillo-Inguinal right;left   -MW    Extremity Treatment simple/brief MLD  -MW    Simple/Brief MLD Bilat axilla into lateral trunk; Lt upper arm   -MW    Astym mastectomy protocol;upper traps;other astym  -MW    Mastectomy Protocol supine  -MW    Mastectomy Protocol Comment Bilateral chest with evaluator and localizer working around / across tissue folds. Localizer and isolator into lateral trunk / ribs. Minimal to moderate fine soft tissue disruptions anteriorly and laterally around trunk RT> LT. Moderate thick, rough disruptions adjacent and superior to incision lines. Localizer over sternum parallel; moderate to minimal rough disruptions.   -MW    Scar(s) / Incision Line(s) Localizer and isolator to bilat chest incision lines working perpendicular and parallel to incision line; between skin folds as able. Noted min to moderate rough disruptions superior to incision lines Rt> Lt.   -MW    Upper Traps right;left  -MW    Upper Traps Comment Initiated in sitting Bilat UT/ scapular region: evaluator and localizer to area; min fine soft tissue disruptions noted along teres mm areas. Extra strokes at supraspinatus and mid traps   -MW    Other Astym Repositioned in shoulder flex/ ABD to open axilla: Evaluator and localizer bilaterally; localizer and isolator in LT axilla for extra strokes at tight tissue restriction. Noted min fine soft tissue disruptions in bilateral medial upper arms. Moderate course soft tissue disruptions in LT axilla; min fine soft tissue disruptions Rt axilla.   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 Driving tolerance has improved but still less than she would prefer. UBE provoked UE N/T bilaterally; monitor and continue nerve glides and stretches. Assist pt to obtain light compression sleeve for prn use.   -MW     Rehab Potential Good  -MW     Patient/caregiver participated in establishment of treatment plan and goals Yes  -MW     Patient would benefit from skilled therapy intervention Yes  -MW     PT Plan    PT Frequency 2x/week;1x/week  -MW     Physical Therapy Interventions (Optional Details) manual therapy techniques;ROM (Range of Motion);strengthening;manual lymphatic drainage;home exercise program  -     PT Plan Comments Hold UBE; cont ASTYM/STM, MLD; monitor LUE edema; sleeve for prn use.   -MW       User Key  (r) = Recorded By, (t) = Taken By, (c) = Cosigned By    Initials Name Provider Type    MW Jennifer Davis, PT Physical Therapist                     Exercises       02/26/18 1300          Subjective Comments    Subjective Comments less intense but busy weekend. Did ok driving to [place]; was tired on way back home. Noted LUE swelling in upper arm and underarm ache in upper arm. Rt axilla ok, but still has some swelling.   -MW      Subjective Pain    Able to rate subjective pain? yes  -MW      Pre-Treatment Pain Level 1  -MW      Post-Treatment Pain Level 1  -MW      Subjective Pain Comment Lt upper arm; posterior   -MW      Exercise 1    Exercise Name 1 UBE warm up  -MW      Time (Minutes) 1 2.5  -MW      Additional Comments only able to tolerate 2.5 of 4 minutes;  fatigue and tingling in both hands   -        User Key  (r) = Recorded By, (t) = Taken By, (c) = Cosigned By    Initials Name Provider Type    KEYA Davis PT Physical Therapist                              PT OP Goals       02/26/18 1607       Time Calculation    PT Goal Re-Cert Due Date 03/07/18  -       User Key  (r) = Recorded By, (t) = Taken By, (c) = Cosigned By    Initials Name Provider Type    KEYA Davis PT Physical Therapist          Therapy Education  Education Details: Cont self massage; monitor LUE swelling   Given: Edema management, Symptoms/condition management  Program: Reinforced  How Provided: Verbal  Provided to: Patient  Level of Understanding: Verbalized              Time Calculation:   Start Time: 1300  Total Timed Code Minutes- PT: 55 minute(s)     Therapy Charges for Today     Code Description Service Date Service Provider Modifiers Qty    67835297492  PT MANUAL THERAPY EA 15 MIN 2/26/2018 Jennifer Davis, PT GP 4                    Jennifer Davis PT  2/26/2018

## 2018-03-01 ENCOUNTER — HOSPITAL ENCOUNTER (OUTPATIENT)
Dept: PHYSICAL THERAPY | Facility: HOSPITAL | Age: 68
Setting detail: THERAPIES SERIES
Discharge: HOME | End: 2018-03-01

## 2018-03-01 DIAGNOSIS — L90.5 SCAR CONDITIONS AND FIBROSIS OF SKIN: Primary | ICD-10-CM

## 2018-03-01 DIAGNOSIS — M79.602 PAIN IN LEFT ARM: ICD-10-CM

## 2018-03-01 DIAGNOSIS — I97.2 POSTMASTECTOMY LYMPHEDEMA SYNDROME OF LEFT UPPER EXTREMITY: ICD-10-CM

## 2018-03-01 PROCEDURE — G8985 CARRY GOAL STATUS: HCPCS | Performed by: PHYSICAL THERAPIST

## 2018-03-01 PROCEDURE — G8984 CARRY CURRENT STATUS: HCPCS | Performed by: PHYSICAL THERAPIST

## 2018-03-01 PROCEDURE — 97140 MANUAL THERAPY 1/> REGIONS: CPT | Performed by: PHYSICAL THERAPIST

## 2018-03-02 NOTE — THERAPY PROGRESS REPORT/RE-CERT
Outpatient Physical Therapy Lymphedema Progress Note   [place]     Patient Name: Sarah Raphael  : 1950  MRN: [id number]  Today's Date: 3/1/2018        Visit Date: 2018    Visit Dx:    ICD-10-CM ICD-9-CM   1. Scar conditions and fibrosis of skin L90.5 709.2   2. 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 Pain in left arm M79.602 729.5       Patient Active Problem List   Diagnosis   • Anxiety   • Gastroesophageal reflux disease without esophagitis   • Hyperlipidemia   • Hypertension   • Hypothyroidism   • Cataract   • Cancer of overlapping sites of right female breast              Lymphedema       18 1300             Subjective Comments pt reports RT shoulder (rotator cuff) is hurting more; also having pain Lt underarm and inner arm.   -MW       Able to rate subjective pain? yes  -MW    Pre-Treatment Pain Level 2  -MW    Post-Treatment Pain Level 2  -MW    Subjective Pain Comment Rt rotator cuff is 3; Lt axilla/ medial arm is 1-2 also tender to touch   -MW       Ptting Edema Category By severity  -MW    Pitting Edema Mild;Moderate  -MW    Edema Assessment Comment Moderate fullness Rt axilla, mild Lt axilla and upper arm   -MW       Location/Assessment Upper Quadrant;Upper Extremity  -MW    Upper Extremity Conditions bilateral:;intact;clean  -MW    Upper Quadrant Conditions bilateral:;intact;clean  -MW    Upper Quadrant Color/Pigment bilateral:;fibrosis   incisions well healed, pink   -MW    Skin Observations Comment Rt chest incision lines bright pink post ASTYM   -MW       Manual Lymphatic Drainage initial sequence;opened regional lymph nodes;opened anastamoses;extremity treatment;astym  -MW    Initial Sequence supraclavicular;shoulder collectors  -MW    Supraclavicular right;left  -MW    Shoulder Collectors right;left  -MW    Opened Regional Lymph Nodes inguinal  -MW    Inguinal right;left  -MW    Opened Anastamoses axillo-inguinal  -MW     Axillo-Inguinal right;left  -MW    Extremity Treatment simple/brief MLD  -MW    Simple/Brief MLD Bilat axilla and lateral trunk into AIA; Lt upper arm   -MW    Astym mastectomy protocol;upper traps;other astym  -MW    Mastectomy Protocol supine  -MW    Mastectomy Protocol Comment Bilateral chest with evaluator and localizer working around / across tissue folds. Localizer and isolator into lateral trunk / ribs. Minimal to moderate fine soft tissue disruptions anteriorly and laterally around trunk RT> LT. Moderate thick, rough disruptions adjacent and superior to incision lines. Localizer over sternum parallel; moderate to minimal rough disruptions.   -MW    Scar(s) / Incision Line(s) Localizer and isolator to bilat chest incision lines working perpendicular and parallel to incision line; between skin folds as able. Noted min to moderate rough disruptions superior to incision lines Rt> Lt.   -MW    Upper Traps right;left  -MW    Upper Traps Comment Initiated in sitting Bilat UT/ scapular region: evaluator and localizer to area; min fine soft tissue disruptions noted along teres mm areas. Extra strokes at supraspinatus and mid traps   -MW    Other Astym Repositioned in shoulder flex/ ABD to open axilla: Evaluator and localizer bilaterally; localizer and isolator in LT axilla for extra strokes at tight tissue restriction. Noted min fine soft tissue disruptions in bilateral medial upper arms. Moderate course soft tissue disruptions in LT axilla; min fine soft tissue disruptions Rt axilla. Limited positioning on Rt due to rotator cuff pain.   -MW    Manual Lymphatic Drainage Comments MLD completed post ASTYM / STM   -MW       Compression/Skin Care skin care  -MW    Skin Care moisturizing lotion applied   cocoa butter   -MW      User Key  (r) = Recorded By, (t) = Taken By, (c) = Cosigned By    Initials Name Provider Type    KEYA Davis, PT Physical Therapist                              PT Assessment/Plan        03/01/18 1300       PT Assessment    Functional Limitations Performance in self-care ADL;Limitation in home management;Limitations in community activities  -     Impairments Pain;Range of motion;Impaired flexibility;Impaired lymphatic circulation;Integumentary integrity;Edema  -     Assessment Comments Pt progressing towards remaining goals. Has had a mild set back in past week with mild increase in lymphedema including into Lt upperarm and hand, as well as persistent axillary edema bilaterally. LUE edema has nearly resolved but axillary edema seems to require daily self massage; due to location and body shape area is unable to benefit from a compression bra or corset. Pt's skin is highly sensitive so Kinesiotape use is limited. Overall, pt continues to make progress with improved soft tissue mobility and decrease in soft tissue disruptions, as well as improved flexibility; she has resumed limited schedule of substitute teaching. Cont PT for remaining goals.   -MW     Rehab Potential Good  -MW     Patient/caregiver participated in establishment of treatment plan and goals Yes  -MW     Patient would benefit from skilled therapy intervention Yes  -MW     PT Plan    PT Frequency 2x/week;1x/week   Decrease freq as symptoms improve   -     Predicted Duration of Therapy Intervention (days/wks) 12 weeks   -MW     Planned CPT's? PT MANUAL THERAPY EA 15 MIN: 03320;PT THER PROC EA 15 MIN: 16432  -MW     Physical Therapy Interventions (Optional Details) manual therapy techniques;manual lymphatic drainage;ROM (Range of Motion);stretching;strengthening;patient/family education;home exercise program  -     PT Plan Comments Cont ASTYM/ STM, MLD prn; montior LUE edema and N/T. Progress HEP.   -       User Key  (r) = Recorded By, (t) = Taken By, (c) = Cosigned By    Initials Name Provider Type    KEYA Davis, PT Physical Therapist                          Manual Rx (last 36 hours)      Manual Treatments        03/01/18 1300          Manual Rx 1    Manual Rx 1 Location Lt chest / pectoralis into axilla   -MW      Manual Rx 1 Type STM: tissue bending; long slow stretch at anterior axillar fold under tension and in relaxed position; also at insertion point of tighness along ribs  -MW      Manual Rx 1 Grade moderate  -MW      Manual Rx 1 Duration 10  -MW      Manual Rx 2    Manual Rx 2 Location Rt chest/ axilla   -MW      Manual Rx 2 Type STM: tissue bending, at lateral pec area and incision lines   -MW      Manual Rx 2 Grade minimal to moderate pressures  -MW      Manual Rx 2 Duration 5  -MW        User Key  (r) = Recorded By, (t) = Taken By, (c) = Cosigned By    Initials Name Provider Type    MW Jennifer Davis, PT Physical Therapist                PT OP Goals       03/01/18 1300    PT Short Term Goals    STG Date to Achieve 03/29/18  -MW    STG 1 Pt independent with home program for improved mobility and lymph massage.   -MW    STG 1 Progress Met  -MW    STG 2 Pt to demonstrate / report at least 25% improvment in Lt shoulder flexibility/ decreased tightness to allow improved function.   -MW    STG 2 Progress Met  -MW    STG 3 Pt to report at least 25% decrease in pain at night / DASH sleep rating to improve to at least 3/5.   -MW    STG 3 Progress Met  -MW    STG 4 Pt to report at least 25% improvement in driving; able to drive greater than 30 miles without pain greater than 4/10.   -MW    STG 4 Progress Met  -MW    Long Term Goals    LTG 1 Pt to demonstrate / report greater than  50% improvment in Lt shoulder flexibility/ decreased tightness to allow improved function.   -MW    LTG 1 Progress Progressing;Partially Met  -MW    LTG 2 Pt to report greater than 50% decrease in pain at night / DASH sleep rating to improve to at least 3/5.   -MW    LTG 2 Progress Met  -MW    LTG 3 Pt to report greater than 50% improvement in driving; able to drive greater than 50 miles without pain greater than 2/10.   -MW    LTG 3 Progress  Goal Revised  -MW    LTG 4 Soft tissue restrictions to decrease by > 50% Lt chest to allow improved upper quarter flexibility and decreased pain.   -MW    LTG 4 Progress Ongoing;Progressing  -MW    LTG 5 Pt independent with lymphedema management, including self massage and compression garment as needed.   -MW    LTG 5 Progress Partially Met;Progressing  -MW    LTG 5 Progress Comments Mild LUE lymphedema flair last week; monitoring for possible sleeve. Axillas with persistent edema but pt able to complete modified self MLD to manage.   -MW    Time Calculation    PT Goal Re-Cert Due Date 06/01/18  -MW      User Key  (r) = Recorded By, (t) = Taken By, (c) = Cosigned By    Initials Name Provider Type    KEYA Davis, PT Physical Therapist               Outcome Measure Options: Disabilities of the Arm, Shoulder, and Hand (DASH)  DASH  Open a tight or new jar.: Mild Difficulty  Write: No Difficulty  Turn a key: No Difficulty  Prepare a meal: No Difficulty  Push open a heavy door: No Difficulty  Place an object on a shelf above your head: Mild Difficulty  Do heavy household chores (e.g., wash walls, wash floors): Severe Difficulty  Garden or do yard work: Moderate Difficulty  Make a bed: No Difficulty  Carry a shopping bag or briefcase: No Difficulty  Carry a heavy object (over 10 lbs): Moderate Difficulty  Change a lightbulb overhead: Mild Difficulty  Wash or blow dry your hair: No Difficulty  Wash your back: No Difficulty  Put on a pullover sweater: No Difficulty  Use a knife to cut food: No Difficulty  Recreational activities in which require little effort (e.g., cardplaying, knitting, etc.): No Difficulty  Recreational activities in which you take some force or impact through your arm, should or hand (e.g. golf, hammering, tennis, etc.): Unable  Recreational Activities in which you move your arm freely (e.g., frisbee, badminton, etc.): Unable  Manage transportation needs (getting from one place to another): No  Difficulty  During the past week, to what extent has your arm, shoulder, or hand problem interfered with your normal social activites with family, friends, neighbors or groups?: Slightly  During the past week, were you limited in your work or other regular daily activities as a result of your arm, shoulder or hand problem?: Slightly Limited  Arm, Shoulder, or hand pain: Moderate  Arm, shoulder or hand pain when you performed any specific activity: Severe  Tingling (pins and needles) in your arm, shoulder, or hand: Mild  Weakness in your arm, shoulder or hand: Mild  Stiffness in your arm, shoulder or hand: Mild  During the past week, how much difficulty have you had sleeping because of the pain in your arm, shoulder or hand?: Mild Difficulty  I feel less capable, less confident or less useful because of my arm, shoulder or hand problem: Strongly disagree  DASH Sum : 58  Number of Questions Answered: 29  DASH Score: 25         Time Calculation:   Start Time: 1300  Total Timed Code Minutes- PT: 55 minute(s)     Therapy Charges for Today     Code Description Service Date Service Provider Modifiers Qty    92810071901 HC PT CARRY MOV HAND OBJ CURRENT 3/1/2018 Jennifer Davis, PT GP, CJ 1    23405768509 HC PT CARRY MOV HAND OBJ PROJECTED 3/1/2018 Jennifer Davis, PT GP, CI 1    85372361808 HC PT MANUAL THERAPY EA 15 MIN 3/1/2018 Jennifer Davis, PT GP 4          PT G-Codes  PT Professional Judgement Used?: Yes  Outcome Measure Options: Disabilities of the Arm, Shoulder, and Hand (DASH)  Score: raw score 58; 255 impaired due to increased pain Rt shoulder (dominant hand)   Functional Limitation: Carrying, moving and handling objects  Carrying, Moving and Handling Objects Current Status (): At least 20 percent but less than 40 percent impaired, limited or restricted  Carrying, Moving and Handling Objects Goal Status (): At least 1 percent but less than 20 percent impaired, limited or restricted         Jennifer  Susna, PT  3/1/2018

## 2018-03-05 ENCOUNTER — HOSPITAL ENCOUNTER (OUTPATIENT)
Dept: PHYSICAL THERAPY | Facility: HOSPITAL | Age: 68
Setting detail: THERAPIES SERIES
Discharge: HOME | End: 2018-03-05

## 2018-03-05 DIAGNOSIS — I97.2 POSTMASTECTOMY LYMPHEDEMA SYNDROME OF LEFT UPPER EXTREMITY: ICD-10-CM

## 2018-03-05 DIAGNOSIS — M79.602 PAIN IN LEFT ARM: ICD-10-CM

## 2018-03-05 DIAGNOSIS — L90.5 SCAR CONDITIONS AND FIBROSIS OF SKIN: Primary | ICD-10-CM

## 2018-03-05 PROCEDURE — 97140 MANUAL THERAPY 1/> REGIONS: CPT | Performed by: PHYSICAL THERAPIST

## 2018-03-05 NOTE — THERAPY TREATMENT NOTE
Outpatient Physical Therapy Lymphedema Treatment Note  [facility]     Patient Name: Sarah Raphael  : 1950  MRN: [id number]  Today's Date: 3/5/2018        Visit Date: 2018    Visit Dx:    ICD-10-CM ICD-9-CM   1. Scar conditions and fibrosis of skin L90.5 709.2   2. 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 Pain in left arm M79.602 729.5       Patient Active Problem List   Diagnosis   • Anxiety   • Gastroesophageal reflux disease without esophagitis   • Hyperlipidemia   • Hypertension   • Hypothyroidism   • Cataract   • Cancer of overlapping sites of right female breast              Lymphedema       18 1300             Subjective Comments Reports feeling some better; relaxed on Saturday. LUE better, less full   -MW       Able to rate subjective pain? yes  -MW    Pre-Treatment Pain Level 1  -MW    Post-Treatment Pain Level 1  -MW       Ptting Edema Category By severity  -MW    Pitting Edema Mild;Moderate  -MW    Edema Assessment Comment Mild to moderate fullness bilat axillas; mild LUE upper arm   -MW       Location/Assessment Upper Quadrant;Upper Extremity  -MW    Upper Extremity Conditions bilateral:;intact;clean  -MW    Upper Quadrant Conditions bilateral:;intact;clean  -MW    Upper Quadrant Color/Pigment bilateral:;fibrosis   incisions well healed, pink   -MW       Measurement Type(s) Quick Girth  -MW    Quick Girth Areas Upper extremities  -MW       Axilla 34 cm  -MW    Mid upper arm 33.7 cm  -MW    Elbow 28.5 cm  -MW    Mid forearm 23.5 cm  -MW    Wrist crease 16.7 cm  -MW    Web space 19.2 cm  -MW    Met-heads 18.5 cm  -MW       Manual Lymphatic Drainage initial sequence;opened regional lymph nodes;opened anastamoses;extremity treatment;astym  -MW    Initial Sequence supraclavicular;shoulder collectors  -MW    Supraclavicular right;left  -MW    Shoulder Collectors right;left  -MW    Opened Regional Lymph Nodes inguinal  -MW    Inguinal right;left  -MW     Opened Anastamoses axillo-inguinal  -MW    Axillo-Inguinal right;left  -MW    Extremity Treatment simple/brief MLD  -MW    Simple/Brief MLD Bilat axilla and lateral trunk into AIA; Lt upper arm   -MW    Astym mastectomy protocol;upper traps;other astym  -MW    Mastectomy Protocol supine  -MW    Mastectomy Protocol Comment Bilateral chest with evaluator and localizer working around / across tissue folds. Localizer and isolator into lateral trunk / ribs. Minimal to moderate fine soft tissue disruptions anteriorly and laterally around trunk RT> LT. Localizer over sternum parallel; moderate to minimal rough disruptions.   -MW    Scar(s) / Incision Line(s) Localizer and isolator to bilat chest incision lines working perpendicular and parallel to incision line; between skin folds as able. Noted min to moderate rough disruptions superior to incision lines Rt> Lt.   -MW    Upper Traps right;left  -MW    Upper Traps Comment Initiated in sitting Bilat UT/ scapular region: evaluator and localizer to area; min fine soft tissue disruptions noted along teres mm areas. Extra strokes at supraspinatus and mid traps   -MW    Other Astym Repositioned in shoulder flex/ ABD to open axilla: Evaluator and localizer bilaterally; localizer and isolator in LT axilla into chest for extra strokes at tight tissue restriction. Noted min fine soft tissue disruptions in bilateral medial upper arms. Moderate course soft tissue disruptions in LT axilla; min fine soft tissue disruptions Rt axilla. Extra strokes mid chest on Rt with UE in ABD position; area of tightness.   -MW    Manual Lymphatic Drainage Comments MLD completed post ASTYM / STM   -MW       Compression/Skin Care skin care  -MW    Skin Care moisturizing lotion applied   cocoa butter   -MW    Compression/Skin Care Comments Measured for [employer] Lite size 3 long.   -MW      User Key  (r) = Recorded By, (t) = Taken By, (c) = Cosigned By    Initials Name Provider Type    KEYA Rodriguez  Susan, PT Physical Therapist                              PT Assessment/Plan       03/05/18 1300       PT Assessment    Functional Limitations Performance in self-care ADL;Limitation in home management;Limitations in community activities  -     Impairments Pain;Range of motion;Impaired flexibility;Impaired lymphatic circulation;Integumentary integrity;Edema  -     Assessment Comments LUE with presistent mild lymphedema, recommended pt obtain light compression sleeve for travel and prn use. Added Dauerbinde for axillary compression prn around home to assist with fluid reduction. Soft tissue disruptions continue to smooth.   -     Rehab Potential Good  -     Patient/caregiver participated in establishment of treatment plan and goals Yes  -MW     Patient would benefit from skilled therapy intervention Yes  -MW     PT Plan    PT Frequency 2x/week;1x/week  -     Physical Therapy Interventions (Optional Details) manual therapy techniques;manual lymphatic drainage;ROM (Range of Motion);stretching;strengthening;patient/family education;home exercise program  -     PT Plan Comments Cont ASTYM/ STM, MLD prn; montior LUE edema and N/T. Progress HEP.   -       User Key  (r) = Recorded By, (t) = Taken By, (c) = Cosigned By    Initials Name Provider Type     Jennifer Davis, PT Physical Therapist                      Manual Rx (last 36 hours)      Manual Treatments       03/05/18 1300          Manual Rx 1    Manual Rx 1 Location Lt chest / pectoralis into axilla   -      Manual Rx 1 Type STM: tissue bending; long slow stretch at anterior axillar fold under tension and in relaxed position; also at insertion point of tighness along ribs  -      Manual Rx 1 Grade moderate  -      Manual Rx 1 Duration 10  -MW      Manual Rx 2    Manual Rx 2 Location Rt chest/ axilla   -      Manual Rx 2 Type STM: tissue bending, at lateral pec area and incision lines   -      Manual Rx 2 Grade minimal to moderate  pressures  -MW      Manual Rx 2 Duration 8  -MW        User Key  (r) = Recorded By, (t) = Taken By, (c) = Cosigned By    Initials Name Provider Type    KEYA Davis, PT Physical Therapist                PT OP Goals       03/05/18 1711       Time Calculation    PT Goal Re-Cert Due Date 06/01/18  -MW       User Key  (r) = Recorded By, (t) = Taken By, (c) = Cosigned By    Initials Name Provider Type    KEYA Davis, PT Physical Therapist          Therapy Education  Education Details: Trial of Dauerbinde for axillary compression 20-30  min BID; [place] options at [facility] [place] [facility].   Given: Edema management, Symptoms/condition management  Program: Modified  How Provided: Verbal, Demonstration  Provided to: Patient  Level of Understanding: Verbalized, Teach back education performed              Time Calculation:   Start Time: 1300  Total Timed Code Minutes- PT: 55 minute(s)     Therapy Charges for Today     Code Description Service Date Service Provider Modifiers Qty    52091666493 HC PT MANUAL THERAPY EA 15 MIN 3/5/2018 Jennifer Davis, PT GP 4                    Jennifer Davis, PT  3/5/2018

## 2018-03-06 ENCOUNTER — HOSPITAL ENCOUNTER (OUTPATIENT)
Dept: PHYSICAL THERAPY | Facility: HOSPITAL | Age: 68
Setting detail: THERAPIES SERIES
End: 2018-03-06

## 2018-03-08 ENCOUNTER — HOSPITAL ENCOUNTER (OUTPATIENT)
Dept: PHYSICAL THERAPY | Facility: HOSPITAL | Age: 68
Setting detail: THERAPIES SERIES
Discharge: HOME | End: 2018-03-08

## 2018-03-08 DIAGNOSIS — I97.2 POSTMASTECTOMY LYMPHEDEMA SYNDROME OF LEFT UPPER EXTREMITY: ICD-10-CM

## 2018-03-08 DIAGNOSIS — L90.5 SCAR CONDITIONS AND FIBROSIS OF SKIN: Primary | ICD-10-CM

## 2018-03-08 DIAGNOSIS — M79.602 PAIN IN LEFT ARM: ICD-10-CM

## 2018-03-08 PROCEDURE — 97140 MANUAL THERAPY 1/> REGIONS: CPT | Performed by: PHYSICAL THERAPIST

## 2018-03-08 NOTE — THERAPY TREATMENT NOTE
Outpatient Physical Therapy Lymphedema Treatment Note   [facility]     Patient Name: Sarah Raphael  : 1950  MRN: [id number]  Today's Date: 3/8/2018        Visit Date: 2018    Visit Dx:    ICD-10-CM ICD-9-CM   1. Scar conditions and fibrosis of skin L90.5 709.2   2. 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 Pain in left arm M79.602 729.5       Patient Active Problem List   Diagnosis   • Anxiety   • Gastroesophageal reflux disease without esophagitis   • Hyperlipidemia   • Hypertension   • Hypothyroidism   • Cataract   • Cancer of overlapping sites of right female breast              Lymphedema       18 1300             Subjective Comments Back is a little sore; Rt shoulder achy. Feels less puffy under arms   -MW       Able to rate subjective pain? yes  -MW    Pre-Treatment Pain Level 1  -MW    Post-Treatment Pain Level 1  -MW       Ptting Edema Category By severity  -MW    Pitting Edema Mild  -MW    Edema Assessment Comment Mild bilat axillas and proximal LUE   -MW       Location/Assessment Upper Quadrant;Upper Extremity  -MW    Upper Extremity Conditions bilateral:;intact;clean  -MW    Upper Quadrant Conditions bilateral:;intact;clean  -MW    Upper Quadrant Color/Pigment bilateral:;fibrosis   incisions well healed, pink   -MW       Measurement Type(s) --  -MW    Quick Girth Areas --  -MW       Manual Lymphatic Drainage initial sequence;opened regional lymph nodes;opened anastamoses;extremity treatment;astym  -MW    Initial Sequence supraclavicular;shoulder collectors  -MW    Supraclavicular right;left  -MW    Shoulder Collectors right;left  -MW    Opened Regional Lymph Nodes inguinal  -MW    Inguinal right;left  -MW    Opened Anastamoses axillo-inguinal  -MW    Axillo-Inguinal right;left  -MW    Extremity Treatment simple/brief MLD  -MW    Simple/Brief MLD Bilat axilla and lateral trunk into AIA; Lt upper arm   -MW    Astym mastectomy protocol;upper  traps;other astym  -MW    Mastectomy Protocol supine  -MW    Mastectomy Protocol Comment Bilateral chest with evaluator and localizer working around / across tissue folds. Localizer and isolator into lateral trunk / ribs. Minimal to moderate fine soft tissue disruptions anteriorly and laterally around trunk RT> LT. Localizer over sternum parallel; moderate to minimal rough disruptions.   -MW    Scar(s) / Incision Line(s) Localizer and isolator to bilat chest incision lines working perpendicular and parallel to incision line; between skin folds as able. Noted min to moderate rough disruptions superior to incision lines Rt> Lt.   -MW    Upper Traps right;left  -MW    Upper Traps Comment Initiated in sitting Bilat UT/ scapular region: evaluator and localizer to area; min fine soft tissue disruptions noted along teres mm areas. Extra strokes at supraspinatus and mid traps   -MW    Other Astym Repositioned in shoulder flex/ ABD to open axilla: Evaluator and localizer bilaterally; localizer and isolator in LT axilla into chest for extra strokes at tight tissue restriction. Noted min fine soft tissue disruptions in bilateral medial upper arms. Moderate course soft tissue disruptions in LT axilla; min fine soft tissue disruptions Rt axilla. Extra strokes mid chest on Rt with UE in ABD position; area of tightness.   -MW    Manual Lymphatic Drainage Comments MLD completed post ASTYM / STM   -MW       Compression/Skin Care skin care  -MW    Skin Care moisturizing lotion applied   cocoa butter   -MW      User Key  (r) = Recorded By, (t) = Taken By, (c) = Cosigned By    Initials Name Provider Type    MW Jennifer Davis, PT Physical Therapist                              PT Assessment/Plan       03/08/18 1300       PT Assessment    Functional Limitations Performance in self-care ADL;Limitation in home management;Limitations in community activities  -MW     Impairments Pain;Range of motion;Impaired flexibility;Impaired lymphatic  circulation;Integumentary integrity;Edema  -     Assessment Comments Pt reports still trying to work with aTno for trunk edema management; seems better but still variable. Tissues smoother; less tightness. Mild edema LUE; still working on sleeve options (needs MD order for DME). Less tingling Lt hand.   -MW     Rehab Potential Good  -MW     Patient/caregiver participated in establishment of treatment plan and goals Yes  -MW     Patient would benefit from skilled therapy intervention Yes  -MW     PT Plan    PT Frequency 2x/week;1x/week   begin to decrease frequence   -MW     Physical Therapy Interventions (Optional Details) manual therapy techniques;manual lymphatic drainage;ROM (Range of Motion);stretching;strengthening;patient/family education;home exercise program  -     PT Plan Comments Cont ASTYM/ STM, MLD prn; montior LUE edema (obtain MD order for sleeve for DME); monitor N/T. Progress HEP.   -MW       User Key  (r) = Recorded By, (t) = Taken By, (c) = Cosigned By    Initials Name Provider Type    KEYA Davis, LANETTE Physical Therapist                     Exercises       03/08/18 1300          Subjective Comments    Subjective Comments Back is a little sore; Rt shoulder achy. Feels less puffy under arms   -MW      Subjective Pain    Able to rate subjective pain? yes  -MW      Pre-Treatment Pain Level 1  -MW      Post-Treatment Pain Level 1  -MW        User Key  (r) = Recorded By, (t) = Taken By, (c) = Cosigned By    Initials Name Provider Type    KEYA Davis, PT Physical Therapist                       Manual Rx (last 36 hours)      Manual Treatments       03/08/18 1300          Manual Rx 1    Manual Rx 1 Location Lt chest / pectoralis into axilla   -      Manual Rx 1 Type STM: tissue bending; long slow stretch at anterior axillar fold under tension and in relaxed position; also at insertion point of tighness along ribs; long fascial stretches following pectoralis mm fibers.   -MW       Manual Rx 1 Grade moderate  -MW      Manual Rx 1 Duration 10  -MW      Manual Rx 2    Manual Rx 2 Location Rt chest/ axilla   -MW      Manual Rx 2 Type STM: tissue bending, at lateral pec area and incision lines   -MW      Manual Rx 2 Grade minimal to moderate pressures  -MW      Manual Rx 2 Duration 8  -MW        User Key  (r) = Recorded By, (t) = Taken By, (c) = Cosigned By    Initials Name Provider Type    KEYA Davis, PT Physical Therapist                PT OP Goals       03/08/18 1658       Time Calculation    PT Goal Re-Cert Due Date 06/01/18  -MW       User Key  (r) = Recorded By, (t) = Taken By, (c) = Cosigned By    Initials Name Provider Type    KYEA Davis PT Physical Therapist                         Time Calculation:   Start Time: 1300  Total Timed Code Minutes- PT: 55 minute(s)     Therapy Charges for Today     Code Description Service Date Service Provider Modifiers Qty    66897170065 HC PT MANUAL THERAPY EA 15 MIN 3/8/2018 Jennifer Davis, PT GP 4                    Jennifer Davis PT  3/8/2018

## 2018-03-15 ENCOUNTER — HOSPITAL ENCOUNTER (OUTPATIENT)
Dept: PHYSICAL THERAPY | Facility: HOSPITAL | Age: 68
Setting detail: THERAPIES SERIES
Discharge: HOME | End: 2018-03-15

## 2018-03-15 DIAGNOSIS — I97.2 POSTMASTECTOMY LYMPHEDEMA SYNDROME OF LEFT UPPER EXTREMITY: ICD-10-CM

## 2018-03-15 DIAGNOSIS — L90.5 SCAR CONDITIONS AND FIBROSIS OF SKIN: Primary | ICD-10-CM

## 2018-03-15 PROCEDURE — 97140 MANUAL THERAPY 1/> REGIONS: CPT | Performed by: PHYSICAL THERAPIST

## 2018-03-15 NOTE — THERAPY TREATMENT NOTE
Outpatient Physical Therapy Lymphedema Treatment Note   [facility]     Patient Name: Sarah Raphael  : 1950  MRN: [id number]  Today's Date: 3/15/2018        Visit Date: 03/15/2018    Visit Dx:    ICD-10-CM ICD-9-CM   1. Scar conditions and fibrosis of skin L90.5 709.2   2. 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 yes  -MW    Pre-Treatment Pain Level 1  -MW    Post-Treatment Pain Level 1  -MW    Subjective Pain Comment Rt shoulder chronic soreness   -MW          Subjective Comments Still puffy under both arms; ? maybe upper arms too.   -MW          Ptting Edema Category By severity  -MW    Pitting Edema Mild;Moderate  -MW    Edema Assessment Comment Mild to moderate in bilat axillas; mild in upperarms   -MW          Location/Assessment --  -MW    Upper Extremity Conditions --  -MW    Upper Quadrant Conditions --  -MW    Upper Quadrant Color/Pigment --  -MW          Manual Lymphatic Drainage initial sequence;opened regional lymph nodes;opened anastamoses;extremity treatment;astym  -MW    Initial Sequence supraclavicular;shoulder collectors  -MW    Supraclavicular right;left  -MW    Shoulder Collectors right;left  -MW    Opened Regional Lymph Nodes inguinal  -MW    Inguinal right;left  -MW    Opened Anastamoses axillo-inguinal  -MW    Axillo-Inguinal right;left  -MW    Extremity Treatment simple/brief MLD  -MW    Simple/Brief MLD Post ASTYM/ STM: Bilat axilla, lateral trunk into AIA, then upper arms   -MW    Astym mastectomy protocol;upper traps;other astym  -MW    Mastectomy Protocol supine  -MW    Mastectomy Protocol Comment Bilateral chest with  evaluator and localizer working around / across tissue folds. Localizer and isolator into lateral trunk / ribs. Minimal fine soft tissue disruptions anteriorly and laterally around trunk. Localizer over sternum parallel; moderate to minimal rough disruptions.   -MW    Scar(s) / Incision Line(s) Localizer and isolator to bilat chest incision lines working perpendicular and parallel to incision line; between skin folds as able. Noted min to moderate rough disruptions superior to incision lines Rt> Lt.   -MW    Upper Traps right;left  -MW    Upper Traps Comment Initiated in sitting Bilat UT/ scapular region: evaluator and localizer to area; min fine soft tissue disruptions noted along teres mm areas. Extra strokes at supraspinatus and mid traps   -MW    Other Astym Repositioned in shoulder flex/ ABD to open axilla: Evaluator and localizer bilaterally; localizer and isolator in LT axilla into chest for extra strokes at tight tissue restriction. Noted min fine soft tissue disruptions in bilateral medial upper arms. Min course to fine soft tissue disruptions in LT axilla; min fine soft tissue disruptions Rt axilla.   -MW          Compression/Skin Care skin care  -MW    Skin Care moisturizing lotion applied   cocoa butter   -MW      User Key  (r) = Recorded By, (t) = Taken By, (c) = Cosigned By    Initials Name Provider Type    MW Jennifer Davis, PT Physical Therapist                              PT Assessment/Plan     Row Name 03/15/18 1300          PT Assessment    Functional Limitations Performance in self-care ADL;Limitation in home management;Limitations in community activities  -MW     Impairments Pain;Range of motion;Impaired flexibility;Impaired lymphatic circulation;Integumentary integrity;Edema  -MW     Assessment Comments Pt has been unsucessful with Dauerbinde as option for axillary edema compression; discussed option of compression shirt or camisole from local merchant or per pt's choice. Overall noted  smoother soft tissue during ASTYM; few areas soft tissue disruptions and less rough/ course. Lt shoulder also seems less restricted; responding to ASTYM/ STM and continues with HEP.   -MW     Rehab Potential Good  -MW     Patient/caregiver participated in establishment of treatment plan and goals Yes  -MW     Patient would benefit from skilled therapy intervention Yes  -MW        PT Plan    PT Frequency 2x/week;1x/week   decrease frequency if symptoms continue to improve   -MW     Physical Therapy Interventions (Optional Details) manual therapy techniques;manual lymphatic drainage;ROM (Range of Motion);stretching;strengthening;patient/family education;home exercise program  -MW     PT Plan Comments Still working on order for arm sleeve for travel; Cont ASTYM/ STM, MLD; monitor N/T and pain.   -MW       User Key  (r) = Recorded By, (t) = Taken By, (c) = Cosigned By    Initials Name Provider Type    KEYA Davis, PT Physical Therapist                     Exercises     Row Name 03/15/18 1300             Subjective Comments    Subjective Comments Still puffy under both arms; ? maybe upper arms too.   -MW         Subjective Pain    Able to rate subjective pain? yes  -MW      Pre-Treatment Pain Level 1  -MW      Post-Treatment Pain Level 1  -MW      Subjective Pain Comment Rt shoulder chronic soreness   -        User Key  (r) = Recorded By, (t) = Taken By, (c) = Cosigned By    Initials Name Provider Type    KEYA Davis, PT Physical Therapist                       Manual Rx (last 36 hours)      Manual Treatments     Row Name 03/15/18 1300             Manual Rx 1    Manual Rx 1 Location Lt chest / pectoralis into axilla   -MW      Manual Rx 1 Type STM: tissue bending; long slow stretch at anterior axillar fold under tension and in relaxed position; also at insertion point of tighness along ribs; long fascial stretches following pectoralis mm fibers.   -MW      Manual Rx 1 Grade moderate  -MW      Manual Rx  1 Duration 10  -MW         Manual Rx 2    Manual Rx 2 Location Rt chest/ axilla   -MW      Manual Rx 2 Type STM: tissue bending, at lateral pec area and incision lines   -MW      Manual Rx 2 Grade minimal to moderate pressures  -MW      Manual Rx 2 Duration 8  -MW        User Key  (r) = Recorded By, (t) = Taken By, (c) = Cosigned By    Initials Name Provider Type    KEYA Davis, PT Physical Therapist                PT OP Goals     Row Name 03/15/18 1728          Time Calculation    PT Goal Re-Cert Due Date 06/01/18  -MW       User Key  (r) = Recorded By, (t) = Taken By, (c) = Cosigned By    Initials Name Provider Type    KEYA Davis PT Physical Therapist                         Time Calculation:   Start Time: 1300  Total Timed Code Minutes- PT: 55 minute(s)     Therapy Charges for Today     Code Description Service Date Service Provider Modifiers Qty    41606975669 HC PT MANUAL THERAPY EA 15 MIN 3/15/2018 Jennifer Davis, PT GP 4                    Jennifer Davis PT  3/15/2018

## 2018-03-19 ENCOUNTER — HOSPITAL ENCOUNTER (OUTPATIENT)
Dept: PHYSICAL THERAPY | Facility: HOSPITAL | Age: 68
Setting detail: THERAPIES SERIES
Discharge: HOME | End: 2018-03-19

## 2018-03-19 DIAGNOSIS — I97.2 POSTMASTECTOMY LYMPHEDEMA SYNDROME OF LEFT UPPER EXTREMITY: ICD-10-CM

## 2018-03-19 DIAGNOSIS — L90.5 SCAR CONDITIONS AND FIBROSIS OF SKIN: Primary | ICD-10-CM

## 2018-03-19 DIAGNOSIS — M79.602 PAIN IN LEFT ARM: ICD-10-CM

## 2018-03-19 PROCEDURE — 97140 MANUAL THERAPY 1/> REGIONS: CPT | Performed by: PHYSICAL THERAPIST

## 2018-03-19 NOTE — THERAPY TREATMENT NOTE
Outpatient Physical Therapy Lymphedema Treatment Note   [facility]     Patient Name: Sarah Raphael  : 1950  MRN: [id number]  Today's Date: 3/19/2018        Visit Date: 2018    Visit Dx:    ICD-10-CM ICD-9-CM   1. Scar conditions and fibrosis of skin L90.5 709.2   2. 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 Pain in left arm M79.602 729.5       Patient Active Problem List   Diagnosis   • Anxiety   • Gastroesophageal reflux disease without esophagitis   • Hyperlipidemia   • Hypertension   • Hypothyroidism   • Cataract   • Cancer of overlapping sites of right female breast              Lymphedema     Row Name 18 1300          Able to rate subjective pain? yes  -MW    Pre-Treatment Pain Level 2  -MW    Post-Treatment Pain Level 2  -MW    Subjective Pain Comment Rt shoulder aches   -MW          Subjective Comments still puffy under arms; sore left upper arm   -MW          Ptting Edema Category By severity  -MW    Pitting Edema Mild;Moderate  -MW    Edema Assessment Comment Mild to moderate in bilat axillas; mild Lt upper arm   -MW          Manual Lymphatic Drainage initial sequence;opened regional lymph nodes;opened anastamoses;extremity treatment;astym  -MW    Initial Sequence supraclavicular;shoulder collectors  -MW    Supraclavicular right;left  -MW    Shoulder Collectors right;left  -MW    Opened Regional Lymph Nodes inguinal  -MW    Inguinal right;left  -MW    Opened Anastamoses axillo-inguinal  -MW    Axillo-Inguinal right;left  -MW    Extremity Treatment simple/brief MLD  -MW    Simple/Brief MLD Post ASTYM/ STM: Bilat axilla, lateral trunk into AIA, then Lt upper arm    -MW    Astym mastectomy protocol;upper traps;other astym  -MW    Mastectomy Protocol supine  -MW    Mastectomy Protocol Comment Bilateral chest with evaluator and localizer working around / across tissue folds. Localizer and isolator into lateral trunk / ribs. Minimal fine soft tissue  disruptions anteriorly and laterally around trunk. Localizer over sternum parallel; moderate to minimal rough disruptions.   -MW    Scar(s) / Incision Line(s) Localizer and isolator to bilat chest incision lines working perpendicular and parallel to incision line; between skin folds as able. Noted min course to fine soft tissue disruptions superior to incision lines.   -MW    Upper Traps right;left  -MW    Upper Traps Comment Initiated in sitting Bilat UT/ scapular region: evaluator and localizer to area; min fine soft tissue disruptions noted along teres mm areas. Extra strokes at supraspinatus and mid traps   -MW    Other Astym Repositioned in shoulder flex/ ABD to open axilla: Evaluator and localizer bilaterally; localizer and isolator in LT axilla into chest for extra strokes at tight tissue restriction. Noted min fine soft tissue disruptions in bilateral medial upper arms. Min course to fine soft tissue disruptions in LT axilla; min fine soft tissue disruptions Rt axilla. Extra strokes RT chest mid lateral aspect at area of course disruptions.   -MW          Compression/Skin Care skin care  -MW    Skin Care moisturizing lotion applied   cocoa butter   -      User Key  (r) = Recorded By, (t) = Taken By, (c) = Cosigned By    Initials Name Provider Type     Jennifer Davis, PT Physical Therapist                              PT Assessment/Plan     Row Name 03/19/18 1300          PT Assessment    Functional Limitations Performance in self-care ADL;Limitation in home management;Limitations in community activities  -     Impairments Pain;Range of motion;Impaired flexibility;Impaired lymphatic circulation;Integumentary integrity;Edema  -MW     Assessment Comments Pt reports still some fullness and soreness Lt upperarm; axillas still puffy but plans to shop for compression shirt or tony as other options have not worked well for her. Gradual improvement in soft tissue mobility and decrease in soft tissue  disruptions noted during ASTYM.   -MW     Rehab Potential Good  -MW     Patient/caregiver participated in establishment of treatment plan and goals Yes  -MW     Patient would benefit from skilled therapy intervention Yes  -MW        PT Plan    PT Frequency 2x/week;1x/week   decrease freq as symptoms improve   -     Physical Therapy Interventions (Optional Details) manual therapy techniques;manual lymphatic drainage;ROM (Range of Motion);stretching;strengthening;patient/family education;home exercise program  -MW     PT Plan Comments Still working on order for arm sleeve for travel; Cont ASTYM/ STM, MLD; monitor N/T and pain.   -MW       User Key  (r) = Recorded By, (t) = Taken By, (c) = Cosigned By    Initials Name Provider Type    KEYA Davis PT Physical Therapist                     Exercises     Row Name 03/19/18 1300             Subjective Comments    Subjective Comments still puffy under arms; sore left upper arm   -         Subjective Pain    Able to rate subjective pain? yes  -MW      Pre-Treatment Pain Level 2  -MW      Post-Treatment Pain Level 2  -MW      Subjective Pain Comment Rt shoulder aches   -        User Key  (r) = Recorded By, (t) = Taken By, (c) = Cosigned By    Initials Name Provider Type    KEYA Davis, PT Physical Therapist                       Manual Rx (last 36 hours)      Manual Treatments     Row Name 03/19/18 1300             Manual Rx 1    Manual Rx 1 Location Lt chest / pectoralis into axilla   -      Manual Rx 1 Type STM: tissue bending; long slow stretch at anterior axillar fold under tension and in relaxed position; also at insertion point of tighness along ribs; long fascial stretches following pectoralis mm fibers.   -MW      Manual Rx 1 Grade moderate  -MW      Manual Rx 1 Duration 10  -MW         Manual Rx 2    Manual Rx 2 Location Rt chest/ axilla   -MW      Manual Rx 2 Type STM: tissue bending, at lateral pec area and incision lines   -      Manual  Rx 2 Grade minimal to moderate pressures  -MW      Manual Rx 2 Duration 8  -MW        User Key  (r) = Recorded By, (t) = Taken By, (c) = Cosigned By    Initials Name Provider Type    KEYA Davis, PT Physical Therapist                             Time Calculation:   Start Time: 1300  Total Timed Code Minutes- PT: 55 minute(s)     Therapy Charges for Today     Code Description Service Date Service Provider Modifiers Qty    61574438750  PT MANUAL THERAPY EA 15 MIN 3/19/2018 Jennifer Davis, PT GP 4                    Jennifer Davis, PT  3/19/2018

## 2018-03-26 ENCOUNTER — HOSPITAL ENCOUNTER (OUTPATIENT)
Dept: PHYSICAL THERAPY | Facility: HOSPITAL | Age: 68
Setting detail: THERAPIES SERIES
Discharge: HOME | End: 2018-03-26

## 2018-03-26 DIAGNOSIS — M79.602 PAIN IN LEFT ARM: ICD-10-CM

## 2018-03-26 DIAGNOSIS — L90.5 SCAR CONDITIONS AND FIBROSIS OF SKIN: Primary | ICD-10-CM

## 2018-03-26 DIAGNOSIS — I97.2 POSTMASTECTOMY LYMPHEDEMA SYNDROME OF LEFT UPPER EXTREMITY: ICD-10-CM

## 2018-03-26 PROCEDURE — 97140 MANUAL THERAPY 1/> REGIONS: CPT | Performed by: PHYSICAL THERAPIST

## 2018-03-26 NOTE — THERAPY TREATMENT NOTE
Outpatient Physical Therapy Lymphedema Treatment Note   [facility]     Patient Name: Sarah Raphael  : 1950  MRN: [id number]  Today's Date: 3/26/2018        Visit Date: 2018    Visit Dx:    ICD-10-CM ICD-9-CM   1. Scar conditions and fibrosis of skin L90.5 709.2   2. 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 Pain in left arm M79.602 729.5       Patient Active Problem List   Diagnosis   • Anxiety   • Gastroesophageal reflux disease without esophagitis   • Hyperlipidemia   • Hypertension   • Hypothyroidism   • Cataract   • Cancer of overlapping sites of right female breast              Lymphedema     Row Name 18 1300          Able to rate subjective pain? yes  -MW    Pre-Treatment Pain Level 2  -MW    Post-Treatment Pain Level 3  -MW    Subjective Pain Comment Rt shoulder rotator cuff pain   -MW          Subjective Comments feels less puffy   -MW          Ptting Edema Category By severity  -MW    Pitting Edema Mild  -MW    Edema Assessment Comment Mild bilat axillas  -MW          Manual Lymphatic Drainage initial sequence;opened regional lymph nodes;opened anastamoses;extremity treatment;astym  -MW    Initial Sequence supraclavicular  -MW    Supraclavicular right;left  -MW    Shoulder Collectors --  -MW    Opened Regional Lymph Nodes inguinal  -MW    Inguinal right;left  -MW    Opened Anastamoses axillo-inguinal  -MW    Axillo-Inguinal right;left  -MW    Extremity Treatment simple/brief MLD  -MW    Simple/Brief MLD Post ASTYM/ STM: Bilat axilla, lateral trunk into AIA  -MW    Astym mastectomy protocol;upper traps;other astym  -MW    Mastectomy Protocol supine  -MW    Mastectomy Protocol Comment Bilateral chest with evaluator and localizer working around / across tissue folds. Localizer and isolator into lateral trunk / ribs. Minimal fine soft tissue disruptions anteriorly and laterally around trunk. Extra strokes with localizer and isolator at lateral areas  of tightness Rt and Lt. Localizer over sternum parallel; moderate to minimal rough disruptions.   -MW    Scar(s) / Incision Line(s) Localizer and isolator to bilat chest incision lines working perpendicular and parallel to incision line; between skin folds as able. Noted min course to fine soft tissue disruptions superior to incision lines.   -MW    Upper Traps right;left  -MW    Upper Traps Comment Initiated in sitting Bilat UT/ scapular region: evaluator and localizer to area; min fine soft tissue disruptions noted along teres mm areas. Extra strokes at supraspinatus and mid traps   -MW    Other Astym Repositioned in shoulder flex/ ABD to open axilla: Evaluator and localizer bilaterally; localizer and isolator in LT axilla into chest for extra strokes at tight tissue restriction. Noted min fine soft tissue disruptions in bilateral medial upper arms. Min course to fine soft tissue disruptions in LT axilla; min fine soft tissue disruptions Rt axilla. Extra strokes RT chest mid lateral aspect at area of course disruptions (tender).   -MW          Compression/Skin Care skin care  -MW    Skin Care moisturizing lotion applied   cocoa butter   -MW      User Key  (r) = Recorded By, (t) = Taken By, (c) = Cosigned By    Initials Name Provider Type    MW Jennifer Davis, PT Physical Therapist                              PT Assessment/Plan     Row Name 03/26/18 1300          PT Assessment    Functional Limitations Performance in self-care ADL;Limitation in home management;Limitations in community activities  -     Impairments Pain;Range of motion;Impaired flexibility;Impaired lymphatic circulation;Integumentary integrity;Edema  -MW     Assessment Comments Pt reporting and displays less fullness in bilateral axilla this visit. Overall soft tissue disruptions continue to decrease; tissues smoother during ASTYM. Added new shoulder exercise to HEP to work on long slow myofascial stretch but modified to accomodate chronic  rotator cuff disfunction. If progress continues expect to decrease to 1 x week next week.   -MW     Rehab Potential Good  -MW     Patient/caregiver participated in establishment of treatment plan and goals Yes  -MW     Patient would benefit from skilled therapy intervention Yes  -MW        PT Plan    PT Frequency 2x/week;1x/week   expect to decrease freq next week.   -MW     Physical Therapy Interventions (Optional Details) manual therapy techniques;manual lymphatic drainage;ROM (Range of Motion);stretching;strengthening;patient/family education;home exercise program  -MW     PT Plan Comments Cont ASTYM/ STM, MLD prn; progress HEP; follow up on arm sleeve order for travel.   -MW       User Key  (r) = Recorded By, (t) = Taken By, (c) = Cosigned By    Initials Name Provider Type    KEYA Davis PT Physical Therapist                     Exercises     Row Name 03/26/18 1300             Subjective Comments    Subjective Comments feels less puffy   -MW         Subjective Pain    Able to rate subjective pain? yes  -MW      Pre-Treatment Pain Level 2  -MW      Post-Treatment Pain Level 3  -MW      Subjective Pain Comment Rt shoulder rotator cuff pain   -MW         Exercise 1    Exercise Name 1 total time 5 min   -MW         Exercise 2    Exercise Name 2 UE reach with horizonal ABD (slow fascial stretches)   -MW      Cueing 2 Demo;Verbal  -MW      Reps 2 3  -MW      Additional Comments VC for deep breathing; resting/ relaxing; listen to body. Modify by supporting UE on table to minimize stress to Rotator cuff.   -MW        User Key  (r) = Recorded By, (t) = Taken By, (c) = Cosigned By    Initials Name Provider Type    KEYA Davis PT Physical Therapist                       Manual Rx (last 36 hours)      Manual Treatments     Row Name 03/26/18 1300             Manual Rx 1    Manual Rx 1 Location Lt chest / pectoralis into axilla   -MW      Manual Rx 1 Type STM: tissue bending; long slow stretch at anterior  axillar fold under tension and in relaxed position; also at insertion point of tighness along ribs; long fascial stretches following pectoralis mm fibers.   -MW      Manual Rx 1 Grade less pressures with longer holds  -MW      Manual Rx 1 Duration 12  -MW         Manual Rx 2    Manual Rx 2 Location Rt chest/ axilla   -MW      Manual Rx 2 Type STM: with long slow fascial stretches at lateral pec area and incision lines   -MW      Manual Rx 2 Grade minimal to moderate pressures  -MW      Manual Rx 2 Duration 10  -MW        User Key  (r) = Recorded By, (t) = Taken By, (c) = Cosigned By    Initials Name Provider Type    MW Jennifer Davis, PT Physical Therapist              Therapy Education  Education Details: Added myofascial horizontal ABD stretch to HEP   Given: HEP, Symptoms/condition management  Program: Progressed  How Provided: Verbal, Demonstration  Provided to: Patient  Level of Understanding: Verbalized, Demonstrated              Time Calculation:   Start Time: 1300  Total Timed Code Minutes- PT: 55 minute(s)     Therapy Charges for Today     Code Description Service Date Service Provider Modifiers Qty    10109751761 HC PT MANUAL THERAPY EA 15 MIN 3/26/2018 Jennifer Davis, PT GP 4                    Jennifer Davis, PT  3/26/2018

## 2018-03-29 ENCOUNTER — HOSPITAL ENCOUNTER (OUTPATIENT)
Dept: PHYSICAL THERAPY | Facility: HOSPITAL | Age: 68
Setting detail: THERAPIES SERIES
Discharge: HOME | End: 2018-03-29

## 2018-03-29 DIAGNOSIS — I97.2 POSTMASTECTOMY LYMPHEDEMA SYNDROME OF LEFT UPPER EXTREMITY: ICD-10-CM

## 2018-03-29 DIAGNOSIS — M79.602 PAIN IN LEFT ARM: ICD-10-CM

## 2018-03-29 DIAGNOSIS — L90.5 SCAR CONDITIONS AND FIBROSIS OF SKIN: Primary | ICD-10-CM

## 2018-03-29 PROCEDURE — 97140 MANUAL THERAPY 1/> REGIONS: CPT | Performed by: PHYSICAL THERAPIST

## 2018-03-29 PROCEDURE — G8985 CARRY GOAL STATUS: HCPCS | Performed by: PHYSICAL THERAPIST

## 2018-03-29 PROCEDURE — G8984 CARRY CURRENT STATUS: HCPCS | Performed by: PHYSICAL THERAPIST

## 2018-03-29 NOTE — THERAPY PROGRESS REPORT/RE-CERT
Outpatient Physical Therapy Lymphedema Progress Note  [facility]     Patient Name: Sarah Raphael  : 1950  MRN: [id number]  Today's Date: 3/29/2018        Visit Date: 2018    Visit Dx:    ICD-10-CM ICD-9-CM   1. Scar conditions and fibrosis of skin L90.5 709.2   2. 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 Pain in left arm M79.602 729.5       Patient Active Problem List   Diagnosis   • Anxiety   • Gastroesophageal reflux disease without esophagitis   • Hyperlipidemia   • Hypertension   • Hypothyroidism   • Cataract   • Cancer of overlapping sites of right female breast              Lymphedema     Row Name 18 1300          Able to rate subjective pain? yes  -MW    Pre-Treatment Pain Level 4  -MW    Post-Treatment Pain Level 4  -MW    Subjective Pain Comment Rt shoulder irritated   -MW          Subjective Comments Overall still has tightness across chest. Has not yet looked into compression shirt / tony   -MW          Ptting Edema Category By severity  -MW    Pitting Edema Mild  -MW    Edema Assessment Comment Mild bilat axillas; trace LUE   -MW          Location/Assessment Upper Quadrant  -MW    Upper Quadrant Conditions bilateral:;intact;clean;dry  -MW    Upper Quadrant Color/Pigment bilateral:;other (comment)   skin color WNL but thickened tissue adjacent to incisions   -MW    Upper Quadrant Skin Details Many multiple skin freckles across body and limbs. Incisions pink, well healed.   -MW          Measurement Type(s) Quick Girth  -MW    Quick Girth Areas Upper extremities  -MW          Axilla 33.5 cm  -MW    Mid upper arm 33.2 cm  -MW    Elbow 28.7 cm  -MW    Mid forearm 23 cm  -MW    Wrist crease 16.4 cm  -MW    Web space 18.7 cm  -MW    Met-heads 18.5 cm  -MW    LUE Quick Girth Total 172  -MW          Manual Lymphatic Drainage initial sequence;opened regional lymph nodes;opened anastamoses;extremity treatment;astym  -MW    Initial Sequence  supraclavicular  -MW    Supraclavicular right;left  -MW    Opened Regional Lymph Nodes inguinal  -MW    Inguinal right;left  -MW    Opened Anastamoses axillo-inguinal  -MW    Axillo-Inguinal right;left  -MW    Extremity Treatment simple/brief MLD  -MW    Simple/Brief MLD Post ASTYM/ STM: Bilat axilla, lateral trunk into AIA  -MW    Astym mastectomy protocol;upper traps;other astym  -MW    Mastectomy Protocol supine  -MW    Mastectomy Protocol Comment Bilateral chest with evaluator and localizer working around / across tissue folds. Localizer and isolator into lateral trunk / ribs. Isolator adjacent to incision lines; perpendicular and parallel. Minimal fine soft tissue disruptions anteriorly and laterally around trunk. Extra strokes with localizer and isolator at lateral areas of tightness Rt and Lt. Localizer over sternum parallel; moderate to minimal rough disruptions.   -MW    Scar(s) / Incision Line(s) Localizer and isolator to bilat chest incision lines working perpendicular and parallel to incision line; between skin folds as able. Noted min course to fine soft tissue disruptions superior to incision lines.   -MW    Upper Traps right;left  -MW    Upper Traps Comment Initiated in sitting Bilat UT/ scapular region: evaluator and localizer to area; min fine soft tissue disruptions noted along teres mm areas. Extra strokes at supraspinatus and mid traps   -MW    Other Astym Repositioned in shoulder flex/ ABD to open axilla: Evaluator and localizer bilaterally; localizer and isolator in LT axilla into chest for extra strokes at tight tissue restriction. Noted min fine soft tissue disruptions in bilateral medial upper arms. Min course to fine soft tissue disruptions in LT axilla; min fine soft tissue disruptions Rt axilla. Extra strokes RT chest mid lateral aspect at area of course disruptions (tender).   -MW          Compression/Skin Care skin care  -MW    Skin Care moisturizing lotion applied   cocoa butter   -MW       User Key  (r) = Recorded By, (t) = Taken By, (c) = Cosigned By    Initials Name Provider Type    MW Jennifer Davis, PT Physical Therapist                              PT Assessment/Plan     Row Name 03/29/18 1300          PT Assessment    Functional Limitations Performance in self-care ADL;Limitation in home management;Limitations in community activities  -MW     Impairments Pain;Range of motion;Impaired flexibility;Impaired lymphatic circulation;Integumentary integrity;Edema  -MW     Assessment Comments Pt continues to make gradual gains in LUE function with improved ROM and overall flexibility; RUE with increased rotator cuff symptoms this past week. Soft tissue disruptions smoother with ASTYM but remains rough adjacent to incision lines and in Lt axilla. Pt with min to moderate bilateral axillary lymphedema which is difficult to manage as she lives alone and with bilateral shoulder limitations (prior rotator cuff impairments) self axillary massage is difficult; working on augmented massage using balls or pillows to provide pressure / massage to area. Recommend continued PT interventions to continue towards remaining goals.   -MW     Rehab Potential Good  -MW     Patient/caregiver participated in establishment of treatment plan and goals Yes  -MW     Patient would benefit from skilled therapy intervention Yes  -MW        PT Plan    PT Frequency 2x/week;1x/week   decrease frequency as symptoms resolve   -MW     Predicted Duration of Therapy Intervention (OT Eval) 8 weeks   -MW     Planned CPT's? PT MANUAL THERAPY EA 15 MIN: 95479;PT THER PROC EA 15 MIN: 99423  -MW     Physical Therapy Interventions (Optional Details) manual therapy techniques;manual lymphatic drainage;ROM (Range of Motion);stretching;strengthening;patient/family education;home exercise program  -MW     PT Plan Comments Cont ASTYM/ STM, MLD to axillas and lateral trunk; progress HEP; follow up on arm sleeve order for travel.   -MW       User  Key  (r) = Recorded By, (t) = Taken By, (c) = Cosigned By    Initials Name Provider Type    MW Jennifer Davis PT Physical Therapist                        Manual Rx (last 36 hours)      Manual Treatments     Row Name 03/29/18 1300             Manual Rx 1    Manual Rx 1 Location Lt chest / pectoralis into axilla   -MW      Manual Rx 1 Type STM: tissue bending; myofascial release at anterior axillar fold under tension and in relaxed position; also at insertion point of tighness along ribs.   -MW      Manual Rx 1 Grade less pressures with longer holds  -MW      Manual Rx 1 Duration 10  -MW         Manual Rx 2    Manual Rx 2 Location Rt chest/ axilla   -MW      Manual Rx 2 Type STM: myofascial release at lateral pec area and incision lines   -MW      Manual Rx 2 Grade minimal to moderate pressures  -MW      Manual Rx 2 Duration 8  -MW        User Key  (r) = Recorded By, (t) = Taken By, (c) = Cosigned By    Initials Name Provider Type    KEYA Davis PT Physical Therapist                PT OP Goals     Row Name 03/29/18 1300       PT Short Term Goals    STG Date to Achieve 03/29/18  -MW    STG 1 Pt independent with home program for improved mobility and lymph massage.   -MW    STG 1 Progress Met  -MW    STG 2 Pt to demonstrate / report at least 25% improvment in Lt shoulder flexibility/ decreased tightness to allow improved function.   -MW    STG 2 Progress Met  -MW    STG 3 Pt to report at least 25% decrease in pain at night / DASH sleep rating to improve to at least 3/5.   -MW    STG 3 Progress Met  -MW    STG 4 Pt to report at least 25% improvement in driving; able to drive greater than 30 miles without pain greater than 4/10.   -MW    STG 4 Progress Met  -MW       Long Term Goals    LTG 1 Pt to demonstrate / report greater than  50% improvment in Lt shoulder flexibility/ decreased tightness to allow improved function.   -MW    LTG 1 Progress Met  -MW    LTG 1 Progress Comments Demonstrates full forward  flexion for overhead reach and IR to wash her back.   -MW    LTG 2 Pt to report greater than 50% decrease in pain at night / DASH sleep rating to improve to at least 3/5.   -MW    LTG 2 Progress Met  -MW    LTG 3 Pt to report greater than 50% improvement in driving; able to drive greater than 50 miles without pain greater than 2/10.   -MW    LTG 3 Progress Ongoing  -MW    LTG 4 Soft tissue restrictions to decrease by > 50% Lt chest to allow improved upper quarter flexibility and decreased pain.   -MW    LTG 4 Progress Ongoing;Progressing  -MW    LTG 5 Pt independent with lymphedema management, including self massage and compression garment as needed.   -MW    LTG 5 Progress Partially Met;Progressing  -MW       Time Calculation    PT Goal Re-Cert Due Date 06/01/18  -      User Key  (r) = Recorded By, (t) = Taken By, (c) = Cosigned By    Initials Name Provider Type    KEYA Davis, PT Physical Therapist          Therapy Education  Education Details: Horizontal ABD stretch irritated Rt rotator cuff so discontinued. Discussed options for trunk compression as well as possible options for axillary massage / compression with neck travel pillow.   Given: HEP, Symptoms/condition management  Program: Modified  How Provided: Verbal  Provided to: Patient  Level of Understanding: Verbalized    Outcome Measure Options: Disabilities of the Arm, Shoulder, and Hand (DASH)  DASH  Open a tight or new jar.: Mild Difficulty  Write: No Difficulty  Turn a key: No Difficulty  Prepare a meal: No Difficulty  Push open a heavy door: No Difficulty  Place an object on a shelf above your head: Mild Difficulty  Do heavy household chores (e.g., wash walls, wash floors): Moderate Difficulty  Garden or do yard work: Moderate Difficulty  Make a bed: Mild Difficulty  Carry a shopping bag or briefcase: No Difficulty  Carry a heavy object (over 10 lbs): Mild Difficulty  Change a lightbulb overhead: Mild Difficulty  Wash or blow dry your hair:  No Difficulty  Wash your back: No Difficulty  Put on a pullover sweater: No Difficulty  Use a knife to cut food: No Difficulty  Recreational activities in which require little effort (e.g., cardplaying, knitting, etc.): No Difficulty  Recreational activities in which you take some force or impact through your arm, should or hand (e.g. golf, hammering, tennis, etc.): Unable  Recreational Activities in which you move your arm freely (e.g., frisbee, badminton, etc.): Unable  Manage transportation needs (getting from one place to another): No Difficulty  During the past week, to what extent has your arm, shoulder, or hand problem interfered with your normal social activites with family, friends, neighbors or groups?: Slightly  During the past week, were you limited in your work or other regular daily activities as a result of your arm, shoulder or hand problem?: Slightly Limited  Arm, Shoulder, or hand pain: Moderate  Arm, shoulder or hand pain when you performed any specific activity: Moderate  Tingling (pins and needles) in your arm, shoulder, or hand: Mild  Weakness in your arm, shoulder or hand: Mild  Stiffness in your arm, shoulder or hand: Mild  During the past week, how much difficulty have you had sleeping because of the pain in your arm, shoulder or hand?: Moderate Difficiculty  I feel less capable, less confident or less useful because of my arm, shoulder or hand problem: Strongly disagree  DASH Sum : 57  Number of Questions Answered: 29  DASH Score: 24.14         Time Calculation:   Start Time: 1300  Total Timed Code Minutes- PT: 55 minute(s)     Therapy Charges for Today     Code Description Service Date Service Provider Modifiers Qty    75414780705 HC PT CARRY MOV HAND OBJ CURRENT 3/29/2018 Jennifer Davis, PT GP, CJ 1    23599381736 HC PT CARRY MOV HAND OBJ PROJECTED 3/29/2018 Jennifer Davis, PT GP, CI 1    10075867375 HC PT MANUAL THERAPY EA 15 MIN 3/29/2018 Jennifer Davis, PT GP 4          PT  G-Codes  Outcome Measure Options: Disabilities of the Arm, Shoulder, and Hand (DASH)  Score: raw score 57; 24% impaired (Right shoulder chronic rotator cuff issues continue to limit DASH score)  Functional Limitation: Carrying, moving and handling objects  Carrying, Moving and Handling Objects Current Status (): At least 20 percent but less than 40 percent impaired, limited or restricted  Carrying, Moving and Handling Objects Goal Status (): At least 1 percent but less than 20 percent impaired, limited or restricted         Jennifer Davis, PT  3/29/2018

## 2018-04-02 ENCOUNTER — APPOINTMENT (OUTPATIENT)
Dept: PHYSICAL THERAPY | Facility: HOSPITAL | Age: 68
End: 2018-04-02

## 2018-04-06 RX ORDER — DIPHENHYDRAMINE HYDROCHLORIDE 50 MG/ML
INJECTION INTRAMUSCULAR; INTRAVENOUS
Qty: 100 ML | Refills: 3 | Status: SHIPPED | OUTPATIENT
Start: 2018-04-06 | End: 2019-04-10 | Stop reason: SDUPTHER

## 2018-04-09 ENCOUNTER — HOSPITAL ENCOUNTER (OUTPATIENT)
Dept: PHYSICAL THERAPY | Facility: HOSPITAL | Age: 68
Setting detail: THERAPIES SERIES
Discharge: HOME | End: 2018-04-09

## 2018-04-09 DIAGNOSIS — I97.2 POSTMASTECTOMY LYMPHEDEMA SYNDROME OF LEFT UPPER EXTREMITY: ICD-10-CM

## 2018-04-09 DIAGNOSIS — L90.5 SCAR CONDITIONS AND FIBROSIS OF SKIN: Primary | ICD-10-CM

## 2018-04-09 PROCEDURE — 97140 MANUAL THERAPY 1/> REGIONS: CPT | Performed by: PHYSICAL THERAPIST

## 2018-04-09 NOTE — THERAPY TREATMENT NOTE
Outpatient Physical Therapy Lymphedema Treatment Note   [facility]     Patient Name: Sarah Raphael  : 1950  MRN: [id number]  Today's Date: 2018        Visit Date: 2018    Visit Dx:    ICD-10-CM ICD-9-CM   1. Scar conditions and fibrosis of skin L90.5 709.2   2. 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 yes  -MW    Pre-Treatment Pain Level 5  -MW    Post-Treatment Pain Level 5  -MW    Subjective Pain Comment Rt shoulder more sore; occ wakes at night when rolls on it; considering seeing ortho MD for options   -MW          Subjective Comments Notes continued tightness across chest; improving but still gets tight between sessions. Some fullness bilat axilla  -MW          Ptting Edema Category By severity  -MW    Pitting Edema Mild  -MW    Edema Assessment Comment Mild bilat axilla/ lateral trunk   -MW          Location/Assessment Upper Quadrant  -MW    Upper Quadrant Conditions bilateral:;intact;clean;dry  -MW    Upper Quadrant Color/Pigment bilateral:;other (comment)   skin color WNL but thickened tissue adjacent to incisions   -MW          Measurement Type(s) --  -MW    Quick Girth Areas --  -MW          Manual Lymphatic Drainage initial sequence;opened regional lymph nodes;opened anastamoses;extremity treatment;astym  -MW    Initial Sequence supraclavicular  -MW    Supraclavicular right;left  -MW    Opened Regional Lymph Nodes inguinal  -MW    Inguinal right;left  -MW    Opened Anastamoses axillo-inguinal  -MW    Axillo-Inguinal right;left  -MW    Extremity Treatment simple/brief MLD  -MW    Simple/Brief MLD Post ASTYM/ STM: Bilat axilla,  lateral trunk into AIA  -MW    Astym mastectomy protocol;upper traps;other astym  -MW    Mastectomy Protocol supine  -MW    Mastectomy Protocol Comment Bilateral chest with evaluator and localizer working around / across tissue folds. Localizer and isolator into lateral trunk / ribs. Isolator adjacent to incision lines; perpendicular and parallel. Minimal fine soft tissue disruptions anteriorly and laterally around trunk. Extra strokes with localizer and isolator at lateral areas of tightness Rt and Lt. Localizer and isolator over sternum parallel and perpendicular; moderate to course to fine soft tissue disruptions.   -MW    Scar(s) / Incision Line(s) Localizer and isolator to bilat chest incision lines working perpendicular and parallel to incision line; between skin folds as able. Noted min course to fine soft tissue disruptions superior to incision lines.   -MW    Upper Traps right;left  -MW    Upper Traps Comment Initiated in sitting Bilat UT/ scapular region: evaluator and localizer to area; min fine soft tissue disruptions noted along teres mm areas. Extra strokes at supraspinatus and mid traps   -MW    Other Astym Repositioned in shoulder flex/ ABD to open axilla: Evaluator and localizer bilaterally; localizer and isolator in LT axilla into chest for extra strokes at tight tissue restriction. Noted min fine soft tissue disruptions in bilateral medial upper arms. Min course to fine soft tissue disruptions in LT axilla; min fine soft tissue disruptions Rt axilla. Extra strokes RT chest mid lateral aspect at area of course disruptions.   -MW          Compression/Skin Care skin care  -MW    Skin Care moisturizing lotion applied   cocoa butter   -MW      User Key  (r) = Recorded By, (t) = Taken By, (c) = Cosigned By    Initials Name Provider Type    KEYA Davis, PT Physical Therapist                              PT Assessment/Plan     Row Name 04/09/18 1400          PT Assessment    Functional Limitations  Performance in self-care ADL;Limitation in home management;Limitations in community activities  -     Impairments Pain;Range of motion;Impaired flexibility;Impaired lymphatic circulation;Integumentary integrity;Edema  -     Assessment Comments Rt rotator cuff continues to limit function and cause extra pain; encouraging pt to follow up with ortho MD for options. Soft tissue disruptions continue to smooth and allow improved soft tissue mobility / flexibility but still experienced tightness between PT sessions. Pt somewhat limited in self massage by h/o bilateral rotator cuff impairments. Still needs to follow up on trunk compression garments, but shoulders may limit options for donning/ doffing. Cont PT to maximize function and flexibility with decrease pain.   -MW     Rehab Potential Good  -MW     Patient/caregiver participated in establishment of treatment plan and goals Yes  -MW     Patient would benefit from skilled therapy intervention Yes  -MW        PT Plan    PT Frequency 2x/week;1x/week  -     Physical Therapy Interventions (Optional Details) manual therapy techniques;manual lymphatic drainage;ROM (Range of Motion);stretching;strengthening;patient/family education;home exercise program  -     PT Plan Comments Cont ASTYM/ STM, MLD to axillas and lateral trunk; progress HEP; follow up on arm sleeve order for travel.   -       User Key  (r) = Recorded By, (t) = Taken By, (c) = Cosigned By    Initials Name Provider Type    MW Jennifer Davis, PT Physical Therapist                  Manual Rx (last 36 hours)      Manual Treatments     Row Name 04/09/18 1400             Manual Rx 1    Manual Rx 1 Location Lt chest / pectoralis into axilla   -      Manual Rx 1 Type STM: tissue bending; myofascial release at anterior axillar fold under tension and in relaxed position; also at insertion point of tighness along ribs.   -      Manual Rx 1 Grade minimal pressure with moderate holding times   -MW       Manual Rx 1 Duration 10  -MW         Manual Rx 2    Manual Rx 2 Location Rt chest/ axilla   -MW      Manual Rx 2 Type STM: myofascial release at lateral pec area and incision lines   -MW      Manual Rx 2 Grade minimal pressure with moderate holding times   -MW      Manual Rx 2 Duration 8  -MW        User Key  (r) = Recorded By, (t) = Taken By, (c) = Cosigned By    Initials Name Provider Type    KEYA Davis, PT Physical Therapist                             Time Calculation:   Start Time: 1400  Total Timed Code Minutes- PT: 55 minute(s)     Therapy Charges for Today     Code Description Service Date Service Provider Modifiers Qty    37360623437 HC PT MANUAL THERAPY EA 15 MIN 4/9/2018 Jennifer Davis, PT GP 4                    Jennifer Davis, PT  4/9/2018

## 2018-04-16 ENCOUNTER — HOSPITAL ENCOUNTER (OUTPATIENT)
Dept: PHYSICAL THERAPY | Facility: HOSPITAL | Age: 68
Setting detail: THERAPIES SERIES
Discharge: HOME | End: 2018-04-16

## 2018-04-16 DIAGNOSIS — I97.2 POSTMASTECTOMY LYMPHEDEMA SYNDROME OF LEFT UPPER EXTREMITY: ICD-10-CM

## 2018-04-16 DIAGNOSIS — L90.5 SCAR CONDITIONS AND FIBROSIS OF SKIN: Primary | ICD-10-CM

## 2018-04-16 PROCEDURE — 97140 MANUAL THERAPY 1/> REGIONS: CPT | Performed by: PHYSICAL THERAPIST

## 2018-04-16 NOTE — THERAPY TREATMENT NOTE
Outpatient Physical Therapy Lymphedema Treatment Note   [facility]     Patient Name: Sarah Raphael  : 1950  MRN: [id number]  Today's Date: 2018        Visit Date: 2018    Visit Dx:    ICD-10-CM ICD-9-CM   1. Scar conditions and fibrosis of skin L90.5 709.2   2. 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 yes  -MW    Pre-Treatment Pain Level 3  -MW    Post-Treatment Pain Level 3  -MW    Subjective Pain Comment Rt shoulder  -MW          Subjective Comments Chest feels tight; right lower more thight than left side   -MW          Ptting Edema Category By severity  -MW    Pitting Edema Mild  -MW    Edema Assessment Comment Mild bilat axilla/ lateral trunk   -MW          Location/Assessment Upper Quadrant  -MW    Upper Quadrant Conditions bilateral:;intact;clean;dry  -MW    Upper Quadrant Color/Pigment bilateral:;other (comment)   skin color WNL but thickened tissue adjacent to incisions   -MW          Manual Lymphatic Drainage initial sequence;opened regional lymph nodes;opened anastamoses;extremity treatment;astym  -MW    Initial Sequence supraclavicular  -MW    Supraclavicular right;left  -MW    Opened Regional Lymph Nodes inguinal  -MW    Inguinal right;left  -MW    Opened Anastamoses axillo-inguinal  -MW    Axillo-Inguinal right;left  -MW    Extremity Treatment simple/brief MLD  -MW    Simple/Brief MLD MLD post ASTYM/ STM: Bilat axilla, lateral trunk into AIA  -MW    Astym mastectomy protocol;upper traps;other astym  -MW    Mastectomy Protocol supine  -MW    Mastectomy Protocol Comment Bilateral chest with evaluator and localizer working around  / across tissue folds. Localizer and isolator into lateral trunk / ribs. Isolator adjacent to incision lines; perpendicular and parallel. Minimal fine soft tissue disruptions anteriorly and laterally around trunk. Extra strokes with localizer and isolator at lateral areas of tightness Rt and Lt. Localizer and isolator over sternum parallel and perpendicular; moderate to course to fine soft tissue disruptions.   -MW    Scar(s) / Incision Line(s) Localizer and isolator to bilat chest incision lines working perpendicular and parallel to incision line; between skin folds as able. Noted min course to fine soft tissue disruptions superior to incision lines.   -MW    Upper Traps right;left  -MW    Upper Traps Comment Initiated in sitting Bilat UT/ scapular region: evaluator and localizer to area; min fine soft tissue disruptions noted along teres mm areas. Extra strokes at supraspinatus and mid traps   -MW    Other Astym Repositioned in shoulder flex/ ABD to open axilla: Evaluator and localizer bilaterally; localizer and isolator in LT axilla into chest for extra strokes at tight tissue restriction. Noted min fine soft tissue disruptions in bilateral medial upper arms. Min course to fine soft tissue disruptions in LT axilla; min fine soft tissue disruptions Rt axilla. Extra strokes RT chest mid lateral aspect at area of course disruptions.   -MW          Compression/Skin Care skin care  -MW    Skin Care moisturizing lotion applied   cocoa butter   -MW      User Key  (r) = Recorded By, (t) = Taken By, (c) = Cosigned By    Initials Name Provider Type    MW Jennifer Davis, PT Physical Therapist                              PT Assessment/Plan     Row Name 04/16/18 1400          PT Assessment    Functional Limitations Performance in self-care ADL;Limitation in home management;Limitations in community activities  -MW     Impairments Pain;Range of motion;Impaired flexibility;Impaired lymphatic circulation;Integumentary  integrity;Edema  -     Assessment Comments Soft tissue disruptions smoother and less frequent. Chest tissues more flexible with STM/ myofascial releases. Pt working out self axillary MLD; would still benefit from compression shirt to provide ongoing support. May be ready to decrease frequency next week if continues to improve.   -MW     Rehab Potential Good  -MW     Patient/caregiver participated in establishment of treatment plan and goals Yes  -MW     Patient would benefit from skilled therapy intervention Yes  -MW        PT Plan    PT Frequency 2x/week;1x/week  -MW     Physical Therapy Interventions (Optional Details) manual therapy techniques;manual lymphatic drainage;ROM (Range of Motion);stretching;strengthening;patient/family education;home exercise program  -MW     PT Plan Comments Cont ASTYM/ STM, MLD to axillas and lateral trunk; progress HEP; follow up on arm sleeve order for travel.   -MW       User Key  (r) = Recorded By, (t) = Taken By, (c) = Cosigned By    Initials Name Provider Type    KEYA Davis, LANETTE Physical Therapist                     Exercises     Row Name 04/16/18 1400             Subjective Comments    Subjective Comments Chest feels tight; right lower more thight than left side   -MW         Subjective Pain    Able to rate subjective pain? yes  -MW      Pre-Treatment Pain Level 3  -MW      Post-Treatment Pain Level 3  -MW      Subjective Pain Comment Rt shoulder  -MW        User Key  (r) = Recorded By, (t) = Taken By, (c) = Cosigned By    Initials Name Provider Type    KEYA Davis, PT Physical Therapist                       Manual Rx (last 36 hours)      Manual Treatments     Row Name 04/16/18 1400             Manual Rx 1    Manual Rx 1 Location Lt chest / pectoralis into axilla   -      Manual Rx 1 Type STM: tissue bending and rolling; myofascial release at anterior axillar fold under tension and in relaxed position; also at insertion point of tighness along ribs.   -MW       Manual Rx 1 Grade minimal pressure with moderate holding times   -MW      Manual Rx 1 Duration 12  -MW         Manual Rx 2    Manual Rx 2 Location Rt chest/ axilla   -MW      Manual Rx 2 Type STM: myofascial release at lateral pec area and incision lines; tissue rolling at incision line  -MW      Manual Rx 2 Grade minimal pressure with moderate holding times   -MW      Manual Rx 2 Duration 10  -MW        User Key  (r) = Recorded By, (t) = Taken By, (c) = Cosigned By    Initials Name Provider Type    MW Jennifer Davis PT Physical Therapist              Therapy Education  Education Details: Cont new self axillary MLD with towel as less fluid this visit.   Given: Symptoms/condition management, Edema management  Program: Modified  How Provided: Verbal  Provided to: Patient  Level of Understanding: Verbalized              Time Calculation:   Start Time: 1410  Total Timed Code Minutes- PT: 45 minute(s)     Therapy Charges for Today     Code Description Service Date Service Provider Modifiers Qty    14673829897 HC PT MANUAL THERAPY EA 15 MIN 4/16/2018 Jennifer Davis, PT GP 3                    Jennifer Davis PT  4/16/2018

## 2018-04-23 ENCOUNTER — HOSPITAL ENCOUNTER (OUTPATIENT)
Dept: PHYSICAL THERAPY | Facility: HOSPITAL | Age: 68
Setting detail: THERAPIES SERIES
Discharge: HOME | End: 2018-04-23

## 2018-04-23 DIAGNOSIS — L90.5 SCAR CONDITIONS AND FIBROSIS OF SKIN: Primary | ICD-10-CM

## 2018-04-23 DIAGNOSIS — M79.602 PAIN IN LEFT ARM: ICD-10-CM

## 2018-04-23 DIAGNOSIS — I97.2 POSTMASTECTOMY LYMPHEDEMA SYNDROME OF LEFT UPPER EXTREMITY: ICD-10-CM

## 2018-04-23 PROCEDURE — 97140 MANUAL THERAPY 1/> REGIONS: CPT | Performed by: PHYSICAL THERAPIST

## 2018-04-23 NOTE — THERAPY TREATMENT NOTE
Outpatient Physical Therapy Lymphedema Treatment Note   [facility]     Patient Name: Sarah Raphael  : 1950  MRN: [id number]  Today's Date: 2018        Visit Date: 2018    Visit Dx:    ICD-10-CM ICD-9-CM   1. Scar conditions and fibrosis of skin L90.5 709.2   2. 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 Pain in left arm M79.602 729.5       Patient Active Problem List   Diagnosis   • Anxiety   • Gastroesophageal reflux disease without esophagitis   • Hyperlipidemia   • Hypertension   • Hypothyroidism   • Cataract   • Cancer of overlapping sites of right female breast              Lymphedema     Row Name 18 1400          Able to rate subjective pain? yes  -MW    Pre-Treatment Pain Level 3  -MW    Post-Treatment Pain Level 3  -MW    Subjective Pain Comment Rt shoulder; better but still hurts   -MW          Subjective Comments Towel roll helping with axillary edema. has plan with david to modify compression shirt and future option for hand made compression shirt.   -MW          Ptting Edema Category By severity  -MW    Pitting Edema Mild  -MW    Edema Assessment Comment Mild bilat axilla/ lateral trunk  Rt >LT   -MW          Location/Assessment Upper Quadrant  -MW    Upper Quadrant Conditions bilateral:;intact;clean;dry  -MW    Upper Quadrant Color/Pigment bilateral:;other (comment)   skin color WNL but thickened tissue adjacent to incisions   -MW          Manual Lymphatic Drainage initial sequence;opened regional lymph nodes;opened anastamoses;extremity treatment;astym  -MW    Initial Sequence supraclavicular  -MW    Supraclavicular right;left  -MW    Opened Regional Lymph Nodes inguinal  -MW    Inguinal right;left  -MW    Opened Anastamoses axillo-inguinal  -MW    Axillo-Inguinal right;left  -MW    Extremity Treatment simple/brief MLD  -MW    Simple/Brief MLD MLD post ASTYM/ STM: Bilat axilla, lateral trunk into AIA  -MW    Astym mastectomy  protocol;upper traps;other astym  -MW    Mastectomy Protocol supine  -MW    Mastectomy Protocol Comment Bilateral chest with evaluator and localizer working around / across tissue folds. Localizer and isolator into lateral trunk / ribs. Isolator adjacent to incision lines; perpendicular and parallel. Minimal fine soft tissue disruptions anteriorly and laterally around trunk. Extra strokes with localizer and isolator at lateral areas of tightness Rt and Lt. Localizer and isolator over sternum parallel and perpendicular; moderate to course to fine soft tissue disruptions.   -MW    Scar(s) / Incision Line(s) Localizer and isolator to bilat chest incision lines working perpendicular and parallel to incision line; between skin folds as able. Noted min course to fine soft tissue disruptions superior to incision lines.   -MW    Upper Traps right;left  -MW    Upper Traps Comment Initiated in sitting Bilat UT/ scapular region: evaluator and localizer to area; min fine soft tissue disruptions noted along teres mm areas. Extra strokes at supraspinatus and mid traps   -MW    Other Astym Repositioned in shoulder flex/ ABD to open axilla: Evaluator and localizer bilaterally; localizer and isolator in LT axilla into chest for extra strokes at tight tissue restriction. Noted min fine soft tissue disruptions in bilateral medial upper arms. Min course to fine soft tissue disruptions in LT axilla; min fine soft tissue disruptions Rt axilla. Extra strokes RT chest mid lateral aspect at area of course disruptions.   -MW          Compression/Skin Care skin care  -MW    Skin Care moisturizing lotion applied   cocoa butter   -      User Key  (r) = Recorded By, (t) = Taken By, (c) = Cosigned By    Initials Name Provider Type    KEYA Davis, PT Physical Therapist                              PT Assessment/Plan     Row Name 04/23/18 1400          PT Assessment    Functional Limitations Performance in self-care ADL;Limitation in home  management;Limitations in community activities  -     Impairments Pain;Range of motion;Impaired flexibility;Impaired lymphatic circulation;Integumentary integrity;Edema  -     Assessment Comments Soft tissue restrictions and disruptions continue to slowly improve. Skin in good condition. Pt working on compression shirt/ tony with david as ready made shirts don't stay positioned well. Pt doing better with control of axillary edema with towel roll massage. Discussed plans to transition to more self care; will continue 2 x wk as schedule allows until pt OOT in May for 3 weeks; then follow up prn post trip. Encouraged pt to resume previous rotator cuff exer to improve her overall function.   -     Rehab Potential Good  -MW     Patient/caregiver participated in establishment of treatment plan and goals Yes  -MW     Patient would benefit from skilled therapy intervention Yes  -MW        PT Plan    PT Frequency 2x/week;1x/week  -     Physical Therapy Interventions (Optional Details) manual therapy techniques;manual lymphatic drainage;ROM (Range of Motion);stretching;strengthening;patient/family education;home exercise program  -     PT Plan Comments Cont ASTYM/ STM, MLD to axillas and lateral trunk; progress HEP; follow up on arm sleeve order for travel.   -       User Key  (r) = Recorded By, (t) = Taken By, (c) = Cosigned By    Initials Name Provider Type     Jennifer Davis, PT Physical Therapist                      Manual Rx (last 36 hours)      Manual Treatments     Row Name 04/23/18 1400             Manual Rx 1    Manual Rx 1 Location Lt chest / pectoralis into axilla   -      Manual Rx 1 Type STM: tissue bending and rolling; myofascial release at anterior axillar fold under tension and in relaxed position; also at insertion point of tighness along ribs.   -      Manual Rx 1 Grade minimal pressure with moderate holding times   -      Manual Rx 1 Duration 10  -MW         Manual Rx 2    Manual Rx  2 Location Rt chest/ axilla   -MW      Manual Rx 2 Type STM: myofascial release at lateral pec area and incision lines; tissue rolling at incision line  -MW      Manual Rx 2 Grade minimal pressure with moderate holding times   -MW      Manual Rx 2 Duration 10  -MW        User Key  (r) = Recorded By, (t) = Taken By, (c) = Cosigned By    Initials Name Provider Type    MW Jennifer Davis, PT Physical Therapist                             Time Calculation:   Start Time: 1400  Total Timed Code Minutes- PT: 55 minute(s)     Therapy Charges for Today     Code Description Service Date Service Provider Modifiers Qty    28217668400  PT MANUAL THERAPY EA 15 MIN 4/23/2018 Jennifer Davis, PT GP 4                    Jennifer Davis, PT  4/23/2018

## 2018-04-26 ENCOUNTER — HOSPITAL ENCOUNTER (OUTPATIENT)
Dept: PHYSICAL THERAPY | Facility: HOSPITAL | Age: 68
Setting detail: THERAPIES SERIES
Discharge: HOME | End: 2018-04-26

## 2018-04-26 DIAGNOSIS — I97.2 POSTMASTECTOMY LYMPHEDEMA SYNDROME OF LEFT UPPER EXTREMITY: ICD-10-CM

## 2018-04-26 DIAGNOSIS — L90.5 SCAR CONDITIONS AND FIBROSIS OF SKIN: Primary | ICD-10-CM

## 2018-04-26 DIAGNOSIS — M79.602 PAIN IN LEFT ARM: ICD-10-CM

## 2018-04-26 PROCEDURE — G8984 CARRY CURRENT STATUS: HCPCS | Performed by: PHYSICAL THERAPIST

## 2018-04-26 PROCEDURE — G8985 CARRY GOAL STATUS: HCPCS | Performed by: PHYSICAL THERAPIST

## 2018-04-26 PROCEDURE — 97140 MANUAL THERAPY 1/> REGIONS: CPT | Performed by: PHYSICAL THERAPIST

## 2018-04-26 NOTE — THERAPY PROGRESS REPORT/RE-CERT
Outpatient Physical Therapy Lymphedema Progress Note   [facility]     Patient Name: Sarah Raphael  : 1950  MRN: [id number]  Today's Date: 2018        Visit Date: 2018    Visit Dx:    ICD-10-CM ICD-9-CM   1. Scar conditions and fibrosis of skin L90.5 709.2   2. 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 Pain in left arm M79.602 729.5       Patient Active Problem List   Diagnosis   • Anxiety   • Gastroesophageal reflux disease without esophagitis   • Hyperlipidemia   • Hypertension   • Hypothyroidism   • Cataract   • Cancer of overlapping sites of right female breast              Lymphedema     Row Name 18 1300          Able to rate subjective pain? yes  -MW    Pre-Treatment Pain Level 4  -MW    Post-Treatment Pain Level 4  -MW    Subjective Pain Comment Lt chest/ ribs/ axilla   -MW          Subjective Comments More pain Lt chest and around into back; slept in recliner to get more comfortable last night.   -MW          Ptting Edema Category By severity  -MW    Pitting Edema Mild  -MW    Edema Assessment Comment RT axilla more full than LT; mild edema LUE   -MW          Location/Assessment Upper Quadrant  -MW    Upper Quadrant Conditions bilateral:;intact;clean;dry  -MW    Upper Quadrant Color/Pigment bilateral:;other (comment)   skin color WNL but thickened tissue adjacent to incisions   -MW    Skin Observations Comment Thickened tissue adjacent to incisions softer, thinner   -MW          Measurement Type(s) Quick Girth  -MW    Quick Girth Areas Upper extremities  -MW          Axilla 35.4 cm  -MW    Mid upper arm 34.5 cm  -MW    Elbow 28.5 cm  -MW    Mid forearm 23.9 cm  -MW    Wrist crease 16.3 cm  -MW    Web space 19.1 cm  -MW    Met-heads 18.5 cm  -MW    LUE Quick Girth Total 176.2  -MW          Axilla 35.5 cm  -MW    Mid upper arm 33.3 cm  -MW    Elbow 28.1 cm  -MW    Mid forearm 23 cm  -MW    Wrist crease 16.6 cm  -MW    Web space 19.5 cm  -MW     Met-heads 18.5 cm  -MW    RUE Quick Girth Total 174.5  -MW          Manual Lymphatic Drainage initial sequence;opened regional lymph nodes;opened anastamoses;extremity treatment;astym  -MW    Initial Sequence supraclavicular  -MW    Supraclavicular right;left  -MW    Opened Regional Lymph Nodes inguinal  -MW    Inguinal right;left  -MW    Opened Anastamoses axillo-inguinal  -MW    Axillo-Inguinal right;left  -MW    Extremity Treatment simple/brief MLD;MLD to full limb  -MW    Simple/Brief MLD MLD post ASTYM/STM to bilat axilla into lateral trunk   -MW    MLD to Full Limb LUE with focus on upper arm   -MW    Astym mastectomy protocol;upper traps;other astym  -MW    Mastectomy Protocol supine  -MW    Mastectomy Protocol Comment Bilateral chest with evaluator and localizer working around / across tissue folds. Localizer and isolator into lateral trunk / ribs. Isolator adjacent to incision lines; perpendicular and parallel. Minimal fine soft tissue disruptions anteriorly and laterally around trunk. Extra strokes with localizer and isolator at lateral areas of tightness Rt and Lt. Localizer and isolator over sternum parallel and perpendicular; moderate to course to fine soft tissue disruptions.   -MW    Upper Traps right;left  -MW    Upper Traps Comment Initiated in sitting Bilat UT/ scapular region: evaluator and localizer to area; min fine soft tissue disruptions noted along teres mm areas. Extra strokes at supraspinatus and mid traps   -MW    Other Astym Repositioned in shoulder flex/ ABD to open axilla: Evaluator and localizer bilaterally; localizer and isolator in LT axilla into chest for extra strokes at tight tissue restriction. Noted min fine soft tissue disruptions in bilateral medial upper arms. Min course to fine soft tissue disruptions in LT axilla; min fine soft tissue disruptions Rt axilla. Extra strokes RT chest mid lateral aspect at area of course disruptions.   -MW          Compression/Skin Care skin  care  -MW    Skin Care moisturizing lotion applied   cocoa butter   -MW      User Key  (r) = Recorded By, (t) = Taken By, (c) = Cosigned By    Initials Name Provider Type    KEYA Davis, PT Physical Therapist                                    Manual Rx (last 36 hours)      Manual Treatments     Row Name 04/26/18 1300             Manual Rx 1    Manual Rx 1 Location Lt chest / pectoralis into axilla   -MW      Manual Rx 1 Type STM: tissue bending and rolling; myofascial release at anterior axillar fold under tension and in relaxed position; also at insertion point of tighness along ribs.   -MW      Manual Rx 1 Grade minimal pressure with moderate holding times   -MW      Manual Rx 1 Duration 8  -MW         Manual Rx 2    Manual Rx 2 Location Rt chest/ axilla   -MW      Manual Rx 2 Type STM: myofascial release at lateral pec area and incision lines; tissue rolling at incision line  -MW      Manual Rx 2 Grade minimal pressure with moderate holding times   -MW      Manual Rx 2 Duration 8  -MW        User Key  (r) = Recorded By, (t) = Taken By, (c) = Cosigned By    Initials Name Provider Type    KEYA Davis, PT Physical Therapist                PT OP Goals     Row Name 04/26/18 1300       PT Short Term Goals    STG 1 Pt independent with home program for improved mobility and lymph massage.   -MW    STG 1 Progress Met  -MW    STG 2 Pt to demonstrate / report at least 25% improvment in Lt shoulder flexibility/ decreased tightness to allow improved function.   -MW    STG 2 Progress Met  -MW    STG 3 Pt to report at least 25% decrease in pain at night / DASH sleep rating to improve to at least 3/5.   -MW    STG 3 Progress Met  -MW    STG 4 Pt to report at least 25% improvement in driving; able to drive greater than 30 miles without pain greater than 4/10.   -MW    STG 4 Progress Met  -MW       Long Term Goals    LTG 1 Pt to demonstrate / report greater than  50% improvment in Lt shoulder flexibility/  decreased tightness to allow improved function.   -MW    LTG 1 Progress Met  -MW    LTG 2 Pt to report greater than 50% decrease in pain at night / DASH sleep rating to improve to at least 3/5.   -MW    LTG 2 Progress Ongoing  -MW    LTG 2 Progress Comments Increased c/o pain at night with sleep distrubed.   -MW    LTG 3 Pt to report greater than 50% improvement in driving; able to drive greater than 50 miles without pain greater than 2/10.   -MW    LTG 3 Progress Ongoing  -MW    LTG 4 Soft tissue restrictions to decrease by > 50% Lt chest to allow improved upper quarter flexibility and decreased pain.   -MW    LTG 4 Progress Ongoing;Progressing  -MW    LTG 5 Pt independent with lymphedema management, including self massage and compression garment as needed.   -MW    LTG 5 Progress Partially Met;Progressing  -MW    LTG 5 Progress Comments LUE sleeve being obtained.   -MW       Time Calculation    PT Goal Re-Cert Due Date 06/01/18  -MW      User Key  (r) = Recorded By, (t) = Taken By, (c) = Cosigned By    Initials Name Provider Type    KEYA Davis, PT Physical Therapist               Outcome Measure Options: Disabilities of the Arm, Shoulder, and Hand (DASH)  DASH  Open a tight or new jar.: Mild Difficulty  Write: No Difficulty  Turn a key: No Difficulty  Prepare a meal: No Difficulty  Push open a heavy door: No Difficulty  Place an object on a shelf above your head: Mild Difficulty  Do heavy household chores (e.g., wash walls, wash floors): Moderate Difficulty  Garden or do yard work: Moderate Difficulty  Make a bed: Mild Difficulty  Carry a shopping bag or briefcase: No Difficulty  Carry a heavy object (over 10 lbs): Moderate Difficulty  Change a lightbulb overhead: Moderate Difficulty  Wash or blow dry your hair: No Difficulty  Wash your back: No Difficulty  Put on a pullover sweater: No Difficulty  Use a knife to cut food: No Difficulty  Recreational activities in which require little effort (e.g.,  cardplaying, knitting, etc.): No Difficulty  Recreational activities in which you take some force or impact through your arm, should or hand (e.g. golf, hammering, tennis, etc.): Mild Difficulty  Recreational Activities in which you move your arm freely (e.g., frisbee, badminton, etc.): Unable  Manage transportation needs (getting from one place to another): No Difficulty  During the past week, to what extent has your arm, shoulder, or hand problem interfered with your normal social activites with family, friends, neighbors or groups?: Slightly  During the past week, were you limited in your work or other regular daily activities as a result of your arm, shoulder or hand problem?: Slightly Limited  Arm, Shoulder, or hand pain: Moderate  Arm, shoulder or hand pain when you performed any specific activity: Moderate  Tingling (pins and needles) in your arm, shoulder, or hand: Moderate  Weakness in your arm, shoulder or hand: Moderate  Stiffness in your arm, shoulder or hand: None  During the past week, how much difficulty have you had sleeping because of the pain in your arm, shoulder or hand?: Moderate Difficiculty  I feel less capable, less confident or less useful because of my arm, shoulder or hand problem: Strongly disagree  DASH Sum : 57  Number of Questions Answered: 29  DASH Score: 24.14         Time Calculation:   Start Time: 1300  Total Timed Code Minutes- PT: 55 minute(s)     Therapy Charges for Today     Code Description Service Date Service Provider Modifiers Qty    33221057859 HC PT CARRY MOV HAND OBJ CURRENT 4/26/2018 Jennifer Davis, PT GP, CJ 1    47014647040 HC PT CARRY MOV HAND OBJ PROJECTED 4/26/2018 Jennifer Davis, PT GP,  1    [id number] HC PT MANUAL THERAPY EA 15 MIN 4/26/2018 Jennifer Davis PT GP 4          PT G-Codes  PT Professional Judgement Used?: Yes  Outcome Measure Options: Disabilities of the Arm, Shoulder, and Hand (DASH)  Score: raw score 57; 24% impaired - increased c/o pain  and swelling LT chest, axilla and upper arm   Functional Limitation: Carrying, moving and handling objects  Carrying, Moving and Handling Objects Current Status (): At least 20 percent but less than 40 percent impaired, limited or restricted  Carrying, Moving and Handling Objects Goal Status (): At least 1 percent but less than 20 percent impaired, limited or restricted         Jennifer Davis, PT  4/26/2018

## 2018-04-30 ENCOUNTER — HOSPITAL ENCOUNTER (OUTPATIENT)
Dept: PHYSICAL THERAPY | Facility: HOSPITAL | Age: 68
Setting detail: THERAPIES SERIES
Discharge: HOME | End: 2018-04-30

## 2018-04-30 DIAGNOSIS — I97.2 POSTMASTECTOMY LYMPHEDEMA SYNDROME OF LEFT UPPER EXTREMITY: ICD-10-CM

## 2018-04-30 DIAGNOSIS — M79.602 PAIN IN LEFT ARM: ICD-10-CM

## 2018-04-30 DIAGNOSIS — L90.5 SCAR CONDITIONS AND FIBROSIS OF SKIN: Primary | ICD-10-CM

## 2018-04-30 PROCEDURE — 97140 MANUAL THERAPY 1/> REGIONS: CPT | Performed by: PHYSICAL THERAPIST

## 2018-05-10 ENCOUNTER — HOSPITAL ENCOUNTER (OUTPATIENT)
Dept: PHYSICAL THERAPY | Facility: HOSPITAL | Age: 68
Setting detail: THERAPIES SERIES
Discharge: HOME | End: 2018-05-10

## 2018-05-10 DIAGNOSIS — I97.2 POSTMASTECTOMY LYMPHEDEMA SYNDROME OF LEFT UPPER EXTREMITY: ICD-10-CM

## 2018-05-10 DIAGNOSIS — L90.5 SCAR CONDITIONS AND FIBROSIS OF SKIN: Primary | ICD-10-CM

## 2018-05-10 PROCEDURE — G8986 CARRY D/C STATUS: HCPCS | Performed by: PHYSICAL THERAPIST

## 2018-05-10 PROCEDURE — 97140 MANUAL THERAPY 1/> REGIONS: CPT | Performed by: PHYSICAL THERAPIST

## 2018-05-10 PROCEDURE — G8985 CARRY GOAL STATUS: HCPCS | Performed by: PHYSICAL THERAPIST

## 2018-05-10 NOTE — THERAPY DISCHARGE NOTE
"     Outpatient Physical Therapy Lymphedema Treatment Note/Discharge Summary   [place]     Patient Name: Sarah Raphael  : 1950  MRN: [id number]  Today's Date: 5/10/2018      Visit Date: 05/10/2018    Visit Dx:    ICD-10-CM ICD-9-CM   1. Scar conditions and fibrosis of skin L90.5 709.2   2. 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 yes  -MW    Pre-Treatment Pain Level 3  -MW    Post-Treatment Pain Level 3  -MW    Subjective Pain Comment Rt shoulder   -MW          Subjective Comments \"I'm ok. Dr SHANKAR found an enlarged lymphnode in Rt axilla. Should have US to check it but will wait until after my trip.\"   -MW          Ptting Edema Category By severity  -MW    Pitting Edema Mild  -MW    Edema Assessment Comment Mild to moderate fullness Rt > LT axilla. Mild fullness LUE   -MW          Location/Assessment Upper Quadrant  -MW    Upper Quadrant Conditions bilateral:;intact;clean;dry  -MW    Upper Quadrant Color/Pigment bilateral:;other (comment)   skin color WNL but thickened tissue adjacent to incisions   -MW          Measurement Type(s) Quick Girth  -MW    Quick Girth Areas Upper extremities  -MW          Axilla 35 cm  -MW    Mid upper arm 35 cm  -MW    Elbow 29.2 cm  -MW    Mid forearm 24.3 cm  -MW    Wrist crease 17 cm  -MW    Web space 19.8 cm  -MW    Met-heads 19 cm  -MW    LUE Quick Girth Total 179.3  -MW          Manual Lymphatic Drainage initial sequence;opened regional lymph nodes;opened anastamoses;extremity treatment;astym  -MW    Initial Sequence supraclavicular  -MW    Supraclavicular right;left  -MW    Opened Regional Lymph Nodes inguinal  -MW    " Inguinal right;left  -MW    Opened Anastamoses axillo-inguinal  -MW    Axillo-Inguinal right;left  -MW    Extremity Treatment simple/brief MLD;MLD to proximal limb only  -MW    Simple/Brief MLD MLD post ASTYM/STM to bilat axilla into lateral trunk RT >Lt  and Lt upper arm    -MW    MLD to Proximal Limb Only LUE  -MW    Astym mastectomy protocol;upper traps;other astym  -MW    Mastectomy Protocol supine  -MW    Mastectomy Protocol Comment Bilateral chest with evaluator and localizer working around / across tissue folds. Localizer and isolator into lateral trunk / ribs. Isolator adjacent to incision lines; perpendicular and parallel. Minimal fine soft tissue disruptions anteriorly and laterally around trunk. Extra strokes with localizer and isolator at lateral areas of tightness Rt and Lt. Localizer and isolator over sternum parallel and perpendicular; minimal to moderate to course to fine soft tissue disruptions.   -MW    Upper Traps right;left  -MW    Upper Traps Comment Initiated in sitting Bilat UT/ scapular region: evaluator and localizer to area; min fine soft tissue disruptions noted along teres mm areas. Extra strokes at RT supraspinatus and mid traps   -MW    Other Astym Repositioned in shoulder flex/ ABD to open axilla: Evaluator and localizer bilaterally; localizer and isolator in LT axilla into chest for extra strokes at tight tissue restriction. Noted min fine soft tissue disruptions in bilateral medial upper arms. Min course to fine soft tissue disruptions in LT axilla; min fine soft tissue disruptions Rt axilla. Extra strokes RT chest mid lateral aspect at area of course disruptions; area smoother.   -MW          Compression/Skin Care skin care  -MW    Skin Care moisturizing lotion applied   cocoa butter   -MW      User Key  (r) = Recorded By, (t) = Taken By, (c) = Cosigned By    Initials Name Provider Type    KEYA Davis, PT Physical Therapist                              PT Assessment/Plan      Row Name 05/10/18 1400          PT Assessment    Functional Limitations Performance in self-care ADL;Limitation in home management;Limitations in community activities  -     Impairments Pain;Range of motion;Impaired flexibility;Impaired lymphatic circulation;Integumentary integrity;Edema  -     Assessment Comments Pt reports she is doing as well as she can and is ready to discontinue PT at this time. She is able to complete self MLD and has resources for compression garments; arm sleeve on order. Soft tissue restrictions have improved and seem to have only a mild impact on her ADL's and recreational activities. She notes more fullness LUE but has been gardening; encouraged pt to wear her sleeve on more active days to minimize edema. Expect pt to do well with self care for edema management.   -MW     Rehab Potential Good  -MW     Patient/caregiver participated in establishment of treatment plan and goals Yes  -MW        PT Plan    PT Plan Comments Pt has requested to discontinue PT at this time; she will call if future issues arise.   -       User Key  (r) = Recorded By, (t) = Taken By, (c) = Cosigned By    Initials Name Provider Type     Jennifer Davis, PT Physical Therapist                        Manual Rx (last 36 hours)      Manual Treatments     Row Name 05/10/18 1400             Manual Rx 1    Manual Rx 1 Location Lt chest / pectoralis into axilla   -      Manual Rx 1 Type STM: tissue bending and rolling; myofascial release at anterior axillary fold under tension and in relaxed position; also at insertion point of tighness along ribs.   -      Manual Rx 1 Grade minimal pressure with moderate holding times   -      Manual Rx 1 Duration 10  -MW         Manual Rx 2    Manual Rx 2 Location Rt chest/ axilla   -MW      Manual Rx 2 Type STM: myofascial release at lateral pec area and incision lines; tissue rolling at incision line  -      Manual Rx 2 Grade minimal pressure with moderate holding times    -MW      Manual Rx 2 Duration 8  -MW        User Key  (r) = Recorded By, (t) = Taken By, (c) = Cosigned By    Initials Name Provider Type    MW Jennifer Davis, PT Physical Therapist                PT OP Goals     Row Name 05/10/18 1400          PT Short Term Goals    STG 1 Pt independent with home program for improved mobility and lymph massage.   -MW     STG 1 Progress Met  -MW     STG 2 Pt to demonstrate / report at least 25% improvment in Lt shoulder flexibility/ decreased tightness to allow improved function.   -MW     STG 2 Progress Met  -MW     STG 3 Pt to report at least 25% decrease in pain at night / DASH sleep rating to improve to at least 3/5.   -MW     STG 3 Progress Met  -MW     STG 4 Pt to report at least 25% improvement in driving; able to drive greater than 30 miles without pain greater than 4/10.   -MW     STG 4 Progress Met  -MW        Long Term Goals    LTG 1 Pt to demonstrate / report greater than  50% improvment in Lt shoulder flexibility/ decreased tightness to allow improved function.   -MW     LTG 1 Progress Met  -MW     LTG 2 Pt to report greater than 50% decrease in pain at night / DASH sleep rating to improve to at least 3/5.   -MW     LTG 2 Progress Progressing;Partially Met  -MW     LTG 3 Pt to report greater than 50% improvement in driving; able to drive greater than 50 miles without pain greater than 2/10.   -MW     LTG 3 Progress Progressing;Partially Met  -MW     LTG 4 Soft tissue restrictions to decrease by > 50% Lt chest to allow improved upper quarter flexibility and decreased pain.   -MW     LTG 4 Progress Partially Met  -MW     LTG 5 Pt independent with lymphedema management, including self massage and compression garment as needed.   -MW     LTG 5 Progress Partially Met;Progressing  -MW     LTG 5 Progress Comments Still waiting for LUE arm sleeve for prn use; especially with air travel.   -MW       User Key  (r) = Recorded By, (t) = Taken By, (c) = Cosigned By    Initials  Name Provider Type    MW Jennifer Davis PT Physical Therapist                         Time Calculation:   Start Time: 1400  Total Timed Code Minutes- PT: 55 minute(s)     Therapy Charges for Today     Code Description Service Date Service Provider Modifiers Qty    70136018482 HC PT CARRY MOV HAND OBJ PROJECTED 5/10/2018 Jennifer Davis PT GP, CI 1    33693680308 HC PT CARRY MOV HAND OBJ DISCHARGE 5/10/2018 Jennifer Davis PT GP, CJ 1    [id number] HC PT MANUAL THERAPY EA 15 MIN 5/10/2018 Jennifer Davis PT GP 4          PT G-Codes  PT Professional Judgement Used?: Yes  Functional Limitation: Carrying, moving and handling objects  Carrying, Moving and Handling Objects Goal Status (): At least 1 percent but less than 20 percent impaired, limited or restricted  Carrying, Moving and Handling Objects Discharge Status (): At least 20 percent but less than 40 percent impaired, limited or restricted      OP PT Discharge Summary  Date of Discharge: 05/10/18  Reason for Discharge: Maximum functional potential achieved, Patient/Caregiver request  Outcomes Achieved: Patient able to partially acheive established goals  Discharge Destination: Home with home program  Discharge Instructions/Additional Comments: pt to continue with self MLD and scar massage; HEP for ROM and to follow recommendations of ortho MD for shoulders.       Jennifer Davis PT  5/10/2018

## 2018-06-04 ENCOUNTER — APPOINTMENT (OUTPATIENT)
Dept: PHYSICAL THERAPY | Facility: HOSPITAL | Age: 68
End: 2018-06-04

## 2018-08-07 ENCOUNTER — OFFICE VISIT (OUTPATIENT)
Dept: INTERNAL MEDICINE | Facility: CLINIC | Age: 68
End: 2018-08-07

## 2018-08-07 ENCOUNTER — TELEPHONE (OUTPATIENT)
Dept: INTERNAL MEDICINE | Facility: CLINIC | Age: 68
End: 2018-08-07

## 2018-08-07 VITALS
SYSTOLIC BLOOD PRESSURE: 138 MMHG | WEIGHT: 214 LBS | DIASTOLIC BLOOD PRESSURE: 82 MMHG | HEART RATE: 64 BPM | BODY MASS INDEX: 32.54 KG/M2 | TEMPERATURE: 97.4 F | RESPIRATION RATE: 16 BRPM

## 2018-08-07 DIAGNOSIS — I10 ESSENTIAL HYPERTENSION: Primary | ICD-10-CM

## 2018-08-07 DIAGNOSIS — E78.5 HYPERLIPIDEMIA, UNSPECIFIED HYPERLIPIDEMIA TYPE: ICD-10-CM

## 2018-08-07 DIAGNOSIS — K21.9 GASTROESOPHAGEAL REFLUX DISEASE WITHOUT ESOPHAGITIS: ICD-10-CM

## 2018-08-07 DIAGNOSIS — E03.8 OTHER SPECIFIED HYPOTHYROIDISM: ICD-10-CM

## 2018-08-07 DIAGNOSIS — F32.A DEPRESSION, UNSPECIFIED DEPRESSION TYPE: ICD-10-CM

## 2018-08-07 LAB
ALBUMIN SERPL-MCNC: 3.89 G/DL (ref 3.2–4.8)
ALBUMIN/GLOB SERPL: 1.5 G/DL (ref 1.5–2.5)
ALP SERPL-CCNC: 71 U/L (ref 25–100)
ALT SERPL W P-5'-P-CCNC: 21 U/L (ref 7–40)
ANION GAP SERPL CALCULATED.3IONS-SCNC: 7 MMOL/L (ref 3–11)
ARTICHOKE IGE QN: 117 MG/DL (ref 0–130)
AST SERPL-CCNC: 22 U/L (ref 0–33)
BASOPHILS # BLD AUTO: 0.04 10*3/MM3 (ref 0–0.2)
BASOPHILS NFR BLD AUTO: 0.7 % (ref 0–1)
BILIRUB SERPL-MCNC: 0.3 MG/DL (ref 0.3–1.2)
BUN BLD-MCNC: 20 MG/DL (ref 9–23)
BUN/CREAT SERPL: 20.2 (ref 7–25)
CALCIUM SPEC-SCNC: 8.9 MG/DL (ref 8.7–10.4)
CHLORIDE SERPL-SCNC: 105 MMOL/L (ref 99–109)
CHOLEST SERPL-MCNC: 184 MG/DL (ref 0–200)
CO2 SERPL-SCNC: 30 MMOL/L (ref 20–31)
CREAT BLD-MCNC: 0.99 MG/DL (ref 0.6–1.3)
DEPRECATED RDW RBC AUTO: 42.1 FL (ref 37–54)
EOSINOPHIL # BLD AUTO: 0.18 10*3/MM3 (ref 0–0.3)
EOSINOPHIL NFR BLD AUTO: 3 % (ref 0–3)
ERYTHROCYTE [DISTWIDTH] IN BLOOD BY AUTOMATED COUNT: 13.2 % (ref 11.3–14.5)
GFR SERPL CREATININE-BSD FRML MDRD: 56 ML/MIN/1.73
GLOBULIN UR ELPH-MCNC: 2.5 GM/DL
GLUCOSE BLD-MCNC: 91 MG/DL (ref 70–100)
HCT VFR BLD AUTO: 40.2 % (ref 34.5–44)
HDLC SERPL-MCNC: 45 MG/DL (ref 40–60)
HGB BLD-MCNC: 12.9 G/DL (ref 11.5–15.5)
IMM GRANULOCYTES # BLD: 0.02 10*3/MM3 (ref 0–0.03)
IMM GRANULOCYTES NFR BLD: 0.3 % (ref 0–0.6)
LYMPHOCYTES # BLD AUTO: 2.42 10*3/MM3 (ref 0.6–4.8)
LYMPHOCYTES NFR BLD AUTO: 40.7 % (ref 24–44)
MCH RBC QN AUTO: 28 PG (ref 27–31)
MCHC RBC AUTO-ENTMCNC: 32.1 G/DL (ref 32–36)
MCV RBC AUTO: 87.2 FL (ref 80–99)
MONOCYTES # BLD AUTO: 0.45 10*3/MM3 (ref 0–1)
MONOCYTES NFR BLD AUTO: 7.6 % (ref 0–12)
NEUTROPHILS # BLD AUTO: 2.83 10*3/MM3 (ref 1.5–8.3)
NEUTROPHILS NFR BLD AUTO: 47.7 % (ref 41–71)
PLATELET # BLD AUTO: 212 10*3/MM3 (ref 150–450)
PMV BLD AUTO: 10.3 FL (ref 6–12)
POTASSIUM BLD-SCNC: 3.8 MMOL/L (ref 3.5–5.5)
PROT SERPL-MCNC: 6.4 G/DL (ref 5.7–8.2)
RBC # BLD AUTO: 4.61 10*6/MM3 (ref 3.89–5.14)
SODIUM BLD-SCNC: 142 MMOL/L (ref 132–146)
TRIGL SERPL-MCNC: 221 MG/DL (ref 0–150)
TSH SERPL DL<=0.05 MIU/L-ACNC: 0.22 MIU/ML (ref 0.35–5.35)
WBC NRBC COR # BLD: 5.94 10*3/MM3 (ref 3.5–10.8)

## 2018-08-07 PROCEDURE — 80061 LIPID PANEL: CPT | Performed by: INTERNAL MEDICINE

## 2018-08-07 PROCEDURE — 99214 OFFICE O/P EST MOD 30 MIN: CPT | Performed by: INTERNAL MEDICINE

## 2018-08-07 PROCEDURE — 84443 ASSAY THYROID STIM HORMONE: CPT | Performed by: INTERNAL MEDICINE

## 2018-08-07 PROCEDURE — 80053 COMPREHEN METABOLIC PANEL: CPT | Performed by: INTERNAL MEDICINE

## 2018-08-07 PROCEDURE — 36415 COLL VENOUS BLD VENIPUNCTURE: CPT | Performed by: INTERNAL MEDICINE

## 2018-08-07 PROCEDURE — 85025 COMPLETE CBC W/AUTO DIFF WBC: CPT | Performed by: INTERNAL MEDICINE

## 2018-08-07 RX ORDER — CITALOPRAM 40 MG/1
60 TABLET ORAL DAILY
Qty: 135 TABLET | Refills: 1 | Status: SHIPPED | OUTPATIENT
Start: 2018-08-07 | End: 2019-01-03 | Stop reason: SDUPTHER

## 2018-08-07 NOTE — TELEPHONE ENCOUNTER
S/W RADHA AT PHARMACY, INFORMED THAT DR BLANC SAID HE IS AWARE THIS IS ELEVATED DOSE BUT WANTS TO CONTINUE WITH INCREASE AND TO PLEASE FILL.  VERB UNDERTANDING AND AGREEMENT.

## 2018-08-07 NOTE — TELEPHONE ENCOUNTER
I am aware that this is an elevated dosage.  Please rx as prescribed.  Tyrese Recinos MD  10:23 AM  08/07/18

## 2018-08-07 NOTE — PROGRESS NOTES
Chief Complaint   Patient presents with   • Follow-up     6 MONTH FOLLOW UP CHRONIC MEDICAL PROBLEMS       History of Present Illness      The patient presents for a follow-up related to hyperlipidemia. She is following a low fat diet. She reports that she is exercising. She is not taking medication for hyperlipidemia. She denies chest pain, shortness of breath, orthopnea, paroxysmal nocturnal dyspnea, dyspnea on exertion, edema, palpitations or syncope.    The patient presents for a follow-up related to hypertension. The patient reports that she has had no headaches or blurred vision. She states that she is taking her medication as prescribed. She is not having medication side effects.    The patient presents for a follow-up related to GERD. The patient is not on medication for her gastroesophageal reflux. She reports no abdominal pain, belching, diarrhea, dysphagia, early satiety, heartburn, hoarseness, nausea, odynophagia, rectal bleeding, vomiting or weight loss. The GERD has no known aggravating factors.    The patient presents for a follow-up related to hypothyroidism. She reports a good energy level. She reports no hair loss, heat intolerance, cold intolerance, constipation or sweats. She is taking her medication as prescribed.    The patient presents for follow-up of depression. She denies currently having depression symptoms. She denies suicidal ideation. Her energy level is good. She denies agorophobia. She sleeps well. She is not tearful. She states that her current depression symptoms are stable. She is currently taking a medication. The current medication regimen consists of citalopram. The patient denies having medication side effects including nausea, headaches, anxiety, increased depression, anorgasmia or fatigue.    Medications      Current Outpatient Prescriptions:   •  albuterol (PROAIR HFA) 108 (90 Base) MCG/ACT inhaler, Inhale 2 puffs Every 6 (Six) Hours As Needed for Wheezing or Shortness of  Air., Disp: 1 inhaler, Rfl: 5  •  amLODIPine (NORVASC) 5 MG tablet, Take 1 tablet by mouth Daily., Disp: 90 tablet, Rfl: 1  •  Cholecalciferol (VITAMIN D PO), Take 1,000 Units by mouth daily. Vitamin D 1000 UNIT CAPS; TAKE 1 TABLET DAILY, Disp: , Rfl:   •  citalopram (CeleXA) 40 MG tablet, Take 1.5 tablets by mouth Daily., Disp: 135 tablet, Rfl: 1  •  dicyclomine (BENTYL) 10 MG capsule, Take 1 capsule by mouth 4 (Four) Times a Day As Needed (abd bloating). For: Abdominal bloating, Disp: , Rfl:   •  diphenhydrAMINE (BENADRYL) 25 mg capsule, Take 1 capsule by mouth Every 6 (Six) Hours As Needed for Itching or Allergies., Disp: 360 capsule, Rfl: 1  •  diphenhydrAMINE (BENADRYL) 50 MG/ML injection, INJECT ONE MILLILITER AS DIRECTED FOR ALLERGIC REACTION, Disp: 100 mL, Rfl: 3  •  guaiFENesin (MUCINEX) 600 MG 12 hr tablet, Take 600 mg by mouth 2 (Two) Times a Day., Disp: , Rfl:   •  hydrOXYzine (ATARAX) 25 MG tablet, Take 25 mg by mouth At Night As Needed for Allergies. at bedtime as needed, Disp: , Rfl:   •  ibuprofen (ADVIL,MOTRIN) 600 MG tablet, Take 1 tablet by mouth Every 8 (Eight) Hours As Needed for Mild Pain ., Disp: 270 tablet, Rfl: 1  •  levocetirizine (XYZAL) 5 MG tablet, TAKE ONE TABLET BY MOUTH ONCE DAILY, Disp: 30 tablet, Rfl: 5  •  levothyroxine (SYNTHROID, LEVOTHROID) 175 MCG tablet, Take 1 tablet by mouth Daily., Disp: 90 tablet, Rfl: 1  •  losartan (COZAAR) 100 MG tablet, Take 1 tablet by mouth Daily., Disp: 90 tablet, Rfl: 1  •  Melatonin 10 MG tablet, Take 1 tablet by mouth At Night As Needed (sleep)., Disp: , Rfl:   •  montelukast (SINGULAIR) 10 MG tablet, Take 1 tablet by mouth Every Night., Disp: 90 tablet, Rfl: 1  •  ranitidine (ZANTAC) 150 MG tablet, Take 150 mg by mouth 2 (Two) Times a Day. AM&PM, Disp: , Rfl:   •  tamoxifen (NOLVADEX) 20 MG chemo tablet, Take 1 tablet by mouth Daily., Disp: 90 tablet, Rfl: 1  •  Triamcinolone Acetonide (NASACORT ALLERGY 24HR) 55 MCG/ACT nasal inhaler, 2 sprays  into each nostril daily., Disp: , Rfl:   •  triamterene-hydrochlorothiazide (MAXZIDE-25) 37.5-25 MG per tablet, Take 1 tablet by mouth Daily., Disp: 90 tablet, Rfl: 1  •  vitamin B-12 (CYANOCOBALAMIN) 1000 MCG tablet, Take 1,000 mcg by mouth Daily., Disp: , Rfl:   •  EPINEPHrine (EPIPEN 2-KIRA) 0.3 MG/0.3ML solution auto-injector injection, Inject 1 Units into the shoulder, thigh, or buttocks As Needed (allergy). as directed; For: Allergic rhinitis, Disp: , Rfl:      Allergies    Allergies   Allergen Reactions   • Nuts Shortness Of Breath     rash   • Other      MSG - HIVES    • Penicillins Shortness Of Breath     rash   • Soybean-Containing Drug Products Shortness Of Breath     rash   • Sunflower Oil Shortness Of Breath     rash   • Apple    • Cefuroxime      Stomach problems   • Cephalexin      Stomach problems   • Chicken Allergy    • Clindamycin Nausea Only and Nausea And Vomiting   • Erythromycin Nausea Only   • Flu Virus Vaccine      Redness and red rash to area   • Gluten Meal    • Metronidazole Hives   • Naproxen Dizziness   • Parsley Seed    • Thimerosal      Redness and swelling at site of injection   • Varicella Virus Vaccine Live      Redness and swelling at site   • Zostavax [Zoster Vaccine Live]      81790 UNT /0.65 ML subcutaneous solution Recontituted  - redness and swelling at site   • Doxycycline Itching and Rash   • Sulfa Antibiotics Nausea Only and Rash     Other reaction(s): Headache, Vomiting       Problem List    Patient Active Problem List   Diagnosis   • Anxiety   • Gastroesophageal reflux disease without esophagitis   • Hyperlipidemia   • Hypertension   • Hypothyroidism   • Cataract   • Cancer of overlapping sites of right female breast (CMS/HCC)       Medications, Allergies, Problems List and Past History were reviewed and updated.    Physical Examination    /82 (BP Location: Right arm, Patient Position: Sitting, Cuff Size: Adult)   Pulse 64   Temp 97.4 °F (36.3 °C) (Temporal Artery  )   Resp 16   Wt 97.1 kg (214 lb)   LMP  (LMP Unknown) Comment: LAST PAP 3/18 BY DR SANDERS  Breastfeeding? No   BMI 32.54 kg/m²     HEENT: Head- Normocephalic Atraumatic. Facies- Within normal limits. Pinnas- Normal texture and shape bilaterally. Canals- Normal bilaterally. TMs- Normal bilaterally. Nares- Patent bilaterally. Nasal Septum- is normal. There is no tenderness to palpation over the frontal or maxillary sinuses. Lids- Normal bilaterally. Conjunctiva- Clear bilaterally. Sclera- Anicteric bilaterally. Oropharynx- Moist with no lesions. Tonsils- No enlargement, erythema or exudate.    Neck: Thyroid- non enlarged, symmetric and has no nodules. No bruits are detected. ROM- Normal Range of Motion with no rigidity.    Lungs: Auscultation- Clear to auscultation bilaterally. There are no retractions, clubbing or cyanosis. The Expiratory to Inspiratory ratio is equal.    Cardiovascular: There are no carotid bruits. Heart- Normal Rate with Regular rhythm and no murmurs. There are no gallops. There are no rubs. In the lower extremities there is no edema. The upper extremities do not have edema.    Abdomen: Soft, benign, non-tender with no masses, hernias, organomegaly or scars.    Impression and Assessment    Hyperlipidemia.    Essential Hypertension.    Gastroesophageal Reflux Disease.    Hypothyroidism.    Major Depressive Disorder Single Episode Unspecified.    Plan    Gastroesophageal Reflux Disease Plan: The current plan was continued.    Hyperlipidemia Plan: The patient was instructed to exercise daily and eat a low fat diet.    Essential Hypertension Plan: The current plan was continued.    Hypothyroidism Plan: Further plans will be formulated after test results are reviewed.    Major Depressive Disorder Single Episode Unspecified Plan: The medications were adjusted as noted.      Return to Office    The patient was instructed to return for follow-up in 3 months.    The patient was instructed to return  sooner if the condition changes, worsens, or doesn't resolve.

## 2018-08-07 NOTE — TELEPHONE ENCOUNTER
----- Message from Bambi Godoy sent at 8/7/2018  9:53 AM EDT -----  Contact: [facility]  RADHA FROM [facility] CALLED AND STATED THAT HE HAS SOME DOSAGE CONCERNS ON citalopram (CeleXA) 40 MG tablet.     PLEASE CALL : [facility] [address] [place] [address] [address] [place] [address] [phone] [facility] [phone] FX    THANK YOU

## 2018-08-22 ENCOUNTER — TRANSCRIBE ORDERS (OUTPATIENT)
Dept: MAMMOGRAPHY | Facility: HOSPITAL | Age: 68
End: 2018-08-22

## 2018-08-22 DIAGNOSIS — C50.412 CARCINOMA OF UPPER-OUTER QUADRANT OF FEMALE BREAST, LEFT (HCC): Primary | ICD-10-CM

## 2018-08-22 RX ORDER — TRIAMTERENE AND HYDROCHLOROTHIAZIDE 37.5; 25 MG/1; MG/1
TABLET ORAL
Qty: 90 TABLET | Refills: 1 | Status: SHIPPED | OUTPATIENT
Start: 2018-08-22 | End: 2019-03-25 | Stop reason: SDUPTHER

## 2018-08-23 ENCOUNTER — HOSPITAL ENCOUNTER (OUTPATIENT)
Dept: ULTRASOUND IMAGING | Facility: HOSPITAL | Age: 68
Discharge: HOME | End: 2018-08-23
Attending: SURGERY | Admitting: SURGERY

## 2018-08-23 DIAGNOSIS — C50.412 CARCINOMA OF UPPER-OUTER QUADRANT OF FEMALE BREAST, LEFT (HCC): ICD-10-CM

## 2018-08-23 PROCEDURE — 76642 ULTRASOUND BREAST LIMITED: CPT

## 2018-08-23 PROCEDURE — 76642 ULTRASOUND BREAST LIMITED: CPT | Performed by: RADIOLOGY

## 2018-09-10 RX ORDER — AMLODIPINE BESYLATE 5 MG/1
TABLET ORAL
Qty: 90 TABLET | Refills: 1 | Status: SHIPPED | OUTPATIENT
Start: 2018-09-10 | End: 2019-01-03 | Stop reason: SDUPTHER

## 2018-09-11 ENCOUNTER — TELEPHONE (OUTPATIENT)
Dept: ONCOLOGY | Facility: CLINIC | Age: 68
End: 2018-09-11

## 2018-09-11 RX ORDER — TAMOXIFEN CITRATE 20 MG/1
TABLET ORAL
Qty: 90 TABLET | Refills: 0 | Status: SHIPPED | OUTPATIENT
Start: 2018-09-11 | End: 2018-09-12 | Stop reason: SDUPTHER

## 2018-09-11 NOTE — TELEPHONE ENCOUNTER
Patient missed 5/9/18 appt and did not have one scheduled.  I filled patients TMX with a 90 day supply and instructed patient to make an appt within the next few weeks. Patient stated understanding.  I transferred her to IVON Carvalho to schedule.

## 2018-09-12 ENCOUNTER — OFFICE VISIT (OUTPATIENT)
Dept: ONCOLOGY | Facility: CLINIC | Age: 68
End: 2018-09-12

## 2018-09-12 VITALS
BODY MASS INDEX: 33.19 KG/M2 | HEIGHT: 68 IN | RESPIRATION RATE: 16 BRPM | HEART RATE: 68 BPM | DIASTOLIC BLOOD PRESSURE: 74 MMHG | SYSTOLIC BLOOD PRESSURE: 139 MMHG | WEIGHT: 219 LBS | OXYGEN SATURATION: 95 % | TEMPERATURE: 97.2 F

## 2018-09-12 DIAGNOSIS — C50.811 MALIGNANT NEOPLASM OF OVERLAPPING SITES OF RIGHT BREAST IN FEMALE, ESTROGEN RECEPTOR POSITIVE (HCC): Primary | ICD-10-CM

## 2018-09-12 DIAGNOSIS — Z17.0 MALIGNANT NEOPLASM OF OVERLAPPING SITES OF RIGHT BREAST IN FEMALE, ESTROGEN RECEPTOR POSITIVE (HCC): Primary | ICD-10-CM

## 2018-09-12 DIAGNOSIS — Z17.0 ESTROGEN RECEPTOR POSITIVE: ICD-10-CM

## 2018-09-12 PROCEDURE — 99213 OFFICE O/P EST LOW 20 MIN: CPT | Performed by: INTERNAL MEDICINE

## 2018-09-12 RX ORDER — TAMOXIFEN CITRATE 20 MG/1
20 TABLET ORAL DAILY
Qty: 90 TABLET | Refills: 1 | Status: SHIPPED | OUTPATIENT
Start: 2018-09-12 | End: 2019-03-13 | Stop reason: SDUPTHER

## 2018-09-12 NOTE — PROGRESS NOTES
"      PROBLEM LIST:  1.  Left breast cancer  A.  Left mastectomy for well-differentiated invasive ductal breast cancer        stage T1b(2) N1a M0, stage IIA  B.  [place] lymph nodes negative but one positive intramammary node  C.  ER and NV positive and HER-2 negative with Oncotype DX recurrent score of 16  D.  Adjuvant tamoxifen started 9/27/17  2.  Atypical ductal hyperplasia of the right breast, status post right simple mastectomy  3.  Hypothyroidism after prior thyroid surgery for goiter  4.  Hypertension             HISTORY OF PRESENT ILLNESS:   Chief complaint: Here about adjuvant endocrine treatment for breast cancer  Ms. Raphael doesn't report any bone pain, dyspnea, unexplained weight loss, or other symptoms typical of recurrent breast cancer.  She had some swelling in her right axilla and Dr. SHANKAR evaluated this including ultrasound with no findings of recurrent cancer.  She's had some med changes by Dr. Recinos as noted in the electronic record.    Past Medical History, Past Surgical History, Social History, Family History have been reviewed and are without significant changes except as mentioned.    Review of Systems   A comprehensive 14 point review of systems was performed and was negative except as mentioned.    Medications:  The current medication list was reviewed in the EMR    ALLERGIES:  Allergies not on file           /74   Pulse 68   Temp 97.2 °F (36.2 °C) (Temporal Artery )   Resp 16   Ht 172.7 cm (68\")   Wt 99.3 kg (219 lb)   LMP  (LMP Unknown) Comment: LAST PAP 3/18 BY DR SANDERS  SpO2 95%   BMI 33.30 kg/m²      Performance Status: ECOG 0    General: well appearing, in no acute distress  HEENT: sclera anicteric, oropharynx clear  Lymphatics: no cervical, supraclavicular, or axillary adenopathy  Cardiovascular: regular rate and rhythm, no murmurs  Lungs: clear to auscultation bilaterally  Abdomen: soft, nontender, nondistended.  No palpable organomegaly  Breasts: Her bilateral " mastectomy incisions have healed well with no nodules or other evidence recurrence  Extremeties: no lower extremity edema  Skin: no rashes, lesions, bruising, or petechiae    RECENT LABS:   WBC   Date Value Ref Range Status   08/07/2018 5.94 3.50 - 10.80 10*3/mm3 Final     Hemoglobin   Date Value Ref Range Status   08/07/2018 12.9 11.5 - 15.5 g/dL Final     Hematocrit   Date Value Ref Range Status   08/07/2018 40.2 34.5 - 44.0 % Final     MCV   Date Value Ref Range Status   08/07/2018 87.2 80.0 - 99.0 fL Final     RDW   Date Value Ref Range Status   08/07/2018 13.2 11.3 - 14.5 % Final     MPV   Date Value Ref Range Status   08/07/2018 10.3 6.0 - 12.0 fL Final     Platelets   Date Value Ref Range Status   08/07/2018 212 150 - 450 10*3/mm3 Final     Immature Grans %   Date Value Ref Range Status   08/07/2018 0.3 0.0 - 0.6 % Final     Neutrophils, Absolute   Date Value Ref Range Status   08/07/2018 2.83 1.50 - 8.30 10*3/mm3 Final     Lymphocytes, Absolute   Date Value Ref Range Status   08/07/2018 2.42 0.60 - 4.80 10*3/mm3 Final     Monocytes, Absolute   Date Value Ref Range Status   08/07/2018 0.45 0.00 - 1.00 10*3/mm3 Final     Eosinophils, Absolute   Date Value Ref Range Status   08/07/2018 0.18 0.00 - 0.30 10*3/mm3 Final     Basophils, Absolute   Date Value Ref Range Status   08/07/2018 0.04 0.00 - 0.20 10*3/mm3 Final     Immature Grans, Absolute   Date Value Ref Range Status   08/07/2018 0.02 0.00 - 0.03 10*3/mm3 Final       Glucose   Date Value Ref Range Status   08/07/2018 91 70 - 100 mg/dL Final     Sodium   Date Value Ref Range Status   08/07/2018 142 132 - 146 mmol/L Final     Potassium   Date Value Ref Range Status   08/07/2018 3.8 3.5 - 5.5 mmol/L Final     CO2   Date Value Ref Range Status   08/07/2018 30.0 20.0 - 31.0 mmol/L Final     Chloride   Date Value Ref Range Status   08/07/2018 105 99 - 109 mmol/L Final     Anion Gap   Date Value Ref Range Status   08/07/2018 7.0 3.0 - 11.0 mmol/L Final      Creatinine   Date Value Ref Range Status   08/07/2018 0.99 0.60 - 1.30 mg/dL Final     BUN   Date Value Ref Range Status   08/07/2018 20 9 - 23 mg/dL Final     BUN/Creatinine Ratio   Date Value Ref Range Status   08/07/2018 20.2 7.0 - 25.0 Final     Calcium   Date Value Ref Range Status   08/07/2018 8.9 8.7 - 10.4 mg/dL Final     eGFR Non  Amer   Date Value Ref Range Status   08/07/2018 56 (L) >60 mL/min/1.73 Final     Alkaline Phosphatase   Date Value Ref Range Status   08/07/2018 71 25 - 100 U/L Final     Total Protein   Date Value Ref Range Status   08/07/2018 6.4 5.7 - 8.2 g/dL Final     ALT (SGPT)   Date Value Ref Range Status   08/07/2018 21 7 - 40 U/L Final     AST (SGOT)   Date Value Ref Range Status   08/07/2018 22 0 - 33 U/L Final     Total Bilirubin   Date Value Ref Range Status   08/07/2018 0.3 0.3 - 1.2 mg/dL Final     Albumin   Date Value Ref Range Status   08/07/2018 3.89 3.20 - 4.80 g/dL Final     Globulin   Date Value Ref Range Status   08/07/2018 2.5 gm/dL Final     A/G Ratio   Date Value Ref Range Status   02/06/2018 1.4 1.2 - 2.2 Final       No results found for: LDH, URICACID    No results found for: MSPIKE, KAPPALAMB, IGLFLC, FREEKAPPAL              Impression: 1.  Breast cancer with low risk features that's doing well on adjuvant tamoxifen.    Plan: 1.  I'll continue her adjuvant tamoxifen.  I'll plan to treat her for 5 years.  She's had a hysterectomy and has only some mild hot flashes as side effects.  She will return here in 6 months.                   Lm Holloway MD  [facility]    09/12/18           CC:

## 2018-10-18 ENCOUNTER — FLU SHOT (OUTPATIENT)
Dept: INTERNAL MEDICINE | Facility: CLINIC | Age: 68
End: 2018-10-18

## 2018-10-18 PROCEDURE — G0008 ADMIN INFLUENZA VIRUS VAC: HCPCS | Performed by: INTERNAL MEDICINE

## 2018-10-18 PROCEDURE — 90662 IIV NO PRSV INCREASED AG IM: CPT | Performed by: INTERNAL MEDICINE

## 2018-11-13 ENCOUNTER — OFFICE VISIT (OUTPATIENT)
Dept: INTERNAL MEDICINE | Facility: CLINIC | Age: 68
End: 2018-11-13

## 2018-11-13 VITALS
RESPIRATION RATE: 18 BRPM | HEART RATE: 68 BPM | DIASTOLIC BLOOD PRESSURE: 72 MMHG | SYSTOLIC BLOOD PRESSURE: 128 MMHG | BODY MASS INDEX: 33.75 KG/M2 | TEMPERATURE: 98.6 F | WEIGHT: 222 LBS

## 2018-11-13 DIAGNOSIS — E03.8 OTHER SPECIFIED HYPOTHYROIDISM: ICD-10-CM

## 2018-11-13 DIAGNOSIS — E78.5 HYPERLIPIDEMIA, UNSPECIFIED HYPERLIPIDEMIA TYPE: ICD-10-CM

## 2018-11-13 DIAGNOSIS — I10 ESSENTIAL HYPERTENSION: Primary | ICD-10-CM

## 2018-11-13 DIAGNOSIS — K21.9 GASTROESOPHAGEAL REFLUX DISEASE WITHOUT ESOPHAGITIS: ICD-10-CM

## 2018-11-13 DIAGNOSIS — F32.A DEPRESSION, UNSPECIFIED DEPRESSION TYPE: ICD-10-CM

## 2018-11-13 LAB
ALBUMIN SERPL-MCNC: 4.07 G/DL (ref 3.2–4.8)
ALBUMIN/GLOB SERPL: 1.9 G/DL (ref 1.5–2.5)
ALP SERPL-CCNC: 68 U/L (ref 25–100)
ALT SERPL W P-5'-P-CCNC: 39 U/L (ref 7–40)
ANION GAP SERPL CALCULATED.3IONS-SCNC: 4 MMOL/L (ref 3–11)
ARTICHOKE IGE QN: 119 MG/DL (ref 0–130)
AST SERPL-CCNC: 32 U/L (ref 0–33)
BILIRUB SERPL-MCNC: 0.4 MG/DL (ref 0.3–1.2)
BUN BLD-MCNC: 14 MG/DL (ref 9–23)
BUN/CREAT SERPL: 13.7 (ref 7–25)
CALCIUM SPEC-SCNC: 9.1 MG/DL (ref 8.7–10.4)
CHLORIDE SERPL-SCNC: 104 MMOL/L (ref 99–109)
CHOLEST SERPL-MCNC: 199 MG/DL (ref 0–200)
CO2 SERPL-SCNC: 29 MMOL/L (ref 20–31)
CREAT BLD-MCNC: 1.02 MG/DL (ref 0.6–1.3)
GFR SERPL CREATININE-BSD FRML MDRD: 54 ML/MIN/1.73
GLOBULIN UR ELPH-MCNC: 2.1 GM/DL
GLUCOSE BLD-MCNC: 110 MG/DL (ref 70–100)
HDLC SERPL-MCNC: 59 MG/DL (ref 40–60)
POTASSIUM BLD-SCNC: 3.7 MMOL/L (ref 3.5–5.5)
PROT SERPL-MCNC: 6.2 G/DL (ref 5.7–8.2)
SODIUM BLD-SCNC: 137 MMOL/L (ref 132–146)
T4 FREE SERPL-MCNC: 0.86 NG/DL (ref 0.89–1.76)
TRIGL SERPL-MCNC: 132 MG/DL (ref 0–150)
TSH SERPL DL<=0.05 MIU/L-ACNC: 27.02 MIU/ML (ref 0.35–5.35)

## 2018-11-13 PROCEDURE — 80053 COMPREHEN METABOLIC PANEL: CPT | Performed by: INTERNAL MEDICINE

## 2018-11-13 PROCEDURE — 84443 ASSAY THYROID STIM HORMONE: CPT | Performed by: INTERNAL MEDICINE

## 2018-11-13 PROCEDURE — 84439 ASSAY OF FREE THYROXINE: CPT | Performed by: INTERNAL MEDICINE

## 2018-11-13 PROCEDURE — 36415 COLL VENOUS BLD VENIPUNCTURE: CPT | Performed by: INTERNAL MEDICINE

## 2018-11-13 PROCEDURE — 80061 LIPID PANEL: CPT | Performed by: INTERNAL MEDICINE

## 2018-11-13 PROCEDURE — 99214 OFFICE O/P EST MOD 30 MIN: CPT | Performed by: INTERNAL MEDICINE

## 2018-11-13 RX ORDER — AZELASTINE HYDROCHLORIDE, FLUTICASONE PROPIONATE 137; 50 UG/1; UG/1
2 SPRAY, METERED NASAL DAILY
COMMUNITY

## 2018-11-13 RX ORDER — DESLORATADINE 5 MG/1
5 TABLET ORAL DAILY
COMMUNITY
End: 2019-09-16

## 2018-11-13 NOTE — PROGRESS NOTES
Chief Complaint   Patient presents with   • Follow-up     3 MONTH FOLLOW UP CHRONIC MEDICAL PROBLEMS       History of Present Illness      The patient presents for a follow-up related to hyperlipidemia. She is following a low fat diet. She reports that she is exercising. She is not taking medication for hyperlipidemia. She denies chest pain, shortness of breath, orthopnea, paroxysmal nocturnal dyspnea, dyspnea on exertion, edema, palpitations or syncope.    The patient presents for a follow-up related to hypertension. The patient reports that she has had no headaches or blurred vision. She states that she is taking her medication as prescribed. She is not having medication side effects.    The patient presents for a follow-up related to GERD. The patient is on ranitidine for her gastroesophageal reflux. The medication is taken on a regular basis and gives complete relief of the symptoms. She reports no abdominal pain, belching, diarrhea, dysphagia, early satiety, heartburn, hoarseness, nausea, odynophagia, rectal bleeding, vomiting or weight loss. The GERD has no known aggravating factors.    The patient presents for a follow-up related to hypothyroidism. She reports a good energy level. She reports no hair loss, heat intolerance, cold intolerance, constipation or sweats. She is taking her medication as prescribed.    The patient presents for follow-up of depression. She denies currently having depression symptoms. She denies suicidal ideation. Her energy level is good. She denies agorophobia. She sleeps well. She is not tearful. She states that her current depression symptoms are stable. She is currently taking a medication. The current medication regimen consists of citalopram. The patient denies having medication side effects including nausea, headaches, anxiety, increased depression, anorgasmia or fatigue.    Review of Systems    GENERAL/CONSTITUTIONAL- Denies Fever, Chills, Sweats, Weakness or  Malaise.    HEENT- Denies Eye Pain, Eye Drainage, Nasal Discharge, Sore Throat, Ear Pain, Ear Drainage, Decreased Vision, Sinus Pain, Nasal Congestion, Decreased Hearing, Visual Disturbance, Diplopia or Tinnitus.    CARDIOVASCULAR- Denies Claudication.    PULMONARY- Denies Wheezing, Sputum Production, Cough, Hemoptysis or Pleuritic Chest Pain.    Medications      Current Outpatient Medications:   •  albuterol (PROAIR HFA) 108 (90 Base) MCG/ACT inhaler, Inhale 2 puffs Every 6 (Six) Hours As Needed for Wheezing or Shortness of Air., Disp: 1 inhaler, Rfl: 5  •  amLODIPine (NORVASC) 5 MG tablet, TAKE ONE TABLET BY MOUTH ONCE DAILY, Disp: 90 tablet, Rfl: 1  •  Azelastine-Fluticasone (DYMISTA) 137-50 MCG/ACT suspension, 2 Squirts into the nostril(s) as directed by provider Daily., Disp: , Rfl:   •  Cholecalciferol (VITAMIN D PO), Take 1,000 Units by mouth daily. Vitamin D 1000 UNIT CAPS; TAKE 1 TABLET DAILY, Disp: , Rfl:   •  citalopram (CeleXA) 40 MG tablet, Take 1.5 tablets by mouth Daily., Disp: 135 tablet, Rfl: 1  •  desloratadine (CLARINEX) 5 MG tablet, Take 5 mg by mouth Daily., Disp: , Rfl:   •  dicyclomine (BENTYL) 10 MG capsule, Take 1 capsule by mouth 4 (Four) Times a Day As Needed (abd bloating). For: Abdominal bloating, Disp: , Rfl:   •  diphenhydrAMINE (BENADRYL) 25 mg capsule, Take 1 capsule by mouth Every 6 (Six) Hours As Needed for Itching or Allergies., Disp: 360 capsule, Rfl: 1  •  diphenhydrAMINE (BENADRYL) 50 MG/ML injection, INJECT ONE MILLILITER AS DIRECTED FOR ALLERGIC REACTION, Disp: 100 mL, Rfl: 3  •  EPINEPHrine (EPIPEN 2-KIRA) 0.3 MG/0.3ML solution auto-injector injection, Inject 1 Units into the shoulder, thigh, or buttocks As Needed (allergy). as directed; For: Allergic rhinitis, Disp: , Rfl:   •  Fluticasone Furoate (ARNUITY ELLIPTA) 100 MCG/ACT aerosol powder , Inhale 2 puffs Daily., Disp: , Rfl:   •  guaiFENesin (MUCINEX) 600 MG 12 hr tablet, Take 600 mg by mouth 2 (Two) Times a Day., Disp:  , Rfl:   •  hydrOXYzine (ATARAX) 25 MG tablet, Take 25 mg by mouth At Night As Needed for Allergies. at bedtime as needed, Disp: , Rfl:   •  ibuprofen (ADVIL,MOTRIN) 600 MG tablet, Take 1 tablet by mouth Every 8 (Eight) Hours As Needed for Mild Pain ., Disp: 270 tablet, Rfl: 1  •  levocetirizine (XYZAL) 5 MG tablet, TAKE ONE TABLET BY MOUTH ONCE DAILY, Disp: 30 tablet, Rfl: 5  •  levothyroxine (SYNTHROID, LEVOTHROID) 175 MCG tablet, Take 1 tablet by mouth Daily., Disp: 90 tablet, Rfl: 1  •  losartan (COZAAR) 100 MG tablet, Take 1 tablet by mouth Daily., Disp: 90 tablet, Rfl: 1  •  Melatonin 10 MG tablet, Take 1 tablet by mouth At Night As Needed (sleep)., Disp: , Rfl:   •  montelukast (SINGULAIR) 10 MG tablet, Take 1 tablet by mouth Every Night., Disp: 90 tablet, Rfl: 1  •  ranitidine (ZANTAC) 150 MG tablet, Take 150 mg by mouth 2 (Two) Times a Day. AM&PM, Disp: , Rfl:   •  tamoxifen (NOLVADEX) 20 MG chemo tablet, Take 1 tablet by mouth Daily., Disp: 90 tablet, Rfl: 1  •  Triamcinolone Acetonide (NASACORT ALLERGY 24HR) 55 MCG/ACT nasal inhaler, 2 sprays into each nostril daily., Disp: , Rfl:   •  triamterene-hydrochlorothiazide (MAXZIDE-25) 37.5-25 MG per tablet, TAKE ONE TABLET BY MOUTH ONCE DAILY, Disp: 90 tablet, Rfl: 1  •  vitamin B-12 (CYANOCOBALAMIN) 1000 MCG tablet, Take 1,000 mcg by mouth Daily., Disp: , Rfl:      Allergies    Allergies   Allergen Reactions   • Nuts Shortness Of Breath     rash   • Other      MSG - HIVES    • Penicillins Shortness Of Breath     rash   • Soybean-Containing Drug Products Shortness Of Breath     rash   • Sunflower Oil Shortness Of Breath     rash   • Apple    • Cefuroxime      Stomach problems   • Cephalexin      Stomach problems   • Chicken Allergy    • Clindamycin Nausea Only and Nausea And Vomiting   • Erythromycin Nausea Only   • Flu Virus Vaccine      Redness and red rash to area   • Gluten Meal    • Metronidazole Hives   • Naproxen Dizziness   • Parsley Seed    •  Thimerosal      Redness and swelling at site of injection   • Varicella Virus Vaccine Live      Redness and swelling at site   • Zostavax [Zoster Vaccine Live]      69399 UNT /0.65 ML subcutaneous solution Recontituted  - redness and swelling at site   • Doxycycline Itching and Rash   • Sulfa Antibiotics Nausea Only and Rash     Other reaction(s): Headache, Vomiting       Problem List    Patient Active Problem List   Diagnosis   • Anxiety   • Gastroesophageal reflux disease without esophagitis   • Hyperlipidemia   • Hypertension   • Hypothyroidism   • Cataract   • Cancer of overlapping sites of right female breast (CMS/HCC)   • Estrogen receptor positive       Medications, Allergies, Problems List and Past History were reviewed and updated.    Physical Examination    /72 (BP Location: Left arm, Patient Position: Sitting, Cuff Size: Adult)   Pulse 68   Temp 98.6 °F (37 °C) (Temporal)   Resp 18   Wt 101 kg (222 lb)   LMP  (LMP Unknown) Comment: LAST PAP 3/18 BY DR SANDERS  Breastfeeding? No   BMI 33.75 kg/m²     HEENT: Facies- Within normal limits. Lids- Normal bilaterally. Conjunctiva- Clear bilaterally. Sclera- Anicteric bilaterally.    Neck: Thyroid- non enlarged, symmetric and has no nodules. No bruits are detected.    Lungs: Auscultation- Clear to auscultation bilaterally. There are no retractions, clubbing or cyanosis. The Expiratory to Inspiratory ratio is equal.    Cardiovascular: There are no carotid bruits. Heart- Normal Rate with Regular rhythm and no murmurs. There are no gallops. There are no rubs. In the lower extremities there is no edema. The upper extremities do not have edema.    Abdomen: Soft, benign, non-tender with no masses, hernias, organomegaly or scars.    Impression and Assessment    Hyperlipidemia.    Essential Hypertension.    Gastroesophageal Reflux Disease.    Hypothyroidism.    Major Depressive Disorder Single Episode Unspecified.    Plan    Gastroesophageal Reflux Disease  Plan: The current plan was continued.    Hyperlipidemia Plan: The patient was instructed to exercise daily and eat a low fat diet.    Essential Hypertension Plan: The condition is stable. No change was made in the current plan.    Hypothyroidism Plan: Further plans will be formulated after test results are reviewed.    Major Depressive Disorder Single Episode Unspecified Plan: The condition is stable. No change is needed in the current plan.    Sarah was seen today for follow-up.    Diagnoses and all orders for this visit:    Essential hypertension  -     Comprehensive Metabolic Panel    Hyperlipidemia, unspecified hyperlipidemia type  -     Comprehensive Metabolic Panel  -     Lipid Panel    Gastroesophageal reflux disease without esophagitis  -     Comprehensive Metabolic Panel    Other specified hypothyroidism  -     TSH  -     T4, Free    Depression, unspecified depression type        Return to Office    The patient was instructed to return for follow-up in 4 months.    The patient was instructed to return sooner if the condition changes, worsens, or doesn't resolve.

## 2018-11-15 RX ORDER — LOSARTAN POTASSIUM 100 MG/1
TABLET ORAL
Qty: 90 TABLET | Refills: 1 | Status: SHIPPED | OUTPATIENT
Start: 2018-11-15 | End: 2019-07-08 | Stop reason: SDUPTHER

## 2018-11-19 RX ORDER — LEVOTHYROXINE SODIUM 0.2 MG/1
200 TABLET ORAL DAILY
Qty: 90 TABLET | Refills: 1 | Status: SHIPPED | OUTPATIENT
Start: 2018-11-19 | End: 2019-06-03 | Stop reason: SDUPTHER

## 2019-01-03 RX ORDER — AMLODIPINE BESYLATE 5 MG/1
TABLET ORAL
Qty: 90 TABLET | Refills: 1 | Status: SHIPPED | OUTPATIENT
Start: 2019-01-03 | End: 2019-09-16 | Stop reason: SDUPTHER

## 2019-01-03 RX ORDER — CITALOPRAM 40 MG/1
TABLET ORAL
Qty: 135 TABLET | Refills: 1 | Status: SHIPPED | OUTPATIENT
Start: 2019-01-03 | End: 2019-09-16 | Stop reason: SDUPTHER

## 2019-03-13 ENCOUNTER — OFFICE VISIT (OUTPATIENT)
Dept: ONCOLOGY | Facility: CLINIC | Age: 69
End: 2019-03-13

## 2019-03-13 VITALS
HEART RATE: 81 BPM | RESPIRATION RATE: 16 BRPM | DIASTOLIC BLOOD PRESSURE: 75 MMHG | BODY MASS INDEX: 33.65 KG/M2 | WEIGHT: 222 LBS | TEMPERATURE: 98.5 F | OXYGEN SATURATION: 96 % | HEIGHT: 68 IN | SYSTOLIC BLOOD PRESSURE: 120 MMHG

## 2019-03-13 DIAGNOSIS — Z17.0 MALIGNANT NEOPLASM OF OVERLAPPING SITES OF RIGHT BREAST IN FEMALE, ESTROGEN RECEPTOR POSITIVE (HCC): Primary | ICD-10-CM

## 2019-03-13 DIAGNOSIS — C50.811 MALIGNANT NEOPLASM OF OVERLAPPING SITES OF RIGHT BREAST IN FEMALE, ESTROGEN RECEPTOR POSITIVE (HCC): Primary | ICD-10-CM

## 2019-03-13 PROCEDURE — 99213 OFFICE O/P EST LOW 20 MIN: CPT | Performed by: NURSE PRACTITIONER

## 2019-03-13 RX ORDER — TAMOXIFEN CITRATE 20 MG/1
20 TABLET ORAL DAILY
Qty: 90 TABLET | Refills: 1 | Status: SHIPPED | OUTPATIENT
Start: 2019-03-13 | End: 2019-09-17 | Stop reason: SDUPTHER

## 2019-03-13 NOTE — PROGRESS NOTES
"      PROBLEM LIST:  1.  Left breast cancer  A.  Left mastectomy for well-differentiated invasive ductal breast cancer        stage T1b(2) N1a M0, stage IIA  B.  [place] lymph nodes negative but one positive intramammary node  C.  ER and DE positive and HER-2 negative with Oncotype DX recurrent score of 16  D.  Adjuvant tamoxifen started 9/27/17  2.  Atypical ductal hyperplasia of the right breast, status post right simple mastectomy  3.  Hypothyroidism after prior thyroid surgery for goiter  4.  Hypertension     Chief complaint: Here about adjuvant endocrine treatment for breast cancer          HISTORY OF PRESENT ILLNESS:   Ms. Raphael is here for follow up evaluation of breast cancer management. She continues on tamoxifen for adjuvant endocrine therapy and is tolerating it well. She has occasional hot flashes but no night sweats. She denies any new long bone pain, headaches, diplopia, or dyspnea. She has allergies and has been started on a new inhaler that helps with cough. She is maintaining her usual daily activities and is doing some part time substitute teaching.        Past Medical History, Past Surgical History, Social History, Family History have been reviewed and are without significant changes except as mentioned.    Review of Systems   A comprehensive 14 point review of systems was performed and was negative except as mentioned.    Medications:  The current medication list was reviewed in the EMR    ALLERGIES:  Allergies not on file           /75   Pulse 81   Temp 98.5 °F (36.9 °C) (Oral)   Resp 16   Ht 172.7 cm (68\")   Wt 101 kg (222 lb)   LMP  (LMP Unknown) Comment: LAST PAP 3/18 BY DR SANDERS  SpO2 96%   BMI 33.75 kg/m²      Performance Status: ECOG 0    General: well appearing, in no acute distress  HEENT: sclera anicteric, oropharynx clear  Lymphatics: no cervical, supraclavicular, or axillary adenopathy  Cardiovascular: regular rate and rhythm, no murmurs  Lungs: clear to auscultation " bilaterally  Abdomen: soft, nontender, nondistended.  No palpable organomegaly  Breasts: Her bilateral mastectomy incisions have healed well with no nodules or other evidence recurrence  Extremeties: no lower extremity edema  Skin: no rashes, lesions, bruising, or petechiae    RECENT LABS:   WBC   Date Value Ref Range Status   08/07/2018 5.94 3.50 - 10.80 10*3/mm3 Final     Hemoglobin   Date Value Ref Range Status   08/07/2018 12.9 11.5 - 15.5 g/dL Final     Hematocrit   Date Value Ref Range Status   08/07/2018 40.2 34.5 - 44.0 % Final     MCV   Date Value Ref Range Status   08/07/2018 87.2 80.0 - 99.0 fL Final     RDW   Date Value Ref Range Status   08/07/2018 13.2 11.3 - 14.5 % Final     MPV   Date Value Ref Range Status   08/07/2018 10.3 6.0 - 12.0 fL Final     Platelets   Date Value Ref Range Status   08/07/2018 212 150 - 450 10*3/mm3 Final     Immature Grans %   Date Value Ref Range Status   08/07/2018 0.3 0.0 - 0.6 % Final     Neutrophils, Absolute   Date Value Ref Range Status   08/07/2018 2.83 1.50 - 8.30 10*3/mm3 Final     Lymphocytes, Absolute   Date Value Ref Range Status   08/07/2018 2.42 0.60 - 4.80 10*3/mm3 Final     Monocytes, Absolute   Date Value Ref Range Status   08/07/2018 0.45 0.00 - 1.00 10*3/mm3 Final     Eosinophils, Absolute   Date Value Ref Range Status   08/07/2018 0.18 0.00 - 0.30 10*3/mm3 Final     Basophils, Absolute   Date Value Ref Range Status   08/07/2018 0.04 0.00 - 0.20 10*3/mm3 Final     Immature Grans, Absolute   Date Value Ref Range Status   08/07/2018 0.02 0.00 - 0.03 10*3/mm3 Final       Glucose   Date Value Ref Range Status   11/13/2018 110 (H) 70 - 100 mg/dL Final     Sodium   Date Value Ref Range Status   11/13/2018 137 132 - 146 mmol/L Final     Potassium   Date Value Ref Range Status   11/13/2018 3.7 3.5 - 5.5 mmol/L Final     CO2   Date Value Ref Range Status   11/13/2018 29.0 20.0 - 31.0 mmol/L Final     Chloride   Date Value Ref Range Status   11/13/2018 104 99 - 109  mmol/L Final     Anion Gap   Date Value Ref Range Status   11/13/2018 4.0 3.0 - 11.0 mmol/L Final     Creatinine   Date Value Ref Range Status   11/13/2018 1.02 0.60 - 1.30 mg/dL Final     BUN   Date Value Ref Range Status   11/13/2018 14 9 - 23 mg/dL Final     BUN/Creatinine Ratio   Date Value Ref Range Status   11/13/2018 13.7 7.0 - 25.0 Final     Calcium   Date Value Ref Range Status   11/13/2018 9.1 8.7 - 10.4 mg/dL Final     eGFR Non  Amer   Date Value Ref Range Status   11/13/2018 54 (L) >60 mL/min/1.73 Final     Alkaline Phosphatase   Date Value Ref Range Status   11/13/2018 68 25 - 100 U/L Final     Total Protein   Date Value Ref Range Status   11/13/2018 6.2 5.7 - 8.2 g/dL Final     ALT (SGPT)   Date Value Ref Range Status   11/13/2018 39 7 - 40 U/L Final     AST (SGOT)   Date Value Ref Range Status   11/13/2018 32 0 - 33 U/L Final     Total Bilirubin   Date Value Ref Range Status   11/13/2018 0.4 0.3 - 1.2 mg/dL Final     Albumin   Date Value Ref Range Status   11/13/2018 4.07 3.20 - 4.80 g/dL Final     Globulin   Date Value Ref Range Status   11/13/2018 2.1 gm/dL Final     A/G Ratio   Date Value Ref Range Status   02/06/2018 1.4 1.2 - 2.2 Final       No results found for: LDH, URICACID    No results found for: MSPIKE, KAPPALAMB, IGLFLC, FREEKAPPAL              Impression:   1.  Breast cancer with low risk features that's doing well on adjuvant tamoxifen. I'll plan to treat her for 5 years.     Plan:   1.  I'll continue her adjuvant tamoxifen. Refill sent to [facility].   2.  She will return here in 6 months. She has been instructed to contact us in the interm if any new symptoms arise.                    Janine George [facility]    03/13/19           CC:

## 2019-03-25 RX ORDER — TRIAMTERENE AND HYDROCHLOROTHIAZIDE 37.5; 25 MG/1; MG/1
TABLET ORAL
Qty: 90 TABLET | Refills: 1 | Status: SHIPPED | OUTPATIENT
Start: 2019-03-25 | End: 2019-09-16 | Stop reason: SDUPTHER

## 2019-03-25 RX ORDER — IBUPROFEN 600 MG/1
TABLET ORAL
Qty: 270 TABLET | Refills: 1 | Status: SHIPPED | OUTPATIENT
Start: 2019-03-25 | End: 2020-01-02

## 2019-04-09 ENCOUNTER — OFFICE VISIT (OUTPATIENT)
Dept: INTERNAL MEDICINE | Facility: CLINIC | Age: 69
End: 2019-04-09

## 2019-04-09 VITALS
HEART RATE: 59 BPM | WEIGHT: 221 LBS | BODY MASS INDEX: 33.6 KG/M2 | OXYGEN SATURATION: 98 % | SYSTOLIC BLOOD PRESSURE: 126 MMHG | DIASTOLIC BLOOD PRESSURE: 76 MMHG | TEMPERATURE: 98.5 F | RESPIRATION RATE: 16 BRPM

## 2019-04-09 DIAGNOSIS — Z00.00 MEDICARE ANNUAL WELLNESS VISIT, INITIAL: Primary | ICD-10-CM

## 2019-04-09 PROCEDURE — G0438 PPPS, INITIAL VISIT: HCPCS | Performed by: NURSE PRACTITIONER

## 2019-04-09 NOTE — PATIENT INSTRUCTIONS
Obesity, Adult  Obesity is the condition of having too much total body fat. Being overweight or obese means that your weight is greater than what is considered healthy for your body size. Obesity is determined by a measurement called BMI. BMI is an estimate of body fat and is calculated from height and weight. For adults, a BMI of 30 or higher is considered obese.  Obesity can eventually lead to other health concerns and major illnesses, including:  · Stroke.  · Coronary artery disease (CAD).  · Type 2 diabetes.  · Some types of cancer, including cancers of the colon, breast, uterus, and gallbladder.  · Osteoarthritis.  · High blood pressure (hypertension).  · High cholesterol.  · Sleep apnea.  · Gallbladder stones.  · Infertility problems.    What are the causes?  The main cause of obesity is taking in (consuming) more calories than your body uses for energy. Other factors that contribute to this condition may include:  · Being born with genes that make you more likely to become obese.  · Having a medical condition that causes obesity. These conditions include:  ? Hypothyroidism.  ? Polycystic ovarian syndrome (PCOS).  ? Binge-eating disorder.  ? Cushing syndrome.  · Taking certain medicines, such as steroids, antidepressants, and seizure medicines.  · Not being physically active (sedentary lifestyle).  · Living where there are limited places to exercise safely or buy healthy foods.  · Not getting enough sleep.    What increases the risk?  The following factors may increase your risk of this condition:  · Having a family history of obesity.  · Being a woman of -American descent.  · Being a man of  descent.    What are the signs or symptoms?  Having excessive body fat is the main symptom of this condition.  How is this diagnosed?  This condition may be diagnosed based on:  · Your symptoms.  · Your medical history.  · A physical exam. Your health care provider may measure:  ? Your BMI. If you are an  adult with a BMI between 25 and less than 30, you are considered overweight. If you are an adult with a BMI of 30 or higher, you are considered obese.  ? The distances around your hips and your waist (circumferences). These may be compared to each other to help diagnose your condition.  ? Your skinfold thickness. Your health care provider may gently pinch a fold of your skin and measure it.    How is this treated?  Treatment for this condition often includes changing your lifestyle. Treatment may include some or all of the following:  · Dietary changes. Work with your health care provider and a dietitian to set a weight-loss goal that is healthy and reasonable for you. Dietary changes may include eating:  ? Smaller portions. A portion size is the amount of a particular food that is healthy for you to eat at one time. This varies from person to person.  ? Low-calorie or low-fat options.  ? More whole grains, fruits, and vegetables.  · Regular physical activity. This may include aerobic activity (cardio) and strength training.  · Medicine to help you lose weight. Your health care provider may prescribe medicine if you are unable to lose 1 pound a week after 6 weeks of eating more healthily and doing more physical activity.  · Surgery. Surgical options may include gastric banding and gastric bypass. Surgery may be done if:  ? Other treatments have not helped to improve your condition.  ? You have a BMI of 40 or higher.  ? You have life-threatening health problems related to obesity.    Follow these instructions at home:    Eating and drinking    · Follow recommendations from your health care provider about what you eat and drink. Your health care provider may advise you to:  ? Limit fast foods, sweets, and processed snack foods.  ? Choose low-fat options, such as low-fat milk instead of whole milk.  ? Eat 5 or more servings of fruits or vegetables every day.  ? Eat at home more often. This gives you more control over  what you eat.  ? Choose healthy foods when you eat out.  ? Learn what a healthy portion size is.  ? Keep low-fat snacks on hand.  ? Avoid sugary drinks, such as soda, fruit juice, iced tea sweetened with sugar, and flavored milk.  ? Eat a healthy breakfast.  · Drink enough water to keep your urine clear or pale yellow.  · Do not go without eating for long periods of time (do not fast) or follow a fad diet. Fasting and fad diets can be unhealthy and even dangerous.  Physical Activity  · Exercise regularly, as told by your health care provider. Ask your health care provider what types of exercise are safe for you and how often you should exercise.  · Warm up and stretch before being active.  · Cool down and stretch after being active.  · Rest between periods of activity.  Lifestyle  · Limit the time that you spend in front of your TV, computer, or video game system.  · Find ways to reward yourself that do not involve food.  · Limit alcohol intake to no more than 1 drink a day for nonpregnant women and 2 drinks a day for men. One drink equals 12 oz of beer, 5 oz of wine, or 1½ oz of hard liquor.  General instructions  · Keep a weight loss journal to keep track of the food you eat and how much you exercise you get.  · Take over-the-counter and prescription medicines only as told by your health care provider.  · Take vitamins and supplements only as told by your health care provider.  · Consider joining a support group. Your health care provider may be able to recommend a support group.  · Keep all follow-up visits as told by your health care provider. This is important.  Contact a health care provider if:  · You are unable to meet your weight loss goal after 6 weeks of dietary and lifestyle changes.  This information is not intended to replace advice given to you by your health care provider. Make sure you discuss any questions you have with your health care provider.  Document Released: 01/25/2006 Document Revised:  05/22/2017 Document Reviewed: 10/05/2016  [employer] Interactive Patient Education © 2019 [employer]  BMI for Adults  Body mass index (BMI) is a number that is calculated from a person's weight and height. In most adults, the number is used to find how much of an adult's weight is made up of fat. BMI is not as accurate as a direct measure of body fat.  How is BMI calculated?  BMI is calculated by dividing weight in kilograms by height in meters squared. It can also be calculated by dividing weight in pounds by height in inches squared, then multiplying the resulting number by 703. Charts are available to help you find your BMI quickly and easily without doing this calculation.  How is BMI interpreted?  Health care professionals use BMI charts to identify whether an adult is underweight, at a normal weight, or overweight based on the following guidelines:  · Underweight: BMI less than 18.5.  · Normal weight: BMI between 18.5 and 24.9.  · Overweight: BMI between 25 and 29.9.  · Obese: BMI of 30 and above.    BMI is usually interpreted the same for males and females.  Weight includes both fat and muscle, so someone with a muscular build, such as an athlete, may have a BMI that is higher than 24.9. In cases like these, BMI may not accurately depict body fat. To determine if excess body fat is the cause of a BMI of 25 or higher, further assessments may need to be done by a health care provider.  Why is BMI a useful tool?  BMI is used to identify a possible weight problem that may be related to a medical problem or may increase the risk for medical problems. BMI can also be used to promote changes to reach a healthy weight.  This information is not intended to replace advice given to you by your health care provider. Make sure you discuss any questions you have with your health care provider.  Document Released: 08/29/2005 Document Revised: 04/27/2017 Document Reviewed: 05/15/2015  [employer] Patient Education  © 2018 [employer]    Fall Prevention in the Home, Adult  Falls can cause injuries. They can happen to people of all ages. There are many things you can do to make your home safe and to help prevent falls. Ask for help when making these changes, if needed.  What actions can I take to prevent falls?  General Instructions  · Use good lighting in all rooms. Replace any light bulbs that burn out.  · Turn on the lights when you go into a dark area. Use night-lights.  · Keep items that you use often in easy-to-reach places. Lower the shelves around your home if necessary.  · Set up your furniture so you have a clear path. Avoid moving your furniture around.  · Do not have throw rugs and other things on the floor that can make you trip.  · Avoid walking on wet floors.  · If any of your floors are uneven, fix them.  · Add color or contrast paint or tape to clearly satish and help you see:  ? Any grab bars or handrails.  ? First and last steps of stairways.  ? Where the edge of each step is.  · If you use a stepladder:  ? Make sure that it is fully opened. Do not climb a closed stepladder.  ? Make sure that both sides of the stepladder are locked into place.  ? Ask someone to hold the stepladder for you while you use it.  · If there are any pets around you, be aware of where they are.  What can I do in the bathroom?  · Keep the floor dry. Clean up any water that spills onto the floor as soon as it happens.  · Remove soap buildup in the tub or shower regularly.  · Use non-skid mats or decals on the floor of the tub or shower.  · Attach bath mats securely with double-sided, non-slip rug tape.  · If you need to sit down in the shower, use a plastic, non-slip stool.  · Install grab bars by the toilet and in the tub and shower. Do not use towel bars as grab bars.  What can I do in the bedroom?  · Make sure that you have a light by your bed that is easy to reach.  · Do not use any sheets or blankets that are too big for your bed.  They should not hang down onto the floor.  · Have a firm chair that has side arms. You can use this for support while you get dressed.  What can I do in the kitchen?  · Clean up any spills right away.  · If you need to reach something above you, use a strong step stool that has a grab bar.  · Keep electrical cords out of the way.  · Do not use floor polish or wax that makes floors slippery. If you must use wax, use non-skid floor wax.  What can I do with my stairs?  · Do not leave any items on the stairs.  · Make sure that you have a light switch at the top of the stairs and the bottom of the stairs. If you do not have them, ask someone to add them for you.  · Make sure that there are handrails on both sides of the stairs, and use them. Fix handrails that are broken or loose. Make sure that handrails are as long as the stairways.  · Install non-slip stair treads on all stairs in your home.  · Avoid having throw rugs at the top or bottom of the stairs. If you do have throw rugs, attach them to the floor with carpet tape.  · Choose a carpet that does not hide the edge of the steps on the stairway.  · Check any carpeting to make sure that it is firmly attached to the stairs. Fix any carpet that is loose or worn.  What can I do on the outside of my home?  · Use bright outdoor lighting.  · Regularly fix the edges of walkways and driveways and fix any cracks.  · Remove anything that might make you trip as you walk through a door, such as a raised step or threshold.  · Trim any bushes or trees on the path to your home.  · Regularly check to see if handrails are loose or broken. Make sure that both sides of any steps have handrails.  · Install guardrails along the edges of any raised decks and porches.  · Clear walking paths of anything that might make someone trip, such as tools or rocks.  · Have any leaves, snow, or ice cleared regularly.  · Use sand or salt on walking paths during winter.  · Clean up any spills in your  garage right away. This includes grease or oil spills.  What other actions can I take?  · Wear shoes that:  ? Have a low heel. Do not wear high heels.  ? Have rubber bottoms.  ? Are comfortable and fit you well.  ? Are closed at the toe. Do not wear open-toe sandals.  · Use tools that help you move around (mobility aids) if they are needed. These include:  ? Canes.  ? Walkers.  ? Scooters.  ? Crutches.  · Review your medicines with your doctor. Some medicines can make you feel dizzy. This can increase your chance of falling.  Ask your doctor what other things you can do to help prevent falls.  Where to find more information  · Centers for Disease Control and Prevention, [employer]: https://cdc.gov  · National [place] on Aging: https://hw9fwqw.lin.nih.gov  Contact a doctor if:  · You are afraid of falling at home.  · You feel weak, drowsy, or dizzy at home.  · You fall at home.  Summary  · There are many simple things that you can do to make your home safe and to help prevent falls.  · Ways to make your home safe include removing tripping hazards and installing grab bars in the bathroom.  · Ask for help when making these changes in your home.  This information is not intended to replace advice given to you by your health care provider. Make sure you discuss any questions you have with your health care provider.  Document Released: 10/14/2010 Document Revised: 08/02/2018 Document Reviewed: 08/02/2018  [employer] Interactive Patient Education © 2019 [employer]

## 2019-04-09 NOTE — PROGRESS NOTES
Initial Medicare Wellness Visit   The ABC's of the Annual Wellness Visit    Chief Complaint   Patient presents with   • Medicare Wellness-Initial Visit       HPI:  Sarah Raphael, -1950, is a 68 y.o. female who presents for an Initial Medicare Wellness Visit.    Recent Hospitalizations:  No hospitalization(s) within the last year..    Current Medical Providers:  Patient Care Team:  Tyrese Recinos MD as PCP - [facility]  [facility]heng [facility] as Consulting Physician (General Surgery)    Health Habits and Functional and Cognitive Screening and Depression Screening:  Functional & Cognitive Status 2019   Do you have difficulty preparing food and eating? No   Do you have difficulty bathing yourself, getting dressed or grooming yourself? No   Do you have difficulty using the toilet? No   Do you have difficulty moving around from place to place? No   Do you have trouble with steps or getting out of a bed or a chair? No   In the past year have you fallen or experienced a near fall? No   Current Diet Well Balanced Diet   Dental Exam Up to date   Eye Exam Up to date   Exercise (times per week) 2 times per week   Current Exercise Activities Include Walking   Do you need help using the phone?  No   Are you deaf or do you have serious difficulty hearing?  No   Do you need help with transportation? No   Do you need help shopping? No   Do you need help preparing meals?  No   Do you need help with housework?  No   Do you need help with laundry? No   Do you need help taking your medications? No   Do you need help managing money? No   Do you ever drive or ride in a car without wearing a seat belt? No   Have you felt unusual stress, anger or loneliness in the last month? No   Who do you live with? Other   If you need help, do you have trouble finding someone available to you? No   Have you been bothered in the last four weeks by sexual problems? No   Do you have difficulty concentrating, remembering or making  decisions? No       Compared to one year ago, the patient feels her physical health is better and her mental health is the same.    Depression Screen:  PHQ-2/PHQ-9 Depression Screening 4/9/2019   Little interest or pleasure in doing things 0   Feeling down, depressed, or hopeless 0   Trouble falling or staying asleep, or sleeping too much -   Feeling tired or having little energy -   Poor appetite or overeating -   Feeling bad about yourself - or that you are a failure or have let yourself or your family down -   Trouble concentrating on things, such as reading the newspaper or watching television -   Moving or speaking so slowly that other people could have noticed. Or the opposite - being so fidgety or restless that you have been moving around a lot more than usual -   Thoughts that you would be better off dead, or of hurting yourself in some way -   Total Score 0   If you checked off any problems, how difficult have these problems made it for you to do your work, take care of things at home, or get along with other people? -         Past Medical/Family/Social History:  The following portions of the patient's history were reviewed and updated as appropriate: allergies, current medications, past family history, past medical history, past social history, past surgical history and problem list.    Allergies   Allergen Reactions   • Nuts Shortness Of Breath     rash   • Other      MSG - HIVES    • Penicillins Shortness Of Breath     rash   • Soybean-Containing Drug Products Shortness Of Breath     rash   • Sunflower Oil Shortness Of Breath     rash   • Apple    • Cefuroxime      Stomach problems   • Cephalexin      Stomach problems   • Chicken Allergy    • Clindamycin Nausea Only and Nausea And Vomiting   • Erythromycin Nausea Only   • Flu Virus Vaccine      Redness and red rash to area   • Gluten Meal    • Metronidazole Hives   • Naproxen Dizziness   • Parsley Seed    • Thimerosal      Redness and swelling at site of  injection   • Varicella Virus Vaccine Live      Redness and swelling at site   • Zostavax [Zoster Vaccine Live]      52223 UNT /0.65 ML subcutaneous solution Recontituted  - redness and swelling at site   • Doxycycline Itching and Rash   • Sulfa Antibiotics Nausea Only and Rash     Other reaction(s): Headache, Vomiting         Current Outpatient Medications:   •  albuterol (PROAIR HFA) 108 (90 Base) MCG/ACT inhaler, Inhale 2 puffs Every 6 (Six) Hours As Needed for Wheezing or Shortness of Air., Disp: 1 inhaler, Rfl: 5  •  amLODIPine (NORVASC) 5 MG tablet, TAKE 1 TABLET BY MOUTH ONCE DAILY, Disp: 90 tablet, Rfl: 1  •  citalopram (CeleXA) 40 MG tablet, TAKE 1 & 1/2 (ONE & ONE-HALF) TABLETS BY MOUTH ONCE DAILY, Disp: 135 tablet, Rfl: 1  •  desloratadine (CLARINEX) 5 MG tablet, Take 5 mg by mouth Daily., Disp: , Rfl:   •  dicyclomine (BENTYL) 10 MG capsule, Take 1 capsule by mouth 4 (Four) Times a Day As Needed (abd bloating). For: Abdominal bloating, Disp: , Rfl:   •  diphenhydrAMINE (BENADRYL) 25 mg capsule, Take 1 capsule by mouth Every 6 (Six) Hours As Needed for Itching or Allergies., Disp: 360 capsule, Rfl: 1  •  diphenhydrAMINE (BENADRYL) 50 MG/ML injection, INJECT ONE MILLILITER AS DIRECTED FOR ALLERGIC REACTION, Disp: 100 mL, Rfl: 3  •  EPINEPHrine (EPIPEN 2-KIRA) 0.3 MG/0.3ML solution auto-injector injection, Inject 1 Units into the shoulder, thigh, or buttocks As Needed (allergy). as directed; For: Allergic rhinitis, Disp: , Rfl:   •  hydrOXYzine (ATARAX) 25 MG tablet, Take 25 mg by mouth At Night As Needed for Allergies. at bedtime as needed, Disp: , Rfl:   •  ibuprofen (ADVIL,MOTRIN) 600 MG tablet, TAKE ONE TABLET BY MOUTH EVERY 8 HOURS AS NEEDED  FOR MILD PAIN., Disp: 270 tablet, Rfl: 1  •  levocetirizine (XYZAL) 5 MG tablet, TAKE ONE TABLET BY MOUTH ONCE DAILY, Disp: 30 tablet, Rfl: 5  •  levothyroxine (SYNTHROID, LEVOTHROID) 200 MCG tablet, Take 1 tablet by mouth Daily., Disp: 90 tablet, Rfl: 1  •   losartan (COZAAR) 100 MG tablet, TAKE ONE TABLET BY MOUTH ONCE DAILY, Disp: 90 tablet, Rfl: 1  •  Triamcinolone Acetonide (NASACORT ALLERGY 24HR) 55 MCG/ACT nasal inhaler, 2 sprays into each nostril daily., Disp: , Rfl:   •  triamterene-hydrochlorothiazide (MAXZIDE-25) 37.5-25 MG per tablet, TAKE 1 TABLET BY MOUTH ONCE DAILY, Disp: 90 tablet, Rfl: 1  •  Azelastine-Fluticasone (DYMISTA) 137-50 MCG/ACT suspension, 2 Squirts into the nostril(s) as directed by provider Daily., Disp: , Rfl:   •  Cholecalciferol (VITAMIN D PO), Take 1,000 Units by mouth daily. Vitamin D 1000 UNIT CAPS; TAKE 1 TABLET DAILY, Disp: , Rfl:   •  Fluticasone Furoate (ARNUITY ELLIPTA) 100 MCG/ACT aerosol powder , Inhale 2 puffs Daily., Disp: , Rfl:   •  guaiFENesin (MUCINEX) 600 MG 12 hr tablet, Take 600 mg by mouth 2 (Two) Times a Day., Disp: , Rfl:   •  Melatonin 10 MG tablet, Take 1 tablet by mouth At Night As Needed (sleep)., Disp: , Rfl:   •  montelukast (SINGULAIR) 10 MG tablet, Take 1 tablet by mouth Every Night., Disp: 90 tablet, Rfl: 1  •  ranitidine (ZANTAC) 150 MG tablet, Take 150 mg by mouth 2 (Two) Times a Day. AM&PM, Disp: , Rfl:   •  tamoxifen (NOLVADEX) 20 MG chemo tablet, Take 1 tablet by mouth Daily., Disp: 90 tablet, Rfl: 1  •  vitamin B-12 (CYANOCOBALAMIN) 1000 MCG tablet, Take 1,000 mcg by mouth Daily., Disp: , Rfl:     Aspirin use counseling: Taking ASA appropriately as indicated    Current medication list contains no high risk medications.  No harmful drug interactions have been identified.     Family History   Problem Relation Age of Onset   • Hypertension Mother    • Hyperlipidemia Mother    • Thyroid disease Mother    • Aneurysm Father    • Heart disease Father    • Breast cancer Neg Hx    • Ovarian cancer Neg Hx        Social History     Tobacco Use   • Smoking status: Never Smoker   • Smokeless tobacco: Never Used   Substance Use Topics   • Alcohol use: Yes     Comment: one or less per week       Past Surgical  History:   Procedure Laterality Date   • COLONOSCOPY      2011   • EYE SURGERY      bilateral cataracts removed   • HYSTERECTOMY  05/1983   • MASTECTOMY Bilateral 09/05/2017   • MASTECTOMY WITH SENTINEL NODE BIOPSY AND AXILLARY NODE DISSECTION Bilateral 9/5/2017    Procedure: LEFT BREAST MASTECTOMY WITH LEFT  SENTINEL NODE BIOPSY AND RIGHT PROPHYLACTIC MASTECTOMY;  Surgeon: Caitlin Green MD;  Location: [facility];  Service:    • OOPHORECTOMY     • SECONDARY INTRAOCULAR LENSE IMPLANTATION  12/2015    also in 12/2016   • SHOULDER ARTHROSCOPY Left 06/04/2014   • SKIN CANCER EXCISION  10/2015    basal cell   • THYROID SURGERY  06/1996   • TONSILLECTOMY  11/1976       Patient Active Problem List   Diagnosis   • Anxiety   • Gastroesophageal reflux disease without esophagitis   • Hyperlipidemia   • Hypertension   • Hypothyroidism   • Cataract   • Cancer of overlapping sites of right female breast (CMS/HCC)   • Estrogen receptor positive       Review of Systems    Objective     Vitals:    04/09/19 1040   BP: 126/76   Pulse: 59   Resp: 16   Temp: 98.5 °F (36.9 °C)   TempSrc: Temporal   SpO2: 98%   Weight: 100 kg (221 lb)   PainSc:   2   PainLoc: Head  Comment: Headache       Patient's Body mass index is 33.6 kg/m². BMI is above normal parameters. Recommendations include: educational material and exercise counseling. Discussed obesity and associated risks.       No exam data present    The patient has no evidence of cognitve impairment.     Physical Exam    Recent Lab Results:  Lab Results   Component Value Date     (H) 02/06/2018     Lab Results   Component Value Date    CHOL 199 11/13/2018    TRIG 132 11/13/2018    HDL 59 11/13/2018    VLDL 39 02/06/2018    LDLHDL 3.10 11/09/2016         Assessment/Plan   Age-appropriate Screening Schedule:  Refer to the list below for future screening recommendations based on patient's age, sex and/or medical conditions.      Health Maintenance   Topic Date Due   • ZOSTER  VACCINE (2 of 2) 09/26/2016   • MAMMOGRAM  05/09/2019   • INFLUENZA VACCINE  08/01/2019   • LIPID PANEL  11/13/2019   • TDAP/TD VACCINES (2 - Td) 11/01/2022   • COLONOSCOPY  01/22/2029   • PNEUMOCOCCAL VACCINES (65+ LOW/MEDIUM RISK)  Completed       Medicare Risks and Personalized Health Plan:  Cardiovascular risk;  Patient advised to follow heart healthy diet to help decrease cardiovascular risk   Obesity/Overweight condition;  Educational material supplied and discussed risks and recommendations.  Polypharmacy; Patient advised to followup with her primary care provider       CMS-Preventive Services Quick Reference  Medicare Preventive Services Addressed:  Annual Wellness Visit (AWV)    Advance Care Planning:  Patient has an advance directive - a copy has been provided and is visible in patient header    Diagnoses and all orders for this visit:    1. Medicare annual wellness visit, initial (Primary)        An After Visit Summary and PPPS with all of these plans were given to the patient.      Follow Up:  Return if symptoms worsen or fail to improve, for F/U for Subsequent Wellness in one year & a day.

## 2019-04-11 RX ORDER — DIPHENHYDRAMINE HYDROCHLORIDE 50 MG/ML
INJECTION INTRAMUSCULAR; INTRAVENOUS
Qty: 10 ML | Refills: 5 | Status: SHIPPED | OUTPATIENT
Start: 2019-04-11 | End: 2019-07-08 | Stop reason: SDUPTHER

## 2019-05-16 ENCOUNTER — OFFICE VISIT (OUTPATIENT)
Dept: INTERNAL MEDICINE | Facility: CLINIC | Age: 69
End: 2019-05-16

## 2019-05-16 VITALS
OXYGEN SATURATION: 96 % | RESPIRATION RATE: 16 BRPM | DIASTOLIC BLOOD PRESSURE: 72 MMHG | WEIGHT: 220 LBS | BODY MASS INDEX: 33.34 KG/M2 | TEMPERATURE: 97.7 F | SYSTOLIC BLOOD PRESSURE: 118 MMHG | HEIGHT: 68 IN | HEART RATE: 67 BPM

## 2019-05-16 DIAGNOSIS — K21.9 GASTROESOPHAGEAL REFLUX DISEASE WITHOUT ESOPHAGITIS: ICD-10-CM

## 2019-05-16 DIAGNOSIS — I10 ESSENTIAL HYPERTENSION: Primary | ICD-10-CM

## 2019-05-16 DIAGNOSIS — E03.8 OTHER SPECIFIED HYPOTHYROIDISM: ICD-10-CM

## 2019-05-16 DIAGNOSIS — E78.5 HYPERLIPIDEMIA, UNSPECIFIED HYPERLIPIDEMIA TYPE: ICD-10-CM

## 2019-05-16 DIAGNOSIS — F32.A DEPRESSION, UNSPECIFIED DEPRESSION TYPE: ICD-10-CM

## 2019-05-16 LAB
ALBUMIN SERPL-MCNC: 4.1 G/DL (ref 3.5–5.2)
ALBUMIN/GLOB SERPL: 1.4 G/DL
ALP SERPL-CCNC: 71 U/L (ref 39–117)
ALT SERPL W P-5'-P-CCNC: 21 U/L (ref 1–33)
ANION GAP SERPL CALCULATED.3IONS-SCNC: 12.7 MMOL/L
AST SERPL-CCNC: 26 U/L (ref 1–32)
BILIRUB SERPL-MCNC: 0.4 MG/DL (ref 0.2–1.2)
BUN BLD-MCNC: 13 MG/DL (ref 8–23)
BUN/CREAT SERPL: 13 (ref 7–25)
CALCIUM SPEC-SCNC: 9 MG/DL (ref 8.6–10.5)
CHLORIDE SERPL-SCNC: 101 MMOL/L (ref 98–107)
CHOLEST SERPL-MCNC: 178 MG/DL (ref 0–200)
CO2 SERPL-SCNC: 26.3 MMOL/L (ref 22–29)
CREAT BLD-MCNC: 1 MG/DL (ref 0.57–1)
GFR SERPL CREATININE-BSD FRML MDRD: 55 ML/MIN/1.73
GLOBULIN UR ELPH-MCNC: 3 GM/DL
GLUCOSE BLD-MCNC: 113 MG/DL (ref 65–99)
HDLC SERPL-MCNC: 44 MG/DL (ref 40–60)
LDLC SERPL CALC-MCNC: 97 MG/DL (ref 0–100)
LDLC/HDLC SERPL: 2.2 {RATIO}
POTASSIUM BLD-SCNC: 4 MMOL/L (ref 3.5–5.2)
PROT SERPL-MCNC: 7.1 G/DL (ref 6–8.5)
SODIUM BLD-SCNC: 140 MMOL/L (ref 136–145)
T4 FREE SERPL-MCNC: 2.03 NG/DL (ref 0.93–1.7)
TRIGL SERPL-MCNC: 186 MG/DL (ref 0–150)
TSH SERPL DL<=0.05 MIU/L-ACNC: 0.11 MIU/ML (ref 0.27–4.2)
VLDLC SERPL-MCNC: 37.2 MG/DL (ref 5–40)

## 2019-05-16 PROCEDURE — 84443 ASSAY THYROID STIM HORMONE: CPT | Performed by: INTERNAL MEDICINE

## 2019-05-16 PROCEDURE — 80061 LIPID PANEL: CPT | Performed by: INTERNAL MEDICINE

## 2019-05-16 PROCEDURE — 84439 ASSAY OF FREE THYROXINE: CPT | Performed by: INTERNAL MEDICINE

## 2019-05-16 PROCEDURE — 99214 OFFICE O/P EST MOD 30 MIN: CPT | Performed by: INTERNAL MEDICINE

## 2019-05-16 PROCEDURE — 80053 COMPREHEN METABOLIC PANEL: CPT | Performed by: INTERNAL MEDICINE

## 2019-05-16 PROCEDURE — 36415 COLL VENOUS BLD VENIPUNCTURE: CPT | Performed by: INTERNAL MEDICINE

## 2019-05-16 RX ORDER — ZOSTER VACCINE RECOMBINANT, ADJUVANTED 50 MCG/0.5
KIT INTRAMUSCULAR
Refills: 0 | COMMUNITY
Start: 2019-04-10 | End: 2019-05-16

## 2019-05-16 RX ORDER — PANTOPRAZOLE SODIUM 40 MG/1
40 TABLET, DELAYED RELEASE ORAL DAILY
Qty: 30 TABLET | Refills: 5 | Status: SHIPPED | OUTPATIENT
Start: 2019-05-16 | End: 2019-09-16 | Stop reason: SDUPTHER

## 2019-05-16 NOTE — PROGRESS NOTES
Chief Complaint   Patient presents with   • Hypothyroidism     6 month F/U   • Hyperlipidemia     6 month F/U   • Hypertension     6 month F/U, discuss medication   • Heartburn     x1 month, worsening, zantac not helping, bland diet, persistant, nausea       History of Present Illness      The patient presents for a follow-up related to hyperlipidemia. She is following a low fat diet. She reports that she is exercising. She is not taking medication for hyperlipidemia. She denies chest pain, shortness of breath, orthopnea, paroxysmal nocturnal dyspnea, dyspnea on exertion, edema, palpitations or syncope.    The patient presents for a follow-up related to hypertension. The patient reports that she has had no headaches or blurred vision. She states that she is taking her medication as prescribed. She is not having medication side effects.    The patient presents for a follow-up related to GERD. The patient is on ranitidine for her gastroesophageal reflux. The medication is taken on a regular basis and does not give relief of the symptoms. She reports belching and heartburn but she denies abdominal pain, diarrhea, dysphagia, early satiety, hoarseness, nausea, odynophagia, rectal bleeding, vomiting or weight loss. The GERD has no known aggravating factors.    The patient presents for a follow-up related to hypothyroidism. She reports a good energy level. She reports no hair loss, heat intolerance, cold intolerance, constipation or sweats. She is taking her medication as prescribed.    The patient presents for follow-up of depression. She denies currently having depression symptoms. She denies suicidal ideation. Her energy level is good. She denies agorophobia. She sleeps well. She is not tearful. She states that her current depression symptoms are stable. She is currently taking a medication. The current medication regimen consists of Celexa. The patient denies having medication side effects including nausea, headaches,  anxiety, increased depression, anorgasmia or fatigue.    Review of Systems    CONSTITUTIONAL- Denies Fever, Chills, Sweats, Weakness or Malaise.    HENT- Denies Nasal Discharge, Sore Throat, Ear Pain, Ear Drainage, Sinus Pain, Nasal Congestion, Decreased Hearing or Tinnitus.    PULMONARY- Denies Wheezing, Sputum Production, Cough, Hemoptysis or Pleuritic Chest Pain.    CARDIOVASCULAR- Denies Claudication or Irregular Heart Beat.    ENDOCRINE- Denies Depression, Memory Loss, Polydypsia, Polyphagia, Polyuria or Weight Gain.    PSYCHIATRIC- Denies Anxiety, Loneliness or Hopelessness.    Medications      Current Outpatient Medications:   •  albuterol (PROAIR HFA) 108 (90 Base) MCG/ACT inhaler, Inhale 2 puffs Every 6 (Six) Hours As Needed for Wheezing or Shortness of Air., Disp: 1 inhaler, Rfl: 5  •  amLODIPine (NORVASC) 5 MG tablet, TAKE 1 TABLET BY MOUTH ONCE DAILY, Disp: 90 tablet, Rfl: 1  •  Azelastine-Fluticasone (DYMISTA) 137-50 MCG/ACT suspension, 2 Squirts into the nostril(s) as directed by provider Daily., Disp: , Rfl:   •  Cholecalciferol (VITAMIN D PO), Take 1,000 Units by mouth daily. Vitamin D 1000 UNIT CAPS; TAKE 1 TABLET DAILY, Disp: , Rfl:   •  citalopram (CeleXA) 40 MG tablet, TAKE 1 & 1/2 (ONE & ONE-HALF) TABLETS BY MOUTH ONCE DAILY, Disp: 135 tablet, Rfl: 1  •  desloratadine (CLARINEX) 5 MG tablet, Take 5 mg by mouth Daily., Disp: , Rfl:   •  dicyclomine (BENTYL) 10 MG capsule, Take 1 capsule by mouth 4 (Four) Times a Day As Needed (abd bloating). For: Abdominal bloating, Disp: , Rfl:   •  diphenhydrAMINE (BENADRYL) 25 mg capsule, Take 1 capsule by mouth Every 6 (Six) Hours As Needed for Itching or Allergies., Disp: 360 capsule, Rfl: 1  •  diphenhydrAMINE (BENADRYL) 50 MG/ML injection, INJECT 1 ML AS DIRECTED FOR ALLERGIC REACTION., Disp: 10 mL, Rfl: 5  •  guaiFENesin (MUCINEX) 600 MG 12 hr tablet, Take 600 mg by mouth 2 (Two) Times a Day., Disp: , Rfl:   •  hydrOXYzine (ATARAX) 25 MG tablet, Take 25  mg by mouth At Night As Needed for Allergies. at bedtime as needed, Disp: , Rfl:   •  ibuprofen (ADVIL,MOTRIN) 600 MG tablet, TAKE ONE TABLET BY MOUTH EVERY 8 HOURS AS NEEDED  FOR MILD PAIN., Disp: 270 tablet, Rfl: 1  •  levocetirizine (XYZAL) 5 MG tablet, TAKE ONE TABLET BY MOUTH ONCE DAILY, Disp: 30 tablet, Rfl: 5  •  levothyroxine (SYNTHROID, LEVOTHROID) 200 MCG tablet, Take 1 tablet by mouth Daily., Disp: 90 tablet, Rfl: 1  •  losartan (COZAAR) 100 MG tablet, TAKE ONE TABLET BY MOUTH ONCE DAILY, Disp: 90 tablet, Rfl: 1  •  magnesium oxide (MAG-OX) 400 MG tablet, Take 400 mg by mouth Daily., Disp: , Rfl:   •  Melatonin 10 MG tablet, Take 1 tablet by mouth At Night As Needed (sleep)., Disp: , Rfl:   •  montelukast (SINGULAIR) 10 MG tablet, Take 1 tablet by mouth Every Night., Disp: 90 tablet, Rfl: 1  •  ranitidine (ZANTAC) 150 MG tablet, Take 150 mg by mouth 2 (Two) Times a Day. AM&PM, Disp: , Rfl:   •  tamoxifen (NOLVADEX) 20 MG chemo tablet, Take 1 tablet by mouth Daily., Disp: 90 tablet, Rfl: 1  •  triamterene-hydrochlorothiazide (MAXZIDE-25) 37.5-25 MG per tablet, TAKE 1 TABLET BY MOUTH ONCE DAILY, Disp: 90 tablet, Rfl: 1  •  vitamin B-12 (CYANOCOBALAMIN) 1000 MCG tablet, Take 1,000 mcg by mouth Daily., Disp: , Rfl:   •  EPINEPHrine (EPIPEN 2-KIRA) 0.3 MG/0.3ML solution auto-injector injection, Inject 1 Units into the shoulder, thigh, or buttocks As Needed (allergy). as directed; For: Allergic rhinitis, Disp: , Rfl:   •  Fluticasone Furoate (ARNUITY ELLIPTA) 100 MCG/ACT aerosol powder , Inhale 2 puffs Daily., Disp: , Rfl:      Allergies    Allergies   Allergen Reactions   • Nuts Shortness Of Breath     rash   • Other      MSG - HIVES    • Penicillins Shortness Of Breath     rash   • Soybean-Containing Drug Products Shortness Of Breath     rash   • Sunflower Oil Shortness Of Breath     rash   • Apple    • Cefuroxime      Stomach problems   • Cephalexin      Stomach problems   • Chicken Allergy    • Clindamycin  "Nausea Only and Nausea And Vomiting   • Erythromycin Nausea Only   • Flu Virus Vaccine      Redness and red rash to area   • Gluten Meal    • Metronidazole Hives   • Naproxen Dizziness   • Parsley Seed    • Thimerosal      Redness and swelling at site of injection   • Varicella Virus Vaccine Live      Redness and swelling at site   • Zostavax [Zoster Vaccine Live]      49006 UNT /0.65 ML subcutaneous solution Recontituted  - redness and swelling at site   • Doxycycline Itching and Rash   • Sulfa Antibiotics Nausea Only and Rash     Other reaction(s): Headache, Vomiting       Problem List    Patient Active Problem List   Diagnosis   • Anxiety   • Gastroesophageal reflux disease without esophagitis   • Hyperlipidemia   • Hypertension   • Hypothyroidism   • Cataract   • Cancer of overlapping sites of right female breast (CMS/HCC)   • Estrogen receptor positive       Medications, Allergies, Problems List and Past History were reviewed and updated.    Physical Examination    /72 (BP Location: Left arm, Patient Position: Sitting, Cuff Size: Adult)   Pulse 67   Temp 97.7 °F (36.5 °C) (Temporal)   Resp 16   Ht 172.7 cm (68\")   Wt 99.8 kg (220 lb)   LMP  (LMP Unknown) Comment: LAST PAP 3/18 BY DR SANDERS  SpO2 96%   BMI 33.45 kg/m²     HEENT: Head- Normocephalic Atraumatic. Facies- Within normal limits. Pinnas- Normal texture and shape bilaterally. Canals- Normal bilaterally. TMs- Normal bilaterally. Nares- Patent bilaterally. Nasal Septum- is normal. There is no tenderness to palpation over the frontal or maxillary sinuses. Lids- Normal bilaterally. Conjunctiva- Clear bilaterally. Sclera- Anicteric bilaterally. Oropharynx- Moist with no lesions. Tonsils- No enlargement, erythema or exudate.    Neck: Thyroid- non enlarged, symmetric and has no nodules. No bruits are detected. ROM- Normal Range of Motion with no rigidity.    Lungs: Auscultation- Clear to auscultation bilaterally. There are no retractions, clubbing " or cyanosis. The Expiratory to Inspiratory ratio is equal.    Cardiovascular: There are no carotid bruits. Heart- Normal Rate with Regular rhythm and no murmurs. There are no gallops. There are no rubs. In the lower extremities there is no edema. The upper extremities do not have edema.    Abdomen: Soft, benign, non-tender with no masses, hernias, organomegaly or scars.    Impression and Assessment    Hyperlipidemia.    Essential Hypertension.    Gastroesophageal Reflux Disease.    Hypothyroidism.    Major Depressive Disorder Single Episode Unspecified.    Plan    Gastroesophageal Reflux Disease Plan: The current plan was continued.    Hyperlipidemia Plan: The patient was instructed to exercise daily and eat a low fat diet.    Essential Hypertension Plan: The current plan was continued.    Hypothyroidism Plan: The current plan was continued.    Major Depressive Disorder Single Episode Unspecified Plan: The current plan was continued.    Sarah was seen today for hypothyroidism, hyperlipidemia, hypertension and heartburn.    Diagnoses and all orders for this visit:    Essential hypertension  -     Comprehensive Metabolic Panel    Hyperlipidemia, unspecified hyperlipidemia type  -     Comprehensive Metabolic Panel  -     Lipid Panel    Gastroesophageal reflux disease without esophagitis  -     Comprehensive Metabolic Panel  -     Ambulatory referral for Screening EGD  -     pantoprazole (PROTONIX) 40 MG EC tablet; Take 1 tablet by mouth Daily.    Other specified hypothyroidism  -     TSH  -     T4, Free    Depression, unspecified depression type        Return to Office    The patient was instructed to return for follow-up in 4 months.    The patient was instructed to return sooner if the condition changes, worsens, or does not resolve.

## 2019-06-03 RX ORDER — LEVOTHYROXINE SODIUM 175 UG/1
175 TABLET ORAL DAILY
Qty: 30 TABLET | Refills: 5 | Status: SHIPPED | OUTPATIENT
Start: 2019-06-03 | End: 2020-01-20

## 2019-07-08 RX ORDER — LOSARTAN POTASSIUM 100 MG/1
TABLET ORAL
Qty: 90 TABLET | Refills: 1 | Status: SHIPPED | OUTPATIENT
Start: 2019-07-08 | End: 2019-09-16 | Stop reason: SDUPTHER

## 2019-07-08 RX ORDER — DIPHENHYDRAMINE HYDROCHLORIDE 50 MG/ML
INJECTION INTRAMUSCULAR; INTRAVENOUS
Qty: 10 ML | Refills: 5 | Status: SHIPPED | OUTPATIENT
Start: 2019-07-08 | End: 2019-09-16 | Stop reason: SDUPTHER

## 2019-09-09 ENCOUNTER — OFFICE VISIT (OUTPATIENT)
Dept: INTERNAL MEDICINE | Facility: CLINIC | Age: 69
End: 2019-09-09

## 2019-09-09 ENCOUNTER — HOSPITAL ENCOUNTER (OUTPATIENT)
Dept: GENERAL RADIOLOGY | Facility: HOSPITAL | Age: 69
Discharge: HOME | End: 2019-09-09
Admitting: NURSE PRACTITIONER

## 2019-09-09 VITALS
HEART RATE: 62 BPM | WEIGHT: 214 LBS | OXYGEN SATURATION: 95 % | SYSTOLIC BLOOD PRESSURE: 116 MMHG | HEIGHT: 68 IN | RESPIRATION RATE: 16 BRPM | BODY MASS INDEX: 32.43 KG/M2 | DIASTOLIC BLOOD PRESSURE: 70 MMHG | TEMPERATURE: 97.6 F

## 2019-09-09 DIAGNOSIS — Z01.818 PREOP EXAMINATION: Primary | ICD-10-CM

## 2019-09-09 LAB
ALBUMIN SERPL-MCNC: 4.1 G/DL (ref 3.5–5.2)
ALBUMIN/GLOB SERPL: 1.4 G/DL
ALP SERPL-CCNC: 79 U/L (ref 39–117)
ALT SERPL W P-5'-P-CCNC: 23 U/L (ref 1–33)
ANION GAP SERPL CALCULATED.3IONS-SCNC: 12.8 MMOL/L (ref 5–15)
AST SERPL-CCNC: 25 U/L (ref 1–32)
BASOPHILS # BLD AUTO: 0.05 10*3/MM3 (ref 0–0.2)
BASOPHILS NFR BLD AUTO: 0.7 % (ref 0–1.5)
BILIRUB BLD-MCNC: ABNORMAL MG/DL
BILIRUB SERPL-MCNC: 0.3 MG/DL (ref 0.2–1.2)
BUN BLD-MCNC: 14 MG/DL (ref 8–23)
BUN/CREAT SERPL: 14.6 (ref 7–25)
CALCIUM SPEC-SCNC: 9.2 MG/DL (ref 8.6–10.5)
CHLORIDE SERPL-SCNC: 94 MMOL/L (ref 98–107)
CLARITY, POC: CLEAR
CO2 SERPL-SCNC: 26.2 MMOL/L (ref 22–29)
COLOR UR: YELLOW
CREAT BLD-MCNC: 0.96 MG/DL (ref 0.57–1)
DEPRECATED RDW RBC AUTO: 45.7 FL (ref 37–54)
EOSINOPHIL # BLD AUTO: 0.12 10*3/MM3 (ref 0–0.4)
EOSINOPHIL NFR BLD AUTO: 1.7 % (ref 0.3–6.2)
ERYTHROCYTE [DISTWIDTH] IN BLOOD BY AUTOMATED COUNT: 14.4 % (ref 12.3–15.4)
EXPIRATION DATE: ABNORMAL
GFR SERPL CREATININE-BSD FRML MDRD: 58 ML/MIN/1.73
GLOBULIN UR ELPH-MCNC: 3 GM/DL
GLUCOSE BLD-MCNC: 98 MG/DL (ref 65–99)
GLUCOSE UR STRIP-MCNC: NEGATIVE MG/DL
HCT VFR BLD AUTO: 42.3 % (ref 34–46.6)
HGB BLD-MCNC: 13.4 G/DL (ref 12–15.9)
IMM GRANULOCYTES # BLD AUTO: 0.03 10*3/MM3 (ref 0–0.05)
IMM GRANULOCYTES NFR BLD AUTO: 0.4 % (ref 0–0.5)
KETONES UR QL: NEGATIVE
LEUKOCYTE EST, POC: NEGATIVE
LYMPHOCYTES # BLD AUTO: 2.08 10*3/MM3 (ref 0.7–3.1)
LYMPHOCYTES NFR BLD AUTO: 28.9 % (ref 19.6–45.3)
Lab: ABNORMAL
MCH RBC QN AUTO: 27.3 PG (ref 26.6–33)
MCHC RBC AUTO-ENTMCNC: 31.7 G/DL (ref 31.5–35.7)
MCV RBC AUTO: 86.3 FL (ref 79–97)
MONOCYTES # BLD AUTO: 0.45 10*3/MM3 (ref 0.1–0.9)
MONOCYTES NFR BLD AUTO: 6.3 % (ref 5–12)
NEUTROPHILS # BLD AUTO: 4.46 10*3/MM3 (ref 1.7–7)
NEUTROPHILS NFR BLD AUTO: 62 % (ref 42.7–76)
NITRITE UR-MCNC: NEGATIVE MG/ML
NRBC BLD AUTO-RTO: 0 /100 WBC (ref 0–0.2)
PH UR: 6 [PH] (ref 5–8)
PLATELET # BLD AUTO: 249 10*3/MM3 (ref 140–450)
PMV BLD AUTO: 10.4 FL (ref 6–12)
POTASSIUM BLD-SCNC: 3.5 MMOL/L (ref 3.5–5.2)
PROT SERPL-MCNC: 7.1 G/DL (ref 6–8.5)
PROT UR STRIP-MCNC: NEGATIVE MG/DL
RBC # BLD AUTO: 4.9 10*6/MM3 (ref 3.77–5.28)
RBC # UR STRIP: NEGATIVE /UL
SODIUM BLD-SCNC: 133 MMOL/L (ref 136–145)
SP GR UR: 1.01 (ref 1–1.03)
UROBILINOGEN UR QL: NORMAL
WBC NRBC COR # BLD: 7.19 10*3/MM3 (ref 3.4–10.8)

## 2019-09-09 PROCEDURE — 85025 COMPLETE CBC W/AUTO DIFF WBC: CPT | Performed by: NURSE PRACTITIONER

## 2019-09-09 PROCEDURE — 36415 COLL VENOUS BLD VENIPUNCTURE: CPT | Performed by: NURSE PRACTITIONER

## 2019-09-09 PROCEDURE — 80053 COMPREHEN METABOLIC PANEL: CPT | Performed by: NURSE PRACTITIONER

## 2019-09-09 PROCEDURE — 81003 URINALYSIS AUTO W/O SCOPE: CPT | Performed by: NURSE PRACTITIONER

## 2019-09-09 PROCEDURE — 93000 ELECTROCARDIOGRAM COMPLETE: CPT | Performed by: NURSE PRACTITIONER

## 2019-09-09 PROCEDURE — 71046 X-RAY EXAM CHEST 2 VIEWS: CPT

## 2019-09-09 PROCEDURE — 99213 OFFICE O/P EST LOW 20 MIN: CPT | Performed by: NURSE PRACTITIONER

## 2019-09-09 RX ORDER — ZOSTER VACCINE RECOMBINANT, ADJUVANTED 50 MCG/0.5
KIT INTRAMUSCULAR
Refills: 0 | COMMUNITY
Start: 2019-06-16 | End: 2019-09-09

## 2019-09-09 NOTE — PROGRESS NOTES
Chief Complaint: Pre-operative Evaluation.    Pre-Op Visit: The patient is being seen for a pre-operative visit.  The procedure is arthroscopic meniscal repair on right knee scheduled for 9/19/2019 with Dr Beatriz Dozier, [facility].  The indication for surgery is right meniscal tear.  The procedure is to be performed under general  Anesthesia.    History obtained from patient.       Surgical Risk Assessment:     Prior Anesthesia: He/She had prior anesthesia and no prior adverse reaction to general anesthesia.     Pertinent Past Medical History: No CAD, CHF, NIDDM, CVA, Asthma, COPD,  RIVAS, or Renal Disease. Does not use anticoagulants. HTN controlled with medicine.     Exercise Capacity: able to walk four blocks without symptoms and able to walk two flights of stairs without symptoms.     Lifestyle Factors: denies alcohol use-glass of wine on occasion, denies tobacco use and denies illegal drug use.    Symptoms: no easy bleeding, no easy bruising, no frequent nosebleeds, no chest pain, no cough, no dyspnea, no edema, no palpitations and no wheezing.     Pertinent Family History: No ischemic heart disease,  no family history of an adverse reaction to anesthesia, no aneurysm, no bleeding problems, no sudden early deaths and no stroke.     Living Situation: home is secure and supportive and no post-op concerns with his/her living situation.       Patient Active Problem List   Diagnosis   • Anxiety   • Gastroesophageal reflux disease without esophagitis   • Hyperlipidemia   • Hypertension   • Hypothyroidism   • Cataract   • Cancer of overlapping sites of right female breast (CMS/HCC)   • Estrogen receptor positive       Current Outpatient Medications on File Prior to Visit   Medication Sig Dispense Refill   • albuterol (PROAIR HFA) 108 (90 Base) MCG/ACT inhaler Inhale 2 puffs Every 6 (Six) Hours As Needed for Wheezing or Shortness of Air. 1 inhaler 5   • amLODIPine (NORVASC) 5 MG tablet TAKE 1 TABLET BY  MOUTH ONCE DAILY 90 tablet 1   • Azelastine-Fluticasone (DYMISTA) 137-50 MCG/ACT suspension 2 Squirts into the nostril(s) as directed by provider Daily.     • Cholecalciferol (VITAMIN D PO) Take 1,000 Units by mouth daily. Vitamin D 1000 UNIT CAPS; TAKE 1 TABLET DAILY     • citalopram (CeleXA) 40 MG tablet TAKE 1 & 1/2 (ONE & ONE-HALF) TABLETS BY MOUTH ONCE DAILY 135 tablet 1   • diphenhydrAMINE (BENADRYL) 25 mg capsule Take 1 capsule by mouth Every 6 (Six) Hours As Needed for Itching or Allergies. 360 capsule 1   • diphenhydrAMINE (BENADRYL) 50 MG/ML injection INJECT 1 ML  AS DIRECTED FOR ALLERGIC REACTION 10 mL 5   • Fluticasone Furoate (ARNUITY ELLIPTA) 100 MCG/ACT aerosol powder  Inhale 2 puffs Daily.     • guaiFENesin (MUCINEX) 600 MG 12 hr tablet Take 600 mg by mouth 2 (Two) Times a Day.     • hydrOXYzine (ATARAX) 25 MG tablet Take 25 mg by mouth At Night As Needed for Allergies. at bedtime as needed     • ibuprofen (ADVIL,MOTRIN) 600 MG tablet TAKE ONE TABLET BY MOUTH EVERY 8 HOURS AS NEEDED  FOR MILD PAIN. 270 tablet 1   • levothyroxine (SYNTHROID, LEVOTHROID) 175 MCG tablet Take 1 tablet by mouth Daily. 30 tablet 5   • losartan (COZAAR) 100 MG tablet TAKE 1 TABLET BY MOUTH ONCE DAILY 90 tablet 1   • magnesium oxide (MAG-OX) 400 MG tablet Take 400 mg by mouth Daily.     • Melatonin 10 MG tablet Take 1 tablet by mouth At Night As Needed (sleep).     • montelukast (SINGULAIR) 10 MG tablet Take 1 tablet by mouth Every Night. 90 tablet 1   • pantoprazole (PROTONIX) 40 MG EC tablet Take 1 tablet by mouth Daily. 30 tablet 5   • tamoxifen (NOLVADEX) 20 MG chemo tablet Take 1 tablet by mouth Daily. 90 tablet 1   • triamterene-hydrochlorothiazide (MAXZIDE-25) 37.5-25 MG per tablet TAKE 1 TABLET BY MOUTH ONCE DAILY 90 tablet 1   • vitamin B-12 (CYANOCOBALAMIN) 1000 MCG tablet Take 1,000 mcg by mouth Daily.     • desloratadine (CLARINEX) 5 MG tablet Take 5 mg by mouth Daily.     • dicyclomine (BENTYL) 10 MG capsule  Take 1 capsule by mouth 4 (Four) Times a Day As Needed (abd bloating). For: Abdominal bloating     • EPINEPHrine (EPIPEN 2-KIRA) 0.3 MG/0.3ML solution auto-injector injection Inject 1 Units into the shoulder, thigh, or buttocks As Needed (allergy). as directed; For: Allergic rhinitis     • levocetirizine (XYZAL) 5 MG tablet TAKE ONE TABLET BY MOUTH ONCE DAILY 30 tablet 5   • [DISCONTINUED] SHINGRIX 50 MCG/0.5ML reconstituted suspension PHARMACIST ADMINISTERED IMMUNIZATION ADMINISTERED AT TIME OF DISPENSING  0     No current facility-administered medications on file prior to visit.        Allergies   Allergen Reactions   • Nuts Shortness Of Breath     rash   • Other      MSG - HIVES    • Penicillins Shortness Of Breath     rash   • Soybean-Containing Drug Products Shortness Of Breath     rash   • Sunflower Oil Shortness Of Breath     rash   • Apple    • Cefuroxime      Stomach problems   • Cephalexin      Stomach problems   • Chicken Allergy    • Clindamycin Nausea Only and Nausea And Vomiting   • Erythromycin Nausea Only   • Flu Virus Vaccine      Redness and red rash to area   • Gluten Meal    • Metronidazole Hives   • Naproxen Dizziness   • Parsley Seed    • Thimerosal      Redness and swelling at site of injection   • Varicella Virus Vaccine Live      Redness and swelling at site   • Zostavax [Zoster Vaccine Live]      71962 UNT /0.65 ML subcutaneous solution Recontituted  - redness and swelling at site   • Doxycycline Itching and Rash   • Sulfa Antibiotics Nausea Only and Rash     Other reaction(s): Headache, Vomiting       Past Surgical History:   Procedure Laterality Date   • COLONOSCOPY      2011   • EYE SURGERY      bilateral cataracts removed   • HYSTERECTOMY  05/1983   • MASTECTOMY Bilateral 09/05/2017   • MASTECTOMY WITH SENTINEL NODE BIOPSY AND AXILLARY NODE DISSECTION Bilateral 9/5/2017    Procedure: LEFT BREAST MASTECTOMY WITH LEFT  SENTINEL NODE BIOPSY AND RIGHT PROPHYLACTIC MASTECTOMY;  Surgeon: Caitlin  "ELIZABETH Green MD;  Location: [facility];  Service:    • OOPHORECTOMY     • SECONDARY INTRAOCULAR LENSE IMPLANTATION  12/2015    also in 12/2016   • SHOULDER ARTHROSCOPY Left 06/04/2014   • SKIN CANCER EXCISION  10/2015    basal cell   • THYROID SURGERY  06/1996   • TONSILLECTOMY  11/1976       Family History   Problem Relation Age of Onset   • Hypertension Mother    • Hyperlipidemia Mother    • Thyroid disease Mother    • Aneurysm Father    • Heart disease Father    • Breast cancer Neg Hx    • Ovarian cancer Neg Hx        Social History     Socioeconomic History   • Marital status:      Spouse name: Not on file   • Number of children: Not on file   • Years of education: Not on file   • Highest education level: Not on file   Tobacco Use   • Smoking status: Never Smoker   • Smokeless tobacco: Never Used   Substance and Sexual Activity   • Alcohol use: Yes     Comment: one or less per week   • Drug use: No   • Sexual activity: Defer       Review of Systems   Constitutional: Negative for fatigue and unexpected weight change.   Eyes: Negative for visual disturbance.   Respiratory: Negative for cough, shortness of breath and wheezing.    Cardiovascular: Negative for chest pain, palpitations and leg swelling.        No GRIER, orthopnea, or claudication.   Gastrointestinal: Negative for abdominal pain, blood in stool, constipation, diarrhea, nausea and vomiting.        Denies melena.   Endocrine: Negative for polydipsia and polyuria.   Musculoskeletal: Positive for gait problem. Negative for arthralgias and myalgias.        Right knee pain   Neurological: Negative for dizziness, syncope, light-headedness and headaches.        No memory issues.   Psychiatric/Behavioral: Negative for decreased concentration.       PHYSICAL EXAM:    /70 (BP Location: Right arm, Patient Position: Sitting, Cuff Size: Adult)   Pulse 62   Temp 97.6 °F (36.4 °C) (Temporal)   Resp 16   Ht 172.7 cm (68\")   Wt 97.1 kg (214 lb)   LMP  " (LMP Unknown) Comment: LAST PAP 3/18 BY DR SANDERS  SpO2 95%   BMI 32.54 kg/m²     Physical Exam   Constitutional: She is oriented to person, place, and time. She appears well-developed and well-nourished. She is active and cooperative. She is easily aroused.  Non-toxic appearance. She does not have a sickly appearance. She does not appear ill. No distress.   HENT:   Head: Normocephalic and atraumatic. Head is without abrasion. Hair is normal.   Right Ear: Hearing, tympanic membrane, external ear and ear canal normal. No foreign bodies. Tympanic membrane is not perforated and not erythematous.   Left Ear: Hearing, tympanic membrane, external ear and ear canal normal. No foreign bodies. Tympanic membrane is not perforated and not erythematous.   Nose: Nose normal. No mucosal edema, rhinorrhea or septal deviation. No epistaxis.  No foreign bodies.   Mouth/Throat: Uvula is midline, oropharynx is clear and moist and mucous membranes are normal. No oral lesions. Normal dentition.   Eyes: Conjunctivae and lids are normal. Pupils are equal, round, and reactive to light. Right eye exhibits no discharge. Left eye exhibits no discharge. Right conjunctiva is not injected. Left conjunctiva is not injected. No scleral icterus.   Neck: Normal range of motion and full passive range of motion without pain. Neck supple. No edema and normal range of motion present. No thyroid mass and no thyromegaly present.   Cardiovascular: Normal rate, regular rhythm and normal heart sounds. Exam reveals no gallop and no friction rub.   No murmur heard.  Pulmonary/Chest: Effort normal and breath sounds normal. No accessory muscle usage. She has no rhonchi. She has no rales. She exhibits no tenderness.   Abdominal: Soft. Bowel sounds are normal. She exhibits no distension. There is no hepatosplenomegaly or hepatomegaly. There is no tenderness. There is no CVA tenderness.   Musculoskeletal: She exhibits no edema or deformity.        Right knee: She  exhibits decreased range of motion and swelling. Tenderness found. Lateral joint line tenderness noted.   Using rolling walker for ambulation   Lymphadenopathy:     She has no cervical adenopathy.   Neurological: She is alert, oriented to person, place, and time and easily aroused. No cranial nerve deficit. Coordination normal.   Skin: Skin is warm, dry and intact. No abrasion and no rash noted. She is not diaphoretic. No cyanosis or erythema. Nails show no clubbing.   Psychiatric: She has a normal mood and affect. Her speech is normal and behavior is normal.   Nursing note and vitals reviewed.      Assessment / Plan:     Diagnoses and all orders for this visit:    Preop examination  -     XR Chest 2 View  -     CBC & Differential  -     Comprehensive Metabolic Panel  -     POC Urinalysis Dipstick, Automated  -     ECG 12 Lead          ECG 12 Lead  Date/Time: 9/9/2019 10:53 AM  Performed by: Kanwal Hager APRN  Authorized by: Kanwal Hager APRN   Comparison: compared with previous ECG from 8/31/2017  Rhythm: sinus rhythm  Rate: normal  Other findings: T wave abnormality    Clinical impression: non-specific ECG  Comments: Non specific T wave abnormality                Medical Clearance:  The patient is an acceptable medical risk for the proposed surgical procedure and anesthesia.    Return if symptoms worsen or fail to improve.

## 2019-09-10 ENCOUNTER — TELEPHONE (OUTPATIENT)
Dept: INTERNAL MEDICINE | Facility: CLINIC | Age: 69
End: 2019-09-10

## 2019-09-10 NOTE — TELEPHONE ENCOUNTER
----- Message from CHRISTY Latif sent at 9/10/2019  8:36 AM EDT -----  Please inform patient sodium was mildly decreased at 133 (136-145) and needs to be monitored at follow up appointment and CBC was normal.

## 2019-09-16 ENCOUNTER — OFFICE VISIT (OUTPATIENT)
Dept: INTERNAL MEDICINE | Facility: CLINIC | Age: 69
End: 2019-09-16

## 2019-09-16 VITALS
WEIGHT: 216 LBS | DIASTOLIC BLOOD PRESSURE: 64 MMHG | HEART RATE: 68 BPM | TEMPERATURE: 98 F | RESPIRATION RATE: 18 BRPM | SYSTOLIC BLOOD PRESSURE: 132 MMHG | BODY MASS INDEX: 32.84 KG/M2

## 2019-09-16 DIAGNOSIS — K21.9 GASTROESOPHAGEAL REFLUX DISEASE WITHOUT ESOPHAGITIS: ICD-10-CM

## 2019-09-16 DIAGNOSIS — I10 ESSENTIAL HYPERTENSION: Primary | ICD-10-CM

## 2019-09-16 DIAGNOSIS — E78.5 HYPERLIPIDEMIA, UNSPECIFIED HYPERLIPIDEMIA TYPE: ICD-10-CM

## 2019-09-16 DIAGNOSIS — E03.8 OTHER SPECIFIED HYPOTHYROIDISM: ICD-10-CM

## 2019-09-16 LAB
ALBUMIN SERPL-MCNC: 3.8 G/DL (ref 3.5–5.2)
ALBUMIN/GLOB SERPL: 1.2 G/DL
ALP SERPL-CCNC: 70 U/L (ref 39–117)
ALT SERPL W P-5'-P-CCNC: 20 U/L (ref 1–33)
ANION GAP SERPL CALCULATED.3IONS-SCNC: 15 MMOL/L (ref 5–15)
AST SERPL-CCNC: 15 U/L (ref 1–32)
BILIRUB SERPL-MCNC: 0.4 MG/DL (ref 0.2–1.2)
BUN BLD-MCNC: 16 MG/DL (ref 8–23)
BUN/CREAT SERPL: 12.9 (ref 7–25)
CALCIUM SPEC-SCNC: 9.3 MG/DL (ref 8.6–10.5)
CHLORIDE SERPL-SCNC: 97 MMOL/L (ref 98–107)
CHOLEST SERPL-MCNC: 197 MG/DL (ref 0–200)
CO2 SERPL-SCNC: 26 MMOL/L (ref 22–29)
CREAT BLD-MCNC: 1.24 MG/DL (ref 0.57–1)
GFR SERPL CREATININE-BSD FRML MDRD: 43 ML/MIN/1.73
GLOBULIN UR ELPH-MCNC: 3.1 GM/DL
GLUCOSE BLD-MCNC: 103 MG/DL (ref 65–99)
HDLC SERPL-MCNC: 51 MG/DL (ref 40–60)
LDLC SERPL CALC-MCNC: 121 MG/DL (ref 0–100)
LDLC/HDLC SERPL: 2.36 {RATIO}
POTASSIUM BLD-SCNC: 4.3 MMOL/L (ref 3.5–5.2)
PROT SERPL-MCNC: 6.9 G/DL (ref 6–8.5)
SODIUM BLD-SCNC: 138 MMOL/L (ref 136–145)
T4 FREE SERPL-MCNC: 1.25 NG/DL (ref 0.93–1.7)
TRIGL SERPL-MCNC: 127 MG/DL (ref 0–150)
TSH SERPL DL<=0.05 MIU/L-ACNC: 0.68 UIU/ML (ref 0.27–4.2)
VLDLC SERPL-MCNC: 25.4 MG/DL (ref 5–40)

## 2019-09-16 PROCEDURE — 99214 OFFICE O/P EST MOD 30 MIN: CPT | Performed by: INTERNAL MEDICINE

## 2019-09-16 PROCEDURE — 84439 ASSAY OF FREE THYROXINE: CPT | Performed by: INTERNAL MEDICINE

## 2019-09-16 PROCEDURE — 36415 COLL VENOUS BLD VENIPUNCTURE: CPT | Performed by: INTERNAL MEDICINE

## 2019-09-16 PROCEDURE — 84443 ASSAY THYROID STIM HORMONE: CPT | Performed by: INTERNAL MEDICINE

## 2019-09-16 PROCEDURE — 80053 COMPREHEN METABOLIC PANEL: CPT | Performed by: INTERNAL MEDICINE

## 2019-09-16 PROCEDURE — 80061 LIPID PANEL: CPT | Performed by: INTERNAL MEDICINE

## 2019-09-16 RX ORDER — DIPHENHYDRAMINE HYDROCHLORIDE 50 MG/ML
50 INJECTION INTRAMUSCULAR; INTRAVENOUS ONCE AS NEEDED
Qty: 10 ML | Refills: 5 | Status: SHIPPED | OUTPATIENT
Start: 2019-09-16 | End: 2020-01-08

## 2019-09-16 RX ORDER — AMLODIPINE BESYLATE 5 MG/1
5 TABLET ORAL DAILY
Qty: 90 TABLET | Refills: 1 | Status: SHIPPED | OUTPATIENT
Start: 2019-09-16 | End: 2020-03-20

## 2019-09-16 RX ORDER — LOSARTAN POTASSIUM 100 MG/1
100 TABLET ORAL DAILY
Qty: 90 TABLET | Refills: 1 | Status: SHIPPED | OUTPATIENT
Start: 2019-09-16 | End: 2020-05-07 | Stop reason: SDUPTHER

## 2019-09-16 RX ORDER — PANTOPRAZOLE SODIUM 40 MG/1
40 TABLET, DELAYED RELEASE ORAL 2 TIMES DAILY
Qty: 180 TABLET | Refills: 1 | Status: SHIPPED | OUTPATIENT
Start: 2019-09-16 | End: 2020-05-07 | Stop reason: SDUPTHER

## 2019-09-16 RX ORDER — CHLORPHENIRAMINE MALEATE 12 MG/1
1 TABLET, FILM COATED, EXTENDED RELEASE ORAL DAILY
COMMUNITY
End: 2020-01-14

## 2019-09-16 RX ORDER — CITALOPRAM 40 MG/1
60 TABLET ORAL DAILY
Qty: 135 TABLET | Refills: 1 | Status: SHIPPED | OUTPATIENT
Start: 2019-09-16 | End: 2020-03-20

## 2019-09-16 RX ORDER — TRIAMTERENE AND HYDROCHLOROTHIAZIDE 37.5; 25 MG/1; MG/1
1 TABLET ORAL DAILY
Qty: 90 TABLET | Refills: 1 | Status: SHIPPED | OUTPATIENT
Start: 2019-09-16 | End: 2020-03-20

## 2019-09-16 NOTE — PROGRESS NOTES
"Chief Complaint   Patient presents with   • Follow-up     4 month follow up chronic medical problems       History of Present Illness    The patient presents for a follow-up related to GERD. The patient is on pantoprazole for her gastroesophageal reflux. The medication is taken on a regular basis and gives complete relief of the symptoms. She reports no abdominal pain, belching, chest pain, diarrhea, dysphagia, heartburn, hoarseness, nausea, rectal bleeding, vomiting or weight loss. The GERD has no known aggravating factors.    The patient presents for a follow-up related to hyperlipidemia. She is following a low fat diet. She reports that she is exercising. She is not taking medication for hyperlipidemia. She denies shortness of breath, orthopnea, paroxysmal nocturnal dyspnea, dyspnea on exertion, edema, palpitations or syncope. She is scheduled for knee surgery and plans to increase her exercise more after she recuperates from the surgery.    The patient presents for a follow-up related to hypertension. The patient reports that she has had no headaches. She states that she is taking her medication as prescribed. She is not having medication side effects.    The patient presents for a follow-up related to hypothyroidism. She reports a good energy level. She reports no hair loss, heat intolerance, cold intolerance, constipation or sweats. She is taking her medication as prescribed.    Review of Systems    PULMONARY- Denies Wheezing, Sputum Production, Cough, Hemoptysis or Pleuritic Chest Pain.    CARDIOVASCULAR- Denies Claudication or Irregular Heart Beat. She had a \"blood flow test\" performed by a nurse from [facility] who did a home visit. She states that she was told that her blood flow was \"excellent\".    Medications      Current Outpatient Medications:   •  albuterol (PROAIR HFA) 108 (90 Base) MCG/ACT inhaler, Inhale 2 puffs Every 6 (Six) Hours As Needed for Wheezing or Shortness of Air., Disp: 1 inhaler, Rfl: 5  •  " amLODIPine (NORVASC) 5 MG tablet, Take 1 tablet by mouth Daily., Disp: 90 tablet, Rfl: 1  •  Azelastine-Fluticasone (DYMISTA) 137-50 MCG/ACT suspension, 2 Squirts into the nostril(s) as directed by provider Daily., Disp: , Rfl:   •  Chlorpheniramine Maleate ER (CHLORPHEN SR) 12 MG tablet controlled-release, Take 1 tablet by mouth Daily., Disp: , Rfl:   •  Cholecalciferol (VITAMIN D PO), Take 1,000 Units by mouth daily. Vitamin D 1000 UNIT CAPS; TAKE 1 TABLET DAILY, Disp: , Rfl:   •  citalopram (CeleXA) 40 MG tablet, Take 1.5 tablets by mouth Daily., Disp: 135 tablet, Rfl: 1  •  diphenhydrAMINE (BENADRYL) 25 mg capsule, Take 1 capsule by mouth Every 6 (Six) Hours As Needed for Itching or Allergies., Disp: 360 capsule, Rfl: 1  •  diphenhydrAMINE (BENADRYL) 50 MG/ML injection, Inject 1 mL into the appropriate muscle as directed by prescriber 1 (One) Time As Needed for Itching or Allergies for up to 1 dose., Disp: 10 mL, Rfl: 5  •  Fluticasone Furoate (ARNUITY ELLIPTA) 100 MCG/ACT aerosol powder , Inhale 2 puffs Daily., Disp: , Rfl:   •  guaiFENesin (MUCINEX) 600 MG 12 hr tablet, Take 600 mg by mouth 2 (Two) Times a Day., Disp: , Rfl:   •  hydrOXYzine (ATARAX) 25 MG tablet, Take 25 mg by mouth At Night As Needed for Allergies. at bedtime as needed, Disp: , Rfl:   •  ibuprofen (ADVIL,MOTRIN) 600 MG tablet, TAKE ONE TABLET BY MOUTH EVERY 8 HOURS AS NEEDED  FOR MILD PAIN., Disp: 270 tablet, Rfl: 1  •  levothyroxine (SYNTHROID, LEVOTHROID) 175 MCG tablet, Take 1 tablet by mouth Daily., Disp: 30 tablet, Rfl: 5  •  losartan (COZAAR) 100 MG tablet, Take 1 tablet by mouth Daily., Disp: 90 tablet, Rfl: 1  •  magnesium oxide (MAG-OX) 400 MG tablet, Take 400 mg by mouth Daily., Disp: , Rfl:   •  Melatonin 10 MG tablet, Take 1 tablet by mouth At Night As Needed (sleep)., Disp: , Rfl:   •  montelukast (SINGULAIR) 10 MG tablet, Take 1 tablet by mouth Every Night., Disp: 90 tablet, Rfl: 1  •  pantoprazole (PROTONIX) 40 MG EC tablet,  Take 1 tablet by mouth 2 (Two) Times a Day., Disp: 180 tablet, Rfl: 1  •  tamoxifen (NOLVADEX) 20 MG chemo tablet, Take 1 tablet by mouth Daily., Disp: 90 tablet, Rfl: 1  •  triamterene-hydrochlorothiazide (MAXZIDE-25) 37.5-25 MG per tablet, Take 1 tablet by mouth Daily., Disp: 90 tablet, Rfl: 1  •  vitamin B-12 (CYANOCOBALAMIN) 1000 MCG tablet, Take 1,000 mcg by mouth Daily., Disp: , Rfl:   •  EPINEPHrine (EPIPEN 2-KIRA) 0.3 MG/0.3ML solution auto-injector injection, Inject 1 Units into the shoulder, thigh, or buttocks As Needed (allergy). as directed; For: Allergic rhinitis, Disp: , Rfl:      Allergies    Allergies   Allergen Reactions   • Nuts Shortness Of Breath     rash   • Other      MSG - HIVES    • Penicillins Shortness Of Breath     rash   • Soybean-Containing Drug Products Shortness Of Breath     rash   • Sunflower Oil Shortness Of Breath     rash   • Apple    • Cefuroxime      Stomach problems   • Cephalexin      Stomach problems   • Chicken Allergy    • Clindamycin Nausea Only and Nausea And Vomiting   • Erythromycin Nausea Only   • Flu Virus Vaccine      Redness and red rash to area   • Gluten Meal    • Metronidazole Hives   • Naproxen Dizziness   • Parsley Seed    • Thimerosal      Redness and swelling at site of injection   • Varicella Virus Vaccine Live      Redness and swelling at site   • Zostavax [Zoster Vaccine Live]      92205 UNT /0.65 ML subcutaneous solution Recontituted  - redness and swelling at site   • Doxycycline Itching and Rash   • Sulfa Antibiotics Nausea Only and Rash     Other reaction(s): Headache, Vomiting       Problem List    Patient Active Problem List   Diagnosis   • Anxiety   • Gastroesophageal reflux disease without esophagitis   • Hyperlipidemia   • Hypertension   • Hypothyroidism   • Cataract   • Cancer of overlapping sites of right female breast (CMS/HCC)   • Estrogen receptor positive       Medications, Allergies, Problems List and Past History were reviewed and  updated.    Physical Examination    /64 (BP Location: Right arm, Patient Position: Sitting, Cuff Size: Adult)   Pulse 68   Temp 98 °F (36.7 °C) (Temporal)   Resp 18   Wt 98 kg (216 lb)   LMP  (LMP Unknown) Comment: LAST PAP 3/18 BY DR SANDERS  Breastfeeding? No   BMI 32.84 kg/m²     HEENT: Facies- Within normal limits. Lids- Normal bilaterally. Conjunctiva- Clear bilaterally. Sclera- Anicteric bilaterally.    Neck: Thyroid- non enlarged, symmetric and has no nodules. No bruits are detected.    Lungs: Auscultation- Clear to auscultation bilaterally. There are no retractions, clubbing or cyanosis. The Expiratory to Inspiratory ratio is equal.    Cardiovascular: There are no carotid bruits. Heart- Normal Rate with Regular rhythm and no murmurs. There are no gallops. There are no rubs. In the lower extremities there is no edema. The upper extremities do not have edema.    Abdomen: Soft, benign, non-tender with no masses, hernias, organomegaly or scars.    Impression and Assessment    Gastroesophageal Reflux Disease.    Hyperlipidemia.    Essential Hypertension.    Hypothyroidism.    Plan    Gastroesophageal Reflux Disease Plan: The current plan was continued.    Hyperlipidemia Plan: The patient was instructed to exercise daily and eat a low fat diet.    Essential Hypertension Plan: The current plan was continued.    Hypothyroidism Plan: The current plan was continued.    Sarah was seen today for follow-up.    Diagnoses and all orders for this visit:    Essential hypertension  -     Comprehensive Metabolic Panel    Gastroesophageal reflux disease without esophagitis  -     pantoprazole (PROTONIX) 40 MG EC tablet; Take 1 tablet by mouth 2 (Two) Times a Day.    Hyperlipidemia, unspecified hyperlipidemia type  -     Comprehensive Metabolic Panel  -     Lipid Panel    Other specified hypothyroidism  -     TSH  -     T4, Free    Other orders  -     amLODIPine (NORVASC) 5 MG tablet; Take 1 tablet by mouth Daily.  -      diphenhydrAMINE (BENADRYL) 50 MG/ML injection; Inject 1 mL into the appropriate muscle as directed by prescriber 1 (One) Time As Needed for Itching or Allergies for up to 1 dose.  -     losartan (COZAAR) 100 MG tablet; Take 1 tablet by mouth Daily.  -     citalopram (CeleXA) 40 MG tablet; Take 1.5 tablets by mouth Daily.  -     triamterene-hydrochlorothiazide (MAXZIDE-25) 37.5-25 MG per tablet; Take 1 tablet by mouth Daily.        Return to Office    The patient was instructed to return for follow-up in 4 months. Next Visit: Physical.    The patient was instructed to return sooner if the condition changes, worsens, or does not resolve.

## 2019-09-17 ENCOUNTER — OFFICE VISIT (OUTPATIENT)
Dept: ONCOLOGY | Facility: CLINIC | Age: 69
End: 2019-09-17

## 2019-09-17 VITALS
WEIGHT: 213 LBS | SYSTOLIC BLOOD PRESSURE: 117 MMHG | HEIGHT: 68 IN | TEMPERATURE: 97.8 F | BODY MASS INDEX: 32.28 KG/M2 | OXYGEN SATURATION: 96 % | RESPIRATION RATE: 18 BRPM | HEART RATE: 67 BPM | DIASTOLIC BLOOD PRESSURE: 70 MMHG

## 2019-09-17 DIAGNOSIS — C50.811 MALIGNANT NEOPLASM OF OVERLAPPING SITES OF RIGHT BREAST IN FEMALE, ESTROGEN RECEPTOR POSITIVE (HCC): Primary | ICD-10-CM

## 2019-09-17 DIAGNOSIS — Z17.0 MALIGNANT NEOPLASM OF OVERLAPPING SITES OF RIGHT BREAST IN FEMALE, ESTROGEN RECEPTOR POSITIVE (HCC): Primary | ICD-10-CM

## 2019-09-17 PROCEDURE — 99213 OFFICE O/P EST LOW 20 MIN: CPT | Performed by: NURSE PRACTITIONER

## 2019-09-17 RX ORDER — TAMOXIFEN CITRATE 20 MG/1
20 TABLET ORAL DAILY
Qty: 90 TABLET | Refills: 1 | Status: SHIPPED | OUTPATIENT
Start: 2019-09-17 | End: 2020-03-16

## 2019-09-17 NOTE — PROGRESS NOTES
"      PROBLEM LIST:  1.  Left breast cancer  A.  Left mastectomy for well-differentiated invasive ductal breast cancer        stage T1b(2) N1a M0, stage IIA  B.  [place] lymph nodes negative but one positive intramammary node  C.  ER and FL positive and HER-2 negative with Oncotype DX recurrent score of 16  D.  Adjuvant tamoxifen started 9/27/17  2.  Atypical ductal hyperplasia of the right breast, status post right simple mastectomy  3.  Hypothyroidism after prior thyroid surgery for goiter  4.  Hypertension     Chief complaint: Here about adjuvant endocrine treatment for breast cancer          HISTORY OF PRESENT ILLNESS:   Ms. Raphael is here for follow up evaluation of breast cancer management. She continues on tamoxifen for adjuvant endocrine therapy and is tolerating it well.  She said her hot flashes have improved greatly.  She fell August 1, 2019 and hurt her right knee.  She is having arthroscopic surgery on Thursday.  Other than the right knee pain she denies any new long bone pain, headaches, diplopia or dyspnea.      Past Medical History, Past Surgical History, Social History, Family History have been reviewed and are without significant changes except as mentioned.    Review of Systems   A comprehensive 14 point review of systems was performed and was negative except as mentioned.    Medications:  The current medication list was reviewed in the EMR    ALLERGIES:  Allergies not on file           /70 Comment: RUE  Pulse 67   Temp 97.8 °F (36.6 °C) (Temporal)   Resp 18   Ht 172.7 cm (68\")   Wt 96.6 kg (213 lb)   LMP  (LMP Unknown) Comment: LAST PAP 3/18 BY DR SANDERS  SpO2 96% Comment: RA  BMI 32.39 kg/m²      Performance Status: ECOG 0    General: well appearing, in no acute distress  HEENT: sclera anicteric, oropharynx clear  Lymphatics: no cervical, supraclavicular, or axillary adenopathy  Cardiovascular: regular rate and rhythm, no murmurs  Lungs: clear to auscultation " bilaterally  Abdomen: soft, nontender, nondistended.  No palpable organomegaly  Breasts: Her bilateral mastectomy incisions have healed well with no nodules or other evidence recurrence  Extremeties: no lower extremity edema  Skin: no rashes, lesions, bruising, or petechiae    RECENT LABS:   WBC   Date Value Ref Range Status   09/09/2019 7.19 3.40 - 10.80 10*3/mm3 Final     Hemoglobin   Date Value Ref Range Status   09/09/2019 13.4 12.0 - 15.9 g/dL Final     Hematocrit   Date Value Ref Range Status   09/09/2019 42.3 34.0 - 46.6 % Final     MCV   Date Value Ref Range Status   09/09/2019 86.3 79.0 - 97.0 fL Final     RDW   Date Value Ref Range Status   09/09/2019 14.4 12.3 - 15.4 % Final     MPV   Date Value Ref Range Status   09/09/2019 10.4 6.0 - 12.0 fL Final     Platelets   Date Value Ref Range Status   09/09/2019 249 140 - 450 10*3/mm3 Final     Immature Grans %   Date Value Ref Range Status   09/09/2019 0.4 0.0 - 0.5 % Final     Neutrophils, Absolute   Date Value Ref Range Status   09/09/2019 4.46 1.70 - 7.00 10*3/mm3 Final     Lymphocytes, Absolute   Date Value Ref Range Status   09/09/2019 2.08 0.70 - 3.10 10*3/mm3 Final     Monocytes, Absolute   Date Value Ref Range Status   09/09/2019 0.45 0.10 - 0.90 10*3/mm3 Final     Eosinophils, Absolute   Date Value Ref Range Status   09/09/2019 0.12 0.00 - 0.40 10*3/mm3 Final     Basophils, Absolute   Date Value Ref Range Status   09/09/2019 0.05 0.00 - 0.20 10*3/mm3 Final     Immature Grans, Absolute   Date Value Ref Range Status   09/09/2019 0.03 0.00 - 0.05 10*3/mm3 Final     nRBC   Date Value Ref Range Status   09/09/2019 0.0 0.0 - 0.2 /100 WBC Final       Glucose   Date Value Ref Range Status   09/16/2019 103 (H) 65 - 99 mg/dL Final     Sodium   Date Value Ref Range Status   09/16/2019 138 136 - 145 mmol/L Final     Potassium   Date Value Ref Range Status   09/16/2019 4.3 3.5 - 5.2 mmol/L Final     CO2   Date Value Ref Range Status   09/16/2019 26.0 22.0 - 29.0  mmol/L Final     Chloride   Date Value Ref Range Status   09/16/2019 97 (L) 98 - 107 mmol/L Final     Anion Gap   Date Value Ref Range Status   09/16/2019 15.0 5.0 - 15.0 mmol/L Final     Creatinine   Date Value Ref Range Status   09/16/2019 1.24 (H) 0.57 - 1.00 mg/dL Final     BUN   Date Value Ref Range Status   09/16/2019 16 8 - 23 mg/dL Final     BUN/Creatinine Ratio   Date Value Ref Range Status   09/16/2019 12.9 7.0 - 25.0 Final     Calcium   Date Value Ref Range Status   09/16/2019 9.3 8.6 - 10.5 mg/dL Final     eGFR Non  Amer   Date Value Ref Range Status   09/16/2019 43 (L) >60 mL/min/1.73 Final     Alkaline Phosphatase   Date Value Ref Range Status   09/16/2019 70 39 - 117 U/L Final     Total Protein   Date Value Ref Range Status   09/16/2019 6.9 6.0 - 8.5 g/dL Final     ALT (SGPT)   Date Value Ref Range Status   09/16/2019 20 1 - 33 U/L Final     AST (SGOT)   Date Value Ref Range Status   09/16/2019 15 1 - 32 U/L Final     Total Bilirubin   Date Value Ref Range Status   09/16/2019 0.4 0.2 - 1.2 mg/dL Final     Albumin   Date Value Ref Range Status   09/16/2019 3.80 3.50 - 5.20 g/dL Final     Globulin   Date Value Ref Range Status   09/16/2019 3.1 gm/dL Final     A/G Ratio   Date Value Ref Range Status   02/06/2018 1.4 1.2 - 2.2 Final       No results found for: LDH, URICACID    No results found for: MSPIKE, KAPPALAMB, IGLFLC, FREEKAPPAL              Impression:   1.  Breast cancer with low risk features that's doing well on adjuvant tamoxifen. I'll plan to treat her for 5 years.     Plan:   1.  I'll continue her adjuvant tamoxifen. Refill sent to [facility].   2.  She will return here in 6 months. She has been instructed to contact us in the interm if any new symptoms arise.                    Janine George [facility]    09/17/19           CC:

## 2019-10-14 ENCOUNTER — FLU SHOT (OUTPATIENT)
Dept: INTERNAL MEDICINE | Facility: CLINIC | Age: 69
End: 2019-10-14

## 2019-10-14 DIAGNOSIS — Z23 NEED FOR IMMUNIZATION AGAINST INFLUENZA: Primary | ICD-10-CM

## 2019-10-14 PROCEDURE — G0008 ADMIN INFLUENZA VIRUS VAC: HCPCS | Performed by: INTERNAL MEDICINE

## 2019-10-14 PROCEDURE — 90653 IIV ADJUVANT VACCINE IM: CPT | Performed by: INTERNAL MEDICINE

## 2020-01-02 RX ORDER — IBUPROFEN 600 MG/1
TABLET ORAL
Qty: 270 TABLET | Refills: 0 | Status: SHIPPED | OUTPATIENT
Start: 2020-01-02 | End: 2020-01-14 | Stop reason: SDUPTHER

## 2020-01-08 RX ORDER — DIPHENHYDRAMINE HYDROCHLORIDE 50 MG/ML
INJECTION INTRAMUSCULAR; INTRAVENOUS
Qty: 6 ML | Refills: 4 | Status: SHIPPED | OUTPATIENT
Start: 2020-01-08 | End: 2020-02-25 | Stop reason: SDUPTHER

## 2020-01-14 ENCOUNTER — OFFICE VISIT (OUTPATIENT)
Dept: INTERNAL MEDICINE | Facility: CLINIC | Age: 70
End: 2020-01-14

## 2020-01-14 VITALS
BODY MASS INDEX: 31.67 KG/M2 | WEIGHT: 209 LBS | SYSTOLIC BLOOD PRESSURE: 138 MMHG | RESPIRATION RATE: 18 BRPM | HEIGHT: 68 IN | HEART RATE: 64 BPM | TEMPERATURE: 98.2 F | DIASTOLIC BLOOD PRESSURE: 74 MMHG

## 2020-01-14 DIAGNOSIS — R20.2 PARESTHESIA: ICD-10-CM

## 2020-01-14 DIAGNOSIS — I10 ESSENTIAL HYPERTENSION: ICD-10-CM

## 2020-01-14 DIAGNOSIS — K21.9 GASTROESOPHAGEAL REFLUX DISEASE WITHOUT ESOPHAGITIS: ICD-10-CM

## 2020-01-14 DIAGNOSIS — E03.9 HYPOTHYROIDISM, UNSPECIFIED TYPE: ICD-10-CM

## 2020-01-14 DIAGNOSIS — Z00.00 PHYSICAL EXAM: Primary | ICD-10-CM

## 2020-01-14 DIAGNOSIS — E78.5 HYPERLIPIDEMIA, UNSPECIFIED HYPERLIPIDEMIA TYPE: ICD-10-CM

## 2020-01-14 LAB
ALBUMIN SERPL-MCNC: 4 G/DL (ref 3.5–5.2)
ALBUMIN/GLOB SERPL: 1.3 G/DL
ALP SERPL-CCNC: 78 U/L (ref 39–117)
ALT SERPL W P-5'-P-CCNC: 20 U/L (ref 1–33)
ANION GAP SERPL CALCULATED.3IONS-SCNC: 13.7 MMOL/L (ref 5–15)
AST SERPL-CCNC: 19 U/L (ref 1–32)
BASOPHILS # BLD AUTO: 0.05 10*3/MM3 (ref 0–0.2)
BASOPHILS NFR BLD AUTO: 0.7 % (ref 0–1.5)
BILIRUB BLD-MCNC: NEGATIVE MG/DL
BILIRUB SERPL-MCNC: 0.5 MG/DL (ref 0.2–1.2)
BUN BLD-MCNC: 11 MG/DL (ref 8–23)
BUN/CREAT SERPL: 9.3 (ref 7–25)
CALCIUM SPEC-SCNC: 9.4 MG/DL (ref 8.6–10.5)
CHLORIDE SERPL-SCNC: 98 MMOL/L (ref 98–107)
CHOLEST SERPL-MCNC: 208 MG/DL (ref 0–200)
CLARITY, POC: CLEAR
CO2 SERPL-SCNC: 25.3 MMOL/L (ref 22–29)
COLOR UR: YELLOW
CREAT BLD-MCNC: 1.18 MG/DL (ref 0.57–1)
DEPRECATED RDW RBC AUTO: 40.7 FL (ref 37–54)
EOSINOPHIL # BLD AUTO: 0.25 10*3/MM3 (ref 0–0.4)
EOSINOPHIL NFR BLD AUTO: 3.3 % (ref 0.3–6.2)
ERYTHROCYTE [DISTWIDTH] IN BLOOD BY AUTOMATED COUNT: 12.9 % (ref 12.3–15.4)
ERYTHROCYTE [SEDIMENTATION RATE] IN BLOOD: 8 MM/HR (ref 0–30)
EXPIRATION DATE: NORMAL
FOLATE SERPL-MCNC: 2.83 NG/ML (ref 4.78–24.2)
GFR SERPL CREATININE-BSD FRML MDRD: 46 ML/MIN/1.73
GLOBULIN UR ELPH-MCNC: 3.1 GM/DL
GLUCOSE BLD-MCNC: 89 MG/DL (ref 65–99)
GLUCOSE UR STRIP-MCNC: NEGATIVE MG/DL
HCT VFR BLD AUTO: 40.7 % (ref 34–46.6)
HDLC SERPL-MCNC: 49 MG/DL (ref 40–60)
HGB BLD-MCNC: 13.7 G/DL (ref 12–15.9)
IMM GRANULOCYTES # BLD AUTO: 0.03 10*3/MM3 (ref 0–0.05)
IMM GRANULOCYTES NFR BLD AUTO: 0.4 % (ref 0–0.5)
KETONES UR QL: NEGATIVE
LDLC SERPL CALC-MCNC: 128 MG/DL (ref 0–100)
LDLC/HDLC SERPL: 2.62 {RATIO}
LEUKOCYTE EST, POC: NEGATIVE
LYMPHOCYTES # BLD AUTO: 2.12 10*3/MM3 (ref 0.7–3.1)
LYMPHOCYTES NFR BLD AUTO: 27.8 % (ref 19.6–45.3)
Lab: NORMAL
MCH RBC QN AUTO: 28.7 PG (ref 26.6–33)
MCHC RBC AUTO-ENTMCNC: 33.7 G/DL (ref 31.5–35.7)
MCV RBC AUTO: 85.1 FL (ref 79–97)
MONOCYTES # BLD AUTO: 0.48 10*3/MM3 (ref 0.1–0.9)
MONOCYTES NFR BLD AUTO: 6.3 % (ref 5–12)
NEUTROPHILS # BLD AUTO: 4.69 10*3/MM3 (ref 1.7–7)
NEUTROPHILS NFR BLD AUTO: 61.5 % (ref 42.7–76)
NITRITE UR-MCNC: NEGATIVE MG/ML
NRBC BLD AUTO-RTO: 0.1 /100 WBC (ref 0–0.2)
PH UR: 7 [PH] (ref 5–8)
PLATELET # BLD AUTO: 234 10*3/MM3 (ref 140–450)
PMV BLD AUTO: 10.4 FL (ref 6–12)
POTASSIUM BLD-SCNC: 4.2 MMOL/L (ref 3.5–5.2)
PROT SERPL-MCNC: 7.1 G/DL (ref 6–8.5)
PROT UR STRIP-MCNC: NEGATIVE MG/DL
RBC # BLD AUTO: 4.78 10*6/MM3 (ref 3.77–5.28)
RBC # UR STRIP: NEGATIVE /UL
SODIUM BLD-SCNC: 137 MMOL/L (ref 136–145)
SP GR UR: 1 (ref 1–1.03)
T4 FREE SERPL-MCNC: 0.96 NG/DL (ref 0.93–1.7)
TRIGL SERPL-MCNC: 153 MG/DL (ref 0–150)
TSH SERPL DL<=0.05 MIU/L-ACNC: 31.6 UIU/ML (ref 0.27–4.2)
UROBILINOGEN UR QL: NORMAL
VIT B12 BLD-MCNC: 1604 PG/ML (ref 211–946)
VLDLC SERPL-MCNC: 30.6 MG/DL (ref 5–40)
WBC NRBC COR # BLD: 7.62 10*3/MM3 (ref 3.4–10.8)

## 2020-01-14 PROCEDURE — 84443 ASSAY THYROID STIM HORMONE: CPT | Performed by: INTERNAL MEDICINE

## 2020-01-14 PROCEDURE — 82784 ASSAY IGA/IGD/IGG/IGM EACH: CPT | Performed by: INTERNAL MEDICINE

## 2020-01-14 PROCEDURE — 82607 VITAMIN B-12: CPT | Performed by: INTERNAL MEDICINE

## 2020-01-14 PROCEDURE — 80053 COMPREHEN METABOLIC PANEL: CPT | Performed by: INTERNAL MEDICINE

## 2020-01-14 PROCEDURE — 80061 LIPID PANEL: CPT | Performed by: INTERNAL MEDICINE

## 2020-01-14 PROCEDURE — 82746 ASSAY OF FOLIC ACID SERUM: CPT | Performed by: INTERNAL MEDICINE

## 2020-01-14 PROCEDURE — 84439 ASSAY OF FREE THYROXINE: CPT | Performed by: INTERNAL MEDICINE

## 2020-01-14 PROCEDURE — 99397 PER PM REEVAL EST PAT 65+ YR: CPT | Performed by: INTERNAL MEDICINE

## 2020-01-14 PROCEDURE — 84155 ASSAY OF PROTEIN SERUM: CPT | Performed by: INTERNAL MEDICINE

## 2020-01-14 PROCEDURE — 85025 COMPLETE CBC W/AUTO DIFF WBC: CPT | Performed by: INTERNAL MEDICINE

## 2020-01-14 PROCEDURE — 36415 COLL VENOUS BLD VENIPUNCTURE: CPT | Performed by: INTERNAL MEDICINE

## 2020-01-14 PROCEDURE — 84165 PROTEIN E-PHORESIS SERUM: CPT | Performed by: INTERNAL MEDICINE

## 2020-01-14 PROCEDURE — 81003 URINALYSIS AUTO W/O SCOPE: CPT | Performed by: INTERNAL MEDICINE

## 2020-01-14 PROCEDURE — 85652 RBC SED RATE AUTOMATED: CPT | Performed by: INTERNAL MEDICINE

## 2020-01-14 PROCEDURE — 86592 SYPHILIS TEST NON-TREP QUAL: CPT | Performed by: INTERNAL MEDICINE

## 2020-01-14 PROCEDURE — 86334 IMMUNOFIX E-PHORESIS SERUM: CPT | Performed by: INTERNAL MEDICINE

## 2020-01-14 RX ORDER — IBUPROFEN 600 MG/1
600 TABLET ORAL EVERY 8 HOURS PRN
Qty: 270 TABLET | Refills: 1 | Status: ON HOLD | OUTPATIENT
Start: 2020-01-14 | End: 2020-10-09 | Stop reason: SDUPTHER

## 2020-01-14 NOTE — PROGRESS NOTES
Chief Complaint   Patient presents with   • Annual Exam       History of Present Illness      The patient presents for an established patient physical examination and health maintenance visit.    The patient presents for a follow-up related to GERD. The patient is on pantoprazole for her gastroesophageal reflux. The medication is taken on a regular basis and gives complete relief of the symptoms. She reports no abdominal pain, belching, chest pain, diarrhea, dysphagia, early satiety, heartburn, hoarseness, nausea, odynophagia, rectal bleeding, vomiting or weight loss. The GERD has no known aggravating factors.    The patient presents for a follow-up related to hyperlipidemia. She is following a low fat diet. She reports that she is exercising. She is not taking medication for hyperlipidemia. She denies shortness of breath, orthopnea, paroxysmal nocturnal dyspnea, dyspnea on exertion, edema, palpitations or syncope.    The patient presents for a follow-up related to hypertension. The patient reports that she has had no headaches or blurred vision. She states that she is taking her medication as prescribed. She is not having medication side effects.    The patient presents for a follow-up related to hypothyroidism. She reports a good energy level. She reports no hair loss, heat intolerance, cold intolerance, constipation or sweats. She is taking her medication as prescribed.    The patient presents for an initial evaluation of numbness and tingling of the right foot and left foot. The symptoms have been present for 6 months. There has not been recent trauma to the affected area. The symptoms are constant.       There is no pain in the spine. There has been no trauma to the neck or back. The patient denies loss of bowel or bladder control. There is no weakness reported in the affected extremity.       The patient does not report foreign travel. There are no insect bites reported. The patient denies new sexual  exposures. There are no high risk exposures. There have not been recent viral infections.       The patient does not report a dry cough, a wet cough, wheezing, fever, facial pain, eye drainage, ear pain, ear drainage, myalgias, sore throat, nasal discharge, decreased appetite, chills, rash, joint pain, a breast lump or GI bleeding.    Review of Systems    CONSTITUTIONAL- Denies Sweats, Weakness or Malaise.    NECK- Denies Decreased Range of Motion, Stiffness, Thyroid Nodules, Enlarged Lymph Nodes or Goiter.    EYES- Denies Eye Pain, Eye Redness, Eye Discharge, Decreased Vision, Visual Disturbance, Diplopia, Visual Loss or Swollen Eyelid.    HENT- Denies Sinus Pain, Nasal Congestion, Decreased Hearing or Tinnitus.    PULMONARY- Denies Sputum Production, Cough, Hemoptysis or Pleuritic Chest Pain.    CARDIOVASCULAR- Denies Claudication or Irregular Heart Beat.    GENITOURINARY- Denies Dysuria, Hematuria, Urinary Frequency, Urinary Leakage, Pelvic Pain, Vaginal Prolapse, Vaginal Discharge, Dyspareunia or Abnormal Menses.    ENDOCRINE- Denies Depression, Memory Loss, Polydypsia, Polyphagia, Polyuria, Sleep Disturbance or Weight Gain.    NEUROLOGIC- Denies Seizures, Confusion or Excessive Daytime Sleepiness.    MUSCULOSKELETAL- Denies Joint Stiffness, Decreased Range of Motion, Joint Swelling or Erythema of Joints.    INTEGUMENTARY- Denies Lumps, Sores, Itching, Dryness, Color Change, Changes in Hair, Brittle Nails, Discolored Nails, Thickened Nails, Growths or Alopecia.    Medications      Current Outpatient Medications:   •  albuterol (PROAIR HFA) 108 (90 Base) MCG/ACT inhaler, Inhale 2 puffs Every 6 (Six) Hours As Needed for Wheezing or Shortness of Air., Disp: 1 inhaler, Rfl: 5  •  amLODIPine (NORVASC) 5 MG tablet, Take 1 tablet by mouth Daily., Disp: 90 tablet, Rfl: 1  •  Azelastine-Fluticasone (DYMISTA) 137-50 MCG/ACT suspension, 2 Squirts into the nostril(s) as directed by provider Daily., Disp: , Rfl:   •   Cholecalciferol (VITAMIN D PO), Take 1,000 Units by mouth daily. Vitamin D 1000 UNIT CAPS; TAKE 1 TABLET DAILY, Disp: , Rfl:   •  citalopram (CeleXA) 40 MG tablet, Take 1.5 tablets by mouth Daily., Disp: 135 tablet, Rfl: 1  •  diphenhydrAMINE (BENADRYL) 25 mg capsule, Take 1 capsule by mouth Every 6 (Six) Hours As Needed for Itching or Allergies., Disp: 360 capsule, Rfl: 1  •  diphenhydrAMINE (BENADRYL) 50 MG/ML injection, INJECT 1 ML INTRAMUSCULARLY AS DIRECTED BY PRESCRIBER ONE TIME AS NEEDED FOR ITCHING OR ALLERGIES FOR UP TO ONE DOSE, Disp: 6 mL, Rfl: 4  •  Fluticasone Furoate (ARNUITY ELLIPTA) 100 MCG/ACT aerosol powder , Inhale 2 puffs Daily., Disp: , Rfl:   •  guaiFENesin (MUCINEX) 600 MG 12 hr tablet, Take 600 mg by mouth 2 (Two) Times a Day., Disp: , Rfl:   •  hydrOXYzine (ATARAX) 25 MG tablet, Take 25 mg by mouth At Night As Needed for Allergies. at bedtime as needed, Disp: , Rfl:   •  ibuprofen (ADVIL,MOTRIN) 600 MG tablet, Take 1 tablet by mouth Every 8 (Eight) Hours As Needed for Mild Pain ., Disp: 270 tablet, Rfl: 1  •  levothyroxine (SYNTHROID, LEVOTHROID) 175 MCG tablet, Take 1 tablet by mouth Daily., Disp: 30 tablet, Rfl: 5  •  losartan (COZAAR) 100 MG tablet, Take 1 tablet by mouth Daily., Disp: 90 tablet, Rfl: 1  •  magnesium oxide (MAG-OX) 400 MG tablet, Take 400 mg by mouth Daily., Disp: , Rfl:   •  Melatonin 10 MG tablet, Take 1 tablet by mouth At Night As Needed (sleep)., Disp: , Rfl:   •  pantoprazole (PROTONIX) 40 MG EC tablet, Take 1 tablet by mouth 2 (Two) Times a Day., Disp: 180 tablet, Rfl: 1  •  tamoxifen (NOLVADEX) 20 MG chemo tablet, Take 1 tablet by mouth Daily., Disp: 90 tablet, Rfl: 1  •  triamterene-hydrochlorothiazide (MAXZIDE-25) 37.5-25 MG per tablet, Take 1 tablet by mouth Daily., Disp: 90 tablet, Rfl: 1  •  vitamin B-12 (CYANOCOBALAMIN) 1000 MCG tablet, Take 1,000 mcg by mouth Daily., Disp: , Rfl:   •  EPINEPHrine (EPIPEN 2-KIRA) 0.3 MG/0.3ML solution auto-injector injection,  "Inject 1 Units into the shoulder, thigh, or buttocks As Needed (allergy). as directed; For: Allergic rhinitis, Disp: , Rfl:      Allergies    Allergies   Allergen Reactions   • Nuts Shortness Of Breath     rash   • Other      MSG - HIVES    • Penicillins Shortness Of Breath     rash   • Soybean-Containing Drug Products Shortness Of Breath     rash   • Sunflower Oil Shortness Of Breath     rash   • Apple    • Cefuroxime      Stomach problems   • Cephalexin      Stomach problems   • Chicken Allergy    • Clindamycin Nausea Only and Nausea And Vomiting   • Erythromycin Nausea Only   • Flu Virus Vaccine      Redness and red rash to area   • Gluten Meal    • Metronidazole Hives   • Naproxen Dizziness   • Parsley Seed    • Thimerosal      Redness and swelling at site of injection   • Varicella Virus Vaccine Live      Redness and swelling at site   • Zostavax [Zoster Vaccine Live]      42967 UNT /0.65 ML subcutaneous solution Recontituted  - redness and swelling at site   • Doxycycline Itching and Rash   • Sulfa Antibiotics Nausea Only and Rash     Other reaction(s): Headache, Vomiting       Problem List    Patient Active Problem List   Diagnosis   • Anxiety   • Gastroesophageal reflux disease without esophagitis   • Hyperlipidemia   • Hypertension   • Hypothyroidism   • Cataract   • Cancer of overlapping sites of right female breast (CMS/HCC)   • Estrogen receptor positive       Medications, Allergies, Problems List and Past History were reviewed and updated.    Physical Examination    /74 (BP Location: Right arm, Patient Position: Sitting, Cuff Size: Adult)   Pulse 64   Temp 98.2 °F (36.8 °C) (Temporal)   Resp 18   Ht 172.7 cm (68\")   Wt 94.8 kg (209 lb)   LMP  (LMP Unknown) Comment: LAST PAP 3/18 BY DR SANDERS  Breastfeeding No   BMI 31.78 kg/m²     HEENT: Head- Normocephalic Atraumatic. Facies- Within normal limits. Pinnas- Normal texture and shape bilaterally. Canals- Normal bilaterally. TMs- Normal " bilaterally. Nares- Patent bilaterally. Nasal Septum- is normal. There is no tenderness to palpation over the frontal or maxillary sinuses. Lids- Normal bilaterally. Conjunctiva- Clear bilaterally. Sclera- Anicteric bilaterally. Oropharynx- Moist with no lesions. Tonsils- No enlargement, erythema or exudate.    Neck: Thyroid- non enlarged, symmetric and has no nodules. No bruits are detected. ROM- Normal Range of Motion with no rigidity.    Lungs: Auscultation- Clear to auscultation bilaterally. There are no retractions, clubbing or cyanosis. The Expiratory to Inspiratory ratio is equal.    Lymph Nodes: Cervical- no enlarged lymph nodes noted. Clavicular- Deferred. Axillary- Deferred. Inguinal- Deferred.    Cardiovascular: There are no carotid bruits. Heart- Normal Rate with Regular rhythm and no murmurs. There are no gallops. There are no rubs. In the lower extremities there is no edema. The upper extremities do not have edema.    Abdomen: Soft, benign, non-tender with no masses, hernias, organomegaly or scars.    Breast: Examination reveals that the patient has undergone a right mastectomy and the patient has undergone a left mastectomy. There are scars seen on the right chest wall and on the left chest wall.    Lower Extremity Neuro Exam: Muscle Strength is 5/5 in all major lower extremity muscle groups. Deep Tendon Reflexes are 2+ and equal in the patellar and Achilles distributions bilaterally. Sensation is normal in all distributions.    Dermatologic: The patient has no worrisome or suspicious skin lesions noted.    Impression and Assessment    Normal Physical Examination.    Gastroesophageal Reflux Disease.    Hyperlipidemia.    Essential Hypertension.    Hypothyroidism.    Paresthesias.    Plan    Gastroesophageal Reflux Disease Plan: The current plan was continued.    Hyperlipidemia Plan: The patient was instructed to exercise daily and eat a low fat diet.    Essential Hypertension Plan: The current plan  was continued.    Hypothyroidism Plan: The current plan was continued.    Paresthesias Plan: Further plans will be made after test results are received and reviewed.    Counseling was provided regarding: Adequate Aerobic Exercise, Dental Visits, Flossing Teeth and Heart Healthy Diet.    No screening tests are indicated at this time.       Immunizations Ordered and Administered: None.    Sarah was seen today for annual exam.    Diagnoses and all orders for this visit:    Physical exam    Gastroesophageal reflux disease without esophagitis  -     Comprehensive Metabolic Panel    Essential hypertension  -     Comprehensive Metabolic Panel  -     POC Urinalysis Dipstick, Automated    Hyperlipidemia, unspecified hyperlipidemia type  -     Comprehensive Metabolic Panel  -     Lipid Panel    Hypothyroidism, unspecified type  -     TSH  -     T4, Free    Paresthesia  -     Comprehensive Metabolic Panel  -     CBC & Differential  -     TIFFANIE + PE  -     TSH  -     T4, Free  -     Sedimentation Rate  -     RPR  -     POC Urinalysis Dipstick, Automated  -     Vitamin B12  -     Folate  -     MRI Lumbar Spine Without Contrast; Future    Other orders  -     ibuprofen (ADVIL,MOTRIN) 600 MG tablet; Take 1 tablet by mouth Every 8 (Eight) Hours As Needed for Mild Pain .        Return to Office    The patient was instructed to return for follow-up in 6 months.    The patient was instructed to return sooner if the condition changes, worsens, or does not resolve.

## 2020-01-15 LAB
ALBUMIN SERPL-MCNC: 3.1 G/DL (ref 2.9–4.4)
ALBUMIN/GLOB SERPL: 1 {RATIO} (ref 0.7–1.7)
ALPHA1 GLOB FLD ELPH-MCNC: 0.4 G/DL (ref 0–0.4)
ALPHA2 GLOB SERPL ELPH-MCNC: 0.8 G/DL (ref 0.4–1)
B-GLOBULIN SERPL ELPH-MCNC: 1.1 G/DL (ref 0.7–1.3)
GAMMA GLOB SERPL ELPH-MCNC: 1.1 G/DL (ref 0.4–1.8)
GLOBULIN SER CALC-MCNC: 3.4 G/DL (ref 2.2–3.9)
IGA SERPL-MCNC: 72 MG/DL (ref 87–352)
IGG SERPL-MCNC: 1095 MG/DL (ref 700–1600)
IGM SERPL-MCNC: 96 MG/DL (ref 26–217)
INTERPRETATION SERPL IEP-IMP: ABNORMAL
Lab: ABNORMAL
M-SPIKE: ABNORMAL G/DL
PROT SERPL-MCNC: 6.5 G/DL (ref 6–8.5)
RPR SER QL: NORMAL

## 2020-01-20 RX ORDER — LEVOTHYROXINE SODIUM 0.2 MG/1
200 TABLET ORAL DAILY
Qty: 30 TABLET | Refills: 5 | Status: SHIPPED | OUTPATIENT
Start: 2020-01-20 | End: 2020-07-17

## 2020-01-27 ENCOUNTER — APPOINTMENT (OUTPATIENT)
Dept: MRI IMAGING | Facility: HOSPITAL | Age: 70
End: 2020-01-27

## 2020-01-27 ENCOUNTER — HOSPITAL ENCOUNTER (OUTPATIENT)
Dept: MRI IMAGING | Facility: HOSPITAL | Age: 70
Discharge: HOME | End: 2020-01-27
Admitting: INTERNAL MEDICINE

## 2020-01-27 DIAGNOSIS — R20.2 PARESTHESIA: ICD-10-CM

## 2020-01-27 PROCEDURE — 72148 MRI LUMBAR SPINE W/O DYE: CPT

## 2020-02-03 ENCOUNTER — EPISODE CHANGES (OUTPATIENT)
Dept: CASE MANAGEMENT | Facility: OTHER | Age: 70
End: 2020-02-03

## 2020-02-03 ENCOUNTER — HOSPITAL ENCOUNTER (EMERGENCY)
Facility: HOSPITAL | Age: 70
Discharge: HOME | End: 2020-02-03
Attending: EMERGENCY MEDICINE | Admitting: EMERGENCY MEDICINE

## 2020-02-03 ENCOUNTER — APPOINTMENT (OUTPATIENT)
Dept: GENERAL RADIOLOGY | Facility: HOSPITAL | Age: 70
End: 2020-02-03

## 2020-02-03 ENCOUNTER — TELEPHONE (OUTPATIENT)
Dept: INTERNAL MEDICINE | Facility: CLINIC | Age: 70
End: 2020-02-03

## 2020-02-03 VITALS
HEART RATE: 56 BPM | HEIGHT: 69 IN | WEIGHT: 205 LBS | TEMPERATURE: 97.6 F | OXYGEN SATURATION: 94 % | DIASTOLIC BLOOD PRESSURE: 81 MMHG | BODY MASS INDEX: 30.36 KG/M2 | SYSTOLIC BLOOD PRESSURE: 162 MMHG | RESPIRATION RATE: 20 BRPM

## 2020-02-03 DIAGNOSIS — M54.40 ACUTE MIDLINE LOW BACK PAIN WITH SCIATICA, SCIATICA LATERALITY UNSPECIFIED: ICD-10-CM

## 2020-02-03 DIAGNOSIS — M25.511 ARTHRALGIA OF RIGHT SHOULDER REGION: ICD-10-CM

## 2020-02-03 DIAGNOSIS — R20.2 PARESTHESIA: Primary | ICD-10-CM

## 2020-02-03 DIAGNOSIS — S43.014A ANTERIOR DISLOCATION OF RIGHT SHOULDER, INITIAL ENCOUNTER: Primary | ICD-10-CM

## 2020-02-03 PROCEDURE — 96375 TX/PRO/DX INJ NEW DRUG ADDON: CPT

## 2020-02-03 PROCEDURE — 99285 EMERGENCY DEPT VISIT HI MDM: CPT

## 2020-02-03 PROCEDURE — 96374 THER/PROPH/DIAG INJ IV PUSH: CPT

## 2020-02-03 PROCEDURE — 73020 X-RAY EXAM OF SHOULDER: CPT

## 2020-02-03 PROCEDURE — 25010000002 MORPHINE PER 10 MG: Performed by: EMERGENCY MEDICINE

## 2020-02-03 PROCEDURE — 73030 X-RAY EXAM OF SHOULDER: CPT

## 2020-02-03 PROCEDURE — 25010000002 ONDANSETRON PER 1 MG: Performed by: EMERGENCY MEDICINE

## 2020-02-03 RX ORDER — MORPHINE SULFATE 4 MG/ML
4 INJECTION, SOLUTION INTRAMUSCULAR; INTRAVENOUS ONCE
Status: COMPLETED | OUTPATIENT
Start: 2020-02-03 | End: 2020-02-03

## 2020-02-03 RX ORDER — ONDANSETRON 2 MG/ML
4 INJECTION INTRAMUSCULAR; INTRAVENOUS ONCE
Status: COMPLETED | OUTPATIENT
Start: 2020-02-03 | End: 2020-02-03

## 2020-02-03 RX ORDER — SODIUM CHLORIDE 0.9 % (FLUSH) 0.9 %
10 SYRINGE (ML) INJECTION AS NEEDED
Status: DISCONTINUED | OUTPATIENT
Start: 2020-02-03 | End: 2020-02-03 | Stop reason: HOSPADM

## 2020-02-03 RX ORDER — HYDROCODONE BITARTRATE AND ACETAMINOPHEN 7.5; 325 MG/1; MG/1
1 TABLET ORAL EVERY 6 HOURS PRN
Qty: 12 TABLET | Refills: 0 | Status: SHIPPED | OUTPATIENT
Start: 2020-02-03 | End: 2020-05-07

## 2020-02-03 RX ORDER — MORPHINE SULFATE 2 MG/ML
2 INJECTION, SOLUTION INTRAMUSCULAR; INTRAVENOUS ONCE
Status: DISCONTINUED | OUTPATIENT
Start: 2020-02-03 | End: 2020-02-03 | Stop reason: HOSPADM

## 2020-02-03 RX ORDER — PROPOFOL 10 MG/ML
200 VIAL (ML) INTRAVENOUS ONCE
Status: DISCONTINUED | OUTPATIENT
Start: 2020-02-03 | End: 2020-02-03

## 2020-02-03 RX ADMIN — MORPHINE SULFATE 4 MG: 4 INJECTION, SOLUTION INTRAMUSCULAR; INTRAVENOUS at 10:50

## 2020-02-03 RX ADMIN — METHOHEXITAL SODIUM 100 MG: 500 INJECTION, POWDER, LYOPHILIZED, FOR SOLUTION INTRAMUSCULAR; INTRAVENOUS; RECTAL at 11:05

## 2020-02-03 RX ADMIN — ONDANSETRON 4 MG: 2 INJECTION INTRAMUSCULAR; INTRAVENOUS at 10:50

## 2020-02-03 NOTE — ED PROVIDER NOTES
Subjective   69-year-old female presents emergency department complaining of right shoulder pain after a trip and fall on uneven pavement.  Patient states he was taking her dog outside when she tripped over an uneven portion of pavement right shoulder.  She does have an obvious deformity and is having quite a bit of pain in the ER left shoulder.  No shortness of breath, no chest pain no neck pain no head injury no vision changes photophobia or nausea or vomiting.    She does have a past medical history remarkable for breast cancer, celiac disease, thyroid disease, GERD, hypertension, arthritis, and back pain.  Her primary care provider is Tyrese Recinos.    Medication list reviewed  She has multiple allergies, all of which were reviewed in the chart.          Review of Systems   Constitutional: Negative for chills, diaphoresis and fever.   HENT: Negative for congestion and sore throat.    Respiratory: Negative for cough, choking, chest tightness, shortness of breath and wheezing.    Cardiovascular: Negative for chest pain and leg swelling.   Gastrointestinal: Negative for abdominal distention, abdominal pain, anal bleeding, blood in stool, constipation, diarrhea, nausea and vomiting.   Genitourinary: Negative for difficulty urinating, dysuria, flank pain, frequency, hematuria and urgency.   Musculoskeletal: Positive for arthralgias and joint swelling. Negative for neck stiffness.        Right shoulder pain with obvious deformity.   Neurological: Negative for dizziness, seizures, syncope, speech difficulty, weakness, light-headedness, numbness and headaches.   Psychiatric/Behavioral: Negative for confusion.   All other systems reviewed and are negative.      Past Medical History:   Diagnosis Date   • Arthritis    • Back pain    • Breast cancer (CMS/HCC)    • Celiac disease    • Disease of thyroid gland    • GERD (gastroesophageal reflux disease)    • Hypertension    • MSG intolerance     Hives       Allergies    Allergen Reactions   • Nuts Shortness Of Breath     rash   • Other      MSG - HIVES    • Penicillins Shortness Of Breath     rash   • Soybean-Containing Drug Products Shortness Of Breath     rash   • Sunflower Oil Shortness Of Breath     rash   • Apple    • Cefuroxime      Stomach problems   • Cephalexin      Stomach problems   • Chicken Allergy    • Clindamycin Nausea Only and Nausea And Vomiting   • Erythromycin Nausea Only   • Flu Virus Vaccine      Redness and red rash to area   • Gluten Meal    • Metronidazole Hives   • Naproxen Dizziness   • Parsley Seed    • Thimerosal      Redness and swelling at site of injection   • Varicella Virus Vaccine Live      Redness and swelling at site   • Zostavax [Zoster Vaccine Live]      76269 UNT /0.65 ML subcutaneous solution Recontituted  - redness and swelling at site   • Doxycycline Itching and Rash   • Sulfa Antibiotics Nausea Only and Rash     Other reaction(s): Headache, Vomiting       Past Surgical History:   Procedure Laterality Date   • COLONOSCOPY      2011   • EYE SURGERY      bilateral cataracts removed   • HYSTERECTOMY  05/1983   • MASTECTOMY Bilateral 09/05/2017   • MASTECTOMY WITH SENTINEL NODE BIOPSY AND AXILLARY NODE DISSECTION Bilateral 9/5/2017    Procedure: LEFT BREAST MASTECTOMY WITH LEFT  SENTINEL NODE BIOPSY AND RIGHT PROPHYLACTIC MASTECTOMY;  Surgeon: Caitlin Green MD;  Location: [facility];  Service:    • OOPHORECTOMY     • SECONDARY INTRAOCULAR LENSE IMPLANTATION  12/2015    also in 12/2016   • SHOULDER ARTHROSCOPY Left 06/04/2014   • SKIN CANCER EXCISION  10/2015    basal cell   • THYROID SURGERY  06/1996   • TONSILLECTOMY  11/1976       Family History   Problem Relation Age of Onset   • Hypertension Mother    • Hyperlipidemia Mother    • Thyroid disease Mother    • Aneurysm Father    • Heart disease Father    • Breast cancer Neg Hx    • Ovarian cancer Neg Hx        Social History     Socioeconomic History   • Marital status:       Spouse name: Not on file   • Number of children: Not on file   • Years of education: Not on file   • Highest education level: Not on file   Tobacco Use   • Smoking status: Never Smoker   • Smokeless tobacco: Never Used   Substance and Sexual Activity   • Alcohol use: Yes     Frequency: 2-4 times a month     Comment: one or less per week   • Drug use: No   • Sexual activity: Defer           Objective   Physical Exam   Constitutional: She is oriented to person, place, and time. She appears well-developed and well-nourished. No distress.   HENT:   Head: Normocephalic and atraumatic.   Right Ear: External ear normal.   Left Ear: External ear normal.   Nose: Nose normal.   Mouth/Throat: Oropharynx is clear and moist. No oropharyngeal exudate.   Eyes: Pupils are equal, round, and reactive to light. Conjunctivae and EOM are normal. Right eye exhibits no discharge. Left eye exhibits no discharge. No scleral icterus.   Neck: Normal range of motion. Neck supple. No JVD present. No tracheal deviation present. No thyromegaly present.   No midline tenderness.   Cardiovascular: Normal rate, regular rhythm, normal heart sounds and intact distal pulses. Exam reveals no gallop and no friction rub.   No murmur heard.  Pulmonary/Chest: Effort normal and breath sounds normal. No stridor. No respiratory distress. She has no wheezes. She has no rales. She exhibits no tenderness.   Abdominal: Soft. She exhibits no distension. There is no tenderness. There is no rebound and no guarding.   Musculoskeletal: She exhibits tenderness and deformity. She exhibits no edema.   Obvious anterior and inferior dislocation of the right shoulder.  Range of motion not assessed for obvious reasons.  Capillary refill is brisk sensations intact and radial pulses intact distally.   Neurological: She is alert and oriented to person, place, and time. No cranial nerve deficit. She exhibits normal muscle tone. Coordination normal.   Skin: Skin is warm and dry. No  rash noted. She is not diaphoretic. No erythema. No pallor.   Psychiatric: She has a normal mood and affect. Her behavior is normal. Judgment and thought content normal.   Nursing note and vitals reviewed.      Procedural Sedation  Date/Time: 2/3/2020 11:03 AM  Performed by: Daron Dove MD  Authorized by: Daron Dove MD     Consent:     Consent obtained:  Verbal    Consent given by:  Patient    Risks discussed:  Allergic reaction  Indications:     Procedure performed:  Dislocation reduction    Procedure necessitating sedation performed by:  Physician performing sedation    Intended level of sedation:  Moderate (conscious sedation)  Pre-sedation assessment:     Time since last food or drink:  19:00 yesterday    ASA classification: class 2 - patient with mild systemic disease      Neck mobility: reduced      Mouth openin finger widths    Pre-sedation assessment completed:  2/3/2020 11:03 AM  Immediate pre-procedure details:     Reassessment: Patient reassessed immediately prior to procedure      Reviewed: vital signs and relevant labs/tests      Verified: oxygen available    Procedure details (see MAR for exact dosages):     Sedation start time:  2/3/2020 11:05 AM    Preoxygenation:  Nonrebreather mask    Sedation:  Methohexital    Analgesia:  Morphine    Intra-procedure monitoring:  Frequent vital sign checks    Sedation end time:  2/3/2020 11:12 AM    Total sedation time (minutes):  7  Post-procedure details:     Post-sedation assessment completed:  2/3/2020 11:12 AM    Attendance: Constant attendance by certified staff until patient recovered      Recovery: Patient returned to pre-procedure baseline      Estimated blood loss (see I/O flowsheets): no      Patient is stable for discharge or admission: yes      Patient tolerance:  Tolerated well, no immediate complications  Upper Extremity Dislocation  Date/Time: 2/3/2020 11:06 AM  Performed by: Daron Dove MD  Authorized by: Daron Dove MD    Consent: Verbal consent obtained. Written consent obtained.  Risks and benefits: risks, benefits and alternatives were discussed  Consent given by: patient  Patient understanding: patient states understanding of the procedure being performed  Patient consent: the patient's understanding of the procedure matches consent given  Procedure consent: procedure consent matches procedure scheduled  Relevant documents: relevant documents present and verified  Test results: test results available and properly labeled  Site marked: the operative site was marked  Imaging studies: imaging studies available  Patient identity confirmed: verbally with patient  Injury location: shoulder  Location details: right shoulder  Injury type: dislocation  Dislocation type: anterior  Hill-Sachs deformity: no  Chronicity: new  Pre-procedure distal perfusion: normal  Pre-procedure range of motion: reduced    Sedation:  Patient sedated: yes  Sedatives: methohexital  Analgesia: morphine  Sedation start date/time: 2/3/2020 11:05 AM  Sedation end date/time: 2/3/2020 11:12 AM  Vitals: Vital signs were monitored during sedation.    Immobilization: sling  Post-procedure neurovascular assessment: post-procedure neurovascularly intact  Patient tolerance: Patient tolerated the procedure well with no immediate complications                 ED Course  ED Course as of Feb 03 1620   Mon Feb 03, 2020   1033 Dr. Dove is at bedside evaluating the patient.    [SK]   1037 I reviewed Mrs. Raphael's case with EDILMA Raymond.  I spoke with Mrs. Raphael about risks and benefits of procedural sedation and closed reduction.  We have just established IV access and will give pain medication.  She tells me she tolerates morphine just fine, last p.o. was 1900 yesterday.    [DT]   1058 Upon repeat exam Mrs. Raphael tells me the morphine has not helped at all and she remains quite anxious.  Will give an additional 2 mg.    [DT]   1610 Patient was stable on serial rechecks.   She states her pain level has improved considerably.  Neurovascular checks remained normal.  She will be discharged home with Ortho follow-up.  She was given a sling for comfort but advised regarding slip fall risk she is not able to guard herself with a fall.  She verbalized understanding she would use extreme caution.  She was counseled regarding narcotics use for pain control and increased risk of falls.  She verbalized understanding regarding the use of narcotics    [TG]      ED Course User Index  [DT] Daron Dove MD  [SK] Zainab Zimmer  [TG] Segundo Macdonald PA-C                                               MDM    Final diagnoses:   Anterior dislocation of right shoulder, initial encounter   Arthralgia of right shoulder region            Zainab Zimmer  02/03/20 1127       Segundo Macdonald PA-C  02/03/20 1620

## 2020-02-03 NOTE — DISCHARGE INSTRUCTIONS
Wear sling for the next 2 to 3 days.  Call Dr. Coe's office to schedule orthopedic consultation.  Use extreme caution when walking while wearing your sling, as your ability to protect yourself in a fall is compromised while wearing a sling.    CONTROLLED SUBSTANCE(S) EDUCATION  Controlled Substances have been prescribed by your provider to treat your medical condition and associated symptoms. Although Controlled Substances can be effective in relieving your pain or other symptoms, they may also cause serious adverse effects. It is important that you understand how to safely and appropriately take these medications.  Proper Use  1. Carefully following instructions for use, including timing of doses, whether to take the  medication with or without food, and any foods or other medications to avoid while taking the medication;  2. If you have low or impaired vision you should wear glasses when taking the medication and not take the medication in the dark;  3. You should read the prescription container label each time to confirm the dosage;  4. You should never use the medication after the expiration date;  5. You must never share the medication with others;  6. You must not take the medication with alcohol or other sedatives;  7. You should not take the medication to help you sleep;  8. You should never break, crush or chew the medication;  9. If you have been prescribed a skin patch (transdermal), external heat, fever and exertion can increase the absorption of these products, leading to potentially fatal overdose;  10. You should immediately contact the physician’s office to report any adverse reaction and,  11. It is illegal to share, sell or give away Controlled Substances.  Driving and Work Safety  1. Controlled Substances may cause sleepiness, clouded thinking, decreased concentration, slower reflexes, or incoordination, all of which may create a danger to you and others when driving or operating certain type  of machinery;  2. Avoid, if possible, driving or engaging in other potentially dangerous work or other activities, for a specific period of time until the initial effects of the Controlled Substances no longer create such dangers; and,  3. Ingesting other substances, such as alcohol, benzodiazepines or some cold remedies, at the same time you are taking the Controlled Substances prescribed or dispensed may increase cognitive and motor impairment.  Pregnancy  If you are pregnant or nursing a baby, avoid using Controlled Substances, or use them on a minimal basis in strict accordance with your provider’s instructions.  Potential for Overdose and Response  1. The use of Controlled Substances creates a risk of respiratory depression, which may result in serious harm or death. You and others should be watchful for the following warning signs of overmedication:  ? intoxicated behavior, such as confusion, slurred speech, or stumbling;  ? feeling dizzy or faint;  ? acting very drowsy or groggy;  ? unusual snoring, gasping, or snorting during sleep;  ? and/or difficulty waking up from sleep or difficulty in staying awake.  2. Immediately call “911” or an emergency service upon you or your caregivers observing or experiencing any of the following conditions:  ? you cannot be aroused or waken, or are unable to talk after being awakened;  ? you have shortness of breath, slow or light breathing, or stopped breathing;  ? gurgling noises coming from your mouth or throat;  ? your body is limp, seems lifeless;  ? your face is pale or clammy;  ? your fingernails or lips are turning purple or blue; and/or  ? your heartbeat is slow, unusual or stopped  Safe Storage of Controlled Substances  1. If your Controlled Substances are not stored in a safe manner there is a potential that  partners, family members or others may improperly obtain your Controlled Substances;  2. Always keep the Controlled Substances in the original container;  3.  Store Controlled Substances in a locked cabinet or other secure storage unit, that is cool, dry and out of direct sunlight, such as:  ? an existing safe;  ? a cut-proof travel bag;  ? a portable lock box designed for travel; or,  ? a locking medical box.  4. Do not store Controlled Substances in:  ? an unlocked medicine cabinet;  ? in your car; or,  ? in a refrigerator or freezer unless specifically recommended by the prescriber or  pharmacist; and  5. Immediately notify your provider if any Controlled Substances prescribed or dispensed by the provider are stolen or improperly taken by another individual.  Proper Disposal  1. It is important to safely and appropriately dispose of unused Controlled Substances that had been prescribed or dispensed by your provider;  2. Promptly dispose of unused Controlled Substances after the expiration date of the  prescription or after you no longer require the Controlled Substances to treat your medical condition;  3. In order to safely dispose of Controlled Substances, you should turn in the unused Controlled Substances as part of an approved governmental drug take-back program. The [place] Office of Drug Control Policy has a listing of [place] Permanent Drug Disposal Locations at http://www.odcp.[place].gov - click on the [place] Prescriptions Drug Drop Map and Location on the left side of the page.  4. You should not flush Controlled Substances down the toilet; and,  5. You should personally remove any identifying information, including the prescription number, from an empty Controlled Substance container and then properly dispose of the empty container.  CONSENT FOR TREATMENT WITH CONTROLLED SUBSTANCE(S)  (This is for an initial prescription)  1. Controlled Substances  Controlled Substances are prescribed to treat a variety of conditions, including the relief  of chronic pain, to provide stimulation, promote weight loss, and treat mood disorders.  Pain relief is an important  medical reason to take Controlled Substances.  Controlled Substances are drugs or chemical substances whose possession and use are  regulated under the Controlled Substances Act. The law requires that patient are informed of the risks, benefits, and alternatives of taking Controlled Substances.  2. Adverse Effects  As with any medication, there are risks and adverse effects associated with the use of  Controlled Substances. Common adverse effects of pain medicines could include, but are not limited to: sedation or sleepiness, nausea, vomiting, constipation, pruritus (itching), confusion, respiratory depression, and urinary retention. Some of these effects may make it unsafe for you to drive a vehicle, operate heavy machinery, or perform other tasks that require concentration and coordination. Excessive use of these Controlled Substances can lead to profound sedation, respiratory depression, coma, and/or death. Regarding stimulants, adverse effects could include, but are not limited to: drug dependency, neuropsychiatric symptoms such as psychosis and jamal, weight loss, cardiovascular events such as heart attack and stroke, insomnia, hypertension, and agitation. Any questions you have regarding the Controlled Substance(s) should be discussed with the prescribing provider.  3. Physical Dependence, Tolerance, and Addiction  Although uncommon when used for their clinical indications, both pain relievers and  stimulants can cause physical dependence, tolerance, and/or addiction when used for a  prolonged period. Maintenance therapy with these Controlled Substances can cause  physical dependence. This means that if these medications are abruptly stopped, or  decreased significantly over a short period of time, a patient may experience withdrawal  symptoms such as: nervousness, irritability, insomnia, sweating, abdominal cramping,  nausea, vomiting, and diarrhea. Tolerance occurs when the effects of these  Controlled  Substances are decreased over a period of prolonged use making it necessary to increase the dosage. Physical dependence and tolerance are different than addiction. Addiction is a complex disease characterized by compulsive craving or seeking and use of a substance despite its extreme negatives on a person. The risk of addiction may be increased in a patient with a history of alcoholism or other addiction.  4. Alternatives  Controlled Substances are routinely prescribed to treat moderate to severe pain or other  medical conditions. Other medicines are available to treat these conditions that are not  associated with tolerance or addiction, however, are associated with a lower level of pain  relief or stimulation. It may also be an alternative to not take any medicine to treat these  conditions, or to use alternative modalities, other than medicine to treat these conditions.  I voluntarily consent to the receipt of the above-named Controlled Substance(s) as prescribed by my provider. I have been informed of the benefits, risks, and alternatives to taking these medications. I acknowledge that I have read and understood all of the information above and I have had the opportunity to ask questions and have them answered to my satisfaction.

## 2020-02-03 NOTE — TELEPHONE ENCOUNTER
Patient called in regards to her MRI results. Pt would like to be advised on those results. Pt also stated that she has not been sleeping. Please advise pt at [phone].

## 2020-02-04 NOTE — TELEPHONE ENCOUNTER
S/W pt, states she called to get MRI results and was told we didn't have yet but radiology told her that report was sent to Dr Daniel. Expl that Dr Daniel was out yesterday and that yes report is in but that he has not reviewed it as of yet.  States she is concerned because her feet are feeling numb and that she fell because of this on Sunday and dislocated her shoulder.  States they said nothing is broken but that she is in a lot of pain still.  States she was concerned that her chemo had caused neuropathy but was wanting to know if anything showed on her MRI.  Expl will forward to Dr Daniel to review tomorrow and will let her know.  Verb great apprec.

## 2020-02-04 NOTE — TELEPHONE ENCOUNTER
Patient called to check on her test results informed patien they are not back yet. Patient also has a question regarding her tamoxifen (NOLVADEX) 20 MG chemo tablet.    Patient would like a call back [phone]    Please advise

## 2020-02-05 ENCOUNTER — PATIENT OUTREACH (OUTPATIENT)
Dept: CASE MANAGEMENT | Facility: OTHER | Age: 70
End: 2020-02-05

## 2020-02-05 NOTE — TELEPHONE ENCOUNTER
S/W pt. Informed that Dr Watters said that she has some mild disc bulges that are causing some pressure on nerves, but nothing that looks ominous. He recommends that she go to PT to see if this will help and if it doesn't then we would schedule with neurosurgery to evaluate, although he doesn't think this is surgical.  Verb good understanding and agreement.  States she would like to proceed with recommendation.  Expl Dr Watters will place order and someone will call her with appt.  Verb apprec.

## 2020-02-05 NOTE — OUTREACH NOTE
Care Coordination Assessment    Documented/Reviewed By:  Jaqueline Godoy RN Date/time:  2/5/2020 12:54 PM   Assessment completed with:  patient  Enrolled in care management program:  No  Living arrangement:  alone  Support system:  marva based, neighbors  Type of residence:  apartment  Home care services:  No  Equipment used at home:  cane, walker  Communication device:  No  Bed or wheelchair confined:  No  Inadequate nutrition:  No  Medication adherence problem:  Yes  Experiencing side effects from current medications:  No  History of fall(s) in last 6 months:  Yes (Comment: Fall that took her to ED 2/3/2020)  Difficulty keeping appointments:  No  Family aware of the patient's advance care planning wishes:  Yes (Comment: Living will on file)

## 2020-02-05 NOTE — TELEPHONE ENCOUNTER
She has some mild disc bulges that are causing some pressure on nerves, but nothing that looks ominous.  I would recommend that she go to PT to see if this will help and if it doesn't we should schedule with neurosurgery to evaluate, although I don't think this is surgical.  Please let me know if she is willing to do this and I'll put in the order.  Tyrese Recinos MD  7:50 AM  02/05/20

## 2020-02-05 NOTE — OUTREACH NOTE
"Care Plan Note      Responses   Annual Wellness Visit:   Patient Has Completed   Care Gaps Addressed  Colon Cancer Screening, Flu Shot, Pneumonia Vaccine, Mammogram [Had colonoscopy 1/22/19]   Colon Cancer Screening Type  Colonoscopy   Colonoscopy Status  Up to Date (< 10 yrs)   Colon Cancer Screening Completion at [facility] or Other  -- [Dr. Brown]   Flu Shot Status  Up to Date   Mammogram Status  Exempt [Has had bilateral Mastectomies]   Pneumonia Vaccine Status  Up to Date   Other Patient Education/Resources   MyChart   MyChart Education Method  Send Materials   Does patient have depression diagnosis?  No   Advanced Directives:  Patient Has   Ed Visits past 12 months:  1   Hospitalizations past 12 months  None   Medication Adherence  Medications understood   Health Literacy  Good        The main concerns and/or symptoms the patient would like to address are: ED visit 2/3/2020 with shoulder injury.  States she is sleeping a lot,\" but that's OK.\"  Did get home with sling, which she is wearing.  She has not made ortho f/u appt yet, but states she has the number and she will call.  I did offer to call and get scheduled, but she wanted to do this herself.   States does notice issue with balance while wearing sling, but also has numbness in feet that she is concerned with.  She states they have told her she has bulging disc. Her PCP has ordered outpt PT for now to see if this helps. She is using walker that she had from her past knee surgery.   She does report that she has neighbors and [employer]oroastrian friends that will take her to appointments. She has [employer] (food delivery subscription) that delivers her groceries.  She was not interested in getting [employer] at present, but was receptive to mailing info, which will be done.  Care gaps addressed (see above).     Education/instruction provided by Care Coordinator: Explained ambulatory case management role and contact information given. She denies any needs at time of call.  "  Discussed need for AWV after April of this year. Discussed ED discharged instructions.      Follow Up Outreach Due:  As needed    Jaqueline Godoy RN  Ambulatory     2/5/2020, 1:12 PM

## 2020-02-10 ENCOUNTER — EPISODE CHANGES (OUTPATIENT)
Dept: CASE MANAGEMENT | Facility: OTHER | Age: 70
End: 2020-02-10

## 2020-02-25 RX ORDER — DIPHENHYDRAMINE HYDROCHLORIDE 50 MG/ML
50 INJECTION INTRAMUSCULAR; INTRAVENOUS AS NEEDED
Qty: 6 ML | Refills: 5 | Status: SHIPPED | OUTPATIENT
Start: 2020-02-25 | End: 2020-05-07 | Stop reason: SDUPTHER

## 2020-02-25 RX ORDER — DIPHENHYDRAMINE HCL 25 MG
25 CAPSULE ORAL EVERY 6 HOURS PRN
Qty: 360 CAPSULE | Refills: 1 | Status: SHIPPED | OUTPATIENT
Start: 2020-02-25 | End: 2020-10-09 | Stop reason: HOSPADM

## 2020-03-16 RX ORDER — TAMOXIFEN CITRATE 20 MG/1
TABLET ORAL
Qty: 90 TABLET | Refills: 1 | Status: SHIPPED | OUTPATIENT
Start: 2020-03-16 | End: 2020-11-24

## 2020-03-20 RX ORDER — TRIAMTERENE AND HYDROCHLOROTHIAZIDE 37.5; 25 MG/1; MG/1
TABLET ORAL
Qty: 90 TABLET | Refills: 0 | Status: SHIPPED | OUTPATIENT
Start: 2020-03-20 | End: 2020-05-07 | Stop reason: SDUPTHER

## 2020-03-20 RX ORDER — AMLODIPINE BESYLATE 5 MG/1
TABLET ORAL
Qty: 90 TABLET | Refills: 0 | Status: SHIPPED | OUTPATIENT
Start: 2020-03-20 | End: 2020-08-13

## 2020-03-20 RX ORDER — CITALOPRAM 40 MG/1
TABLET ORAL
Qty: 135 TABLET | Refills: 0 | Status: SHIPPED | OUTPATIENT
Start: 2020-03-20 | End: 2020-05-07 | Stop reason: SDUPTHER

## 2020-04-13 ENCOUNTER — TELEPHONE (OUTPATIENT)
Dept: INTERNAL MEDICINE | Facility: CLINIC | Age: 70
End: 2020-04-13

## 2020-04-13 DIAGNOSIS — G47.00 INSOMNIA, UNSPECIFIED TYPE: Primary | ICD-10-CM

## 2020-04-13 RX ORDER — ARIPIPRAZOLE 5 MG/1
5 TABLET ORAL DAILY
Qty: 30 TABLET | Refills: 1 | Status: SHIPPED | OUTPATIENT
Start: 2020-04-13 | End: 2020-06-11

## 2020-04-13 NOTE — TELEPHONE ENCOUNTER
She states she is needing an increased on the citalopram . She is c/o   Insomnia , lack of energy,  She states all she wants to do is lay around.    She feels her depression has got worse.  She has been  taking 60 mg daily since 9/2017.    She is also asking for medication to help her sleep.    Khai CHAVEZ

## 2020-04-13 NOTE — TELEPHONE ENCOUNTER
PT CALLED STATED THAT SHE NEEDS TO HAVE HER RX citalopram (CeleXA) 40 MG tablet DOSAGE INCREASED, ALSO STATED THAT SHE NEEDS MEDICATION TO HELP HER SLEEP.    PLEASE ADVISE.  CALL BACK:[phone]

## 2020-04-13 NOTE — TELEPHONE ENCOUNTER
Add abilify 5 mg po daily.  #30  rf 1  Schedule a follow-up with me via video visit in 3-4 weeks.  Tyrese Recinos MD  13:21  04/13/20

## 2020-05-07 ENCOUNTER — TELEMEDICINE (OUTPATIENT)
Dept: INTERNAL MEDICINE | Facility: CLINIC | Age: 70
End: 2020-05-07

## 2020-05-07 DIAGNOSIS — E78.5 HYPERLIPIDEMIA, UNSPECIFIED HYPERLIPIDEMIA TYPE: ICD-10-CM

## 2020-05-07 DIAGNOSIS — K21.9 GASTROESOPHAGEAL REFLUX DISEASE WITHOUT ESOPHAGITIS: ICD-10-CM

## 2020-05-07 DIAGNOSIS — I10 ESSENTIAL HYPERTENSION: ICD-10-CM

## 2020-05-07 DIAGNOSIS — G47.00 INSOMNIA, UNSPECIFIED TYPE: Primary | ICD-10-CM

## 2020-05-07 DIAGNOSIS — E03.9 HYPOTHYROIDISM, UNSPECIFIED TYPE: ICD-10-CM

## 2020-05-07 PROCEDURE — 99214 OFFICE O/P EST MOD 30 MIN: CPT | Performed by: INTERNAL MEDICINE

## 2020-05-07 RX ORDER — HYDROXYZINE HYDROCHLORIDE 25 MG/1
25 TABLET, FILM COATED ORAL NIGHTLY PRN
Qty: 90 TABLET | Refills: 1 | Status: SHIPPED | OUTPATIENT
Start: 2020-05-07 | End: 2022-02-28 | Stop reason: SDUPTHER

## 2020-05-07 RX ORDER — DIPHENHYDRAMINE HYDROCHLORIDE 50 MG/ML
50 INJECTION INTRAMUSCULAR; INTRAVENOUS AS NEEDED
Qty: 6 ML | Refills: 5 | Status: SHIPPED | OUTPATIENT
Start: 2020-05-07 | End: 2020-07-13

## 2020-05-07 RX ORDER — TRIAMTERENE AND HYDROCHLOROTHIAZIDE 37.5; 25 MG/1; MG/1
1 TABLET ORAL DAILY
Qty: 90 TABLET | Refills: 1 | Status: SHIPPED | OUTPATIENT
Start: 2020-05-07 | End: 2020-10-09 | Stop reason: HOSPADM

## 2020-05-07 RX ORDER — LOSARTAN POTASSIUM 100 MG/1
100 TABLET ORAL DAILY
Qty: 90 TABLET | Refills: 1 | Status: SHIPPED | OUTPATIENT
Start: 2020-05-07 | End: 2021-01-14

## 2020-05-07 RX ORDER — PANTOPRAZOLE SODIUM 40 MG/1
40 TABLET, DELAYED RELEASE ORAL 2 TIMES DAILY
Qty: 180 TABLET | Refills: 1 | Status: SHIPPED | OUTPATIENT
Start: 2020-05-07 | End: 2021-05-11

## 2020-05-07 RX ORDER — CITALOPRAM 40 MG/1
60 TABLET ORAL DAILY
Qty: 135 TABLET | Refills: 1 | Status: SHIPPED | OUTPATIENT
Start: 2020-05-07 | End: 2021-06-03

## 2020-05-07 NOTE — PROGRESS NOTES
Chief Complaint   Patient presents with   • Follow-up     Telemedicine was provided via two-way interactive audiovisual telecommunication (Zoom) due to the COVID-19 outbreak in order to minimize risk of exposure.    Others in attendance:  None    Risks, benefits and alternative including in-person office visit have been explained to the patient, and the patient has consented to this mode of care.  The visit was conducted on a secure line.  All parties in the room, if any other, were identified and approved by the patient prior to the telemedicine visit.  No technical issues were experienced.  A level of care equivalent to in-person care was achieved.    History of Present Illness    The patient presents for a follow-up related to GERD. The patient is on pantoprazole for her gastroesophageal reflux. The medication is taken on a regular basis and gives complete relief of the symptoms. She reports no abdominal pain, belching, chest pain, diarrhea, dysphagia, early satiety, heartburn, hoarseness, nausea, odynophagia, rectal bleeding, vomiting or weight loss. The GERD has no known aggravating factors.    The patient presents for a follow-up related to insomnia. She denies feelings of depression and anxiety. There is no history of excessive caffeine usage. There is no history of nocturia. She is taking hydroxyzine. This has helped the patient sleep better.    The patient falls asleep easily and typically does not awaken at night. The patient does not snore and denies gasping or stopping breathing at night. She feels well rested during the day and she denies falling asleep while watching television, while driving or during conversation.       The patient presents for a follow-up related to hypertension. The patient reports that she has had no blurred vision. She states that she is taking her medication as prescribed. She is not having medication side effects.    The patient presents for a follow-up related to  hyperlipidemia. She is following a low fat diet. She reports that she is exercising. She is not taking medication for hyperlipidemia. She denies orthopnea, paroxysmal nocturnal dyspnea or dyspnea on exertion.    The patient presents for a follow-up related to hypothyroidism. She reports a good energy level. She reports no hair loss, heat intolerance, cold intolerance, constipation or sweats. She is taking her medication as prescribed.    Review of Systems    NECK- Denies Decreased Range of Motion, Stiffness, Thyroid Nodules, Enlarged Lymph Nodes or Goiter.    HENT- Denies Nasal Discharge, Sore Throat, Ear Pain, Ear Drainage, Headache, Sinus Pain, Nasal Congestion, Decreased Hearing or Tinnitus.    PULMONARY- Denies Wheezing, Dyspnea, Sputum Production, Cough, Hemoptysis or Pleuritic Chest Pain.    CARDIOVASCULAR- Denies Claudication, Edema, Syncope, Palpitations or Irregular Heart Beat.    Medications      Current Outpatient Medications:   •  albuterol (PROAIR HFA) 108 (90 Base) MCG/ACT inhaler, Inhale 2 puffs Every 6 (Six) Hours As Needed for Wheezing or Shortness of Air., Disp: 1 inhaler, Rfl: 5  •  amLODIPine (NORVASC) 5 MG tablet, TAKE ONE TABLET BY MOUTH DAILY, Disp: 90 tablet, Rfl: 0  •  ARIPiprazole (Abilify) 5 MG tablet, Take 1 tablet by mouth Daily., Disp: 30 tablet, Rfl: 1  •  Azelastine-Fluticasone (DYMISTA) 137-50 MCG/ACT suspension, 2 Squirts into the nostril(s) as directed by provider Daily., Disp: , Rfl:   •  Cholecalciferol (VITAMIN D PO), Take 1,000 Units by mouth daily. Vitamin D 1000 UNIT CAPS; TAKE 1 TABLET DAILY, Disp: , Rfl:   •  citalopram (CeleXA) 40 MG tablet, Take 1.5 tablets by mouth Daily., Disp: 135 tablet, Rfl: 1  •  diphenhydrAMINE (BENADRYL) 25 mg capsule, Take 1 capsule by mouth Every 6 (Six) Hours As Needed for Itching or Allergies., Disp: 360 capsule, Rfl: 1  •  diphenhydrAMINE (BENADRYL) 50 MG/ML injection, Inject 1 mL into the appropriate muscle as directed by prescriber As  Needed for Itching or Allergies., Disp: 6 mL, Rfl: 5  •  EPINEPHrine (EPIPEN 2-KIRA) 0.3 MG/0.3ML solution auto-injector injection, Inject 1 Units into the shoulder, thigh, or buttocks As Needed (allergy). as directed; For: Allergic rhinitis, Disp: , Rfl:   •  Fluticasone Furoate (ARNUITY ELLIPTA) 100 MCG/ACT aerosol powder , Inhale 2 puffs Daily., Disp: , Rfl:   •  guaiFENesin (MUCINEX) 600 MG 12 hr tablet, Take 600 mg by mouth 2 (Two) Times a Day., Disp: , Rfl:   •  hydrOXYzine (ATARAX) 25 MG tablet, Take 1 tablet by mouth At Night As Needed for Allergies. at bedtime as needed, Disp: 90 tablet, Rfl: 1  •  ibuprofen (ADVIL,MOTRIN) 600 MG tablet, Take 1 tablet by mouth Every 8 (Eight) Hours As Needed for Mild Pain ., Disp: 270 tablet, Rfl: 1  •  levothyroxine (SYNTHROID) 200 MCG tablet, Take 1 tablet by mouth Daily., Disp: 30 tablet, Rfl: 5  •  losartan (COZAAR) 100 MG tablet, Take 1 tablet by mouth Daily., Disp: 90 tablet, Rfl: 1  •  magnesium oxide (MAG-OX) 400 MG tablet, Take 400 mg by mouth Daily., Disp: , Rfl:   •  Melatonin 10 MG tablet, Take 1 tablet by mouth At Night As Needed (sleep)., Disp: , Rfl:   •  pantoprazole (PROTONIX) 40 MG EC tablet, Take 1 tablet by mouth 2 (Two) Times a Day., Disp: 180 tablet, Rfl: 1  •  tamoxifen (NOLVADEX) 20 MG chemo tablet, TAKE ONE TABLET BY MOUTH DAILY, Disp: 90 tablet, Rfl: 1  •  triamterene-hydrochlorothiazide (MAXZIDE-25) 37.5-25 MG per tablet, Take 1 tablet by mouth Daily., Disp: 90 tablet, Rfl: 1  •  vitamin B-12 (CYANOCOBALAMIN) 1000 MCG tablet, Take 1,000 mcg by mouth Daily., Disp: , Rfl:      Allergies    Allergies   Allergen Reactions   • Nuts Shortness Of Breath     rash   • Other      MSG - HIVES    • Penicillins Shortness Of Breath     rash   • Soybean-Containing Drug Products Shortness Of Breath     rash   • Sunflower Oil Shortness Of Breath     rash   • Apple    • Cefuroxime      Stomach problems   • Cephalexin      Stomach problems   • Chicken Allergy    •  Clindamycin Nausea Only and Nausea And Vomiting   • Erythromycin Nausea Only   • Flu Virus Vaccine      Redness and red rash to area   • Gluten Meal    • Metronidazole Hives   • Naproxen Dizziness   • Parsley Seed    • Thimerosal      Redness and swelling at site of injection   • Varicella Virus Vaccine Live      Redness and swelling at site   • Zostavax [Zoster Vaccine Live]      25579 UNT /0.65 ML subcutaneous solution Recontituted  - redness and swelling at site   • Doxycycline Itching and Rash   • Sulfa Antibiotics Nausea Only and Rash     Other reaction(s): Headache, Vomiting       Problem List    Patient Active Problem List   Diagnosis   • Anxiety   • Gastroesophageal reflux disease without esophagitis   • Hyperlipidemia   • Hypertension   • Hypothyroidism   • Cataract   • Cancer of overlapping sites of right female breast (CMS/HCC)   • Estrogen receptor positive       Medications, Allergies, Problems List and Past History were reviewed and updated.    Physical Examination    LMP  (LMP Unknown) Comment: LAST PAP 3/18 BY DR SANDERS    General: The patient is well developed and well nourished.    HEENT: Facies- Within normal limits. Lids- Normal bilaterally. Conjunctiva- Clear bilaterally. Sclera- Anicteric bilaterally.    Psychiatric Examination: The patient appears appropriate, clean and tidy with a level of consciousness that is appropriate and alert. The facial expression is appropriate and she makes good eye contact. The patient is talkative and the speech volume is appropriate. The words are articulated clearly with a normal flow. There are no circumlocutions and the patient does not paraphrase.       The mood is appropriate. There are no abnormal thought processes and the thought content is normal. There are no delusions or hallucinations noted.    Impression and Assessment    Gastroesophageal Reflux Disease.    Insomnia.    Essential  Hypertension.    Hyperlipidemia.    Hypothyroidism.    Plan    Gastroesophageal Reflux Disease Plan: The current plan was continued.    Essential Hypertension Plan: The current plan was continued.    Hyperlipidemia Plan: The current plan was continued.    Hypothyroidism Plan: The current plan was continued.    Insomnia Plan: The current plan was continued.    Sarah was seen today for follow-up.    Diagnoses and all orders for this visit:    Insomnia, unspecified type    Gastroesophageal reflux disease without esophagitis  -     pantoprazole (PROTONIX) 40 MG EC tablet; Take 1 tablet by mouth 2 (Two) Times a Day.    Essential hypertension    Hyperlipidemia, unspecified hyperlipidemia type    Hypothyroidism, unspecified type    Other orders  -     losartan (COZAAR) 100 MG tablet; Take 1 tablet by mouth Daily.  -     citalopram (CeleXA) 40 MG tablet; Take 1.5 tablets by mouth Daily.  -     hydrOXYzine (ATARAX) 25 MG tablet; Take 1 tablet by mouth At Night As Needed for Allergies. at bedtime as needed  -     triamterene-hydrochlorothiazide (MAXZIDE-25) 37.5-25 MG per tablet; Take 1 tablet by mouth Daily.  -     diphenhydrAMINE (BENADRYL) 50 MG/ML injection; Inject 1 mL into the appropriate muscle as directed by prescriber As Needed for Itching or Allergies.          Return to Office    The patient was instructed to return for follow-up in 4 months.    The patient was instructed to return sooner if the condition changes, worsens, or does not resolve.

## 2020-06-11 DIAGNOSIS — G47.00 INSOMNIA, UNSPECIFIED TYPE: ICD-10-CM

## 2020-06-11 RX ORDER — ARIPIPRAZOLE 5 MG/1
TABLET ORAL
Qty: 90 TABLET | Refills: 0 | Status: SHIPPED | OUTPATIENT
Start: 2020-06-11 | End: 2020-09-08

## 2020-06-16 ENCOUNTER — TELEPHONE (OUTPATIENT)
Dept: ONCOLOGY | Facility: CLINIC | Age: 70
End: 2020-06-16

## 2020-06-17 ENCOUNTER — OFFICE VISIT (OUTPATIENT)
Dept: ONCOLOGY | Facility: CLINIC | Age: 70
End: 2020-06-17

## 2020-06-17 VITALS
OXYGEN SATURATION: 97 % | RESPIRATION RATE: 18 BRPM | DIASTOLIC BLOOD PRESSURE: 65 MMHG | HEIGHT: 69 IN | SYSTOLIC BLOOD PRESSURE: 156 MMHG | BODY MASS INDEX: 33.03 KG/M2 | WEIGHT: 223 LBS | TEMPERATURE: 97.2 F | HEART RATE: 67 BPM

## 2020-06-17 DIAGNOSIS — Z17.0 MALIGNANT NEOPLASM OF OVERLAPPING SITES OF RIGHT BREAST IN FEMALE, ESTROGEN RECEPTOR POSITIVE (HCC): Primary | ICD-10-CM

## 2020-06-17 DIAGNOSIS — C50.811 MALIGNANT NEOPLASM OF OVERLAPPING SITES OF RIGHT BREAST IN FEMALE, ESTROGEN RECEPTOR POSITIVE (HCC): Primary | ICD-10-CM

## 2020-06-17 PROCEDURE — 99213 OFFICE O/P EST LOW 20 MIN: CPT | Performed by: NURSE PRACTITIONER

## 2020-06-17 RX ORDER — MONTELUKAST SODIUM 10 MG/1
10 TABLET ORAL NIGHTLY
COMMUNITY
Start: 2020-05-24 | End: 2022-02-28 | Stop reason: SDUPTHER

## 2020-06-17 NOTE — PROGRESS NOTES
"      PROBLEM LIST:  1.  Left breast cancer  A.  Left mastectomy for well-differentiated invasive ductal breast cancer        stage T1b(2) N1a M0, stage IIA  B.  [place] lymph nodes negative but one positive intramammary node  C.  ER and SC positive and HER-2 negative with Oncotype DX recurrent score of 16  D.  Adjuvant tamoxifen started 9/27/17  2.  Atypical ductal hyperplasia of the right breast, status post right simple mastectomy  3.  Hypothyroidism after prior thyroid surgery for goiter  4.  Hypertension     Chief complaint: Here about adjuvant endocrine treatment for breast cancer          HISTORY OF PRESENT ILLNESS:   Ms. Raphael is here for follow up evaluation of breast cancer management. She continues on tamoxifen for adjuvant endocrine therapy and is tolerating it well.  She fell in February and dislocated her right shoulder and hurt her right knee.  She is waiting on a knee brace for her right knee to help with ambulation.  She has had peripheral neuropathy that is been worsening over the past year.  She had a nerve conduction study test about a month ago and she sees a neurologist on Monday to go over the results.  She still has occasional swelling under the right axilla area.  She is intermittent pain in that right area no lymphadenopathy noted.      Past Medical History, Past Surgical History, Social History, Family History have been reviewed and are without significant changes except as mentioned.    Review of Systems   A comprehensive 14 point review of systems was performed and was negative except as mentioned.    Medications:  The current medication list was reviewed in the EMR    ALLERGIES:  Allergies not on file           /65   Pulse 67   Temp 97.2 °F (36.2 °C) (Temporal)   Resp 18   Ht 175.3 cm (69.02\")   Wt 101 kg (223 lb)   LMP  (LMP Unknown) Comment: LAST PAP 3/18 BY DR SANDERS  SpO2 97%   BMI 32.92 kg/m²   Vitals:    06/17/20 1258   PainSc:   3          Performance Status: " ECOG 0    General: well appearing, in no acute distress  HEENT: sclera anicteric, oropharynx clear  Lymphatics: no cervical, supraclavicular, or axillary adenopathy  Cardiovascular: regular rate and rhythm, no murmurs  Lungs: clear to auscultation bilaterally  Abdomen: soft, nontender, nondistended.  No palpable organomegaly  Breasts: Her bilateral mastectomy incisions have healed well with no nodules or other evidence recurrence  Extremeties: no lower extremity edema  Skin: no rashes, lesions, bruising, or petechiae    RECENT LABS:   WBC   Date Value Ref Range Status   01/14/2020 7.62 3.40 - 10.80 10*3/mm3 Final     Hemoglobin   Date Value Ref Range Status   01/14/2020 13.7 12.0 - 15.9 g/dL Final     Hematocrit   Date Value Ref Range Status   01/14/2020 40.7 34.0 - 46.6 % Final     MCV   Date Value Ref Range Status   01/14/2020 85.1 79.0 - 97.0 fL Final     RDW   Date Value Ref Range Status   01/14/2020 12.9 12.3 - 15.4 % Final     MPV   Date Value Ref Range Status   01/14/2020 10.4 6.0 - 12.0 fL Final     Platelets   Date Value Ref Range Status   01/14/2020 234 140 - 450 10*3/mm3 Final     Immature Grans %   Date Value Ref Range Status   01/14/2020 0.4 0.0 - 0.5 % Final     Neutrophils, Absolute   Date Value Ref Range Status   01/14/2020 4.69 1.70 - 7.00 10*3/mm3 Final     Lymphocytes, Absolute   Date Value Ref Range Status   01/14/2020 2.12 0.70 - 3.10 10*3/mm3 Final     Monocytes, Absolute   Date Value Ref Range Status   01/14/2020 0.48 0.10 - 0.90 10*3/mm3 Final     Eosinophils, Absolute   Date Value Ref Range Status   01/14/2020 0.25 0.00 - 0.40 10*3/mm3 Final     Basophils, Absolute   Date Value Ref Range Status   01/14/2020 0.05 0.00 - 0.20 10*3/mm3 Final     Immature Grans, Absolute   Date Value Ref Range Status   01/14/2020 0.03 0.00 - 0.05 10*3/mm3 Final     nRBC   Date Value Ref Range Status   01/14/2020 0.1 0.0 - 0.2 /100 WBC Final       Glucose   Date Value Ref Range Status   01/14/2020 89 65 - 99  mg/dL Final     Sodium   Date Value Ref Range Status   01/14/2020 137 136 - 145 mmol/L Final     Potassium   Date Value Ref Range Status   01/14/2020 4.2 3.5 - 5.2 mmol/L Final     CO2   Date Value Ref Range Status   01/14/2020 25.3 22.0 - 29.0 mmol/L Final     Chloride   Date Value Ref Range Status   01/14/2020 98 98 - 107 mmol/L Final     Anion Gap   Date Value Ref Range Status   01/14/2020 13.7 5.0 - 15.0 mmol/L Final     Creatinine   Date Value Ref Range Status   01/14/2020 1.18 (H) 0.57 - 1.00 mg/dL Final     BUN   Date Value Ref Range Status   01/14/2020 11 8 - 23 mg/dL Final     BUN/Creatinine Ratio   Date Value Ref Range Status   01/14/2020 9.3 7.0 - 25.0 Final     Calcium   Date Value Ref Range Status   01/14/2020 9.4 8.6 - 10.5 mg/dL Final     eGFR Non  Amer   Date Value Ref Range Status   01/14/2020 46 (L) >60 mL/min/1.73 Final     Alkaline Phosphatase   Date Value Ref Range Status   01/14/2020 78 39 - 117 U/L Final     Total Protein   Date Value Ref Range Status   01/14/2020 7.1 6.0 - 8.5 g/dL Final     ALT (SGPT)   Date Value Ref Range Status   01/14/2020 20 1 - 33 U/L Final     AST (SGOT)   Date Value Ref Range Status   01/14/2020 19 1 - 32 U/L Final     Total Bilirubin   Date Value Ref Range Status   01/14/2020 0.5 0.2 - 1.2 mg/dL Final     Albumin   Date Value Ref Range Status   01/14/2020 4.00 3.50 - 5.20 g/dL Final   01/14/2020 3.1 2.9 - 4.4 g/dL Final     Globulin   Date Value Ref Range Status   01/14/2020 3.1 gm/dL Final     A/G Ratio   Date Value Ref Range Status   01/14/2020 1.0 0.7 - 1.7 Final       No results found for: LDH, URICACID    Lab Results   Component Value Date    MSPIKE Not Observed 01/14/2020                 Impression:   1.  Breast cancer with low risk features that's doing well on adjuvant tamoxifen. I'll plan to treat her for 5 years.     Plan:   1.  I'll continue her adjuvant tamoxifen.   2.  She will return here in 6 months. She has been instructed to contact us in the  interm if any new symptoms arise.                    Janine George [facility]    06/17/20           CC:

## 2020-07-13 RX ORDER — DIPHENHYDRAMINE HYDROCHLORIDE 50 MG/ML
INJECTION INTRAMUSCULAR; INTRAVENOUS
Qty: 50 ML | Refills: 4 | Status: SHIPPED | OUTPATIENT
Start: 2020-07-13 | End: 2020-10-09 | Stop reason: HOSPADM

## 2020-07-17 RX ORDER — LEVOTHYROXINE SODIUM 0.2 MG/1
TABLET ORAL
Qty: 30 TABLET | Refills: 4 | Status: SHIPPED | OUTPATIENT
Start: 2020-07-17 | End: 2020-07-20 | Stop reason: SDUPTHER

## 2020-07-20 RX ORDER — LEVOTHYROXINE SODIUM 0.2 MG/1
200 TABLET ORAL DAILY
Qty: 90 TABLET | Refills: 1 | Status: SHIPPED | OUTPATIENT
Start: 2020-07-20 | End: 2021-08-09 | Stop reason: SDUPTHER

## 2020-07-21 ENCOUNTER — OFFICE VISIT (OUTPATIENT)
Dept: INTERNAL MEDICINE | Facility: CLINIC | Age: 70
End: 2020-07-21

## 2020-07-21 VITALS
TEMPERATURE: 97.1 F | DIASTOLIC BLOOD PRESSURE: 76 MMHG | WEIGHT: 227 LBS | RESPIRATION RATE: 18 BRPM | BODY MASS INDEX: 33.51 KG/M2 | HEART RATE: 84 BPM | SYSTOLIC BLOOD PRESSURE: 134 MMHG

## 2020-07-21 DIAGNOSIS — E03.9 HYPOTHYROIDISM, UNSPECIFIED TYPE: ICD-10-CM

## 2020-07-21 DIAGNOSIS — I10 ESSENTIAL HYPERTENSION: ICD-10-CM

## 2020-07-21 DIAGNOSIS — G47.00 INSOMNIA, UNSPECIFIED TYPE: ICD-10-CM

## 2020-07-21 DIAGNOSIS — Z00.00 MEDICARE ANNUAL WELLNESS VISIT, SUBSEQUENT: Primary | ICD-10-CM

## 2020-07-21 DIAGNOSIS — E78.5 HYPERLIPIDEMIA, UNSPECIFIED HYPERLIPIDEMIA TYPE: ICD-10-CM

## 2020-07-21 DIAGNOSIS — K21.9 GASTROESOPHAGEAL REFLUX DISEASE WITHOUT ESOPHAGITIS: ICD-10-CM

## 2020-07-21 LAB
ALBUMIN SERPL-MCNC: 4 G/DL (ref 3.5–5.2)
ALBUMIN/GLOB SERPL: 1.4 G/DL
ALP SERPL-CCNC: 79 U/L (ref 39–117)
ALT SERPL W P-5'-P-CCNC: 20 U/L (ref 1–33)
ANION GAP SERPL CALCULATED.3IONS-SCNC: 11.2 MMOL/L (ref 5–15)
AST SERPL-CCNC: 18 U/L (ref 1–32)
BILIRUB SERPL-MCNC: 0.3 MG/DL (ref 0–1.2)
BUN SERPL-MCNC: 10 MG/DL (ref 8–23)
BUN/CREAT SERPL: 10.8 (ref 7–25)
CALCIUM SPEC-SCNC: 9.6 MG/DL (ref 8.6–10.5)
CHLORIDE SERPL-SCNC: 97 MMOL/L (ref 98–107)
CHOLEST SERPL-MCNC: 212 MG/DL (ref 0–200)
CO2 SERPL-SCNC: 25.8 MMOL/L (ref 22–29)
CREAT SERPL-MCNC: 0.93 MG/DL (ref 0.57–1)
GFR SERPL CREATININE-BSD FRML MDRD: 60 ML/MIN/1.73
GLOBULIN UR ELPH-MCNC: 2.8 GM/DL
GLUCOSE SERPL-MCNC: 112 MG/DL (ref 65–99)
HDLC SERPL-MCNC: 60 MG/DL (ref 40–60)
LDLC SERPL CALC-MCNC: 116 MG/DL (ref 0–100)
LDLC/HDLC SERPL: 1.93 {RATIO}
POTASSIUM SERPL-SCNC: 4.1 MMOL/L (ref 3.5–5.2)
PROT SERPL-MCNC: 6.8 G/DL (ref 6–8.5)
SODIUM SERPL-SCNC: 134 MMOL/L (ref 136–145)
T4 FREE SERPL-MCNC: 0.98 NG/DL (ref 0.93–1.7)
TRIGL SERPL-MCNC: 181 MG/DL (ref 0–150)
TSH SERPL DL<=0.05 MIU/L-ACNC: 29.8 UIU/ML (ref 0.27–4.2)
VLDLC SERPL-MCNC: 36.2 MG/DL (ref 5–40)

## 2020-07-21 PROCEDURE — G0439 PPPS, SUBSEQ VISIT: HCPCS | Performed by: INTERNAL MEDICINE

## 2020-07-21 PROCEDURE — 84443 ASSAY THYROID STIM HORMONE: CPT | Performed by: INTERNAL MEDICINE

## 2020-07-21 PROCEDURE — 80053 COMPREHEN METABOLIC PANEL: CPT | Performed by: INTERNAL MEDICINE

## 2020-07-21 PROCEDURE — 84439 ASSAY OF FREE THYROXINE: CPT | Performed by: INTERNAL MEDICINE

## 2020-07-21 PROCEDURE — 36415 COLL VENOUS BLD VENIPUNCTURE: CPT | Performed by: INTERNAL MEDICINE

## 2020-07-21 PROCEDURE — 80061 LIPID PANEL: CPT | Performed by: INTERNAL MEDICINE

## 2020-07-21 NOTE — PATIENT INSTRUCTIONS
Advance Care Planning and Advance Directives     You make decisions on a daily basis - decisions about where you want to live, your career, your home, your life. Perhaps one of the most important decisions you face is your choice for future medical care. Take time to talk with your family and your healthcare team and start planning today.  Advance Care Planning is a process that can help you:  · Understand possible future healthcare decisions in light of your own experiences  · Reflect on those decision in light of your goals and values  · Discuss your decisions with those closest to you and the healthcare professionals that care for you  · Make a plan by creating a document that reflects your wishes    Surrogate Decision Maker  In the event of a medical emergency, which has left you unable to communicate or to make your own decisions, you would need someone to make decisions for you.  It is important to discuss your preferences for medical treatment with this person while you are in good health.     Qualities of a surrogate decision maker:  • Willing to take on this role and responsibility  • Knows what you want for future medical care  • Willing to follow your wishes even if they don't agree with them  • Able to make difficult medical decisions under stressful circumstances    Advance Directives  These are legal documents you can create that will guide your healthcare team and decision maker(s) when needed. These documents can be stored in the electronic medical record.    · Living Will - a legal document to guide your care if you have a terminal condition or a serious illness and are unable to communicate. States vary by statute in document names/types, but most forms may include one or more of the following:        -  Directions regarding life-prolonging treatments        -  Directions regarding artificially provided nutrition/hydration        -  Choosing a healthcare decision maker        -  Direction  regarding organ/tissue donation    · Durable Power of  for Healthcare - this document names an -in-fact to make medical decisions for you, but it may also allow this person to make personal and financial decisions for you. Please seek the advice of an  if you need this type of document.    **Advance Directives are not required and no one may discriminate against you if you do not sign one.    Medical Orders  Many states allow specific forms/orders signed by your physician to record your wishes for medical treatment in your current state of health. This form, signed in personal communication with your physician, addresses resuscitation and other medical interventions that you may or may not want.      For more information or to schedule a time with a [facility] Advance Care Planning Facilitator contact: [facility] or call [phone] and someone will contact you directly.    Fall Prevention in the Home, Adult  Falls can cause injuries. They can happen to people of all ages. There are many things you can do to make your home safe and to help prevent falls. Ask for help when making these changes, if needed.  What actions can I take to prevent falls?  General Instructions  · Use good lighting in all rooms. Replace any light bulbs that burn out.  · Turn on the lights when you go into a dark area. Use night-lights.  · Keep items that you use often in easy-to-reach places. Lower the shelves around your home if necessary.  · Set up your furniture so you have a clear path. Avoid moving your furniture around.  · Do not have throw rugs and other things on the floor that can make you trip.  · Avoid walking on wet floors.  · If any of your floors are uneven, fix them.  · Add color or contrast paint or tape to clearly satish and help you see:  ? Any grab bars or handrails.  ? First and last steps of stairways.  ? Where the edge of each step is.  · If you use a stepladder:  ? Make sure that it  is fully opened. Do not climb a closed stepladder.  ? Make sure that both sides of the stepladder are locked into place.  ? Ask someone to hold the stepladder for you while you use it.  · If there are any pets around you, be aware of where they are.  What can I do in the bathroom?         · Keep the floor dry. Clean up any water that spills onto the floor as soon as it happens.  · Remove soap buildup in the tub or shower regularly.  · Use non-skid mats or decals on the floor of the tub or shower.  · Attach bath mats securely with double-sided, non-slip rug tape.  · If you need to sit down in the shower, use a plastic, non-slip stool.  · Install grab bars by the toilet and in the tub and shower. Do not use towel bars as grab bars.  What can I do in the bedroom?  · Make sure that you have a light by your bed that is easy to reach.  · Do not use any sheets or blankets that are too big for your bed. They should not hang down onto the floor.  · Have a firm chair that has side arms. You can use this for support while you get dressed.  What can I do in the kitchen?  · Clean up any spills right away.  · If you need to reach something above you, use a strong step stool that has a grab bar.  · Keep electrical cords out of the way.  · Do not use floor polish or wax that makes floors slippery. If you must use wax, use non-skid floor wax.  What can I do with my stairs?  · Do not leave any items on the stairs.  · Make sure that you have a light switch at the top of the stairs and the bottom of the stairs. If you do not have them, ask someone to add them for you.  · Make sure that there are handrails on both sides of the stairs, and use them. Fix handrails that are broken or loose. Make sure that handrails are as long as the stairways.  · Install non-slip stair treads on all stairs in your home.  · Avoid having throw rugs at the top or bottom of the stairs. If you do have throw rugs, attach them to the floor with carpet  tape.  · Choose a carpet that does not hide the edge of the steps on the stairway.  · Check any carpeting to make sure that it is firmly attached to the stairs. Fix any carpet that is loose or worn.  What can I do on the outside of my home?  · Use bright outdoor lighting.  · Regularly fix the edges of walkways and driveways and fix any cracks.  · Remove anything that might make you trip as you walk through a door, such as a raised step or threshold.  · Trim any bushes or trees on the path to your home.  · Regularly check to see if handrails are loose or broken. Make sure that both sides of any steps have handrails.  · Install guardrails along the edges of any raised decks and porches.  · Clear walking paths of anything that might make someone trip, such as tools or rocks.  · Have any leaves, snow, or ice cleared regularly.  · Use sand or salt on walking paths during winter.  · Clean up any spills in your garage right away. This includes grease or oil spills.  What other actions can I take?  · Wear shoes that:  ? Have a low heel. Do not wear high heels.  ? Have rubber bottoms.  ? Are comfortable and fit you well.  ? Are closed at the toe. Do not wear open-toe sandals.  · Use tools that help you move around (mobility aids) if they are needed. These include:  ? Canes.  ? Walkers.  ? Scooters.  ? Crutches.  · Review your medicines with your doctor. Some medicines can make you feel dizzy. This can increase your chance of falling.  Ask your doctor what other things you can do to help prevent falls.  Where to find more information  · Centers for Disease Control and Prevention, STEADI: https://cdc.gov  · National [place] on Aging: https://rl1mmmx.lin.nih.gov  Contact a doctor if:  · You are afraid of falling at home.  · You feel weak, drowsy, or dizzy at home.  · You fall at home.  Summary  · There are many simple things that you can do to make your home safe and to help prevent falls.  · Ways to make your home safe  include removing tripping hazards and installing grab bars in the bathroom.  · Ask for help when making these changes in your home.  This information is not intended to replace advice given to you by your health care provider. Make sure you discuss any questions you have with your health care provider.  Document Released: 10/14/2010 Document Revised: 04/09/2020 Document Reviewed: 08/02/2018  Elsevier Patient Education © 2020 [employer]      Heart-Healthy Eating Plan  Heart-healthy meal planning includes:  · Eating less unhealthy fats.  · Eating more healthy fats.  · Making other changes in your diet.  Talk with your doctor or a diet specialist (dietitian) to create an eating plan that is right for you.  What is my plan?  Your doctor may recommend an eating plan that includes:  · Total fat: ______% or less of total calories a day.  · Saturated fat: ______% or less of total calories a day.  · Cholesterol: less than _________mg a day.  What are tips for following this plan?  Cooking  Avoid frying your food. Try to bake, boil, grill, or broil it instead. You can also reduce fat by:  · Removing the skin from poultry.  · Removing all visible fats from meats.  · Steaming vegetables in water or broth.  Meal planning    · At meals, divide your plate into four equal parts:  ? Fill one-half of your plate with vegetables and green salads.  ? Fill one-fourth of your plate with whole grains.  ? Fill one-fourth of your plate with lean protein foods.  · Eat 4-5 servings of vegetables per day. A serving of vegetables is:  ? 1 cup of raw or cooked vegetables.  ? 2 cups of raw leafy greens.  · Eat 4-5 servings of fruit per day. A serving of fruit is:  ? 1 medium whole fruit.  ? ¼ cup of dried fruit.  ? ½ cup of fresh, frozen, or canned fruit.  ? ½ cup of 100% fruit juice.  · Eat more foods that have soluble fiber. These are apples, broccoli, carrots, beans, peas, and barley. Try to get 20-30 g of fiber per day.  · Eat 4-5 servings  of nuts, legumes, and seeds per week:  ? 1 serving of dried beans or legumes equals ½ cup after being cooked.  ? 1 serving of nuts is ¼ cup.  ? 1 serving of seeds equals 1 tablespoon.  General information  · Eat more home-cooked food. Eat less restaurant, buffet, and fast food.  · Limit or avoid alcohol.  · Limit foods that are high in starch and sugar.  · Avoid fried foods.  · Lose weight if you are overweight.  · Keep track of how much salt (sodium) you eat. This is important if you have high blood pressure. Ask your doctor to tell you more about this.  · Try to add vegetarian meals each week.  Fats  · Choose healthy fats. These include olive oil and canola oil, flaxseeds, walnuts, almonds, and seeds.  · Eat more omega-3 fats. These include salmon, mackerel, sardines, tuna, flaxseed oil, and ground flaxseeds. Try to eat fish at least 2 times each week.  · Check food labels. Avoid foods with trans fats or high amounts of saturated fat.  · Limit saturated fats.  ? These are often found in animal products, such as meats, butter, and cream.  ? These are also found in plant foods, such as palm oil, palm kernel oil, and coconut oil.  · Avoid foods with partially hydrogenated oils in them. These have trans fats. Examples are stick margarine, some tub margarines, cookies, crackers, and other baked goods.  What foods can I eat?  Fruits  All fresh, canned (in natural juice), or frozen fruits.  Vegetables  Fresh or frozen vegetables (raw, steamed, roasted, or grilled). Green salads.  Grains  Most grains. Choose whole wheat and whole grains most of the time. Rice and pasta, including brown rice and pastas made with whole wheat.  Meats and other proteins  Lean, well-trimmed beef, veal, pork, and lamb. Chicken and turkey without skin. All fish and shellfish. Wild duck, rabbit, pheasant, and venison. Egg whites or low-cholesterol egg substitutes. Dried beans, peas, lentils, and tofu. Seeds and most nuts.  Dairy  Low-fat or  nonfat cheeses, including ricotta and mozzarella. Skim or 1% milk that is liquid, powdered, or evaporated. Buttermilk that is made with low-fat milk. Nonfat or low-fat yogurt.  Fats and oils  Non-hydrogenated (trans-free) margarines. Vegetable oils, including soybean, sesame, sunflower, olive, peanut, safflower, corn, canola, and cottonseed. Salad dressings or mayonnaise made with a vegetable oil.  Beverages  Mineral water. Coffee and tea. Diet carbonated beverages.  Sweets and desserts  Sherbet, gelatin, and fruit ice. Small amounts of dark chocolate.  Limit all sweets and desserts.  Seasonings and condiments  All seasonings and condiments.  The items listed above may not be a complete list of foods and drinks you can eat. Contact a dietitian for more options.  What foods should I avoid?  Fruits  Canned fruit in heavy syrup. Fruit in cream or butter sauce. Fried fruit. Limit coconut.  Vegetables  Vegetables cooked in cheese, cream, or butter sauce. Fried vegetables.  Grains  Breads that are made with saturated or trans fats, oils, or whole milk. Croissants. Sweet rolls. Donuts. High-fat crackers, such as cheese crackers.  Meats and other proteins  Fatty meats, such as hot dogs, ribs, sausage, nagel, rib-eye roast or steak. High-fat deli meats, such as salami and bologna. Caviar. Domestic duck and goose. Organ meats, such as liver.  Dairy  Cream, sour cream, cream cheese, and creamed cottage cheese. Whole-milk cheeses. Whole or 2% milk that is liquid, evaporated, or condensed. Whole buttermilk. Cream sauce or high-fat cheese sauce. Yogurt that is made from whole milk.  Fats and oils  Meat fat, or shortening. Cocoa butter, hydrogenated oils, palm oil, coconut oil, palm kernel oil. Solid fats and shortenings, including nagel fat, salt pork, lard, and butter. Nondairy cream substitutes. Salad dressings with cheese or sour cream.  Beverages  Regular sodas and juice drinks with added sugar.  Sweets and  desserts  Frosting. Pudding. Cookies. Cakes. Pies. Milk chocolate or white chocolate. Buttered syrups. Full-fat ice cream or ice cream drinks.  The items listed above may not be a complete list of foods and drinks to avoid. Contact a dietitian for more information.  Summary  · Heart-healthy meal planning includes eating less unhealthy fats, eating more healthy fats, and making other changes in your diet.  · Eat a balanced diet. This includes fruits and vegetables, low-fat or nonfat dairy, lean protein, nuts and legumes, whole grains, and heart-healthy oils and fats.  This information is not intended to replace advice given to you by your health care provider. Make sure you discuss any questions you have with your health care provider.  Document Released: 06/18/2013 Document Revised: 02/21/2019 Document Reviewed: 01/25/2019  [employer] Patient Education © 2020 [employer] Inc[employer]

## 2020-07-21 NOTE — PROGRESS NOTES
The ABCs of the Annual Wellness VisitSubsequent Medicare Wellness Visit    Chief Complaint   Patient presents with   • Follow-up     6 month follow up chronic medical problems       Subjective   History of Present Illness:  Sarah Raphael is a 69 y.o. female who presents for a Subsequent Medicare Wellness Visit.    HEALTH RISK ASSESSMENT    Recent Hospitalizations:  No hospitalization(s) within the last year.    Current Medical Providers:  Patient Care Team:  Tyrese Recinos MD as PCP - General  Caitlin Green MD as Consulting Physician (General Surgery)    Smoking Status:  Social History     Tobacco Use   Smoking Status Never Smoker   Smokeless Tobacco Never Used       Alcohol Consumption:  Social History     Substance and Sexual Activity   Alcohol Use Yes   • Frequency: 2-4 times a month    Comment: one or less per week       Depression Screen:   PHQ-2/PHQ-9 Depression Screening 7/21/2020   Little interest or pleasure in doing things 0   Feeling down, depressed, or hopeless 0   Trouble falling or staying asleep, or sleeping too much -   Feeling tired or having little energy -   Poor appetite or overeating -   Feeling bad about yourself - or that you are a failure or have let yourself or your family down -   Trouble concentrating on things, such as reading the newspaper or watching television -   Moving or speaking so slowly that other people could have noticed. Or the opposite - being so fidgety or restless that you have been moving around a lot more than usual -   Thoughts that you would be better off dead, or of hurting yourself in some way -   Total Score 0   If you checked off any problems, how difficult have these problems made it for you to do your work, take care of things at home, or get along with other people? -       Fall Risk Screen:  STEADI Fall Risk Assessment was completed, and patient is at HIGH risk for falls. Assessment completed on:7/21/2020    Health Habits and Functional and  Cognitive Screening:  Functional & Cognitive Status 7/21/2020   Do you have difficulty preparing food and eating? No   Do you have difficulty bathing yourself, getting dressed or grooming yourself? No   Do you have difficulty using the toilet? No   Do you have difficulty moving around from place to place? No   Do you have trouble with steps or getting out of a bed or a chair? No   Current Diet Well Balanced Diet   Dental Exam Up to date   Eye Exam Up to date   Exercise (times per week) 0 times per week   Current Exercise Activities Include None   Do you need help using the phone?  No   Are you deaf or do you have serious difficulty hearing?  No   Do you need help with transportation? No   Do you need help shopping? No   Do you need help preparing meals?  No   Do you need help with housework?  No   Do you need help with laundry? No   Do you need help taking your medications? No   Do you need help managing money? No   Do you ever drive or ride in a car without wearing a seat belt? No   Have you felt unusual stress, anger or loneliness in the last month? Yes   Who do you live with? Alone   If you need help, do you have trouble finding someone available to you? No   Have you been bothered in the last four weeks by sexual problems? No   Do you have difficulty concentrating, remembering or making decisions? No         Does the patient have evidence of cognitive impairment? No    Asprin use counseling:Does not need ASA (and currently is not on it)    Age-appropriate Screening Schedule:  Refer to the list below for future screening recommendations based on patient's age, sex and/or medical conditions. Orders for these recommended tests are listed in the plan section. The patient has been provided with a written plan.    Health Maintenance   Topic Date Due   • MAMMOGRAM  05/09/2019   • INFLUENZA VACCINE  08/01/2020   • LIPID PANEL  01/14/2021   • TDAP/TD VACCINES (2 - Td) 11/01/2022   • COLONOSCOPY  01/22/2029   • ZOSTER  VACCINE  Completed          The following portions of the patient's history were reviewed and updated as appropriate: allergies, current medications, past family history, past medical history, past social history, past surgical history and problem list.    Outpatient Medications Prior to Visit   Medication Sig Dispense Refill   • albuterol (PROAIR HFA) 108 (90 Base) MCG/ACT inhaler Inhale 2 puffs Every 6 (Six) Hours As Needed for Wheezing or Shortness of Air. 1 inhaler 5   • amLODIPine (NORVASC) 5 MG tablet TAKE ONE TABLET BY MOUTH DAILY 90 tablet 0   • ARIPiprazole (ABILIFY) 5 MG tablet TAKE ONE TABLET BY MOUTH DAILY 90 tablet 0   • Azelastine-Fluticasone (DYMISTA) 137-50 MCG/ACT suspension 2 Squirts into the nostril(s) as directed by provider Daily.     • Cholecalciferol (VITAMIN D PO) Take 1,000 Units by mouth daily. Vitamin D 1000 UNIT CAPS; TAKE 1 TABLET DAILY     • citalopram (CeleXA) 40 MG tablet Take 1.5 tablets by mouth Daily. 135 tablet 1   • diphenhydrAMINE (BENADRYL) 25 mg capsule Take 1 capsule by mouth Every 6 (Six) Hours As Needed for Itching or Allergies. 360 capsule 1   • diphenhydrAMINE (BENADRYL) 50 MG/ML injection INJECT 1 ML INTRAMUSCULARLY AS DIRECTED BY PRESCRIBER ONE TIME AS NEEDED FOR ITCHING OR ALLERGIES FOR UP TO ONE DOSE 50 mL 4   • Fluticasone Furoate (ARNUITY ELLIPTA) 100 MCG/ACT aerosol powder  Inhale 2 puffs Daily.     • guaiFENesin (MUCINEX) 600 MG 12 hr tablet Take 600 mg by mouth 2 (Two) Times a Day.     • hydrOXYzine (ATARAX) 25 MG tablet Take 1 tablet by mouth At Night As Needed for Allergies. at bedtime as needed (Patient taking differently: Take 50 mg by mouth At Night As Needed for Allergies. at bedtime as needed) 90 tablet 1   • ibuprofen (ADVIL,MOTRIN) 600 MG tablet Take 1 tablet by mouth Every 8 (Eight) Hours As Needed for Mild Pain . 270 tablet 1   • levothyroxine (SYNTHROID, LEVOTHROID) 200 MCG tablet Take 1 tablet by mouth Daily. 90 tablet 1   • losartan (COZAAR) 100 MG  tablet Take 1 tablet by mouth Daily. 90 tablet 1   • magnesium oxide (MAG-OX) 400 MG tablet Take 400 mg by mouth Daily.     • Melatonin 10 MG tablet Take 1 tablet by mouth At Night As Needed (sleep).     • montelukast (SINGULAIR) 10 MG tablet      • pantoprazole (PROTONIX) 40 MG EC tablet Take 1 tablet by mouth 2 (Two) Times a Day. 180 tablet 1   • tamoxifen (NOLVADEX) 20 MG chemo tablet TAKE ONE TABLET BY MOUTH DAILY 90 tablet 1   • triamterene-hydrochlorothiazide (MAXZIDE-25) 37.5-25 MG per tablet Take 1 tablet by mouth Daily. 90 tablet 1   • vitamin B-12 (CYANOCOBALAMIN) 1000 MCG tablet Take 1,000 mcg by mouth Daily.     • EPINEPHrine (EPIPEN 2-KIRA) 0.3 MG/0.3ML solution auto-injector injection Inject 1 Units into the shoulder, thigh, or buttocks As Needed (allergy). as directed; For: Allergic rhinitis       No facility-administered medications prior to visit.        Patient Active Problem List   Diagnosis   • Anxiety   • Gastroesophageal reflux disease without esophagitis   • Hyperlipidemia   • Hypertension   • Hypothyroidism   • Cataract   • Cancer of overlapping sites of right female breast (CMS/HCC)   • Estrogen receptor positive       Advanced Care Planning:  ACP discussion was held with the patient during this visit. Patient has an advance directive in EMR which is still valid.     Review of Systems    Compared to one year ago, the patient feels her physical health is the same.  Compared to one year ago, the patient feels her mental health is the same.    Reviewed chart for potential of high risk medication in the elderly: yes  Reviewed chart for potential of harmful drug interactions in the elderly:yes    Objective         Vitals:    07/21/20 1002   BP: 134/76   BP Location: Right arm   Patient Position: Sitting   Cuff Size: Adult   Pulse: 84   Resp: 18   Temp: 97.1 °F (36.2 °C)   TempSrc: Infrared   Weight: 103 kg (227 lb)   PainSc: 0-No pain       Body mass index is 33.51 kg/m².  Discussed the patient's  BMI with her. The BMI is above average; BMI management plan is completed.    Physical Exam      Finger Rub Hearing{Test (right ear):passed  Finger Rub Hearing{Test (left ear):passed        Assessment/Plan   Medicare Risks and Personalized Health Plan  CMS Preventative Services Quick Reference  Advance Directive Discussion  Fall Risk  Obesity/Overweight     The above risks/problems have been discussed with the patient.  Pertinent information has been shared with the patient in the After Visit Summary.  Follow up plans and orders are seen below in the Assessment/Plan Section.        Sarah was seen today for follow-up.    Diagnoses and all orders for this visit:    Medicare annual wellness visit, subsequent          An After Visit Summary and PPPS were given to the patient.

## 2020-07-21 NOTE — PROGRESS NOTES
Chief Complaint   Patient presents with   • Follow-up     6 month follow up chronic medical problems       History of Present Illness    The patient presents for a follow-up related to hypertension. The patient reports that she has had no headaches, chest pain, dyspnea, edema, syncope, blurred vision or palpitations. She states that she is taking her medication as prescribed. She is not having medication side effects.    The patient presents for a follow-up related to GERD. The patient is on pantoprazole for her gastroesophageal reflux. The medication is taken on a regular basis and gives complete relief of the symptoms. She reports no abdominal pain, belching, diarrhea, dysphagia, early satiety, heartburn, hoarseness, nausea, odynophagia, rectal bleeding, vomiting or weight loss. The GERD has no known aggravating factors.    The patient presents for a follow-up related to hyperlipidemia. She is following a low fat diet. She reports that she is exercising. She is not taking medication for hyperlipidemia. She denies orthopnea, paroxysmal nocturnal dyspnea or dyspnea on exertion.    The patient presents for a follow-up related to hypothyroidism. She reports a good energy level. She reports no hair loss, heat intolerance, cold intolerance, constipation or sweats. She is taking her medication as prescribed.    The patient presents for a follow-up related to insomnia. She denies feelings of depression and anxiety. There is no history of excessive caffeine usage. There is no history of nocturia. She is not taking a medication for her insomia.    The patient falls asleep easily and typically does not awaken at night. The patient does not snore and denies gasping or stopping breathing at night. She feels well rested during the day and she denies falling asleep while watching television, while driving or during conversation.       Review of Systems    NECK- Denies Decreased Range of Motion, Stiffness, Thyroid Nodules,  Enlarged Lymph Nodes or Goiter.    HENT- Denies Nasal Discharge, Sore Throat, Ear Pain, Ear Drainage, Sinus Pain, Nasal Congestion, Decreased Hearing or Tinnitus.    PULMONARY- Denies Wheezing, Sputum Production, Cough, Hemoptysis or Pleuritic Chest Pain.    CARDIOVASCULAR- Denies Claudication or Irregular Heart Beat.    Medications      Current Outpatient Medications:   •  albuterol (PROAIR HFA) 108 (90 Base) MCG/ACT inhaler, Inhale 2 puffs Every 6 (Six) Hours As Needed for Wheezing or Shortness of Air., Disp: 1 inhaler, Rfl: 5  •  amLODIPine (NORVASC) 5 MG tablet, TAKE ONE TABLET BY MOUTH DAILY, Disp: 90 tablet, Rfl: 0  •  ARIPiprazole (ABILIFY) 5 MG tablet, TAKE ONE TABLET BY MOUTH DAILY, Disp: 90 tablet, Rfl: 0  •  Azelastine-Fluticasone (DYMISTA) 137-50 MCG/ACT suspension, 2 Squirts into the nostril(s) as directed by provider Daily., Disp: , Rfl:   •  Cholecalciferol (VITAMIN D PO), Take 1,000 Units by mouth daily. Vitamin D 1000 UNIT CAPS; TAKE 1 TABLET DAILY, Disp: , Rfl:   •  citalopram (CeleXA) 40 MG tablet, Take 1.5 tablets by mouth Daily., Disp: 135 tablet, Rfl: 1  •  diphenhydrAMINE (BENADRYL) 25 mg capsule, Take 1 capsule by mouth Every 6 (Six) Hours As Needed for Itching or Allergies., Disp: 360 capsule, Rfl: 1  •  diphenhydrAMINE (BENADRYL) 50 MG/ML injection, INJECT 1 ML INTRAMUSCULARLY AS DIRECTED BY PRESCRIBER ONE TIME AS NEEDED FOR ITCHING OR ALLERGIES FOR UP TO ONE DOSE, Disp: 50 mL, Rfl: 4  •  Fluticasone Furoate (ARNUITY ELLIPTA) 100 MCG/ACT aerosol powder , Inhale 2 puffs Daily., Disp: , Rfl:   •  guaiFENesin (MUCINEX) 600 MG 12 hr tablet, Take 600 mg by mouth 2 (Two) Times a Day., Disp: , Rfl:   •  hydrOXYzine (ATARAX) 25 MG tablet, Take 1 tablet by mouth At Night As Needed for Allergies. at bedtime as needed (Patient taking differently: Take 50 mg by mouth At Night As Needed for Allergies. at bedtime as needed), Disp: 90 tablet, Rfl: 1  •  ibuprofen (ADVIL,MOTRIN) 600 MG tablet, Take 1  tablet by mouth Every 8 (Eight) Hours As Needed for Mild Pain ., Disp: 270 tablet, Rfl: 1  •  levothyroxine (SYNTHROID, LEVOTHROID) 200 MCG tablet, Take 1 tablet by mouth Daily., Disp: 90 tablet, Rfl: 1  •  losartan (COZAAR) 100 MG tablet, Take 1 tablet by mouth Daily., Disp: 90 tablet, Rfl: 1  •  magnesium oxide (MAG-OX) 400 MG tablet, Take 400 mg by mouth Daily., Disp: , Rfl:   •  Melatonin 10 MG tablet, Take 1 tablet by mouth At Night As Needed (sleep)., Disp: , Rfl:   •  montelukast (SINGULAIR) 10 MG tablet, , Disp: , Rfl:   •  pantoprazole (PROTONIX) 40 MG EC tablet, Take 1 tablet by mouth 2 (Two) Times a Day., Disp: 180 tablet, Rfl: 1  •  tamoxifen (NOLVADEX) 20 MG chemo tablet, TAKE ONE TABLET BY MOUTH DAILY, Disp: 90 tablet, Rfl: 1  •  triamterene-hydrochlorothiazide (MAXZIDE-25) 37.5-25 MG per tablet, Take 1 tablet by mouth Daily., Disp: 90 tablet, Rfl: 1  •  vitamin B-12 (CYANOCOBALAMIN) 1000 MCG tablet, Take 1,000 mcg by mouth Daily., Disp: , Rfl:   •  EPINEPHrine (EPIPEN 2-KIRA) 0.3 MG/0.3ML solution auto-injector injection, Inject 1 Units into the shoulder, thigh, or buttocks As Needed (allergy). as directed; For: Allergic rhinitis, Disp: , Rfl:      Allergies    Allergies   Allergen Reactions   • Nuts Shortness Of Breath     rash   • Other      MSG - HIVES    • Penicillins Shortness Of Breath     rash   • Soybean-Containing Drug Products Shortness Of Breath     rash   • Sunflower Oil Shortness Of Breath     rash   • Cefuroxime      Stomach problems   • Cephalexin      Stomach problems   • Chicken Allergy    • Clindamycin Nausea Only and Nausea And Vomiting   • Erythromycin Nausea Only   • Flu Virus Vaccine      Redness and red rash to area   • Gluten Meal    • Metronidazole Hives   • Naproxen Dizziness   • Parsley Seed    • Thimerosal      Redness and swelling at site of injection   • Varicella Virus Vaccine Live      Redness and swelling at site   • Zostavax [Zoster Vaccine Live]      34167 UNT /0.65  ML subcutaneous solution Recontituted  - redness and swelling at site   • Apple Rash   • Doxycycline Itching and Rash   • Sulfa Antibiotics Nausea Only and Rash     Other reaction(s): Headache, Vomiting       Problem List    Patient Active Problem List   Diagnosis   • Anxiety   • Gastroesophageal reflux disease without esophagitis   • Hyperlipidemia   • Hypertension   • Hypothyroidism   • Cataract   • Cancer of overlapping sites of right female breast (CMS/HCC)   • Estrogen receptor positive       Medications, Allergies, Problems List and Past History were reviewed and updated.    Physical Examination    /76 (BP Location: Right arm, Patient Position: Sitting, Cuff Size: Adult)   Pulse 84   Temp 97.1 °F (36.2 °C) (Infrared)   Resp 18   Wt 103 kg (227 lb)   LMP  (LMP Unknown) Comment: LAST PAP 3/18 BY DR SANDERS  Breastfeeding No   BMI 33.51 kg/m²     HEENT: Head- Normocephalic Atraumatic. Facies- Within normal limits. Pinnas- Normal texture and shape bilaterally. Canals- Normal bilaterally. TMs- Normal bilaterally. Nares- Patent bilaterally. Nasal Septum- is normal. There is no tenderness to palpation over the frontal or maxillary sinuses. Lids- Normal bilaterally. Conjunctiva- Clear bilaterally. Sclera- Anicteric bilaterally. Oropharynx- Moist with no lesions. Tonsils- No enlargement, erythema or exudate.    Neck: Thyroid- non enlarged, symmetric and has no nodules. No bruits are detected. ROM- Normal Range of Motion with no rigidity.    Lungs: Auscultation- Clear to auscultation bilaterally. There are no retractions, clubbing or cyanosis. The Expiratory to Inspiratory ratio is equal.    Cardiovascular: There are no carotid bruits. Heart- Normal Rate with Regular rhythm and no murmurs. There are no gallops. There are no rubs. In the lower extremities there is no edema. The upper extremities do not have edema.    Abdomen: Soft, benign, non-tender with no masses, hernias, organomegaly or  scars.    Impression and Assessment    Essential Hypertension.    Gastroesophageal Reflux Disease.    Hyperlipidemia.    Hypothyroidism.    Insomnia.    Plan    Gastroesophageal Reflux Disease Plan: The current plan was continued.    Essential Hypertension Plan: The current plan was continued.    Hyperlipidemia Plan: The patient was instructed to exercise daily and eat a low fat diet.    Hypothyroidism Plan: The current plan was continued.    Insomnia Plan: The condition is stable. No change is needed in the current plan.    Sarah was seen today for follow-up.    Diagnoses and all orders for this visit:    Medicare annual wellness visit, subsequent    Gastroesophageal reflux disease without esophagitis    Essential hypertension  -     Comprehensive Metabolic Panel    Hyperlipidemia, unspecified hyperlipidemia type  -     Comprehensive Metabolic Panel  -     Lipid Panel    Hypothyroidism, unspecified type  -     TSH  -     T4, Free    Insomnia, unspecified type        Return to Office    The patient was instructed to return for follow-up in 6 months.    The patient was instructed to return sooner if the condition changes, worsens, or does not resolve.

## 2020-07-30 ENCOUNTER — TELEPHONE (OUTPATIENT)
Dept: INTERNAL MEDICINE | Facility: CLINIC | Age: 70
End: 2020-07-30

## 2020-07-30 NOTE — TELEPHONE ENCOUNTER
Patient did not specify subject but asked that Dr. Recinos or member of clinical staff call back when possible. Thanks!

## 2020-08-05 RX ORDER — LEVOTHYROXINE SODIUM 0.05 MG/1
50 TABLET ORAL DAILY
Qty: 90 TABLET | Refills: 1 | Status: SHIPPED | OUTPATIENT
Start: 2020-08-05 | End: 2021-04-07

## 2020-08-13 RX ORDER — AMLODIPINE BESYLATE 5 MG/1
TABLET ORAL
Qty: 90 TABLET | Refills: 0 | Status: SHIPPED | OUTPATIENT
Start: 2020-08-13 | End: 2020-11-24

## 2020-09-06 DIAGNOSIS — G47.00 INSOMNIA, UNSPECIFIED TYPE: ICD-10-CM

## 2020-09-08 RX ORDER — ARIPIPRAZOLE 5 MG/1
TABLET ORAL
Qty: 90 TABLET | Refills: 1 | Status: SHIPPED | OUTPATIENT
Start: 2020-09-08 | End: 2020-10-09 | Stop reason: HOSPADM

## 2020-09-25 ENCOUNTER — APPOINTMENT (OUTPATIENT)
Dept: PREADMISSION TESTING | Facility: HOSPITAL | Age: 70
End: 2020-09-25

## 2020-09-25 ENCOUNTER — HOSPITAL ENCOUNTER (OUTPATIENT)
Dept: GENERAL RADIOLOGY | Facility: HOSPITAL | Age: 70
Discharge: HOME | End: 2020-09-25

## 2020-09-25 VITALS — WEIGHT: 223.6 LBS | HEIGHT: 69 IN | BODY MASS INDEX: 33.12 KG/M2

## 2020-09-25 LAB
25(OH)D3 SERPL-MCNC: 40.8 NG/ML (ref 30–100)
ALBUMIN SERPL-MCNC: 4.1 G/DL (ref 3.5–5.2)
ALBUMIN/GLOB SERPL: 1.6 G/DL
ALP SERPL-CCNC: 84 U/L (ref 39–117)
ALT SERPL W P-5'-P-CCNC: 19 U/L (ref 1–33)
ANION GAP SERPL CALCULATED.3IONS-SCNC: 11 MMOL/L (ref 5–15)
APTT PPP: 25.6 SECONDS (ref 24–37)
AST SERPL-CCNC: 27 U/L (ref 1–32)
BASOPHILS # BLD AUTO: 0.05 10*3/MM3 (ref 0–0.2)
BASOPHILS NFR BLD AUTO: 0.9 % (ref 0–1.5)
BILIRUB SERPL-MCNC: 0.3 MG/DL (ref 0–1.2)
BUN SERPL-MCNC: 14 MG/DL (ref 8–23)
BUN/CREAT SERPL: 17.9 (ref 7–25)
CALCIUM SPEC-SCNC: 9.4 MG/DL (ref 8.6–10.5)
CHLORIDE SERPL-SCNC: 104 MMOL/L (ref 98–107)
CO2 SERPL-SCNC: 23 MMOL/L (ref 22–29)
CREAT SERPL-MCNC: 0.78 MG/DL (ref 0.57–1)
DEPRECATED RDW RBC AUTO: 38.3 FL (ref 37–54)
EOSINOPHIL # BLD AUTO: 0.13 10*3/MM3 (ref 0–0.4)
EOSINOPHIL NFR BLD AUTO: 2.3 % (ref 0.3–6.2)
ERYTHROCYTE [DISTWIDTH] IN BLOOD BY AUTOMATED COUNT: 12.2 % (ref 12.3–15.4)
GFR SERPL CREATININE-BSD FRML MDRD: 73 ML/MIN/1.73
GLOBULIN UR ELPH-MCNC: 2.5 GM/DL
GLUCOSE SERPL-MCNC: 91 MG/DL (ref 65–99)
HBA1C MFR BLD: 5.8 % (ref 4.8–5.6)
HCT VFR BLD AUTO: 40.8 % (ref 34–46.6)
HGB BLD-MCNC: 13.5 G/DL (ref 12–15.9)
IMM GRANULOCYTES # BLD AUTO: 0 10*3/MM3 (ref 0–0.05)
IMM GRANULOCYTES NFR BLD AUTO: 0 % (ref 0–0.5)
INR PPP: 1.08 (ref 0.85–1.16)
LYMPHOCYTES # BLD AUTO: 1.99 10*3/MM3 (ref 0.7–3.1)
LYMPHOCYTES NFR BLD AUTO: 35.7 % (ref 19.6–45.3)
MCH RBC QN AUTO: 28.5 PG (ref 26.6–33)
MCHC RBC AUTO-ENTMCNC: 33.1 G/DL (ref 31.5–35.7)
MCV RBC AUTO: 86.1 FL (ref 79–97)
MONOCYTES # BLD AUTO: 0.53 10*3/MM3 (ref 0.1–0.9)
MONOCYTES NFR BLD AUTO: 9.5 % (ref 5–12)
NEUTROPHILS NFR BLD AUTO: 2.88 10*3/MM3 (ref 1.7–7)
NEUTROPHILS NFR BLD AUTO: 51.6 % (ref 42.7–76)
NRBC BLD AUTO-RTO: 0 /100 WBC (ref 0–0.2)
PLATELET # BLD AUTO: 238 10*3/MM3 (ref 140–450)
PMV BLD AUTO: 9.7 FL (ref 6–12)
POTASSIUM SERPL-SCNC: 4.1 MMOL/L (ref 3.5–5.2)
PROT SERPL-MCNC: 6.6 G/DL (ref 6–8.5)
PROTHROMBIN TIME: 13.8 SECONDS (ref 11.5–14)
RBC # BLD AUTO: 4.74 10*6/MM3 (ref 3.77–5.28)
SODIUM SERPL-SCNC: 138 MMOL/L (ref 136–145)
WBC # BLD AUTO: 5.58 10*3/MM3 (ref 3.4–10.8)

## 2020-09-25 PROCEDURE — 85025 COMPLETE CBC W/AUTO DIFF WBC: CPT | Performed by: ORTHOPAEDIC SURGERY

## 2020-09-25 PROCEDURE — G0480 DRUG TEST DEF 1-7 CLASSES: HCPCS | Performed by: ORTHOPAEDIC SURGERY

## 2020-09-25 PROCEDURE — 71046 X-RAY EXAM CHEST 2 VIEWS: CPT

## 2020-09-25 PROCEDURE — 93010 ELECTROCARDIOGRAM REPORT: CPT | Performed by: INTERNAL MEDICINE

## 2020-09-25 PROCEDURE — 36415 COLL VENOUS BLD VENIPUNCTURE: CPT

## 2020-09-25 PROCEDURE — 82306 VITAMIN D 25 HYDROXY: CPT | Performed by: ORTHOPAEDIC SURGERY

## 2020-09-25 PROCEDURE — 83036 HEMOGLOBIN GLYCOSYLATED A1C: CPT | Performed by: ORTHOPAEDIC SURGERY

## 2020-09-25 PROCEDURE — 85610 PROTHROMBIN TIME: CPT | Performed by: ORTHOPAEDIC SURGERY

## 2020-09-25 PROCEDURE — 87081 CULTURE SCREEN ONLY: CPT | Performed by: ORTHOPAEDIC SURGERY

## 2020-09-25 PROCEDURE — 80053 COMPREHEN METABOLIC PANEL: CPT | Performed by: ORTHOPAEDIC SURGERY

## 2020-09-25 PROCEDURE — 85730 THROMBOPLASTIN TIME PARTIAL: CPT | Performed by: ORTHOPAEDIC SURGERY

## 2020-09-25 PROCEDURE — 93005 ELECTROCARDIOGRAM TRACING: CPT

## 2020-09-25 RX ORDER — ASPIRIN 81 MG/1
81 TABLET ORAL DAILY
Status: ON HOLD | COMMUNITY
End: 2020-10-09 | Stop reason: SDUPTHER

## 2020-09-25 ASSESSMENT — KOOS JR
KOOS JR SCORE: 21
KOOS JR SCORE: 34.174

## 2020-09-25 NOTE — PAT
Patient to apply Chlorhexadine wipes  to surgical area (as instructed) the night before procedure and the AM of procedure. Wipes provided.    No script in Epic for RX shower wash or nasal ointment.     Patient instructed to drink 20 ounces (or until full) of Gatorade and it needs to be completed 1 hour before given arrival time for procedure (NO RED Gatorade)    Patient verbalized understanding.    Per Anesthesia Request, patient instructed not to take their ACE/ARB medications on the AM of surgery.    Pt scheduled for Covid test on 10/5/2020    Memory Screen 5/5.    Pt sent for chest x-ray after PAT.     Joint Book given to pt.  She plans to do Zoom class.

## 2020-09-26 ENCOUNTER — FLU SHOT (OUTPATIENT)
Dept: INTERNAL MEDICINE | Facility: CLINIC | Age: 70
End: 2020-09-26

## 2020-09-26 DIAGNOSIS — Z23 NEED FOR INFLUENZA VACCINATION: ICD-10-CM

## 2020-09-26 LAB — MRSA SPEC QL CULT: NORMAL

## 2020-09-26 PROCEDURE — G0008 ADMIN INFLUENZA VIRUS VAC: HCPCS | Performed by: INTERNAL MEDICINE

## 2020-09-26 PROCEDURE — 90694 VACC AIIV4 NO PRSRV 0.5ML IM: CPT | Performed by: INTERNAL MEDICINE

## 2020-10-02 LAB
COTININE SERPL-MCNC: <1 NG/ML
NICOTINE SERPL-MCNC: <1 NG/ML

## 2020-10-05 ENCOUNTER — APPOINTMENT (OUTPATIENT)
Dept: PREADMISSION TESTING | Facility: HOSPITAL | Age: 70
End: 2020-10-05

## 2020-10-05 LAB — SARS-COV-2 RNA RESP QL NAA+PROBE: NOT DETECTED

## 2020-10-05 PROCEDURE — U0004 COV-19 TEST NON-CDC HGH THRU: HCPCS

## 2020-10-05 PROCEDURE — C9803 HOPD COVID-19 SPEC COLLECT: HCPCS

## 2020-10-07 ENCOUNTER — ANESTHESIA EVENT (OUTPATIENT)
Dept: PERIOP | Facility: HOSPITAL | Age: 70
End: 2020-10-07

## 2020-10-07 ENCOUNTER — ANESTHESIA (OUTPATIENT)
Dept: PERIOP | Facility: HOSPITAL | Age: 70
End: 2020-10-07

## 2020-10-07 ENCOUNTER — APPOINTMENT (OUTPATIENT)
Dept: GENERAL RADIOLOGY | Facility: HOSPITAL | Age: 70
End: 2020-10-07

## 2020-10-07 ENCOUNTER — HOSPITAL ENCOUNTER (INPATIENT)
Facility: HOSPITAL | Age: 70
LOS: 2 days | Discharge: NURSING, REHAB OR LONG-TERM CARE | End: 2020-10-09
Attending: ORTHOPAEDIC SURGERY | Admitting: ORTHOPAEDIC SURGERY

## 2020-10-07 DIAGNOSIS — Z96.651 STATUS POST TOTAL RIGHT KNEE REPLACEMENT: Primary | ICD-10-CM

## 2020-10-07 PROBLEM — M17.11 ARTHRITIS OF RIGHT KNEE: Status: ACTIVE | Noted: 2020-10-07

## 2020-10-07 PROBLEM — R73.09 ELEVATED HEMOGLOBIN A1C: Status: ACTIVE | Noted: 2020-10-07

## 2020-10-07 LAB — POTASSIUM SERPL-SCNC: 3.4 MMOL/L (ref 3.5–5.2)

## 2020-10-07 PROCEDURE — 25010000002 CLONIDINE PER 1 MG: Performed by: ORTHOPAEDIC SURGERY

## 2020-10-07 PROCEDURE — 25010000002 ROPIVACAINE PER 1 MG: Performed by: NURSE ANESTHETIST, CERTIFIED REGISTERED

## 2020-10-07 PROCEDURE — 84132 ASSAY OF SERUM POTASSIUM: CPT | Performed by: ANESTHESIOLOGY

## 2020-10-07 PROCEDURE — 25010000002 PROPOFOL 10 MG/ML EMULSION: Performed by: NURSE ANESTHETIST, CERTIFIED REGISTERED

## 2020-10-07 PROCEDURE — 25010000002 DEXAMETHASONE PER 1 MG: Performed by: NURSE ANESTHETIST, CERTIFIED REGISTERED

## 2020-10-07 PROCEDURE — 25010000002 KETOROLAC TROMETHAMINE PER 15 MG: Performed by: ORTHOPAEDIC SURGERY

## 2020-10-07 PROCEDURE — 25010000002 ONDANSETRON PER 1 MG: Performed by: NURSE ANESTHETIST, CERTIFIED REGISTERED

## 2020-10-07 PROCEDURE — 25010000003 MEPIVACAINE PER 10 ML: Performed by: ANESTHESIOLOGY

## 2020-10-07 PROCEDURE — 73560 X-RAY EXAM OF KNEE 1 OR 2: CPT

## 2020-10-07 PROCEDURE — 25010000003 CEFAZOLIN IN DEXTROSE 2-4 GM/100ML-% SOLUTION: Performed by: ORTHOPAEDIC SURGERY

## 2020-10-07 PROCEDURE — 97162 PT EVAL MOD COMPLEX 30 MIN: CPT

## 2020-10-07 PROCEDURE — C1713 ANCHOR/SCREW BN/BN,TIS/BN: HCPCS | Performed by: ORTHOPAEDIC SURGERY

## 2020-10-07 PROCEDURE — 97116 GAIT TRAINING THERAPY: CPT

## 2020-10-07 PROCEDURE — 94640 AIRWAY INHALATION TREATMENT: CPT

## 2020-10-07 PROCEDURE — C1776 JOINT DEVICE (IMPLANTABLE): HCPCS | Performed by: ORTHOPAEDIC SURGERY

## 2020-10-07 PROCEDURE — 0SRC0J9 REPLACEMENT OF RIGHT KNEE JOINT WITH SYNTHETIC SUBSTITUTE, CEMENTED, OPEN APPROACH: ICD-10-PCS | Performed by: ORTHOPAEDIC SURGERY

## 2020-10-07 PROCEDURE — 25010000002 HYDROMORPHONE PER 4 MG: Performed by: ORTHOPAEDIC SURGERY

## 2020-10-07 DEVICE — CMT BONE SIMPLEX/P FULL DOSE 10/PK: Type: IMPLANTABLE DEVICE | Site: KNEE | Status: FUNCTIONAL

## 2020-10-07 DEVICE — TOTL KN HI DEMAND STRYKER: Type: IMPLANTABLE DEVICE | Site: KNEE | Status: FUNCTIONAL

## 2020-10-07 DEVICE — DEV CONTRL TISS STRATAFIX SPIRAL PDO BIDIR 1 36X36CM: Type: IMPLANTABLE DEVICE | Site: KNEE | Status: FUNCTIONAL

## 2020-10-07 DEVICE — BASEPLT TIB TRIATH TS NO5: Type: IMPLANTABLE DEVICE | Site: KNEE | Status: FUNCTIONAL

## 2020-10-07 DEVICE — INSRT TIB/KN TRIATHLON PS X3 NMBR5 9MM: Type: IMPLANTABLE DEVICE | Site: KNEE | Status: FUNCTIONAL

## 2020-10-07 DEVICE — IMPLANTABLE DEVICE: Type: IMPLANTABLE DEVICE | Site: KNEE | Status: FUNCTIONAL

## 2020-10-07 DEVICE — COMP FEM TRIATH PS CMT NO5 RT: Type: IMPLANTABLE DEVICE | Site: KNEE | Status: FUNCTIONAL

## 2020-10-07 DEVICE — PEG FEM FIX TRIATHLON DIST MOD PK/2: Type: IMPLANTABLE DEVICE | Site: KNEE | Status: FUNCTIONAL

## 2020-10-07 RX ORDER — LOSARTAN POTASSIUM 50 MG/1
100 TABLET ORAL DAILY
Status: DISCONTINUED | OUTPATIENT
Start: 2020-10-07 | End: 2020-10-09 | Stop reason: HOSPADM

## 2020-10-07 RX ORDER — ALBUTEROL SULFATE 2.5 MG/3ML
2.5 SOLUTION RESPIRATORY (INHALATION) EVERY 6 HOURS PRN
Status: DISCONTINUED | OUTPATIENT
Start: 2020-10-07 | End: 2020-10-09 | Stop reason: HOSPADM

## 2020-10-07 RX ORDER — GUAIFENESIN 600 MG/1
1200 TABLET, EXTENDED RELEASE ORAL EVERY 12 HOURS SCHEDULED
Status: DISCONTINUED | OUTPATIENT
Start: 2020-10-07 | End: 2020-10-09 | Stop reason: HOSPADM

## 2020-10-07 RX ORDER — MONTELUKAST SODIUM 10 MG/1
10 TABLET ORAL NIGHTLY
Status: DISCONTINUED | OUTPATIENT
Start: 2020-10-07 | End: 2020-10-09 | Stop reason: HOSPADM

## 2020-10-07 RX ORDER — PANTOPRAZOLE SODIUM 40 MG/1
40 TABLET, DELAYED RELEASE ORAL 2 TIMES DAILY
Status: DISCONTINUED | OUTPATIENT
Start: 2020-10-07 | End: 2020-10-09 | Stop reason: HOSPADM

## 2020-10-07 RX ORDER — LABETALOL HYDROCHLORIDE 5 MG/ML
10 INJECTION, SOLUTION INTRAVENOUS EVERY 4 HOURS PRN
Status: DISCONTINUED | OUTPATIENT
Start: 2020-10-07 | End: 2020-10-09 | Stop reason: HOSPADM

## 2020-10-07 RX ORDER — ACETAMINOPHEN 500 MG
1000 TABLET ORAL ONCE
Status: COMPLETED | OUTPATIENT
Start: 2020-10-07 | End: 2020-10-07

## 2020-10-07 RX ORDER — ACETAMINOPHEN 500 MG
1000 TABLET ORAL EVERY 8 HOURS
Status: DISCONTINUED | OUTPATIENT
Start: 2020-10-07 | End: 2020-10-09 | Stop reason: HOSPADM

## 2020-10-07 RX ORDER — DIPHENHYDRAMINE HCL 25 MG
25 CAPSULE ORAL EVERY 6 HOURS PRN
Status: DISCONTINUED | OUTPATIENT
Start: 2020-10-07 | End: 2020-10-09 | Stop reason: HOSPADM

## 2020-10-07 RX ORDER — HYDROMORPHONE HYDROCHLORIDE 1 MG/ML
0.5 INJECTION, SOLUTION INTRAMUSCULAR; INTRAVENOUS; SUBCUTANEOUS
Status: DISCONTINUED | OUTPATIENT
Start: 2020-10-07 | End: 2020-10-07 | Stop reason: HOSPADM

## 2020-10-07 RX ORDER — HYDROMORPHONE HYDROCHLORIDE 1 MG/ML
0.5 INJECTION, SOLUTION INTRAMUSCULAR; INTRAVENOUS; SUBCUTANEOUS
Status: DISCONTINUED | OUTPATIENT
Start: 2020-10-07 | End: 2020-10-08

## 2020-10-07 RX ORDER — ARIPIPRAZOLE 10 MG/1
5 TABLET ORAL DAILY
Status: DISCONTINUED | OUTPATIENT
Start: 2020-10-07 | End: 2020-10-07

## 2020-10-07 RX ORDER — LEVOTHYROXINE SODIUM 0.05 MG/1
50 TABLET ORAL
Status: DISCONTINUED | OUTPATIENT
Start: 2020-10-07 | End: 2020-10-09 | Stop reason: HOSPADM

## 2020-10-07 RX ORDER — SODIUM CHLORIDE 0.9 % (FLUSH) 0.9 %
10 SYRINGE (ML) INJECTION AS NEEDED
Status: CANCELLED | OUTPATIENT
Start: 2020-10-07

## 2020-10-07 RX ORDER — ONDANSETRON 2 MG/ML
INJECTION INTRAMUSCULAR; INTRAVENOUS AS NEEDED
Status: DISCONTINUED | OUTPATIENT
Start: 2020-10-07 | End: 2020-10-07 | Stop reason: SURG

## 2020-10-07 RX ORDER — OXYCODONE HYDROCHLORIDE 5 MG/1
10 TABLET ORAL EVERY 4 HOURS PRN
Status: DISCONTINUED | OUTPATIENT
Start: 2020-10-07 | End: 2020-10-09 | Stop reason: HOSPADM

## 2020-10-07 RX ORDER — FAMOTIDINE 10 MG/ML
20 INJECTION, SOLUTION INTRAVENOUS ONCE
Status: CANCELLED | OUTPATIENT
Start: 2020-10-07 | End: 2020-10-07

## 2020-10-07 RX ORDER — TAMOXIFEN CITRATE 10 MG/1
20 TABLET ORAL DAILY
Status: DISCONTINUED | OUTPATIENT
Start: 2020-10-07 | End: 2020-10-09 | Stop reason: HOSPADM

## 2020-10-07 RX ORDER — LIDOCAINE HYDROCHLORIDE 10 MG/ML
0.5 INJECTION, SOLUTION EPIDURAL; INFILTRATION; INTRACAUDAL; PERINEURAL ONCE AS NEEDED
Status: COMPLETED | OUTPATIENT
Start: 2020-10-07 | End: 2020-10-07

## 2020-10-07 RX ORDER — CEFAZOLIN SODIUM 2 G/100ML
2 INJECTION, SOLUTION INTRAVENOUS ONCE
Status: COMPLETED | OUTPATIENT
Start: 2020-10-07 | End: 2020-10-07

## 2020-10-07 RX ORDER — CHOLECALCIFEROL (VITAMIN D3) 125 MCG
10 CAPSULE ORAL NIGHTLY PRN
Status: DISCONTINUED | OUTPATIENT
Start: 2020-10-07 | End: 2020-10-09 | Stop reason: HOSPADM

## 2020-10-07 RX ORDER — SODIUM CHLORIDE, SODIUM LACTATE, POTASSIUM CHLORIDE, CALCIUM CHLORIDE 600; 310; 30; 20 MG/100ML; MG/100ML; MG/100ML; MG/100ML
100 INJECTION, SOLUTION INTRAVENOUS CONTINUOUS
Status: DISCONTINUED | OUTPATIENT
Start: 2020-10-07 | End: 2020-10-09 | Stop reason: HOSPADM

## 2020-10-07 RX ORDER — MAGNESIUM HYDROXIDE 1200 MG/15ML
LIQUID ORAL AS NEEDED
Status: DISCONTINUED | OUTPATIENT
Start: 2020-10-07 | End: 2020-10-07 | Stop reason: HOSPADM

## 2020-10-07 RX ORDER — MELOXICAM 15 MG/1
15 TABLET ORAL ONCE
Status: COMPLETED | OUTPATIENT
Start: 2020-10-07 | End: 2020-10-07

## 2020-10-07 RX ORDER — MELOXICAM 7.5 MG/1
15 TABLET ORAL DAILY
Status: DISCONTINUED | OUTPATIENT
Start: 2020-10-08 | End: 2020-10-09 | Stop reason: HOSPADM

## 2020-10-07 RX ORDER — ASPIRIN 81 MG/1
81 TABLET ORAL EVERY 12 HOURS SCHEDULED
Status: DISCONTINUED | OUTPATIENT
Start: 2020-10-08 | End: 2020-10-09 | Stop reason: HOSPADM

## 2020-10-07 RX ORDER — BUPIVACAINE HYDROCHLORIDE 2.5 MG/ML
INJECTION, SOLUTION EPIDURAL; INFILTRATION; INTRACAUDAL
Status: DISCONTINUED | OUTPATIENT
Start: 2020-10-07 | End: 2020-10-07 | Stop reason: SURG

## 2020-10-07 RX ORDER — KETOROLAC TROMETHAMINE 15 MG/ML
15 INJECTION, SOLUTION INTRAMUSCULAR; INTRAVENOUS EVERY 6 HOURS PRN
Status: COMPLETED | OUTPATIENT
Start: 2020-10-07 | End: 2020-10-08

## 2020-10-07 RX ORDER — CEFAZOLIN SODIUM 2 G/100ML
2 INJECTION, SOLUTION INTRAVENOUS EVERY 8 HOURS
Status: COMPLETED | OUTPATIENT
Start: 2020-10-07 | End: 2020-10-08

## 2020-10-07 RX ORDER — ONDANSETRON 2 MG/ML
4 INJECTION INTRAMUSCULAR; INTRAVENOUS EVERY 6 HOURS PRN
Status: DISCONTINUED | OUTPATIENT
Start: 2020-10-07 | End: 2020-10-09 | Stop reason: HOSPADM

## 2020-10-07 RX ORDER — BUDESONIDE 0.5 MG/2ML
0.5 INHALANT ORAL
Status: DISCONTINUED | OUTPATIENT
Start: 2020-10-07 | End: 2020-10-09 | Stop reason: HOSPADM

## 2020-10-07 RX ORDER — PREGABALIN 75 MG/1
75 CAPSULE ORAL ONCE
Status: COMPLETED | OUTPATIENT
Start: 2020-10-07 | End: 2020-10-07

## 2020-10-07 RX ORDER — SODIUM CHLORIDE, SODIUM LACTATE, POTASSIUM CHLORIDE, CALCIUM CHLORIDE 600; 310; 30; 20 MG/100ML; MG/100ML; MG/100ML; MG/100ML
9 INJECTION, SOLUTION INTRAVENOUS CONTINUOUS
Status: DISCONTINUED | OUTPATIENT
Start: 2020-10-07 | End: 2020-10-09 | Stop reason: HOSPADM

## 2020-10-07 RX ORDER — FENTANYL CITRATE 50 UG/ML
50 INJECTION, SOLUTION INTRAMUSCULAR; INTRAVENOUS
Status: DISCONTINUED | OUTPATIENT
Start: 2020-10-07 | End: 2020-10-07 | Stop reason: HOSPADM

## 2020-10-07 RX ORDER — AMLODIPINE BESYLATE 5 MG/1
5 TABLET ORAL DAILY
Status: DISCONTINUED | OUTPATIENT
Start: 2020-10-07 | End: 2020-10-09 | Stop reason: HOSPADM

## 2020-10-07 RX ORDER — ONDANSETRON 4 MG/1
4 TABLET, FILM COATED ORAL EVERY 6 HOURS PRN
Status: DISCONTINUED | OUTPATIENT
Start: 2020-10-07 | End: 2020-10-09 | Stop reason: HOSPADM

## 2020-10-07 RX ORDER — LEVOTHYROXINE SODIUM 0.1 MG/1
200 TABLET ORAL
Status: DISCONTINUED | OUTPATIENT
Start: 2020-10-07 | End: 2020-10-09 | Stop reason: HOSPADM

## 2020-10-07 RX ORDER — ACETAMINOPHEN 160 MG
TABLET,DISINTEGRATING ORAL AS NEEDED
Status: DISCONTINUED | OUTPATIENT
Start: 2020-10-07 | End: 2020-10-07 | Stop reason: HOSPADM

## 2020-10-07 RX ORDER — DIPHENHYDRAMINE HYDROCHLORIDE 50 MG/ML
25 INJECTION INTRAMUSCULAR; INTRAVENOUS EVERY 6 HOURS PRN
Status: DISCONTINUED | OUTPATIENT
Start: 2020-10-07 | End: 2020-10-09 | Stop reason: HOSPADM

## 2020-10-07 RX ORDER — OXYCODONE HYDROCHLORIDE 5 MG/1
5 TABLET ORAL EVERY 4 HOURS PRN
Status: DISCONTINUED | OUTPATIENT
Start: 2020-10-07 | End: 2020-10-09 | Stop reason: HOSPADM

## 2020-10-07 RX ORDER — FAMOTIDINE 20 MG/1
20 TABLET, FILM COATED ORAL ONCE
Status: COMPLETED | OUTPATIENT
Start: 2020-10-07 | End: 2020-10-07

## 2020-10-07 RX ORDER — SODIUM CHLORIDE 0.9 % (FLUSH) 0.9 %
10 SYRINGE (ML) INJECTION EVERY 12 HOURS SCHEDULED
Status: CANCELLED | OUTPATIENT
Start: 2020-10-07

## 2020-10-07 RX ORDER — CITALOPRAM 20 MG/1
60 TABLET ORAL DAILY
Status: DISCONTINUED | OUTPATIENT
Start: 2020-10-07 | End: 2020-10-09 | Stop reason: HOSPADM

## 2020-10-07 RX ORDER — DIPHENHYDRAMINE HCL 25 MG
25 CAPSULE ORAL EVERY 6 HOURS PRN
Status: DISCONTINUED | OUTPATIENT
Start: 2020-10-07 | End: 2020-10-07 | Stop reason: SDUPTHER

## 2020-10-07 RX ORDER — DEXAMETHASONE SODIUM PHOSPHATE 4 MG/ML
INJECTION, SOLUTION INTRA-ARTICULAR; INTRALESIONAL; INTRAMUSCULAR; INTRAVENOUS; SOFT TISSUE AS NEEDED
Status: DISCONTINUED | OUTPATIENT
Start: 2020-10-07 | End: 2020-10-07 | Stop reason: SURG

## 2020-10-07 RX ORDER — NALOXONE HCL 0.4 MG/ML
0.1 VIAL (ML) INJECTION
Status: DISCONTINUED | OUTPATIENT
Start: 2020-10-07 | End: 2020-10-09 | Stop reason: HOSPADM

## 2020-10-07 RX ADMIN — TRANEXAMIC ACID 1000 MG: 100 INJECTION, SOLUTION INTRAVENOUS at 09:36

## 2020-10-07 RX ADMIN — BUPIVACAINE HYDROCHLORIDE 20 ML: 2.5 INJECTION, SOLUTION EPIDURAL; INFILTRATION; INTRACAUDAL; PERINEURAL at 10:45

## 2020-10-07 RX ADMIN — ACETAMINOPHEN 1000 MG: 500 TABLET, FILM COATED ORAL at 20:27

## 2020-10-07 RX ADMIN — ROPIVACAINE HYDROCHLORIDE 10 ML/HR: 5 INJECTION, SOLUTION EPIDURAL; INFILTRATION; PERINEURAL at 10:59

## 2020-10-07 RX ADMIN — ONDANSETRON 4 MG: 2 INJECTION INTRAMUSCULAR; INTRAVENOUS at 09:57

## 2020-10-07 RX ADMIN — PANTOPRAZOLE SODIUM 40 MG: 40 TABLET, DELAYED RELEASE ORAL at 18:24

## 2020-10-07 RX ADMIN — PROPOFOL 40 MCG/KG/MIN: 10 INJECTION, EMULSION INTRAVENOUS at 08:15

## 2020-10-07 RX ADMIN — MONTELUKAST SODIUM 10 MG: 10 TABLET, COATED ORAL at 20:26

## 2020-10-07 RX ADMIN — BUDESONIDE 0.5 MG: 0.5 INHALANT RESPIRATORY (INHALATION) at 21:36

## 2020-10-07 RX ADMIN — KETOROLAC TROMETHAMINE 15 MG: 15 INJECTION, SOLUTION INTRAMUSCULAR; INTRAVENOUS at 12:43

## 2020-10-07 RX ADMIN — LOSARTAN POTASSIUM 100 MG: 50 TABLET, FILM COATED ORAL at 18:25

## 2020-10-07 RX ADMIN — CEFAZOLIN SODIUM 2 G: 2 INJECTION, SOLUTION INTRAVENOUS at 18:25

## 2020-10-07 RX ADMIN — MELOXICAM 15 MG: 15 TABLET ORAL at 07:36

## 2020-10-07 RX ADMIN — HYDROMORPHONE HYDROCHLORIDE 0.5 MG: 1 INJECTION, SOLUTION INTRAMUSCULAR; INTRAVENOUS; SUBCUTANEOUS at 14:32

## 2020-10-07 RX ADMIN — HYDROMORPHONE HYDROCHLORIDE 0.5 MG: 1 INJECTION, SOLUTION INTRAMUSCULAR; INTRAVENOUS; SUBCUTANEOUS at 21:32

## 2020-10-07 RX ADMIN — CEFAZOLIN SODIUM 2 G: 2 INJECTION, SOLUTION INTRAVENOUS at 08:04

## 2020-10-07 RX ADMIN — PREGABALIN 75 MG: 75 CAPSULE ORAL at 07:36

## 2020-10-07 RX ADMIN — GUAIFENESIN 1200 MG: 600 TABLET, EXTENDED RELEASE ORAL at 20:26

## 2020-10-07 RX ADMIN — DEXAMETHASONE SODIUM PHOSPHATE 8 MG: 4 INJECTION, SOLUTION INTRAMUSCULAR; INTRAVENOUS at 08:15

## 2020-10-07 RX ADMIN — LIDOCAINE HYDROCHLORIDE 0.2 ML: 10 INJECTION, SOLUTION EPIDURAL; INFILTRATION; INTRACAUDAL; PERINEURAL at 07:30

## 2020-10-07 RX ADMIN — ACETAMINOPHEN 1000 MG: 500 TABLET ORAL at 07:36

## 2020-10-07 RX ADMIN — KETOROLAC TROMETHAMINE 15 MG: 15 INJECTION, SOLUTION INTRAMUSCULAR; INTRAVENOUS at 20:26

## 2020-10-07 RX ADMIN — MEPIVACAINE HYDROCHLORIDE 4 ML: 15 INJECTION, SOLUTION EPIDURAL; INFILTRATION at 08:10

## 2020-10-07 RX ADMIN — FAMOTIDINE 20 MG: 20 TABLET, FILM COATED ORAL at 07:36

## 2020-10-07 RX ADMIN — TRANEXAMIC ACID 1000 MG: 100 INJECTION, SOLUTION INTRAVENOUS at 08:16

## 2020-10-07 RX ADMIN — TAMOXIFEN CITRATE 20 MG: 10 TABLET, FILM COATED ORAL at 18:26

## 2020-10-07 RX ADMIN — SODIUM CHLORIDE, POTASSIUM CHLORIDE, SODIUM LACTATE AND CALCIUM CHLORIDE 9 ML/HR: 600; 310; 30; 20 INJECTION, SOLUTION INTRAVENOUS at 07:37

## 2020-10-07 RX ADMIN — OXYCODONE 5 MG: 5 TABLET ORAL at 18:25

## 2020-10-07 RX ADMIN — OXYCODONE 5 MG: 5 TABLET ORAL at 12:44

## 2020-10-07 NOTE — ANESTHESIA POSTPROCEDURE EVALUATION
Patient: Sarah Raphael    Procedure Summary     Date: 10/07/20 [facility] / [facility]  [facility]    Anesthesia Start: 0804 Anesthesia Stop: 1044    Procedure: RIGHT TOTAL KNEE ARTHROPLASTY (Right Knee) Diagnosis:       Arthritis of right knee      (Arthritis of right knee [702245])    Surgeon: Adrian Cortes MD Provider: Fidel Apodaca MD    Anesthesia Type: spinal ASA Status: 3          Anesthesia Type: spinal    Vitals  Vitals Value Taken Time   BP     Temp     Pulse 60 10/07/20 1043   Resp     SpO2 91 % 10/07/20 1043   Vitals shown include unvalidated device data.        Post Anesthesia Care and Evaluation    Patient location during evaluation: PACU  Patient participation: complete - patient participated  Level of consciousness: awake and alert  Pain score: 0  Pain management: adequate  Airway patency: patent  Anesthetic complications: No anesthetic complications  PONV Status: none  Cardiovascular status: hemodynamically stable and acceptable  Respiratory status: nonlabored ventilation, acceptable and nasal cannula  Hydration status: acceptable

## 2020-10-07 NOTE — ANESTHESIA PROCEDURE NOTES
Peripheral Block      Patient reassessed immediately prior to procedure    Patient location during procedure: post-op  Start time: 10/7/2020 10:45 AM  Reason for block: at surgeon's request and post-op pain management  Performed by  CRNA: Vinnie Sharma, CRNA  Assisted by: Hope Perez RN  Preanesthetic Checklist  Completed: patient identified, site marked, surgical consent, pre-op evaluation, timeout performed, IV checked, risks and benefits discussed and monitors and equipment checked  Prep:  Pt Position: supine  Sterile barriers:cap, gloves, mask and sterile barriers  Prep: ChloraPrep  Patient monitoring: blood pressure monitoring, continuous pulse oximetry and EKG  Procedure  Sedation:no  Performed under: spinal  Guidance:ultrasound guided  Images:still images obtained, printed/placed on chart    Laterality:right  Block Type:adductor canal block  Injection Technique:catheter  Needle Type:Tuohy and echogenic  Needle Gauge:18 G  Resistance on Injection: none  Catheter Size:20 G (20g)  Cath Depth at skin: 12 cm    Medications Used: bupivacaine PF (MARCAINE) 0.25 % injection, 20 mL  Med admintered at 10/7/2020 10:45 AM      Medications  Preservative Free Saline:5ml    Post Assessment  Injection Assessment: negative aspiration for heme, incremental injection and no paresthesia on injection  Patient Tolerance:comfortable throughout block  Complications:no  Additional Notes  Procedure:             The pt was placed in the Supine position.  The Insertion site was  prepped and Draped in sterile fashion.  The pt was anesthetized with  IV Sedation( see meds).  Skin and cutaneous tissue was infiltrated and anesthetized with 1% Lidocaine 3 mls via a 25g needle.  A BBraun 4 inch 18g echogenic needle was then  inserted approximately midline, mid-thigh and advanced In-plane with Ultrasound guidance.  Normal Saline PSF was utilized for hydrodissection of tissue.  The Vastus medialis and Sartorius muscle where visualized and  the needle tip was placed in the adductor canal,  lateral to the femoral artery.  LA injection spread was visualized, injection was incremental 1-5ml, injection pressure was normal or little, no intraneural injection, no vascular injection.  LA dose was injected thru the needle(see dose above).  A BBraun 20g wire stylet catheter was placed via the needle with ultrasound visualization and confirmation with NS fluid bolus. The labeled Catheter was then secured to skin at insertion site with skin afix and steristrips to curled catheter and CHG transparent dressing.  Thank you.

## 2020-10-07 NOTE — OP NOTE
KNEE ARTHROPLASTY, PARTIAL REPLACEMENT  Procedure Report    Patient Name:  Sarah Raphael  YOB: 1950    Date of Surgery:  10/7/2020     Indications:    Patient is a 70 y.o. female noted for progressively worsening right knee pain over the last several months to year. Patient is reached the point of disability is having difficulty with activities of daily living including but not limited to getting up out of a chair ambulating distances and stairs, and complains of night pain. Patient has failed nonoperative management, treatment options and alternatives are discussed in detail with the patient was indicated for a partial versus total knee arthroplasty. Likely risk benefits of the procedure including but not limited to infection, DVT, pulmonary embolism, future loosening of the implants, possibility vengeance injury to tendons, ligaments, nerves and/or vessels and periprosthetic fracture been discussed in detail. Despite the risks involved, the patient elected to proceed with an informed consent was obtained and the patient was scheduled for surgery. The patient was seen in the preoperative holding area and the operative extremity was marked.    Pre-op Diagnosis:   Arthritis of right knee [034883]       Post-Op Diagnosis Codes:     * Arthritis of right knee [M17.11]    Procedure/CPT® Codes:      Procedure(s):  RIGHT TOTAL KNEE ARTHROPLASTY    Staff:  Surgeon(s):  Adrian Cortes MD    Anesthesia: Spinal    Estimated Blood Loss: 100ml    Implants:    Implant Name Type Inv. Item Serial No.  Lot No. LRB No. Used Action   CMT BONE SIMPLEX/P FULL DOSE 10/PK - [id number] Implant CMT BONE SIMPLEX/P FULL DOSE 10/PK  [employer] [id number] Right 2 Implanted   SUT CONTRL TISS STRATAFIX SPIRAL PDO BIDIR 1 [id number] - [id number] Implant SUT CONTRL TISS STRATAFIX SPIRAL PDO BIDIR 1 [id number]  [employer]  Right 1 Implanted   COMP FEM TRIATH PS CMT NO5 RT - [id number] Implant COMP  FEM TRIATH PS CMT NO5 RT  [employer]OME [id number] Right 1 Implanted   PAT TRIATH ASYM X3 [id number] - [id number] Implant PAT TRIATH ASYM X3 [id number]  [employer] [id number] Right 1 Implanted   PEG FEM FIX TRIATHLON DIST MOD PK/2 - [id number] Implant PEG FEM FIX TRIATHLON DIST MOD PK/2  [employer]OME [id number] Right 1 Implanted   BASEPLT TIB TRIATH TS NO5 - [id number] Implant BASEPLT TIB TRIATH TS NO5  [employer]OME [id number] Right 1 Implanted   INSRT TIB/KN TRIATHLON PS X3 NMBR5 9MM - [id number] Implant INSRT TIB/KN TRIATHLON PS X3 NMBR5 9MM  [employer] J[employer]OME [id number] Right 1 Implanted       Specimen:          None      Findings: Patient has significant arthritis in the patellofemoral joint as well as lateral compartment with grade 4 changes in both and therefore was indicated for total knee instead of the lateral partial    Complications: None    Description of Procedure:   The patient was transferred to [facility] operating room. Preoperative antibiotics in the form of 2 g of Kefzol as well as 1 g of tranexamic acid were given IV and infused prior to skin incision and to inflation of the tourniquet according to skip protocol.  Patient underwent spinal anesthesia. A well padded tourniquet was placed to the proximal aspect of the operative thigh. The operative leg was then prepped and draped in the usual sterile fashion the tourniquet after application of Esmarch the tourniquet was inflated to 250 mg mercury.  A skin incision was made vertically oriented centering over the patella anteriorly. The skin and subcutaneous tissue were incised and a sub vastus approach was developed there was a significant effusion. The patella was subluxed over the knee there is complete loss of the articular cartilage on the lateral distal femoral condyle with corresponding areas loss of articular cartilage of the lateral tibial plateau. There were no arthritic changes in the medial tibial compartment. There was pretty reasonable cartilage  in the trochlear groove on the femur however on the undersurface of patella there was a complete inebriation of cartilage with exposed bone throughout the medial lateral facets with very little intact cartilage.  At that therefore at that point we made the decision to proceed with total knee arthroplasty instead of partial lateral.. A distal femoral cut was made using utilizing an intramedullary alignment agustina we started by resecting 8 mm of distal femur with a 5° valgus cut. This is found to be satisfactory.  Attention was then turned to the proximal tibia. Extra medullary alignment agustina was utilized and 9 mm of tibial thickness utilizing a stylist was resected in order to take off as little bone as possible. It was cut with a 0° posterior slope and a neutral varus mild valgus alignment. The proximal tibial cut was found to be satisfactory we did place a spacer block and between the distal femoral cut and the proximal tibia cut and found overall good alignment of the knee.  Attention was then directed at the distal femur the distal femoral size and rotation was determined by using a tensioner device as well as to help him set external rotation of the femur. This was confirmed along with verification Whitesides line and trans epicondylar axis. Once he's pinholes were made in the formal cutting block was applied to the distal femur again checked to make sure that it was a good size anteriorly and size #5 was used. Anterior followed by posterior followed by chamfer cuts were completed it was planned to be seated sacrificing the PCL a box cut was completed for PCL resection posterior osteophytes were resected from the distal femur medial and lateral meniscectomies were then completed. The proximal tibia was incised with a trial reduction reductions found to be satisfactory range of motion from 0-125° with patella tracking well. Attention was then directed at the patella the patella measured 24 mm in its thickest area and  16 at its thinnest area resected down to the 14 mm thinness of the patella lug holes were drilled for an asymmetric patella size 32mm. Trial button was found to seat satisfactorily room in satisfactory with the trials in the bony surface were prepared for cementation we did overream with boss reamer for the proximal tibia maintaining correct rotational alignment and then used the keel punch. Following this we then injected are pain cocktail and tibial both the posterior capsule as well as the periosteum surrounding the femur and capsule. Following this the tibial component was then cemented into position. We did prep the bone with copious irrigation followed by a peroxide soak to remove any fat marrow. And then it was found to be very dry with no extra fluid the cement was pressurized into the tibial component. The cement was applied to both the tibial and femoral components prior to impaction. Once the femoral component was seated then turned our attention to patella which was cemented as well we used a 9 mm thick trial liner while the cement hardened with the knee in extension. Excess cement was removed once and the components were held without any motion. Once the cement had fully hardened we checked the range of motion was found to be satisfactory from 0-125° with excellent stability throughout the range of motion. Good medial and lateral soft tissue tension and soft tissue balancing. The patella tracked well the trial liner was then replaced with a PS size 5 thickness of 9 mm liner. Having been satisfied with this the joint was thoroughly irrigated with 3 L of saline. We did use a Betadine wash as well.  The sponge needle and instrument counts are found to be correct. The arthrotomy was closed with interrupted 0 Vicryl's followed by a running strata fix #2. 2-0 Vicryl for skin followed by a Monocryl and preneo dressing with the knee held in flexion. Sterile Mepilex AG and then placed over the incision wrapped  with web roll and overwrapped with Ace wrap. The patient tolerated the procedure well and is being admitted for postoperative antibiotics according to skip protocol 2 more doses in the first 24 hours. The patient will be on aspirin 81 mg twice a day starting postoperative day #1. Patient will need to be mobilized with physical therapy.  I discussed the satisfactory performance of the procedure with patient's family and discussed with them the postoperative management        Assistant Participation:  Surgeon(s):  Adrian Cortes MD    Assistant: Caryn Balbuena PA-C assisted with proper preoperative positioning, preoperative templating, determingin availability of proper implants, prepping and draping of patient, manipulation placement of instruments, protection of ligaments and vital soft tissue structures, assistance in maintaining hemostasis and assistance with closure of the wound. Their skills and knowledge of the steps of operation and the desired outcome of each surgical step was crucial, allowing for efficient choreography of surgical procedure, and closure of the wound which lead to reduced surgical time, less blood loss, and less risk of complications for the patient.       Adrian Cortes MD     Date: 10/7/2020  Time: 10:03 EDT

## 2020-10-07 NOTE — ANESTHESIA PROCEDURE NOTES
Spinal Block      Patient reassessed immediately prior to procedure    Patient location during procedure: OR  Indication:at surgeon's request  Performed By  CRNA: Kenan Reynaga CRNA  Preanesthetic Checklist  Completed: patient identified, site marked, surgical consent, pre-op evaluation, timeout performed, IV checked, risks and benefits discussed and monitors and equipment checked  Spinal Block Prep:  Patient Position:sitting  Sterile Tech:cap, gloves, sterile barriers and mask  Prep:Chloraprep  Patient Monitoring:blood pressure monitoring, continuous pulse oximetry and EKG  Spinal Block Procedure  Approach:midline  Guidance:landmark technique and palpation technique  Location:L4-L5  Needle Type:Sprotte  Needle Gauge:22 G  Placement of Spinal needle event:cerebrospinal fluid aspirated  Paresthesia: no  Fluid Appearance:clear  Medications: mepivacaine (CARBOCAINE) 1.5 % injection, 4 mL  Med Administered at 10/7/2020 8:10 AM   Post Assessment  Patient Tolerance:patient tolerated the procedure well with no apparent complications  Complications no  Additional Notes  Procedure:  Pt assisted to sitting position, with legs in position of comfort over side of bed.  Pt. instructed in optimal spine presentation, the spine was prepped/ Draped and the skin at insertion site was anesthetized with 1% Lidocaine 2 ml.  The spinal needle was then advanced until CSF flow was obtained and LA was injected:

## 2020-10-07 NOTE — PROGRESS NOTES
Discharge Planning Assessment  [facility]     Patient Name: Sarah Raphael  MRN: [id number]  Today's Date: 10/7/2020    Admit Date: 10/7/2020    Discharge Needs Assessment     Row Name 10/07/20 1507       Living Environment    Lives With  alone    Current Living Arrangements  home/apartment/condo    Primary Care Provided by  self    Provides Primary Care For  no one    Family Caregiver if Needed  sibling(s)    Family Caregiver Names  Viridiana Bullock - sister -[phone]    Quality of Family Relationships  helpful;involved;supportive    Able to Return to Prior Arrangements  yes       Resource/Environmental Concerns    Resource/Environmental Concerns  none    Transportation Concerns  car, none       Transition Planning    Patient/Family Anticipates Transition to  inpatient rehabilitation facility    Patient/Family Anticipated Services at Transition  ;rehabilitation services    Transportation Anticipated  family or friend will provide       Discharge Needs Assessment    Readmission Within the Last 30 Days  no previous admission in last 30 days    Equipment Currently Used at Home  rollator    Concerns to be Addressed  discharge planning    Anticipated Changes Related to Illness  inability to care for self    Discharge Facility/Level of Care Needs  acute rehab    Provided Post Acute Provider List?  Yes    Post Acute Provider List  Inpatient Rehab    Provided Post Acute Provider Quality & Resource List?  Yes    Post Acute Provider Quality and Resource List  Inpatient Rehab    Delivered To  Patient    Method of Delivery  In person    Patient's Choice of Community Agency(s)  [facility]    Current Discharge Risk  lives alone        Discharge Plan     Row Name 10/07/20 1512       Plan    Plan  [facility]    Plan Comments  CM spoke with patient at bedside. Patient resides in [facility], alone. Patient is independent with ADL's. Patient states she uses a rollator (at bedside) for mobility  assist. Patient states she will need IP rehab @ discharge due to living alone. Patient has two sisters who live close by, however, they are unable to care for patient in her home. Patient would like a referral to [facility]. CM called and spoke with [facility] liaison. She will follow. Patient will require a prior authorization with her [facility] Medicare prior to acceptance to   [facility]. Patient will need [facility] van for transportation to facility. CM following.    Final Discharge Disposition Code  62 - inpatient rehab facility        Continued Care and Services - Admitted Since 10/7/2020     Destination     Service Provider Request Status Selected Services Address Phone Fax    [facility]  Pending - No Request Sent N/A [address] [facility] [address] [phone]                Demographic Summary     Row Name 10/07/20 1500       General Information    Arrived From  home    Referral Source  physician    Reason for Consult  discharge planning    Preferred Language  English     Used During This Interaction  no    General Information Comments  PCP: Tyrese Recinos       Contact Information    Contact Information Comments  Viridiana Bullock - sister -[phone]        Functional Status     Row Name 10/07/20 1506       Functional Status    Usual Activity Tolerance  moderate    Current Activity Tolerance  -- See PT notes       Functional Status, IADL    Medications  independent    Meal Preparation  independent    Housekeeping  assistive equipment    Laundry  assistive equipment    Shopping  assistive equipment       Mental Status    General Appearance WDL  WDL       Mental Status Summary    Recent Changes in Mental Status/Cognitive Functioning  no changes       Employment/    Employment Status  retired            Antoinette Norwood RN

## 2020-10-07 NOTE — H&P
Pre-Op H&P  Sarah Raphael  [id number]  1950    Chief complaint: Right knee pain    HPI:    Patient is a 70 y.o.female who presents with a history of right knee pain. She has been diagnosed with primary osteoarthritis of the right knee. Conservative treatment has failed to provide significant relief. Surgical intervention is recommended and she is agreeable. She is here today for a right lateral unilateral verus total knee arthroplasty.     Review of Systems:  General ROS: negative for chills, fever or skin lesions;  No changes since last office visit.  Neg for recent sick exposure  Cardiovascular ROS: no chest pain or dyspnea on exertion; +HTN  Respiratory ROS: no cough, shortness of breath, or wheezing    Allergies:   Allergies   Allergen Reactions   • Nuts Shortness Of Breath     rash   • Other      MSG - HIVES    • Penicillins Shortness Of Breath     rash   • Soybean-Containing Drug Products Shortness Of Breath     rash   • Sunflower Oil Shortness Of Breath     rash   • Cefuroxime      Stomach problems   • Cephalexin      Stomach problems   • Chicken Allergy    • Clindamycin Nausea Only and Nausea And Vomiting   • Erythromycin Nausea Only   • Flu Virus Vaccine      Redness and red rash to area   • Gluten Meal    • Metronidazole Hives   • Naproxen Dizziness   • Parsley Seed    • Thimerosal      Redness and swelling at site of injection   • Varicella Virus Vaccine Live      Redness and swelling at site   • Zostavax [Zoster Vaccine Live]      66457 UNT /0.65 ML subcutaneous solution Recontituted  - redness and swelling at site   • Apple Rash   • Doxycycline Itching and Rash   • Sulfa Antibiotics Nausea Only and Rash     Other reaction(s): Headache, Vomiting       Home Meds:    No current facility-administered medications on file prior to encounter.      Current Outpatient Medications on File Prior to Encounter   Medication Sig Dispense Refill   • amLODIPine (NORVASC) 5 MG tablet TAKE ONE TABLET BY MOUTH DAILY  (Patient taking differently: Take 5 mg by mouth Daily.) 90 tablet 0   • ARIPiprazole (ABILIFY) 5 MG tablet TAKE ONE TABLET BY MOUTH DAILY (Patient taking differently: Take 5 mg by mouth.) 90 tablet 1   • Azelastine-Fluticasone (DYMISTA) 137-50 MCG/ACT suspension 2 Squirts into the nostril(s) as directed by provider Daily.     • Cholecalciferol (VITAMIN D PO) Take 2,000 Units by mouth Daily. Vitamin D 1000 UNIT CAPS; TAKE 1 TABLET DAILY     • citalopram (CeleXA) 40 MG tablet Take 1.5 tablets by mouth Daily. 135 tablet 1   • Fluticasone Furoate (ARNUITY ELLIPTA) 100 MCG/ACT aerosol powder  Inhale 2 puffs Daily.     • guaiFENesin (MUCINEX) 600 MG 12 hr tablet Take 1,200 mg by mouth 2 (Two) Times a Day.     • hydrOXYzine (ATARAX) 25 MG tablet Take 1 tablet by mouth At Night As Needed for Allergies. at bedtime as needed (Patient taking differently: Take 50 mg by mouth At Night As Needed for Allergies. at bedtime as needed) 90 tablet 1   • levothyroxine (Synthroid) 50 MCG tablet Take 1 tablet by mouth Daily. 90 tablet 1   • levothyroxine (SYNTHROID, LEVOTHROID) 200 MCG tablet Take 1 tablet by mouth Daily. 90 tablet 1   • losartan (COZAAR) 100 MG tablet Take 1 tablet by mouth Daily. 90 tablet 1   • MAGNESIUM OXIDE PO Take 500 mg by mouth Daily.     • Melatonin 10 MG tablet Take 1 tablet by mouth At Night As Needed (sleep).     • montelukast (SINGULAIR) 10 MG tablet Take 10 mg by mouth Every Night.     • pantoprazole (PROTONIX) 40 MG EC tablet Take 1 tablet by mouth 2 (Two) Times a Day. 180 tablet 1   • triamterene-hydrochlorothiazide (MAXZIDE-25) 37.5-25 MG per tablet Take 1 tablet by mouth Daily. 90 tablet 1   • vitamin B-12 (CYANOCOBALAMIN) 1000 MCG tablet Take 1,000 mcg by mouth Daily.     • albuterol (PROAIR HFA) 108 (90 Base) MCG/ACT inhaler Inhale 2 puffs Every 6 (Six) Hours As Needed for Wheezing or Shortness of Air. 1 inhaler 5   • diphenhydrAMINE (BENADRYL) 25 mg capsule Take 1 capsule by mouth Every 6 (Six) Hours  As Needed for Itching or Allergies. 360 capsule 1   • diphenhydrAMINE (BENADRYL) 50 MG/ML injection INJECT 1 ML INTRAMUSCULARLY AS DIRECTED BY PRESCRIBER ONE TIME AS NEEDED FOR ITCHING OR ALLERGIES FOR UP TO ONE DOSE (Patient taking differently: Inject  into the appropriate muscle as directed by prescriber 1 (One) Time As Needed.) 50 mL 4   • EPINEPHrine (EPIPEN 2-KIRA) 0.3 MG/0.3ML solution auto-injector injection Inject 1 Units into the shoulder, thigh, or buttocks As Needed (allergy). as directed; For: Allergic rhinitis     • ibuprofen (ADVIL,MOTRIN) 600 MG tablet Take 1 tablet by mouth Every 8 (Eight) Hours As Needed for Mild Pain . 270 tablet 1   • tamoxifen (NOLVADEX) 20 MG chemo tablet TAKE ONE TABLET BY MOUTH DAILY (Patient taking differently: Take 20 mg by mouth Daily. Swallow whole. Do not crush or chew.) 90 tablet 1       PMH:   Past Medical History:   Diagnosis Date   • Arthritis    • Back pain    • Breast cancer (CMS/HCC)    • Celiac disease    • Disease of thyroid gland    • GERD (gastroesophageal reflux disease)    • Hepatitis     NOT SURE OF TYPE, CAN'T DONATE BLOOD   • History of transfusion    • Hypertension      PSH:    Past Surgical History:   Procedure Laterality Date   • COLONOSCOPY      2011   • EYE SURGERY      bilateral cataracts removed   • HYSTERECTOMY  05/1983   • MASTECTOMY Bilateral 09/05/2017   • MASTECTOMY WITH SENTINEL NODE BIOPSY AND AXILLARY NODE DISSECTION Bilateral 9/5/2017    Procedure: LEFT BREAST MASTECTOMY WITH LEFT  SENTINEL NODE BIOPSY AND RIGHT PROPHYLACTIC MASTECTOMY;  Surgeon: Caitlin Green MD;  [facility];  Service:    • OOPHORECTOMY     • SECONDARY INTRAOCULAR LENSE IMPLANTATION  12/2015    also in 12/2016   • SHOULDER ARTHROSCOPY Left 06/04/2014   • SKIN CANCER EXCISION  10/2015    basal cell   • THYROID SURGERY  06/1996   • TONSILLECTOMY  11/1976       Social History:   Tobacco:   Social History     Tobacco Use   Smoking Status Never Smoker   Smokeless  Tobacco Never Used      Alcohol:     Social History     Substance and Sexual Activity   Alcohol Use Yes   • Frequency: 2-4 times a month    Comment: one or less per week       Vitals:           /69 (BP Location: Right arm, Patient Position: Sitting)   Pulse 72   Temp 97.2 °F (36.2 °C) (Temporal)   Resp 18   LMP  (LMP Unknown) Comment: LAST PAP 3/18 BY DR SANDERS  SpO2 96%   Breastfeeding No     Physical Exam:  General Appearance:    Alert, cooperative, no distress, appears stated age   Head:    Normocephalic, without obvious abnormality, atraumatic   Lungs:     Clear to auscultation bilaterally, respirations unlabored    Heart:   Regular rate and rhythm, S1 and S2 normal, no murmur, rub    or gallop    Abdomen:    Soft, non-tender, +bowel sounds   Breast Exam:    deferred   Genitalia:    deferred   Extremities:   Extremities normal, atraumatic, no cyanosis or edema   Skin:   Skin color, texture, turgor normal, no rashes or lesions   Neurologic:   Grossly intact   Results Review  LABS:  Lab Results   Component Value Date    WBC 5.58 09/25/2020    HGB 13.5 09/25/2020    HCT 40.8 09/25/2020    MCV 86.1 09/25/2020     09/25/2020    NEUTROABS 2.88 09/25/2020    GLUCOSE 91 09/25/2020    BUN 14 09/25/2020    CREATININE 0.78 09/25/2020    EGFRIFNONA 73 09/25/2020    EGFRIFAFRI 74 02/06/2018     09/25/2020    K 4.1 09/25/2020     09/25/2020    CO2 23.0 09/25/2020    MG 1.9 08/03/2017    CALCIUM 9.4 09/25/2020    ALBUMIN 4.10 09/25/2020    AST 27 09/25/2020    ALT 19 09/25/2020    BILITOT 0.3 09/25/2020    PTT 25.6 09/25/2020    INR 1.08 09/25/2020       RADIOLOGY:  Imaging Results (Last 72 Hours)     ** No results found for the last 72 hours. **          I reviewed the patient's new clinical results.     9/25/20 ECG: reviewed, NSR  9/25/20 CXR: no acute cardiopulmonary disease    10/25/20 COVID-19: negative    Cancer Staging (if applicable)  Cancer Patient: __ yes _X_no __unknown; If yes,  clinical stage T:__ N:__M:__, stage group or __N/A  *History of breast cancer    Impression: Right primary osteoarthritis of the right knee    Plan: Right lateral unilateral versus total knee arthroplasty      Clare Lara, CHRISTY   10/7/2020   07:39 EDT

## 2020-10-07 NOTE — H&P
Patient Name: Sarah Raphael  MRN: [id number]  : 1950  DOS: 10/7/2020    Attending: Adrian Cortes MD    Primary Care Provider: Tyrese Recinos MD      Chief complaint: Right knee pain    Subjective   Patient is a pleasant 70 y.o. female presented for scheduled surgery by Dr. Cortes.  She underwent right total knee arthroplasty under spinal anesthesia.  She tolerated surgery well and was admitted for further medical management.  Her knee has been painful for about a year.  She uses a walker for ambulation.  She did have a fall about 6 months ago and dislocated her right shoulder.    When seen postop she is doing well.  Her pain is well controlled.  She denies nausea, shortness of breath or chest pain.  No history of DVT or PE.    (Above is noted, agree.  Doing well when seen in her room afterwards.  No nausea, vomiting, or shortness of breath.Good pain control.  PT notes afterwards showed her to have ambulated 50 feet using rolling walker and contact-guard assist of 2.  Patient does have some tremors especially in the upper extremities.  He is started over the last couple of months.  It is of note that around the same time she was started on Abilify as an adjunctive treatment for depression.)wy    Allergies:  Allergies   Allergen Reactions   • Nuts Shortness Of Breath     rash   • Other      MSG - HIVES    • Penicillins Shortness Of Breath     rash   • Soybean-Containing Drug Products Shortness Of Breath     rash   • Sunflower Oil Shortness Of Breath     rash   • Cefuroxime      Stomach problems   • Cephalexin      Stomach problems   • Chicken Allergy    • Clindamycin Nausea Only and Nausea And Vomiting   • Erythromycin Nausea Only   • Flu Virus Vaccine      Redness and red rash to area   • Gluten Meal    • Metronidazole Hives   • Naproxen Dizziness   • Parsley Seed    • Thimerosal      Redness and swelling at site of injection   • Varicella Virus Vaccine Live      Redness and swelling at site   •  Zostavax [Zoster Vaccine Live]      78015 UNT /0.65 ML subcutaneous solution Recontituted  - redness and swelling at site   • Apple Rash   • Doxycycline Itching and Rash   • Sulfa Antibiotics Nausea Only and Rash     Other reaction(s): Headache, Vomiting       Meds:  Medications Prior to Admission   Medication Sig Dispense Refill Last Dose   • amLODIPine (NORVASC) 5 MG tablet TAKE ONE TABLET BY MOUTH DAILY (Patient taking differently: Take 5 mg by mouth Daily.) 90 tablet 0 10/6/2020 at Unknown time   • ARIPiprazole (ABILIFY) 5 MG tablet TAKE ONE TABLET BY MOUTH DAILY (Patient taking differently: Take 5 mg by mouth.) 90 tablet 1 10/6/2020 at Unknown time   • Azelastine-Fluticasone (DYMISTA) 137-50 MCG/ACT suspension 2 Squirts into the nostril(s) as directed by provider Daily.   10/6/2020 at Unknown time   • Cholecalciferol (VITAMIN D PO) Take 2,000 Units by mouth Daily. Vitamin D 1000 UNIT CAPS; TAKE 1 TABLET DAILY   10/6/2020 at Unknown time   • citalopram (CeleXA) 40 MG tablet Take 1.5 tablets by mouth Daily. 135 tablet 1 10/6/2020 at Unknown time   • Fluticasone Furoate (ARNUITY ELLIPTA) 100 MCG/ACT aerosol powder  Inhale 2 puffs Daily.   10/6/2020 at Unknown time   • guaiFENesin (MUCINEX) 600 MG 12 hr tablet Take 1,200 mg by mouth 2 (Two) Times a Day.   10/6/2020 at Unknown time   • hydrOXYzine (ATARAX) 25 MG tablet Take 1 tablet by mouth At Night As Needed for Allergies. at bedtime as needed (Patient taking differently: Take 50 mg by mouth At Night As Needed for Allergies. at bedtime as needed) 90 tablet 1 10/6/2020 at Unknown time   • levothyroxine (Synthroid) 50 MCG tablet Take 1 tablet by mouth Daily. 90 tablet 1 10/6/2020 at Unknown time   • levothyroxine (SYNTHROID, LEVOTHROID) 200 MCG tablet Take 1 tablet by mouth Daily. 90 tablet 1 10/6/2020 at Unknown time   • losartan (COZAAR) 100 MG tablet Take 1 tablet by mouth Daily. 90 tablet 1 10/6/2020 at Unknown time   • MAGNESIUM OXIDE PO Take 500 mg by mouth  Daily.   10/6/2020 at Unknown time   • Melatonin 10 MG tablet Take 1 tablet by mouth At Night As Needed (sleep).   10/6/2020 at Unknown time   • montelukast (SINGULAIR) 10 MG tablet Take 10 mg by mouth Every Night.   10/6/2020 at Unknown time   • pantoprazole (PROTONIX) 40 MG EC tablet Take 1 tablet by mouth 2 (Two) Times a Day. 180 tablet 1 10/6/2020 at Unknown time   • Potassium 99 MG tablet Take 1 tablet by mouth Daily.   10/6/2020 at Unknown time   • triamterene-hydrochlorothiazide (MAXZIDE-25) 37.5-25 MG per tablet Take 1 tablet by mouth Daily. 90 tablet 1 10/6/2020 at Unknown time   • vitamin B-12 (CYANOCOBALAMIN) 1000 MCG tablet Take 1,000 mcg by mouth Daily.   10/6/2020 at Unknown time   • albuterol (PROAIR HFA) 108 (90 Base) MCG/ACT inhaler Inhale 2 puffs Every 6 (Six) Hours As Needed for Wheezing or Shortness of Air. 1 inhaler 5 10/4/2020   • aspirin 81 MG EC tablet Take 81 mg by mouth Daily.   9/27/2020   • diphenhydrAMINE (BENADRYL) 25 mg capsule Take 1 capsule by mouth Every 6 (Six) Hours As Needed for Itching or Allergies. 360 capsule 1 10/6/2020   • diphenhydrAMINE (BENADRYL) 50 MG/ML injection INJECT 1 ML INTRAMUSCULARLY AS DIRECTED BY PRESCRIBER ONE TIME AS NEEDED FOR ITCHING OR ALLERGIES FOR UP TO ONE DOSE (Patient taking differently: Inject  into the appropriate muscle as directed by prescriber 1 (One) Time As Needed.) 50 mL 4 10/3/2020   • EPINEPHrine (EPIPEN 2-KIRA) 0.3 MG/0.3ML solution auto-injector injection Inject 1 Units into the shoulder, thigh, or buttocks As Needed (allergy). as directed; For: Allergic rhinitis      • ibuprofen (ADVIL,MOTRIN) 600 MG tablet Take 1 tablet by mouth Every 8 (Eight) Hours As Needed for Mild Pain . 270 tablet 1 9/27/2020   • tamoxifen (NOLVADEX) 20 MG chemo tablet TAKE ONE TABLET BY MOUTH DAILY (Patient taking differently: Take 20 mg by mouth Daily. Swallow whole. Do not crush or chew.) 90 tablet 1 9/27/2020         History:   Past Medical History:   Diagnosis  Date   • Arthritis    • Back pain    • Breast cancer (CMS/HCC)    • Celiac disease    • Disease of thyroid gland    • GERD (gastroesophageal reflux disease)    • Hepatitis     NOT SURE OF TYPE, CAN'T DONATE BLOOD   • History of transfusion    • Hypertension      Past Surgical History:   Procedure Laterality Date   • COLONOSCOPY      2011   • EYE SURGERY      bilateral cataracts removed   • HYSTERECTOMY  05/1983   • MASTECTOMY Bilateral 09/05/2017   • MASTECTOMY WITH SENTINEL NODE BIOPSY AND AXILLARY NODE DISSECTION Bilateral 9/5/2017    Procedure: LEFT BREAST MASTECTOMY WITH LEFT  SENTINEL NODE BIOPSY AND RIGHT PROPHYLACTIC MASTECTOMY;  Surgeon: Caitlin Green MD;  Location: [facility];  Service:    • OOPHORECTOMY     • SECONDARY INTRAOCULAR LENSE IMPLANTATION  12/2015    also in 12/2016   • SHOULDER ARTHROSCOPY Left 06/04/2014   • SKIN CANCER EXCISION  10/2015    basal cell   • THYROID SURGERY  06/1996   • TONSILLECTOMY  11/1976     Family History   Problem Relation Age of Onset   • Hypertension Mother    • Hyperlipidemia Mother    • Thyroid disease Mother    • Aneurysm Father    • Heart disease Father    • Breast cancer Neg Hx    • Ovarian cancer Neg Hx      Social History     Tobacco Use   • Smoking status: Never Smoker   • Smokeless tobacco: Never Used   Substance Use Topics   • Alcohol use: Yes     Frequency: 2-4 times a month     Comment: one or less per week   • Drug use: No   She lives alone and has 2 children.  She is retired educator.    Review of Systems  All systems were reviewed and negative except for:  Gastrointestinal: positive for  constipation and diarrhea    Vital Signs  /61 (BP Location: Right arm, Patient Position: Lying)   Pulse 58   Temp 97.7 °F (36.5 °C) (Oral)   Resp 18   LMP  (LMP Unknown) Comment: LAST PAP 3/18 BY DR SANDERS  SpO2 95%   Breastfeeding No     Physical Exam:    General Appearance:    Alert, cooperative, in no acute distress   Head:    Normocephalic, without  obvious abnormality, atraumatic   Eyes:            Lids and lashes normal, conjunctivae and sclerae normal, no   icterus, no pallor, corneas clear,    Ears:    Ears appear intact with no abnormalities noted   Throat:   No oral lesions, no thrush, oral mucosa moist   Neck:   No adenopathy, supple, trachea midline, no thyromegaly    Lungs:     Clear to auscultation,respirations regular, even and unlabored    Heart:    Regular rhythm and normal rate, normal S1 and S2, no murmur, no gallop   Abdomen:     Normal bowel sounds, no masses, no organomegaly, soft non-tender, non-distended, no guarding, no rebound  tenderness   Genitalia:    Deferred   Extremities:  Right knee Ace wrap clean dry intact.  Nerve block present.   Pulses:   Pulses palpable and equal bilaterally   Skin:   No bleeding, bruising or rash   Neurologic:   Cranial nerves 2 - 12 grossly intact. Flexion and dorsiflexion intact bilateral feet.  ( tremors and slight increase in muscle tone/rigidity in UEs)wy        I reviewed the patient's new clinical results.     Results from last 7 days   Lab Units 10/07/20  0733   POTASSIUM mmol/L 3.4*     Lab Results   Component Value Date    HGBA1C 5.80 (H) 09/25/2020     Results for KARINE MONTALVO (MRN [id number]) as of 10/7/2020 12:44   Ref. Range 9/25/2020 10:40   Glucose Latest Ref Range: 65 - 99 mg/dL 91   Sodium Latest Ref Range: 136 - 145 mmol/L 138   Potassium Latest Ref Range: 3.5 - 5.2 mmol/L 4.1   CO2 Latest Ref Range: 22.0 - 29.0 mmol/L 23.0   Chloride Latest Ref Range: 98 - 107 mmol/L 104   Anion Gap Latest Ref Range: 5.0 - 15.0 mmol/L 11.0   Creatinine Latest Ref Range: 0.57 - 1.00 mg/dL 0.78   BUN Latest Ref Range: 8 - 23 mg/dL 14   BUN/Creatinine Ratio Latest Ref Range: 7.0 - 25.0  17.9   Calcium Latest Ref Range: 8.6 - 10.5 mg/dL 9.4   eGFR Non  Am Latest Ref Range: >60 mL/min/1.73 73   Alkaline Phosphatase Latest Ref Range: 39 - 117 U/L 84   Total Protein Latest Ref Range: 6.0 - 8.5 g/dL 6.6    ALT (SGPT) Latest Ref Range: 1 - 33 U/L 19   AST (SGOT) Latest Ref Range: 1 - 32 U/L 27   Total Bilirubin Latest Ref Range: 0.0 - 1.2 mg/dL 0.3   Albumin Latest Ref Range: 3.50 - 5.20 g/dL 4.10   Globulin Latest Units: gm/dL 2.5   A/G Ratio Latest Units: g/dL 1.6     Results for KARINE MONTALVO (MRN [id number]) as of 10/7/2020 12:44   Ref. Range 9/25/2020 10:40   WBC Latest Ref Range: 3.40 - 10.80 10*3/mm3 5.58   RBC Latest Ref Range: 3.77 - 5.28 10*6/mm3 4.74   Hemoglobin Latest Ref Range: 12.0 - 15.9 g/dL 13.5   Hematocrit Latest Ref Range: 34.0 - 46.6 % 40.8   RDW Latest Ref Range: 12.3 - 15.4 % 12.2 (L)   MCV Latest Ref Range: 79.0 - 97.0 fL 86.1   MCH Latest Ref Range: 26.6 - 33.0 pg 28.5   MCHC Latest Ref Range: 31.5 - 35.7 g/dL 33.1   MPV Latest Ref Range: 6.0 - 12.0 fL 9.7   Platelets Latest Ref Range: 140 - 450 10*3/mm3 238     Assessment and Plan:     Status post total right knee replacement    Arthritis of right knee    Hyperlipidemia    Hypertension    Hypothyroidism    Elevated hemoglobin A1c  Tremors and stiffness, suspect extraparametal effects from Abilify    Plan  1. PT/OT- WBAT RLE  2. Pain control-prns, AC nerve block   3. IS-encourage  4. DVT proph- Mechs/ASA  5. Bowel regimen  6. Resume home medications as appropriate  7. Monitor post-op labs  8. DC planning for home    HTN, Hyperlipidemia  - Continue home Norvasc and Cozaar  - Hold Maxzide  - Monitor BP   - Holding parameters for BP meds  - Labetalol PRN for SBP>170    Hypothyroid  -Continue home Synthroid    Elevated hemoglobin A1c: In prediabetic range  - Explained to patient implication of A1C elevation, need to modify diet and increase activity, and f/u with PCP.    Depression  -Resume Celexa, discontinue Abilify, I discussed that with Dr. Recinos who is in agreement.ilene Pulido, CHRISTY  10/07/20  12:40 EDT     I have personally performed the evaluation on this patient. My history is consistent  with HPI obtained. My exam  findings are listed above. I have personally reviewed and discussed the above formulated treatment plan with pt and AH.Johann.

## 2020-10-07 NOTE — ANESTHESIA PREPROCEDURE EVALUATION
Anesthesia Evaluation     Patient summary reviewed and Nursing notes reviewed                Airway   Mallampati: I  TM distance: >3 FB  Neck ROM: full  No difficulty expected  Dental - normal exam     Pulmonary - negative pulmonary ROS and normal exam   Cardiovascular - normal exam    (+) hypertension, hyperlipidemia,       Neuro/Psych  (+) tremors, psychiatric history Anxiety,     GI/Hepatic/Renal/Endo    (+)  GERD,      ROS Comment: CELIAC DZ    Musculoskeletal     (+) back pain,   Abdominal  - normal exam    Bowel sounds: normal.   Substance History - negative use     OB/GYN negative ob/gyn ROS         Other   arthritis,    history of cancer (BREAST)      Other Comment: MULTIPLE ALLERGIES                Anesthesia Plan    ASA 3     spinal   (ADDUCTOR CANAL CATHETER FOR POSTOP PAIN )  intravenous induction     Anesthetic plan, all risks, benefits, and alternatives have been provided, discussed and informed consent has been obtained with: patient.    Plan discussed with CRNA.

## 2020-10-07 NOTE — THERAPY EVALUATION
Patient Name: Sarah Raphael  : 1950    MRN: [id number]                              Today's Date: 10/7/2020       Admit Date: 10/7/2020    Visit Dx: No diagnosis found.  Patient Active Problem List   Diagnosis   • Anxiety   • Gastroesophageal reflux disease without esophagitis   • Hyperlipidemia   • Hypertension   • Hypothyroidism   • Cataract   • Cancer of overlapping sites of right female breast (CMS/HCC)   • Estrogen receptor positive   • Arthritis of right knee   • Status post total right knee replacement   • Elevated hemoglobin A1c     Past Medical History:   Diagnosis Date   • Arthritis    • Back pain    • Breast cancer (CMS/HCC)    • Celiac disease    • Disease of thyroid gland    • GERD (gastroesophageal reflux disease)    • Hepatitis     NOT SURE OF TYPE, CAN'T DONATE BLOOD   • History of transfusion    • Hypertension      Past Surgical History:   Procedure Laterality Date   • COLONOSCOPY         • EYE SURGERY      bilateral cataracts removed   • HYSTERECTOMY  1983   • MASTECTOMY Bilateral 2017   • MASTECTOMY WITH SENTINEL NODE BIOPSY AND AXILLARY NODE DISSECTION Bilateral 2017    Procedure: LEFT BREAST MASTECTOMY WITH LEFT  SENTINEL NODE BIOPSY AND RIGHT PROPHYLACTIC MASTECTOMY;  Surgeon: Caitlin Green MD;  Location: [facility];  Service:    • OOPHORECTOMY     • SECONDARY INTRAOCULAR LENSE IMPLANTATION  2015    also in 2016   • SHOULDER ARTHROSCOPY Left 2014   • SKIN CANCER EXCISION  10/2015    basal cell   • THYROID SURGERY  1996   • TONSILLECTOMY  1976     General Information     Row Name 10/07/20 1355          Physical Therapy Time and Intention    Document Type  evaluation  -SHANON     Mode of Treatment  individual therapy;physical therapy  -SHANON     Row Name 10/07/20 1355          General Information    Patient Profile Reviewed  yes  -SHANON     Prior Level of Function  min assist:;bed mobility;ADL's;all household mobility;transfer  -SHANON     Existing  Precautions/Restrictions  fall;other (see comments) R adductor nerve catheter  -     Barriers to Rehab  none identified  -     Row Name 10/07/20 [facility]          Living Environment    Lives With  alone  -     Row Name 10/07/20 [facility]          Home Main Entrance    Number of Stairs, Main Entrance  none  -SHANON     Row Name 10/07/20 [facility]          Stairs Within Home, Primary    Stairs, Within Home, Primary  0  -SHANON     Number of Stairs, Within Home, Primary  none  -SHANON     Row Name 10/07/20 [facility]          Cognition    Orientation Status (Cognition)  oriented x 4  -SHANON     Row Name 10/07/20 [facility]          Safety Issues, Functional Mobility    Safety Issues Affecting Function (Mobility)  safety precautions follow-through/compliance;safety precaution awareness  -     Impairments Affecting Function (Mobility)  endurance/activity tolerance;range of motion (ROM);strength;pain  -       User Key  (r) = Recorded By, (t) = Taken By, (c) = Cosigned By    Initials Name Provider Type    Tomas Ybarra, PT Physical Therapist        Mobility     Row Name 10/07/20 [facility]          Bed Mobility    Bed Mobility  scooting/bridging;supine-sit  -     Scooting/Bridging [place] (Bed Mobility)  verbal cues;contact guard;2 person assist  -SHANON     Supine-Sit [place] (Bed Mobility)  verbal cues;contact guard;2 person assist  -SHANON     Assistive Device (Bed Mobility)  bed rails;head of bed elevated  -     Comment (Bed Mobility)  Verbal cues for LE sequencing off of EOB and use of UEs to push trunk into sitting; pt requiring increased time to complete  -     Row Name 10/07/20 [facility]          Transfers    Comment (Transfers)  Verbal cues for safe hand placement during standing/sitting and moving R LE out for comfort prior to sitting; min A for balance  -     Row Name 10/07/20 [facility]          Sit-Stand Transfer    Sit-Stand [place] (Transfers)  verbal cues;minimum assist (75% patient effort);2 person assist  -SHANON     Assistive Device  (Sit-Stand Transfers)  walker, front-wheeled  -     Row Name 10/07/20 1353          Gait/Stairs (Locomotion)    [place] Level (Gait)  verbal cues;contact guard;2 person assist  -     Assistive Device (Gait)  walker, front-wheeled  -     Distance in Feet (Gait)  50 feet  -     Deviations/Abnormal Patterns (Gait)  bilateral deviations;jo decreased;gait speed decreased;weight shifting decreased;antalgic  -     Bilateral Gait Deviations  forward flexed posture  -SHANON     Right Sided Gait Deviations  heel strike decreased;weight shift ability decreased  -     [place] Level (Stairs)  not tested  -     Comment (Gait/Stairs)  Pt ambulated with step to pattern and decreased speed. Verbal cues for maintaining upright posture, body within walker, increasing step length, WB, and heel strike on R LE. Pt requiring 2 standing rest breaks during gait due to pain and fatigue. No knee buckling noted with gait.  -     Row Name 10/07/20 5204          Mobility    Extremity Weight-bearing Status  right lower extremity  -     Right Lower Extremity (Weight-bearing Status)  weight-bearing as tolerated (WBAT)  -       User Key  (r) = Recorded By, (t) = Taken By, (c) = Cosigned By    Initials Name Provider Type    Tomas Ybarra, PT Physical Therapist        Obj/Interventions     Row Name 10/07/20 8512          Range of Motion Comprehensive    General Range of Motion  lower extremity range of motion deficits identified  -     Comment, General Range of Motion  R LE AROM impaired 25%; L LE AROM WFL; able to actively DF/PF  -     Row Name 10/07/20 1357          Strength Comprehensive (MMT)    General Manual Muscle Testing (MMT) Assessment  lower extremity strength deficits identified  -     Comment, General Manual Muscle Testing (MMT) Assessment  R LE functionally 4-/5; L LE functionally 4+/5  -     Row Name 10/07/20 135          Motor Skills    Therapeutic Exercise  hip;knee;ankle  -     Row Name  10/07/20 [facility]5          Hip (Therapeutic Exercise)    Hip (Therapeutic Exercise)  isometric exercises  -     Hip Isometrics (Therapeutic Exercise)  gluteal sets;10 repetitions  -     Row Name 10/07/20 [facility]5          Knee (Therapeutic Exercise)    Knee (Therapeutic Exercise)  isometric exercises  -     Knee Isometrics (Therapeutic Exercise)  quad sets;10 repetitions  -     Row Name 10/07/20 [facility]5          Ankle (Therapeutic Exercise)    Ankle (Therapeutic Exercise)  AROM (active range of motion)  -     Ankle AROM (Therapeutic Exercise)  bilateral;dorsiflexion;plantarflexion;10 repetitions  -     Row Name 10/07/20 [facility]5          Sensory Assessment (Somatosensory)    Sensory Assessment (Somatosensory)  LE sensation intact  -       User Key  (r) = Recorded By, (t) = Taken By, (c) = Cosigned By    Initials Name Provider Type    SHANON Tomas Dodge, PT Physical Therapist        Goals/Plan     Row Name 10/07/20 [facility]5          Bed Mobility Goal 1 (PT)    Activity/Assistive Device (Bed Mobility Goal 1, PT)  sit to supine/supine to sit  -SHANON     [place] Level/Cues Needed (Bed Mobility Goal 1, PT)  modified independence  -SHANON     Time Frame (Bed Mobility Goal 1, PT)  long term goal (LTG);3 days  -     Row Name 10/07/20 [facility]5          Transfer Goal 1 (PT)    Activity/Assistive Device (Transfer Goal 1, PT)  sit-to-stand/stand-to-sit;walker, rolling  -SHANON     [place] Level/Cues Needed (Transfer Goal 1, PT)  modified independence  -SHANON     Time Frame (Transfer Goal 1, PT)  long term goal (LTG);3 days  -     Row Name 10/07/20 [facility]5          Gait Training Goal 1 (PT)    Activity/Assistive Device (Gait Training Goal 1, PT)  gait (walking locomotion);walker, rolling  -SHANON     [place] Level (Gait Training Goal 1, PT)  modified independence  -SHANON     Distance (Gait Training Goal 1, PT)  150 feet  -SHANON     Time Frame (Gait Training Goal 1, PT)  long term goal (LTG);3 days  -     Row Name 10/07/20 [facility]5          ROM Goal 1  (PT)    ROM Goal 1 (PT)  R knee AROM 0-90 degrees  -SHANON     Time Frame (ROM Goal 1, PT)  long-term goal (LTG);3 days  -       User Key  (r) = Recorded By, (t) = Taken By, (c) = Cosigned By    Initials Name Provider Type    Tomas Ybarra, PT Physical Therapist        Clinical Impression     Row Name 10/07/20 2466          Pain    Additional Documentation  Pain Scale: Numbers Pre/Post-Treatment (Group)  -     Row Name 10/07/20 [facility]8          Pain Scale: Numbers Pre/Post-Treatment    Pretreatment Pain Rating  4/10  -SHANON     Posttreatment Pain Rating  6/10  -SHANON     Pain Location - Side  Right  -SHANON     Pain Location - Orientation  anterior  -SHANON     Pain Location  knee  -SHANON     Pain Intervention(s)  Ambulation/increased activity;Cold applied;Repositioned  -     Row Name 10/07/20 5758          Therapy Assessment/Plan (PT)    Patient/Family Therapy Goals Statement (PT)  To improve function  -SHANON     Rehab Potential (PT)  good, to achieve stated therapy goals  -     Criteria for Skilled Interventions Met (PT)  yes;meets criteria;skilled treatment is necessary  -     Row Name 10/07/20 2916          Positioning and Restraints    Pre-Treatment Position  in bed  -SHANON     Post Treatment Position  chair  -SHANON     In Chair  notified nsg;reclined;call light within reach;encouraged to call for assist;exit alarm on;legs elevated;compression device  -       User Key  (r) = Recorded By, (t) = Taken By, (c) = Cosigned By    Initials Name Provider Type    Tomas Ybarra, PT Physical Therapist        Outcome Measures     Row Name 10/07/20 8725          How much help from another person do you currently need...    Turning from your back to your side while in flat bed without using bedrails?  3  -SHANON     Moving from lying on back to sitting on the side of a flat bed without bedrails?  3  -SHANON     Moving to and from a bed to a chair (including a wheelchair)?  3  -SHANON     Standing up from a chair using your arms (e.g., wheelchair, bedside  chair)?  2  -SHANON     Climbing 3-5 steps with a railing?  2  -SHANON     To walk in hospital room?  3  -SHANON     AM-PAC 6 Clicks Score (PT)  16  -SHANON     Row Name 10/07/20 [facility]          PADD    Diagnosis  1  -     Gender  1  -SHANON     Age Group  1  -SHANON     Gait Distance  0  -SHANON     Assist Level  1  -SHANON     Home Support  0  -SHANON     PADD Score  4  -SHANON     Patient Preference  extended rehabilitation  -     Prediction by PADD Score  extended rehabilitation  -     Row Name 10/07/20 [facility]          Functional Assessment    Outcome Measure Options  AM-PAC 6 Clicks Basic Mobility (PT);PADD  -       User Key  (r) = Recorded By, (t) = Taken By, (c) = Cosigned By    Initials Name Provider Type    Tomas Ybarra, PT Physical Therapist        Physical Therapy Education                 Title: PT OT SLP Therapies (Done)     Topic: Physical Therapy (Done)     Point: Mobility training (Done)     Learning Progress Summary           Patient Acceptance, E,D, VU by  at 10/7/2020 1355    Comment: Educated on safe sequencing with bed mobility, ambulatory transfers, and gait. Reviewed HEP and knee precautions.                   Point: Home exercise program (Done)     Learning Progress Summary           Patient Acceptance, E,D, VU by SHANON at 10/7/2020 1355    Comment: Educated on safe sequencing with bed mobility, ambulatory transfers, and gait. Reviewed HEP and knee precautions.                   Point: Body mechanics (Done)     Learning Progress Summary           Patient Acceptance, E,D, VU by  at 10/7/2020 1355    Comment: Educated on safe sequencing with bed mobility, ambulatory transfers, and gait. Reviewed HEP and knee precautions.                   Point: Precautions (Done)     Learning Progress Summary           Patient Acceptance, E,D, VU by  at 10/7/2020 1355    Comment: Educated on safe sequencing with bed mobility, ambulatory transfers, and gait. Reviewed HEP and knee precautions.                               User Key      Initials Effective Dates Name Provider Type Discipline     09/10/19 -  Tomas Dodge, PT Physical Therapist PT              PT Recommendation and Plan  Planned Therapy Interventions (PT): balance training, bed mobility training, gait training, home exercise program, patient/family education, ROM (range of motion), strengthening, transfer training  Plan of Care Reviewed With: patient  Progress: improving  Outcome Summary: PT eval complete. Pt ambulated 50 feet using RW and CGA x2. Pt requiring 2 standing rest breaks due to pain and fatigue. Bed mobility performed with CGA x2 and STS with min A x2. No knee buckling noted with ambulation. Pt unable to perform SLR. Will assess R knee AROM POD#1. Reviewed HEP and knee precautions. PADD score = 4. Pt has no form of assistance at home in order to assist with ADL's and mobility tasks. Recommend pt d/c to IPR when appropriate, pending improvement in independence with functional and mobility tasks.     Time Calculation:   PT Charges     Row Name 10/07/20 1355             Time Calculation    Start Time  1355  -SHANON      PT Received On  10/07/20  -      PT Goal Re-Cert Due Date  10/17/20  -         Time Calculation- PT    Total Timed Code Minutes- PT  10 minute(s)  -         Timed Charges    59765 - PT Therapeutic Exercise Minutes  2  -      59649 - Gait Training Minutes   8  -SHANON        User Key  (r) = Recorded By, (t) = Taken By, (c) = Cosigned By    Initials Name Provider Type    SHANON Tomas Dodge, PT Physical Therapist        Therapy Charges for Today     Code Description Service Date Service Provider Modifiers Qty    52611254320 HC GAIT TRAINING EA 15 MIN 10/7/2020 Tomas Dodge, PT GP 1    12218247903 HC PT EVAL MOD COMPLEXITY 3 10/7/2020 Tomas Dodge, PT GP 1    35656115262 HC PT THER SUPP EA 15 MIN 10/7/2020 Tomas Dodge, PT GP 2          PT G-Codes  Outcome Measure Options: AM-PAC 6 Clicks Basic Mobility (PT), PADD  AM-PAC 6 Clicks Score (PT): 16    Tomas Dodge  PT  10/7/2020

## 2020-10-07 NOTE — PLAN OF CARE
Problem: Adult Inpatient Plan of Care  Goal: Plan of Care Review  Flowsheets (Taken 10/7/2020 5730)  Progress: improving  Plan of Care Reviewed With: patient  Outcome Summary: PT eval complete. Pt ambulated 50 feet using RW and CGA x2. Pt requiring 2 standing rest breaks due to pain and fatigue. Bed mobility performed with CGA x2 and STS with min A x2. No knee buckling noted with ambulation. Pt unable to perform SLR. Will assess R knee AROM POD#1. Reviewed HEP and knee precautions. PADD score = 4. Pt has no form of assistance at home in order to assist with ADL's and mobility tasks. Recommend pt d/c to IPR when appropriate, pending improvement in independence with functional and mobility tasks.

## 2020-10-08 PROBLEM — E87.1 HYPONATREMIA: Status: ACTIVE | Noted: 2020-10-08

## 2020-10-08 PROBLEM — G89.18 ACUTE POSTOPERATIVE PAIN: Status: ACTIVE | Noted: 2020-10-08

## 2020-10-08 LAB
ANION GAP SERPL CALCULATED.3IONS-SCNC: 9 MMOL/L (ref 5–15)
BUN SERPL-MCNC: 18 MG/DL (ref 8–23)
BUN/CREAT SERPL: 20 (ref 7–25)
CALCIUM SPEC-SCNC: 8.6 MG/DL (ref 8.6–10.5)
CHLORIDE SERPL-SCNC: 96 MMOL/L (ref 98–107)
CO2 SERPL-SCNC: 22 MMOL/L (ref 22–29)
CREAT SERPL-MCNC: 0.9 MG/DL (ref 0.57–1)
DEPRECATED RDW RBC AUTO: 38.5 FL (ref 37–54)
ERYTHROCYTE [DISTWIDTH] IN BLOOD BY AUTOMATED COUNT: 12.3 % (ref 12.3–15.4)
GFR SERPL CREATININE-BSD FRML MDRD: 62 ML/MIN/1.73
GLUCOSE SERPL-MCNC: 108 MG/DL (ref 65–99)
HCT VFR BLD AUTO: 35.8 % (ref 34–46.6)
HGB BLD-MCNC: 11.6 G/DL (ref 12–15.9)
MCH RBC QN AUTO: 27.8 PG (ref 26.6–33)
MCHC RBC AUTO-ENTMCNC: 32.4 G/DL (ref 31.5–35.7)
MCV RBC AUTO: 85.9 FL (ref 79–97)
PLATELET # BLD AUTO: 197 10*3/MM3 (ref 140–450)
PMV BLD AUTO: 10 FL (ref 6–12)
POTASSIUM SERPL-SCNC: 4.6 MMOL/L (ref 3.5–5.2)
RBC # BLD AUTO: 4.17 10*6/MM3 (ref 3.77–5.28)
SARS-COV-2 RDRP RESP QL NAA+PROBE: NOT DETECTED
SODIUM SERPL-SCNC: 127 MMOL/L (ref 136–145)
WBC # BLD AUTO: 10.43 10*3/MM3 (ref 3.4–10.8)

## 2020-10-08 PROCEDURE — 25010000002 KETOROLAC TROMETHAMINE PER 15 MG: Performed by: ORTHOPAEDIC SURGERY

## 2020-10-08 PROCEDURE — C9803 HOPD COVID-19 SPEC COLLECT: HCPCS | Performed by: NURSE PRACTITIONER

## 2020-10-08 PROCEDURE — 85027 COMPLETE CBC AUTOMATED: CPT | Performed by: NURSE PRACTITIONER

## 2020-10-08 PROCEDURE — 97535 SELF CARE MNGMENT TRAINING: CPT

## 2020-10-08 PROCEDURE — 97530 THERAPEUTIC ACTIVITIES: CPT

## 2020-10-08 PROCEDURE — 87635 SARS-COV-2 COVID-19 AMP PRB: CPT | Performed by: NURSE PRACTITIONER

## 2020-10-08 PROCEDURE — 80048 BASIC METABOLIC PNL TOTAL CA: CPT | Performed by: ORTHOPAEDIC SURGERY

## 2020-10-08 PROCEDURE — 97110 THERAPEUTIC EXERCISES: CPT

## 2020-10-08 PROCEDURE — 94799 UNLISTED PULMONARY SVC/PX: CPT

## 2020-10-08 PROCEDURE — 25010000003 CEFAZOLIN IN DEXTROSE 2-4 GM/100ML-% SOLUTION: Performed by: ORTHOPAEDIC SURGERY

## 2020-10-08 PROCEDURE — 97165 OT EVAL LOW COMPLEX 30 MIN: CPT

## 2020-10-08 PROCEDURE — 97116 GAIT TRAINING THERAPY: CPT

## 2020-10-08 RX ADMIN — LOSARTAN POTASSIUM 100 MG: 50 TABLET, FILM COATED ORAL at 08:27

## 2020-10-08 RX ADMIN — ACETAMINOPHEN 1000 MG: 500 TABLET, FILM COATED ORAL at 13:57

## 2020-10-08 RX ADMIN — LEVOTHYROXINE SODIUM 200 MCG: 100 TABLET ORAL at 06:09

## 2020-10-08 RX ADMIN — MONTELUKAST SODIUM 10 MG: 10 TABLET, COATED ORAL at 20:39

## 2020-10-08 RX ADMIN — ACETAMINOPHEN 1000 MG: 500 TABLET, FILM COATED ORAL at 06:08

## 2020-10-08 RX ADMIN — ASPIRIN 81 MG: 81 TABLET, COATED ORAL at 08:27

## 2020-10-08 RX ADMIN — PANTOPRAZOLE SODIUM 40 MG: 40 TABLET, DELAYED RELEASE ORAL at 08:27

## 2020-10-08 RX ADMIN — MELATONIN TAB 5 MG 10 MG: 5 TAB at 20:39

## 2020-10-08 RX ADMIN — LEVOTHYROXINE SODIUM 50 MCG: 100 TABLET ORAL at 06:09

## 2020-10-08 RX ADMIN — KETOROLAC TROMETHAMINE 15 MG: 15 INJECTION, SOLUTION INTRAMUSCULAR; INTRAVENOUS at 12:02

## 2020-10-08 RX ADMIN — TAMOXIFEN CITRATE 20 MG: 10 TABLET, FILM COATED ORAL at 08:28

## 2020-10-08 RX ADMIN — BUDESONIDE 0.5 MG: 0.5 INHALANT RESPIRATORY (INHALATION) at 08:56

## 2020-10-08 RX ADMIN — OXYCODONE 10 MG: 5 TABLET ORAL at 04:49

## 2020-10-08 RX ADMIN — GUAIFENESIN 1200 MG: 600 TABLET, EXTENDED RELEASE ORAL at 08:27

## 2020-10-08 RX ADMIN — AMLODIPINE BESYLATE 5 MG: 5 TABLET ORAL at 08:27

## 2020-10-08 RX ADMIN — KETOROLAC TROMETHAMINE 15 MG: 15 INJECTION, SOLUTION INTRAMUSCULAR; INTRAVENOUS at 03:52

## 2020-10-08 RX ADMIN — OXYCODONE 10 MG: 5 TABLET ORAL at 10:24

## 2020-10-08 RX ADMIN — ACETAMINOPHEN 1000 MG: 500 TABLET, FILM COATED ORAL at 20:39

## 2020-10-08 RX ADMIN — OXYCODONE 10 MG: 5 TABLET ORAL at 13:57

## 2020-10-08 RX ADMIN — MELOXICAM 15 MG: 7.5 TABLET ORAL at 08:27

## 2020-10-08 RX ADMIN — OXYCODONE 10 MG: 5 TABLET ORAL at 17:36

## 2020-10-08 RX ADMIN — CEFAZOLIN SODIUM 2 G: 2 INJECTION, SOLUTION INTRAVENOUS at 00:41

## 2020-10-08 RX ADMIN — CITALOPRAM HYDROBROMIDE 60 MG: 20 TABLET ORAL at 08:27

## 2020-10-08 RX ADMIN — BUDESONIDE 0.5 MG: 0.5 INHALANT RESPIRATORY (INHALATION) at 20:26

## 2020-10-08 RX ADMIN — OXYCODONE 10 MG: 5 TABLET ORAL at 00:45

## 2020-10-08 RX ADMIN — PANTOPRAZOLE SODIUM 40 MG: 40 TABLET, DELAYED RELEASE ORAL at 20:39

## 2020-10-08 RX ADMIN — GUAIFENESIN 1200 MG: 600 TABLET, EXTENDED RELEASE ORAL at 20:40

## 2020-10-08 RX ADMIN — ASPIRIN 81 MG: 81 TABLET, COATED ORAL at 20:39

## 2020-10-08 NOTE — THERAPY EVALUATION
Patient Name: Sarah Raphael  : 1950    MRN: [id number]                              Today's Date: 10/8/2020       Admit Date: 10/7/2020    Visit Dx: No diagnosis found.  Patient Active Problem List   Diagnosis   • Anxiety   • Gastroesophageal reflux disease without esophagitis   • Hyperlipidemia   • Hypertension   • Hypothyroidism   • Cataract   • Cancer of overlapping sites of right female breast (CMS/HCC)   • Estrogen receptor positive   • Arthritis of right knee   • Status post total right knee replacement   • Elevated hemoglobin A1c   • Hyponatremia   • Acute postoperative pain     Past Medical History:   Diagnosis Date   • Arthritis    • Back pain    • Breast cancer (CMS/HCC)    • Celiac disease    • Disease of thyroid gland    • GERD (gastroesophageal reflux disease)    • Hepatitis     NOT SURE OF TYPE, CAN'T DONATE BLOOD   • History of transfusion    • Hypertension      Past Surgical History:   Procedure Laterality Date   • COLONOSCOPY         • EYE SURGERY      bilateral cataracts removed   • HYSTERECTOMY  1983   • KNEE ARTHROPLASTY, PARTIAL REPLACEMENT Right 10/7/2020    Procedure: TOTAL KNEE ARTHROPLASTY RIGHT;  Surgeon: Adrian Cortes MD;  Location: [facility];  Service: Orthopedics;  Laterality: Right;   • MASTECTOMY Bilateral 2017   • MASTECTOMY WITH SENTINEL NODE BIOPSY AND AXILLARY NODE DISSECTION Bilateral 2017    Procedure: LEFT BREAST MASTECTOMY WITH LEFT  SENTINEL NODE BIOPSY AND RIGHT PROPHYLACTIC MASTECTOMY;  Surgeon: Caitlin Green MD;  Location: [facility];  Service:    • OOPHORECTOMY     • SECONDARY INTRAOCULAR LENSE IMPLANTATION  2015    also in 2016   • SHOULDER ARTHROSCOPY Left 2014   • SKIN CANCER EXCISION  10/2015    basal cell   • THYROID SURGERY  1996   • TONSILLECTOMY  1976     General Information     Row Name 10/08/20 1310          OT Time and Intention    Document Type  evaluation  -HK     Mode of Treatment  occupational therapy   -     Row Name 10/08/20 1310          General Information    Patient Profile Reviewed  yes  -HK     Prior Level of Function  min assist:;all household mobility;community mobility;gait;transfer;bed mobility;ADL's  -HK     Existing Precautions/Restrictions  fall;other (see comments) R adductor canal nerve catheter  -HK     Barriers to Rehab  none identified  -HK     Row Name 10/08/20 1310          Living Environment    Lives With  alone  -     Row Name 10/08/20 1310          Home Main Entrance    Number of Stairs, Main Entrance  none  -HK     Stair Railings, Main Entrance  none  -HK     Row Name 10/08/20 1310          Stairs Within Home, Primary    Stairs Comment, Within Home, Primary  Pt has garden tub/shower. OT issued hand out on tub bench and educated pt on need. Pt plans to use other tub.  -     Row Name 10/08/20 1310          Cognition    Orientation Status (Cognition)  oriented x 4  -     Row Name 10/08/20 1310          Safety Issues, Functional Mobility    Safety Issues Affecting Function (Mobility)  safety precautions follow-through/compliance;safety precaution awareness  -HK     Impairments Affecting Function (Mobility)  endurance/activity tolerance;range of motion (ROM);strength;pain;balance  -       User Key  (r) = Recorded By, (t) = Taken By, (c) = Cosigned By    Initials Name Provider Type    HK Giselle Collado, OT Occupational Therapist        Mobility/ADL's     Row Name 10/08/20 1315          Bed Mobility    Comment (Bed Mobility)  Pt received and left [facility]. OT issued leg  and educated pt on use for completion of all bed mobility, car transfers, and tub transfers with use of tub bench.  -     Row Name 10/08/20 1315          Transfers    Transfers  sit-stand transfer  -     Comment (Transfers)  Pt completed sit to stand from chair with CGA and RW.  -     Sit-Stand [place] (Transfers)  contact guard;verbal cues  -     Row Name 10/08/20 1315          Sit-Stand Transfer     Assistive Device (Sit-Stand Transfers)  walker, front-wheeled  -HK     Row Name 10/08/20 1315          Functional Mobility    Functional Mobility- Comment  not tested  -     Row Name 10/08/20 1315          Activities of Daily Living    BADL Assessment/Intervention  upper body dressing;lower body dressing;bathing  -HK     Row Name 10/08/20 1315          Mobility    Extremity Weight-bearing Status  right lower extremity  -HK     Right Lower Extremity (Weight-bearing Status)  weight-bearing as tolerated (WBAT)  -     Row Name 10/08/20 1315          Upper Body Dressing Assessment/Training    [place] Level (Upper Body Dressing)  don;front opening garment;minimum assist (75% patient effort);verbal cues  -HK     Position (Upper Body Dressing)  unsupported sitting  -HK     Row Name 10/08/20 1315          Lower Body Dressing Assessment/Training    [place] Level (Lower Body Dressing)  doff;don;pants/bottoms;socks;minimum assist (75% patient effort);verbal cues  -HK     Assistive Devices (Lower Body Dressing)  sock-aid;reacher;long-handled shoe horn  -HK     Position (Lower Body Dressing)  unsupported sitting  -HK     Comment (Lower Body Dressing)  OT issued AE and educated pt on use. Pt cpmpleted all LBD with Mark and use of reacher and sock aid. Pt donned pants and dof/don socks with Mark and use if AE.  -     Row Name 10/08/20 1315          Bathing Assessment/Intervention    Comment (Bathing)  OT issued LH sponge and educated pt on use for completion of all LBB.  -       User Key  (r) = Recorded By, (t) = Taken By, (c) = Cosigned By    Initials Name Provider Type     Giselle Collado, OT Occupational Therapist        Obj/Interventions     Row Name 10/08/20 1324          Sensory Assessment (Somatosensory)    Sensory Assessment (Somatosensory)  sensation intact  -     Row Name 10/08/20 1324          Range of Motion Comprehensive    General Range of Motion  no range of motion deficits identified  -      Comment, General Range of Motion  BUE WFL for eval  -HK     Row Name 10/08/20 1324          Strength Comprehensive (MMT)    General Manual Muscle Testing (MMT) Assessment  no strength deficits identified  -HK     Comment, General Manual Muscle Testing (MMT) Assessment  B UE WFL for eval  -HK     Row Name 10/08/20 1324          Balance    Balance Assessment  sitting static balance;sitting dynamic balance;standing static balance;standing dynamic balance  -HK     Static Sitting Balance  WNL  -HK     Dynamic Sitting Balance  WNL  -HK     Static Standing Balance  mild impairment  -HK     Dynamic Standing Balance  mild impairment  -HK       User Key  (r) = Recorded By, (t) = Taken By, (c) = Cosigned By    Initials Name Provider Type    HK Giselle Collado, OT Occupational Therapist        Goals/Plan     Row Name 10/08/20 1328          Bed Mobility Goal 1 (OT)    Activity/Assistive Device (Bed Mobility Goal 1, OT)  sit to supine/supine to sit;scooting  -HK     [place] Level/Cues Needed (Bed Mobility Goal 1, OT)  modified independence  -HK     Time Frame (Bed Mobility Goal 1, OT)  by discharge  -HK     Progress/Outcomes (Bed Mobility Goal 1, OT)  goal ongoing  -     Row Name 10/08/20 1328          Transfer Goal 1 (OT)    Activity/Assistive Device (Transfer Goal 1, OT)  sit-to-stand/stand-to-sit;toilet  -HK     [place] Level/Cues Needed (Transfer Goal 1, OT)  modified independence  -HK     Time Frame (Transfer Goal 1, OT)  by discharge  -HK     Progress/Outcome (Transfer Goal 1, OT)  goal ongoing  -     Row Name 10/08/20 1328          Dressing Goal 1 (OT)    Activity/Device (Dressing Goal 1, OT)  lower body dressing;sock-aid;reacher;long-handled shoe horn  -HK     [place]/Cues Needed (Dressing Goal 1, OT)  modified independence  -HK     Time Frame (Dressing Goal 1, OT)  by discharge  -HK     Progress/Outcome (Dressing Goal 1, OT)  goal ongoing  -     Row Name 10/08/20 1328          Toileting Goal 1 (OT)     Activity/Device (Toileting Goal 1, OT)  adjust/manage clothing;perform perineal hygiene  -HK     [place] Level/Cues Needed (Toileting Goal 1, OT)  minimum assist (75% or more patient effort);verbal cues required  -HK     Time Frame (Toileting Goal 1, OT)  by discharge  -HK     Progress/Outcome (Toileting Goal 1, OT)  goal ongoing  -HK       User Key  (r) = Recorded By, (t) = Taken By, (c) = Cosigned By    Initials Name Provider Type    HK Giselle Collado, OT Occupational Therapist        Clinical Impression     Row Name 10/08/20 1326          Pain Assessment    Additional Documentation  Pain Scale: Numbers Pre/Post-Treatment (Group)  -HK     Row Name 10/08/20 1326          Pain Scale: Numbers Pre/Post-Treatment    Pretreatment Pain Rating  4/10  -HK     Posttreatment Pain Rating  4/10  -HK     Pain Location - Side  Right  -HK     Pain Location - Orientation  generalized  -HK     Pain Location  knee  -HK     Row Name 10/08/20 1326          Plan of Care Review    Plan of Care Reviewed With  patient  -HK     Progress  improving  -HK     Outcome Summary  OT eval complete. Pt completes transfers with CGA and RW. OT issued appropriate AE and educated pt on use. Pt dof/don socks and donned pants with Mark and use of AE. Pt completed UBD with Mark from OT. Pt would like to go to rehab prior to returning home due to living alone. If pt returns home with need RW and tub bench.  -     Row Name 10/08/20 1326          Therapy Assessment/Plan (OT)    Rehab Potential (OT)  good, to achieve stated therapy goals  -HK     Criteria for Skilled Therapeutic Interventions Met (OT)  yes;skilled treatment is necessary  -HK     Therapy Frequency (OT)  daily  -     Row Name 10/08/20 1326          Therapy Plan Review/Discharge Plan (OT)    Anticipated Discharge Disposition (OT)  inpatient rehabilitation facility  -     Row Name 10/08/20 1326          Vital Signs    Pre Systolic BP Rehab  -- RN cleared for tx; VSS  -HK     O2 Delivery  Pre Treatment  room air  -HK     O2 Delivery Intra Treatment  room air  -HK     O2 Delivery Post Treatment  room air  -HK     Pre Patient Position  Sitting  -HK     Intra Patient Position  Standing  -HK     Post Patient Position  Sitting  -HK     Row Name 10/08/20 1326          Positioning and Restraints    Pre-Treatment Position  sitting in chair/recliner  -HK     Post Treatment Position  chair  -HK     In Chair  notified nsg;reclined;call light within reach;encouraged to call for assist;exit alarm on  -HK       User Key  (r) = Recorded By, (t) = Taken By, (c) = Cosigned By    Initials Name Provider Type    Giselle Anderson OT Occupational Therapist        Outcome Measures     Row Name 10/08/20 1330          How much help from another is currently needed...    Putting on and taking off regular lower body clothing?  3  -HK     Bathing (including washing, rinsing, and drying)  3  -HK     Toileting (which includes using toilet bed pan or urinal)  3  -HK     Putting on and taking off regular upper body clothing  3  -HK     Taking care of personal grooming (such as brushing teeth)  3  -HK     Eating meals  4  -HK     AM-PAC 6 Clicks Score (OT)  19  -HK     Row Name 10/08/20 1330          Functional Assessment    Outcome Measure Options  AM-PAC 6 Clicks Daily Activity (OT)  -HK       User Key  (r) = Recorded By, (t) = Taken By, (c) = Cosigned By    Initials Name Provider Type    Giselle Anderson OT Occupational Therapist        Occupational Therapy Education                 Title: PT OT SLP Therapies (In Progress)     Topic: Occupational Therapy (In Progress)     Point: ADL training (Done)     Description:   Instruct learner(s) on proper safety adaptation and remediation techniques during self care or transfers.   Instruct in proper use of assistive devices.              Learning Progress Summary           Patient Acceptance, E,TB,D,H, VU,NR by  at 10/8/2020 1334                   Point: Home exercise program (Not  Started)     Description:   Instruct learner(s) on appropriate technique for monitoring, assisting and/or progressing therapeutic exercises/activities.              Learner Progress:  Not documented in this visit.          Point: Precautions (Done)     Description:   Instruct learner(s) on prescribed precautions during self-care and functional transfers.              Learning Progress Summary           Patient Acceptance, E,TB,D,H, VU,NR by  at 10/8/2020 1334                   Point: Body mechanics (Done)     Description:   Instruct learner(s) on proper positioning and spine alignment during self-care, functional mobility activities and/or exercises.              Learning Progress Summary           Patient Acceptance, E,TB,D,H, VU,NR by  at 10/8/2020 1334                               User Key     Initials Effective Dates Name Provider Type Discipline     03/07/18 -  Giselle Collado OT Occupational Therapist OT              OT Recommendation and Plan  Retired Outcome Summary/Treatment Plan (OT)  Anticipated Discharge Disposition (OT): inpatient rehabilitation facility  Therapy Frequency (OT): daily  Plan of Care Review  Plan of Care Reviewed With: patient  Progress: improving  Outcome Summary: OT eval complete. Pt completes transfers with CGA and RW. OT issued appropriate AE and educated pt on use. Pt dof/don socks and donned pants with Mark and use of AE. Pt completed UBD with Mark from OT. Pt would like to go to rehab prior to returning home due to living alone. If pt returns home with need RW and tub bench.  Plan of Care Reviewed With: patient  Outcome Summary: OT eval complete. Pt completes transfers with CGA and RW. OT issued appropriate AE and educated pt on use. Pt dof/don socks and donned pants with Mark and use of AE. Pt completed UBD with Mark from OT. Pt would like to go to rehab prior to returning home due to living alone. If pt returns home with need RW and tub bench.     Time Calculation:   Time  Calculation- OT     Row Name 10/08/20 1048             Time Calculation- OT    OT Start Time  1048  -HK      OT Received On  10/08/20  -      OT Goal Re-Cert Due Date  10/18/20  -         Timed Charges    41793 - OT Self Care/Mgmt Minutes  26  -HK        User Key  (r) = Recorded By, (t) = Taken By, (c) = Cosigned By    Initials Name Provider Type     Giselle Collado, OT Occupational Therapist        Therapy Charges for Today     Code Description Service Date Service Provider Modifiers Qty    58930353780 HC OT SELF CARE/MGMT/TRAIN EA 15 MIN 10/8/2020 Giselle Collado, OT GO 2    58279729304 HC OT EVAL LOW COMPLEXITY 2 10/8/2020 Giselle Collado OT GO 1               Giselle Collado OT  10/8/2020

## 2020-10-08 NOTE — PROGRESS NOTES
"IM progress note      Sarah Raphael  [id number]  1950     LOS: 1 day     Attending: Adrian Cortes MD    Primary Care Provider: Tyrese Recinos MD      Chief Complaint/Reason for visit:  Right knee pain    Subjective   Doing ok. Moderate knee pain. Denies f/c/n/v/sob/cp.    Objective     Vital Signs  Visit Vitals  /64 (BP Location: Right arm, Patient Position: Lying)   Pulse 55   Temp 97.7 °F (36.5 °C) (Oral)   Resp 16   Ht 175.3 cm (69\")   Wt 103 kg (227 lb)   LMP  (LMP Unknown) Comment: LAST PAP 3/18 BY DR SANDERS   SpO2 94%   Breastfeeding No   BMI 33.52 kg/m²     Temp (24hrs), Av.6 °F (36.4 °C), Min:97.2 °F (36.2 °C), Max:98.6 °F (37 °C)      Nutrition: PO    Respiratory: RA    Physical Therapy: (10/7) PT eval complete. Pt ambulated 50 feet using RW and CGA x2. Pt requiring 2 standing rest breaks due to pain and fatigue. Bed mobility performed with CGA x2 and STS with min A x2. No knee buckling noted with ambulation. Pt unable to perform SLR. Will assess R knee AROM POD#1. Reviewed HEP and knee precautions. PADD score = 4. Pt has no form of assistance at home in order to assist with ADL's and mobility tasks. Recommend pt d/c to IPR when appropriate, pending improvement in independence with functional and mobility tasks.    Physical Exam:     General Appearance:    Alert, cooperative, in no acute distress   Head:    Normocephalic, without obvious abnormality, atraumatic    Lungs:     Normal effort, symmetric chest rise, no crepitus, clear to      auscultation bilaterally             Heart:    Regular rhythm and normal rate, normal S1 and S2   Abdomen:     Normal bowel sounds, no masses, no organomegaly, soft        non-tender, non-distended, no guarding, no rebound                tenderness   Extremities:   No clubbing, cyanosis or edema.  No deformities. Right knee ACE wrap CDI. Nerve block present   Pulses:   Pulses palpable and equal bilaterally   Skin:   No bleeding, bruising or rash "   Neurologic:   Moves all extremities with no obvious focal motor deficit.  Cranial nerves 2 - 12 grossly intact. Flexion and dorsiflexion intact bilateral feet.       Results Review:     I reviewed the patient's new clinical results.   Results from last 7 days   Lab Units 10/08/20  0743   WBC 10*3/mm3 10.43   HEMOGLOBIN g/dL 11.6*   HEMATOCRIT % 35.8   PLATELETS 10*3/mm3 197     Results from last 7 days   Lab Units 10/08/20  0743 10/07/20  0733   SODIUM mmol/L 127*  --    POTASSIUM mmol/L 4.6 3.4*   CHLORIDE mmol/L 96*  --    CO2 mmol/L 22.0  --    BUN mg/dL 18  --    CREATININE mg/dL 0.90  --    CALCIUM mg/dL 8.6  --    GLUCOSE mg/dL 108*  --      I reviewed the patient's new imaging including images and reports.    All medications reviewed.   acetaminophen, 1,000 mg, Oral, Q8H  amLODIPine, 5 mg, Oral, Daily  aspirin, 81 mg, Oral, Q12H  budesonide, 0.5 mg, Nebulization, BID - RT  citalopram, 60 mg, Oral, Daily  guaiFENesin, 1,200 mg, Oral, Q12H  levothyroxine, 200 mcg, Oral, Q AM  levothyroxine, 50 mcg, Oral, Q AM  losartan, 100 mg, Oral, Daily  meloxicam, 15 mg, Oral, Daily  montelukast, 10 mg, Oral, Nightly  pantoprazole, 40 mg, Oral, BID  tamoxifen, 20 mg, Oral, Daily        Assessment/Plan     Status post total right knee replacement    Arthritis of right knee    Hyperlipidemia    Hypertension    Hypothyroidism    Elevated hemoglobin A1c    Hyponatremia    Acute postoperative pain      Plan  1. PT/OT- WBAT RLE  2. Pain control-prns, AC nerve block  3. IS-encouraged  4. DVT proph- mechs/ASA  5. Bowel regimen  6. Monitor post-op labs  7. DC planning for home vs rehab depending on PT progress    HTN, Hyperlipidemia  - Continue home Norvasc and Cozaar  - Hold Maxzide  - Monitor BP   - Holding parameters for BP meds  - Labetalol PRN for SBP>170     Hypothyroid  -Continue home Synthroid     Elevated hemoglobin A1c: In prediabetic range  - Explained to patient implication of A1C elevation, need to modify diet and  increase activity, and f/u with PCP.     Depression  -Resume Celexa, discontinue Abilify, I discussed that with Dr. Recinos who is in agreement.CHRISTY Dixon  10/08/20  10:43 EDT

## 2020-10-08 NOTE — PLAN OF CARE
Problem: Adult Inpatient Plan of Care  Goal: Plan of Care Review  Recent Flowsheet Documentation  Taken 10/8/2020 1501 by Alejandra Begum PT  Progress: improving  Plan of Care Reviewed With: patient  Outcome Summary: Pt perform STS transfers with CGA.  Pt ambulated with RW and ' with no knee buckle.  Pt became light headed, dizzy, and lethargic and returned to sitting.  No change in symptoms >5 min.  RN notified.  VSS.  Ther-ex held due to symptoms.  IRF at [place]

## 2020-10-08 NOTE — PLAN OF CARE
Goal Outcome Evaluation:  Plan of Care Reviewed With: patient  Progress: no change   Pt continues to request pain medication throughout shift, pt states acceptable level of pain as a 2, will continue to monitor

## 2020-10-08 NOTE — PLAN OF CARE
9  Problem: Adult Inpatient Plan of Care  Goal: Plan of Care Review  Recent Flowsheet Documentation  Taken 10/8/2020 0922 by Alejandra Begum PT  Progress: improving  Plan of Care Reviewed With: patient  Outcome Summary: Pt perform bed mobility with SV.  Transfers with RW and SBA.  Pt ambulated 100' with RW and CGA with no knee buckle and VC for sequencing.  Fatigue and pain limits mobility.  R knee flexion 82 degrees and extension 5 degrees from 0.  Recommend DC to IRF when appropriate.

## 2020-10-08 NOTE — PROGRESS NOTES
[facility]    Acute pain service Inpatient Progress Note    Patient Name: Sarah Raphael  :  1950  MRN:  [id number]        Acute Pain  Service Inpatient Progress Note:    Analgesia:Excellent  Pain Score:2/10  LOC: alert and awake  Resp Status: room air  Cardiac: VS stable  Side Effects:None  Catheter Site:clean, dressing intact and dry  Cath type: peripheral nerve cath with ON Q  Infusion rate: 10ml/hr  Catheter Plan:Catheter to remain Insitu and Continue catheter infusion rate unchanged  Comments: Physical Therapy - Plan of Care Review - Outcome Summary:  Outcome Summary: PT eval complete. Pt ambulated 50 feet using RW and CGA x2. Pt requiring 2 standing rest breaks due to pain and fatigue. Bed mobility performed with CGA x2 and STS with min A x2. No knee buckling noted with ambulation. Pt unable to perform SLR. Will assess R knee AROM POD#1. Reviewed HEP and knee precautions. PADD score = 4. Pt has no form of assistance at home in order to assist with ADL's and mobility tasks. Recommend pt d/c to IPR when appropriate, pending improvement in independence with functional and mobility tasks. (10/07/20 5335)]    Occupational Therapy - Plan of Care Review - Outcome Summary:   ]  ----------------------------------------------------------------------------------

## 2020-10-08 NOTE — PROGRESS NOTES
Case Management Discharge Note    Addendum:  [facility] has been rescheduled to 4:15pm on today, Friday, 10/9/20.  Insurance auth from [facility] Medicare was finally received.  Final Note: Ms. Raphael has a skilled bed at [facility] tomorrow, Friday, 10/9/20, if medically ready.  The prior auth from [facility] Medicare is pending, however, [facility] at [facility] stated that they will auth later today or early tomorrow.   ordered the [employer] COVID test for the rehab admission.  [facility] is scheduled to transport Ms. Raphael tomorrow at 1:30pm.  Please call report to [facility] at [phone].  Please have a copy of the DC summary, AVS and any hard scripts in the DC packet.  Thank you.    Notified Ms. Raphael at the bedside and she is agreeable to DC plan.  [facility] did not have any subacute rehab beds available.    Provided Post Acute Provider List?: Yes  Post Acute Provider List: Nursing Home  Provided Post Acute Provider Quality & Resource List?: Yes  Post Acute Provider Quality and Resource List: Nursing Home  Delivered To: Patient  Method of Delivery: In person    Selected Continued Care - Admitted Since 10/7/2020     Destination Coordination complete    Service Provider Selected Services Address Phone Fax    [facility]  Skilled Nursing [address] [place] [id number] [phone]          Durable Medical Equipment    No services have been selected for the patient.              Dialysis/Infusion    No services have been selected for the patient.              Home Medical Care    No services have been selected for the patient.              Therapy    No services have been selected for the patient.              Community Resources    No services have been selected for the patient.                       Final Discharge Disposition Code: 03 - skilled nursing facility (SNF)

## 2020-10-08 NOTE — THERAPY TREATMENT NOTE
Patient Name: Sarah Raphael  : 1950    MRN: [id number]                              Today's Date: 10/8/2020       Admit Date: 10/7/2020    Visit Dx: No diagnosis found.  Patient Active Problem List   Diagnosis   • Anxiety   • Gastroesophageal reflux disease without esophagitis   • Hyperlipidemia   • Hypertension   • Hypothyroidism   • Cataract   • Cancer of overlapping sites of right female breast (CMS/HCC)   • Estrogen receptor positive   • Arthritis of right knee   • Status post total right knee replacement   • Elevated hemoglobin A1c   • Hyponatremia   • Acute postoperative pain     Past Medical History:   Diagnosis Date   • Arthritis    • Back pain    • Breast cancer (CMS/HCC)    • Celiac disease    • Disease of thyroid gland    • GERD (gastroesophageal reflux disease)    • Hepatitis     NOT SURE OF TYPE, CAN'T DONATE BLOOD   • History of transfusion    • Hypertension      Past Surgical History:   Procedure Laterality Date   • COLONOSCOPY         • EYE SURGERY      bilateral cataracts removed   • HYSTERECTOMY  1983   • KNEE ARTHROPLASTY, PARTIAL REPLACEMENT Right 10/7/2020    Procedure: TOTAL KNEE ARTHROPLASTY RIGHT;  Surgeon: Adrian Cortes MD;  Location: [facility];  Service: Orthopedics;  Laterality: Right;   • MASTECTOMY Bilateral 2017   • MASTECTOMY WITH SENTINEL NODE BIOPSY AND AXILLARY NODE DISSECTION Bilateral 2017    Procedure: LEFT BREAST MASTECTOMY WITH LEFT  SENTINEL NODE BIOPSY AND RIGHT PROPHYLACTIC MASTECTOMY;  Surgeon: Caitlin Green MD;  Location: [facility];  Service:    • OOPHORECTOMY     • SECONDARY INTRAOCULAR LENSE IMPLANTATION  2015    also in 2016   • SHOULDER ARTHROSCOPY Left 2014   • SKIN CANCER EXCISION  10/2015    basal cell   • THYROID SURGERY  1996   • TONSILLECTOMY  1976     General Information     Row Name 10/08/20 1501 10/08/20 0922       Physical Therapy Time and Intention    Document Type  therapy note (daily note)  -AA   therapy note (daily note)  -AA    Mode of Treatment  physical therapy  -AA  physical therapy  -AA    Row Name 10/08/20 1501 10/08/20 0922       General Information    Patient Profile Reviewed  yes  -AA  yes  -AA    Existing Precautions/Restrictions  fall;other (see comments) R adductor nerve cath  -AA  fall;other (see comments) R adductor nerve cath  -AA    Row Name 10/08/20 1501 10/08/20 0922       Cognition    Orientation Status (Cognition)  oriented x 4  -AA  oriented x 4  -AA    Row Name 10/08/20 1501 10/08/20 0922       Safety Issues, Functional Mobility    Safety Issues Affecting Function (Mobility)  --  safety precaution awareness;safety precautions follow-through/compliance  -AA    Impairments Affecting Function (Mobility)  endurance/activity tolerance;range of motion (ROM);strength;pain;balance  -AA  endurance/activity tolerance;range of motion (ROM);strength;pain;balance  -AA      User Key  (r) = Recorded By, (t) = Taken By, (c) = Cosigned By    Initials Name Provider Type    AA Alejandra Begum, PT Physical Therapist        Mobility     Row Name 10/08/20 1501 10/08/20 0922       Bed Mobility    Bed Mobility  --  scooting/bridging;supine-sit  -AA    Scooting/Bridging [place] (Bed Mobility)  --  supervision;verbal cues  -AA    Supine-Sit [place] (Bed Mobility)  --  verbal cues;supervision  -AA    Assistive Device (Bed Mobility)  --  bed rails;head of bed elevated  -AA    Comment (Bed Mobility)  pt UIC  -AA  VC for sequencing.  Increased time to perform  -AA    Row Name 10/08/20 1501 10/08/20 0922       Transfers    Comment (Transfers)  VC for attention to nerve cath and stepping out RLE for comfort  -AA  VC for attention to nerve cath and hand placement  -AA    Row Name 10/08/20 1501 10/08/20 0922       Sit-Stand Transfer    Sit-Stand [place] (Transfers)  contact guard;verbal cues  -AA  standby assist;verbal cues  -AA    Assistive Device (Sit-Stand Transfers)  walker, front-wheeled  -AA   walker, front-wheeled  -AA    Row Name 10/08/20 1501 10/08/20 0922       Gait/Stairs (Locomotion)    [place] Level (Gait)  contact guard;verbal cues  -AA  contact guard;verbal cues  -AA    Assistive Device (Gait)  walker, front-wheeled  -AA  walker, front-wheeled  -AA    Distance in Feet (Gait)  100'  -AA  100'  -AA    Deviations/Abnormal Patterns (Gait)  bilateral deviations;jo decreased;gait speed decreased;weight shifting decreased;antalgic  -AA  bilateral deviations;jo decreased;gait speed decreased;weight shifting decreased;antalgic  -AA    Bilateral Gait Deviations  forward flexed posture  -AA  forward flexed posture  -AA    Right Sided Gait Deviations  heel strike decreased;weight shift ability decreased  -AA  heel strike decreased;weight shift ability decreased  -AA    [place] Level (Stairs)  not tested  -AA  not tested  -AA    Comment (Gait/Stairs)  Pt ambulated with RW and CGA with VC for step length, posture, and heel strike.  No knee buckle.  Pt became lethargic and reports being light headed and dizzy.  Returned to sitting with VSS and RN notified.  -AA  Pt ambulated with RW and ' with VC for proper stepping, posture, and heel strike.  No LOB or knee buckle.  Fatigue limits mobility.  -AA    Row Name 10/08/20 1501 10/08/20 0922       Mobility    Extremity Weight-bearing Status  right lower extremity  -AA  right lower extremity  -AA    Right Lower Extremity (Weight-bearing Status)  weight-bearing as tolerated (WBAT)  -AA  weight-bearing as tolerated (WBAT)  -AA      User Key  (r) = Recorded By, (t) = Taken By, (c) = Cosigned By    Initials Name Provider Type    Alejandra Dooley PT Physical Therapist        Obj/Interventions     Row Name 10/08/20 0922          Range of Motion Comprehensive    Comment, General Range of Motion  R knee flex 82 degrees and extension 5 degrees from 0  -AA     Row Name 10/08/20 1501 10/08/20 0922       Motor Skills    Therapeutic Exercise  -- therex  held due to dizziness  -  hip;knee;ankle  -AA    Row Name 10/08/20 0922          Hip (Therapeutic Exercise)    Hip (Therapeutic Exercise)  AROM (active range of motion);isometric exercises  -AA     Hip AROM (Therapeutic Exercise)  right;flexion;aBduction;10 repetitions  -AA     Hip Isometrics (Therapeutic Exercise)  bilateral;gluteal sets;10 repetitions  -AA     Row Name 10/08/20 0922          Knee (Therapeutic Exercise)    Knee (Therapeutic Exercise)  AROM (active range of motion)  -AA     Knee AROM (Therapeutic Exercise)  right;flexion;extension;SLR (straight leg raise);LAQ (long arc quad);heel slides;10 repetitions  -AA     Knee Isometrics (Therapeutic Exercise)  right;quad sets;10 repetitions  -AA     Row Name 10/08/20 0922          Ankle (Therapeutic Exercise)    Ankle (Therapeutic Exercise)  AROM (active range of motion)  -     Ankle AROM (Therapeutic Exercise)  bilateral;dorsiflexion;plantarflexion;10 repetitions  -AA     Row Name 10/08/20 1501 10/08/20 0922       Balance    Balance Assessment  sitting static balance;standing static balance;standing dynamic balance  -AA  sitting static balance;standing static balance;standing dynamic balance  -    Static Sitting Balance  WNL  -AA  WNL  -AA    Static Standing Balance  mild impairment  -AA  WFL  -AA    Dynamic Standing Balance  mild impairment  -AA  mild impairment  -AA      User Key  (r) = Recorded By, (t) = Taken By, (c) = Cosigned By    Initials Name Provider Type    Alejandra Dooley L, PT Physical Therapist        Goals/Plan     Row Name 10/08/20 1501 10/08/20 0922       Bed Mobility Goal 1 (PT)    Activity/Assistive Device (Bed Mobility Goal 1, PT)  sit to supine/supine to sit  -AA  sit to supine/supine to sit  -AA    [place] Level/Cues Needed (Bed Mobility Goal 1, PT)  modified independence  -AA  modified independence  -AA    Time Frame (Bed Mobility Goal 1, PT)  long term goal (LTG);3 days  -AA  long term goal (LTG);3 days  -AA     Progress/Outcomes (Bed Mobility Goal 1, PT)  good progress toward goal;goal ongoing  -AA  good progress toward goal;goal ongoing  -AA    Row Name 10/08/20 1501 10/08/20 0922       Transfer Goal 1 (PT)    Activity/Assistive Device (Transfer Goal 1, PT)  sit-to-stand/stand-to-sit;walker, rolling  -AA  sit-to-stand/stand-to-sit;walker, rolling  -AA    [place] Level/Cues Needed (Transfer Goal 1, PT)  modified independence  -AA  modified independence  -AA    Time Frame (Transfer Goal 1, PT)  long term goal (LTG);3 days  -AA  long term goal (LTG);3 days  -AA    Progress/Outcome (Transfer Goal 1, PT)  good progress toward goal;goal ongoing  -AA  good progress toward goal;goal ongoing  -AA    Row Name 10/08/20 1501 10/08/20 0922       Gait Training Goal 1 (PT)    Activity/Assistive Device (Gait Training Goal 1, PT)  gait (walking locomotion);walker, rolling  -AA  gait (walking locomotion);walker, rolling  -AA    [place] Level (Gait Training Goal 1, PT)  modified independence  -AA  modified independence  -AA    Distance (Gait Training Goal 1, PT)  150 feet  -AA  150 feet  -AA    Time Frame (Gait Training Goal 1, PT)  long term goal (LTG);3 days  -AA  long term goal (LTG);3 days  -AA    Progress/Outcome (Gait Training Goal 1, PT)  good progress toward goal;goal ongoing  -AA  good progress toward goal;goal ongoing  -AA    Row Name 10/08/20 1501 10/08/20 0922       ROM Goal 1 (PT)    ROM Goal 1 (PT)  R knee AROM 0-90 degrees  -AA  R knee AROM 0-90 degrees  -AA    Time Frame (ROM Goal 1, PT)  long-term goal (LTG);3 days  -AA  long-term goal (LTG);3 days  -AA    Progress/Outcome (ROM Goal 1, PT)  good progress toward goal;goal ongoing  -AA  good progress toward goal;goal ongoing  -AA      User Key  (r) = Recorded By, (t) = Taken By, (c) = Cosigned By    Initials Name Provider Type    Alejandra Dooley L, PT Physical Therapist        Clinical Impression     Row Name 10/08/20 1501 10/08/20 0922       Pain    Additional  Documentation  Pain Scale: Numbers Pre/Post-Treatment (Group)  -AA  Pain Scale: Numbers Pre/Post-Treatment (Group)  -AA    Row Name 10/08/20 1501 10/08/20 0922       Pain Scale: Numbers Pre/Post-Treatment    Pretreatment Pain Rating  5/10  -AA  4/10  -AA    Posttreatment Pain Rating  5/10  -AA  4/10  -AA    Pain Location - Side  Right  -AA  Right  -AA    Pain Location - Orientation  generalized  -AA  anterior  -AA    Pain Location  knee  -AA  knee  -AA    Pain Intervention(s)  Cold applied;Repositioned;Ambulation/increased activity  -AA  Repositioned;Cold applied;Ambulation/increased activity  -AA    Row Name 10/08/20 1501 10/08/20 0922       Plan of Care Review    Plan of Care Reviewed With  patient  -AA  patient  -AA    Progress  improving  -AA  improving  -AA    Outcome Summary  Pt perform STS transfers with CGA.  Pt ambulated with RW and ' with no knee buckle.  Pt became light headed, dizzy, and lethargic and returned to sitting.  No change in symptoms >5 min.  RN notified.  VSS.  Ther-ex held due to symptoms.  IRF at [place]  -AA  Pt perform bed mobility with SV.  Transfers with RW and SBA.  Pt ambulated 100' with RW and CGA with no knee buckle and VC for sequencing.  Fatigue and pain limits mobility.  R knee flexion 82 degrees and extension 5 degrees from 0.  Recommend DC to IRF when appropriate.  -AA    Row Name 10/08/20 1501 10/08/20 0922       Positioning and Restraints    Pre-Treatment Position  sitting in chair/recliner  -AA  in bed  -AA    Post Treatment Position  chair  -AA  chair  -AA    In Chair  notified nsg;reclined;call light within reach;encouraged to call for assist;exit alarm on SCDs  -AA  notified nsg;reclined;call light within reach;encouraged to call for assist;exit alarm on;waffle cushion SCDs  -AA      User Key  (r) = Recorded By, (t) = Taken By, (c) = Cosigned By    Initials Name Provider Type    Alejandra Dooley, PT Physical Therapist        Outcome Measures     Row Name 10/08/20 1501  10/08/20 0922       How much help from another person do you currently need...    Turning from your back to your side while in flat bed without using bedrails?  4  -AA  4  -AA    Moving from lying on back to sitting on the side of a flat bed without bedrails?  3  -AA  3  -AA    Moving to and from a bed to a chair (including a wheelchair)?  3  -AA  3  -AA    Standing up from a chair using your arms (e.g., wheelchair, bedside chair)?  3  -AA  3  -AA    Climbing 3-5 steps with a railing?  3  -AA  3  -AA    To walk in hospital room?  3  -AA  3  -AA    AM-PAC 6 Clicks Score (PT)  19  -AA  19  -AA    Row Name 10/08/20 1501 10/08/20 0922       Functional Assessment    Outcome Measure Options  AM-PAC 6 Clicks Basic Mobility (PT)  -AA  AM-PAC 6 Clicks Basic Mobility (PT)  -AA      User Key  (r) = Recorded By, (t) = Taken By, (c) = Cosigned By    Initials Name Provider Type    Alejandra Dooley PT Physical Therapist        Physical Therapy Education                 Title: PT OT SLP Therapies (In Progress)     Topic: Physical Therapy (Done)     Point: Mobility training (Done)     Learning Progress Summary           Patient Acceptance, E,D, VU,DU,NR by AA at 10/8/2020 1501    Acceptance, E,D,H, VU,DU,NR by AA at 10/8/2020 0922    Acceptance, E,D, VU by SHANON at 10/7/2020 1355    Comment: Educated on safe sequencing with bed mobility, ambulatory transfers, and gait. Reviewed HEP and knee precautions.                   Point: Home exercise program (Done)     Learning Progress Summary           Patient Acceptance, E,D, VU,DU,NR by AA at 10/8/2020 1501    Acceptance, E,D,H, VU,DU,NR by ELIZABETH at 10/8/2020 0922    Acceptance, E,D, VU by SHANON at 10/7/2020 1355    Comment: Educated on safe sequencing with bed mobility, ambulatory transfers, and gait. Reviewed HEP and knee precautions.                   Point: Body mechanics (Done)     Learning Progress Summary           Patient Acceptance, E,D, VU,DU,NR by AA at 10/8/2020 1501    Acceptance,  E,D,H, VU,DU,NR by AA at 10/8/2020 0922    Acceptance, E,D, VU by SHANON at 10/7/2020 1355    Comment: Educated on safe sequencing with bed mobility, ambulatory transfers, and gait. Reviewed HEP and knee precautions.                   Point: Precautions (Done)     Learning Progress Summary           Patient Acceptance, E,D, VU,DU,NR by AA at 10/8/2020 1501    Acceptance, E,D,H, VU,DU,NR by AA at 10/8/2020 0922    Acceptance, E,D, VU by SHANON at 10/7/2020 1355    Comment: Educated on safe sequencing with bed mobility, ambulatory transfers, and gait. Reviewed HEP and knee precautions.                               User Key     Initials Effective Dates Name Provider Type Discipline     04/02/18 -  Alejandra Begum, PT Physical Therapist PT    SHANON 09/10/19 -  Tomas Dodge PT Physical Therapist PT              PT Recommendation and Plan     Plan of Care Reviewed With: patient  Progress: improving  Outcome Summary: Pt perform STS transfers with CGA.  Pt ambulated with RW and ' with no knee buckle.  Pt became light headed, dizzy, and lethargic and returned to sitting.  No change in symptoms >5 min.  RN notified.  VSS.  Ther-ex held due to symptoms.  IRF at [place]     Time Calculation:   PT Charges     Row Name 10/08/20 1501 10/08/20 0922          Time Calculation    Start Time  1501  -AA  0922  -AA     PT Received On  10/08/20  -  10/08/20  -     PT Goal Re-Cert Due Date  10/17/20  -  10/17/20  -        Time Calculation- PT    Total Timed Code Minutes- PT  14 minute(s)  -AA  40 minute(s)  -AA        Timed Charges    48479 - PT Therapeutic Exercise Minutes  --  16  -AA     01798 - Gait Training Minutes   14  -AA  13  -AA     84911 - PT Therapeutic Activity Minutes  --  11  -AA       User Key  (r) = Recorded By, (t) = Taken By, (c) = Cosigned By    Initials Name Provider Type    AA Alejandra Begum, PT Physical Therapist        Therapy Charges for Today     Code Description Service Date Service Provider Modifiers Qty     88062916845  PT THER PROC EA 15 MIN 10/8/2020 Kel, April L, PT GP 1    19100141675 HC GAIT TRAINING EA 15 MIN 10/8/2020 Kel, April L, PT GP 1    28464499169 HC PT THERAPEUTIC ACT EA 15 MIN 10/8/2020 Kel, April L, PT GP 1    57173608043 HC GAIT TRAINING EA 15 MIN 10/8/2020 Kel, April L, PT GP 1          PT G-Codes  Outcome Measure Options: AM-PAC 6 Clicks Basic Mobility (PT)  AM-PAC 6 Clicks Score (PT): 19  AM-PAC 6 Clicks Score (OT): 19 April L Kel, PT  10/8/2020

## 2020-10-08 NOTE — PLAN OF CARE
Goal Outcome Evaluation:  Plan of Care Reviewed With: patient  Progress: improving   Pt VSS. No s/s of acute distress at this time. Pt oob to bedside commode and tolerated well. Pt ambulated with Physical Therapy after surgery yesterday. Patient's pain controlled with IV toradol, one dose of IV Dilaudid 0.5 mg, and 3 doses of percocet po which was effective. No complaints of numbness or tingling at this time. Will continue to monitor patient.

## 2020-10-08 NOTE — PLAN OF CARE
Problem: Adult Inpatient Plan of Care  Goal: Plan of Care Review  Recent Flowsheet Documentation  Taken 10/8/2020 1326 by Giselle Collado OT  Progress: improving  Plan of Care Reviewed With: patient  Outcome Summary: OT eval complete. Pt completes transfers with CGA and RW. OT issued appropriate AE and educated pt on use. Pt dof/don socks and donned pants with Mark and use of AE. Pt completed UBD with Mark from OT. Pt would like to go to rehab prior to returning home due to living alone. If pt returns home with need RW and tub bench.

## 2020-10-08 NOTE — THERAPY TREATMENT NOTE
Patient Name: Sarah Raphael  : 1950    MRN: [id number]                              Today's Date: 10/8/2020       Admit Date: 10/7/2020    Visit Dx: No diagnosis found.  Patient Active Problem List   Diagnosis   • Anxiety   • Gastroesophageal reflux disease without esophagitis   • Hyperlipidemia   • Hypertension   • Hypothyroidism   • Cataract   • Cancer of overlapping sites of right female breast (CMS/HCC)   • Estrogen receptor positive   • Arthritis of right knee   • Status post total right knee replacement   • Elevated hemoglobin A1c   • Hyponatremia   • Acute postoperative pain     Past Medical History:   Diagnosis Date   • Arthritis    • Back pain    • Breast cancer (CMS/HCC)    • Celiac disease    • Disease of thyroid gland    • GERD (gastroesophageal reflux disease)    • Hepatitis     NOT SURE OF TYPE, CAN'T DONATE BLOOD   • History of transfusion    • Hypertension      Past Surgical History:   Procedure Laterality Date   • COLONOSCOPY         • EYE SURGERY      bilateral cataracts removed   • HYSTERECTOMY  1983   • KNEE ARTHROPLASTY, PARTIAL REPLACEMENT Right 10/7/2020    Procedure: TOTAL KNEE ARTHROPLASTY RIGHT;  Surgeon: Adrian Cortes MD;  Location: [facility];  Service: Orthopedics;  Laterality: Right;   • MASTECTOMY Bilateral 2017   • MASTECTOMY WITH SENTINEL NODE BIOPSY AND AXILLARY NODE DISSECTION Bilateral 2017    Procedure: LEFT BREAST MASTECTOMY WITH LEFT  SENTINEL NODE BIOPSY AND RIGHT PROPHYLACTIC MASTECTOMY;  Surgeon: Caitlin Green MD;  Location: [facility];  Service:    • OOPHORECTOMY     • SECONDARY INTRAOCULAR LENSE IMPLANTATION  2015    also in 2016   • SHOULDER ARTHROSCOPY Left 2014   • SKIN CANCER EXCISION  10/2015    basal cell   • THYROID SURGERY  1996   • TONSILLECTOMY  1976     General Information     Row Name 10/08/20 0922          Physical Therapy Time and Intention    Document Type  therapy note (daily note)  -AA     Mode of  Treatment  physical therapy  -AA     Row Name 10/08/20 0922          General Information    Patient Profile Reviewed  yes  -AA     Existing Precautions/Restrictions  fall;other (see comments) R adductor nerve cath  -AA     Row Name 10/08/20 0922          Cognition    Orientation Status (Cognition)  oriented x 4  -AA     Row Name 10/08/20 0922          Safety Issues, Functional Mobility    Safety Issues Affecting Function (Mobility)  safety precaution awareness;safety precautions follow-through/compliance  -AA     Impairments Affecting Function (Mobility)  endurance/activity tolerance;range of motion (ROM);strength;pain;balance  -AA       User Key  (r) = Recorded By, (t) = Taken By, (c) = Cosigned By    Initials Name Provider Type    AA Alejandra Begum PT Physical Therapist        Mobility     Row Name 10/08/20 0922          Bed Mobility    Bed Mobility  scooting/bridging;supine-sit  -AA     Scooting/Bridging [place] (Bed Mobility)  supervision;verbal cues  -AA     Supine-Sit [place] (Bed Mobility)  verbal cues;supervision  -AA     Assistive Device (Bed Mobility)  bed rails;head of bed elevated  -AA     Comment (Bed Mobility)  VC for sequencing.  Increased time to perform  -AA     Row Name 10/08/20 0922          Transfers    Comment (Transfers)  VC for attention to nerve cath and hand placement  -AA     Row Name 10/08/20 0922          Sit-Stand Transfer    Sit-Stand [place] (Transfers)  standby assist;verbal cues  -AA     Assistive Device (Sit-Stand Transfers)  walker, front-wheeled  -AA     Row Name 10/08/20 0922          Gait/Stairs (Locomotion)    [place] Level (Gait)  contact guard;verbal cues  -AA     Assistive Device (Gait)  walker, front-wheeled  -AA     Distance in Feet (Gait)  100'  -AA     Deviations/Abnormal Patterns (Gait)  bilateral deviations;jo decreased;gait speed decreased;weight shifting decreased;antalgic  -AA     Bilateral Gait Deviations  forward flexed posture  -AA      Right Sided Gait Deviations  heel strike decreased;weight shift ability decreased  -     [place] Level (Stairs)  not tested  -     Comment (Gait/Stairs)  Pt ambulated with RW and ' with VC for proper stepping, posture, and heel strike.  No LOB or knee buckle.  Fatigue limits mobility.  -     Row Name 10/08/20 0922          Mobility    Extremity Weight-bearing Status  right lower extremity  -     Right Lower Extremity (Weight-bearing Status)  weight-bearing as tolerated (WBAT)  -       User Key  (r) = Recorded By, (t) = Taken By, (c) = Cosigned By    Initials Name Provider Type     Alejandra Begum, PT Physical Therapist        Obj/Interventions     Row Name 10/08/20 0922          Range of Motion Comprehensive    Comment, General Range of Motion  R knee flex 82 degrees and extension 5 degrees from 0  -     Row Name 10/08/20 0922          Motor Skills    Therapeutic Exercise  hip;knee;ankle  -[facility] Name 10/08/20 0922          Hip (Therapeutic Exercise)    Hip (Therapeutic Exercise)  AROM (active range of motion);isometric exercises  -     Hip AROM (Therapeutic Exercise)  right;flexion;aBduction;10 repetitions  -     Hip Isometrics (Therapeutic Exercise)  bilateral;gluteal sets;10 repetitions  -     Row Name 10/08/20 0922          Knee (Therapeutic Exercise)    Knee (Therapeutic Exercise)  AROM (active range of motion)  -     Knee AROM (Therapeutic Exercise)  right;flexion;extension;SLR (straight leg raise);LAQ (long arc quad);heel slides;10 repetitions  -     Knee Isometrics (Therapeutic Exercise)  right;quad sets;10 repetitions  -     Row Name 10/08/20 0922          Ankle (Therapeutic Exercise)    Ankle (Therapeutic Exercise)  AROM (active range of motion)  -     Ankle AROM (Therapeutic Exercise)  bilateral;dorsiflexion;plantarflexion;10 repetitions  -     Row Name 10/08/20 0922          Balance    Balance Assessment  sitting static balance;standing static balance;standing  dynamic balance  -AA     Static Sitting Balance  WNL  -AA     Static Standing Balance  WFL  -AA     Dynamic Standing Balance  mild impairment  -AA       User Key  (r) = Recorded By, (t) = Taken By, (c) = Cosigned By    Initials Name Provider Type    Alejandra Dooley, PT Physical Therapist        Goals/Plan     Row Name 10/08/20 0922          Bed Mobility Goal 1 (PT)    Activity/Assistive Device (Bed Mobility Goal 1, PT)  sit to supine/supine to sit  -AA     [place] Level/Cues Needed (Bed Mobility Goal 1, PT)  modified independence  -AA     Time Frame (Bed Mobility Goal 1, PT)  long term goal (LTG);3 days  -AA     Progress/Outcomes (Bed Mobility Goal 1, PT)  good progress toward goal;goal ongoing  -AA     Row Name 10/08/20 0922          Transfer Goal 1 (PT)    Activity/Assistive Device (Transfer Goal 1, PT)  sit-to-stand/stand-to-sit;walker, rolling  -AA     [place] Level/Cues Needed (Transfer Goal 1, PT)  modified independence  -AA     Time Frame (Transfer Goal 1, PT)  long term goal (LTG);3 days  -AA     Progress/Outcome (Transfer Goal 1, PT)  good progress toward goal;goal ongoing  -AA     Row Name 10/08/20 0922          Gait Training Goal 1 (PT)    Activity/Assistive Device (Gait Training Goal 1, PT)  gait (walking locomotion);walker, rolling  -AA     [place] Level (Gait Training Goal 1, PT)  modified independence  -AA     Distance (Gait Training Goal 1, PT)  150 feet  -AA     Time Frame (Gait Training Goal 1, PT)  long term goal (LTG);3 days  -AA     Progress/Outcome (Gait Training Goal 1, PT)  good progress toward goal;goal ongoing  -AA     Row Name 10/08/20 0922          ROM Goal 1 (PT)    ROM Goal 1 (PT)  R knee AROM 0-90 degrees  -AA     Time Frame (ROM Goal 1, PT)  long-term goal (LTG);3 days  -AA     Progress/Outcome (ROM Goal 1, PT)  good progress toward goal;goal ongoing  -AA       User Key  (r) = Recorded By, (t) = Taken By, (c) = Cosigned By    Initials Name Provider Type    ELIZABETH Begum  Alejandra CARTER, PT Physical Therapist        Clinical Impression     Row Name 10/08/20 0922          Pain    Additional Documentation  Pain Scale: Numbers Pre/Post-Treatment (Group)  -AA     Row Name 10/08/20 0922          Pain Scale: Numbers Pre/Post-Treatment    Pretreatment Pain Rating  4/10  -AA     Posttreatment Pain Rating  4/10  -AA     Pain Location - Side  Right  -AA     Pain Location - Orientation  anterior  -AA     Pain Location  knee  -AA     Pain Intervention(s)  Repositioned;Cold applied;Ambulation/increased activity  -AA     Row Name 10/08/20 0922          Plan of Care Review    Plan of Care Reviewed With  patient  -AA     Progress  improving  -AA     Outcome Summary  Pt perform bed mobility with SV.  Transfers with RW and SBA.  Pt ambulated 100' with RW and CGA with no knee buckle and VC for sequencing.  Fatigue and pain limits mobility.  R knee flexion 82 degrees and extension 5 degrees from 0.  Recommend DC to IRF when appropriate.  -AA     Row Name 10/08/20 0922          Positioning and Restraints    Pre-Treatment Position  in bed  -AA     Post Treatment Position  chair  -AA     In Chair  notified nsg;reclined;call light within reach;encouraged to call for assist;exit alarm on;waffle cushion SCDs  -AA       User Key  (r) = Recorded By, (t) = Taken By, (c) = Cosigned By    Initials Name Provider Type    AA Alejandra Begum, PT Physical Therapist        Outcome Measures     Row Name 10/08/20 0922          How much help from another person do you currently need...    Turning from your back to your side while in flat bed without using bedrails?  4  -AA     Moving from lying on back to sitting on the side of a flat bed without bedrails?  3  -AA     Moving to and from a bed to a chair (including a wheelchair)?  3  -AA     Standing up from a chair using your arms (e.g., wheelchair, bedside chair)?  3  -AA     Climbing 3-5 steps with a railing?  3  -AA     To walk in hospital room?  3  -AA     AM-PAC 6 Clicks  Score (PT)  19  -AA     Row Name 10/08/20 0922          Functional Assessment    Outcome Measure Options  AM-PAC 6 Clicks Basic Mobility (PT)  -AA       User Key  (r) = Recorded By, (t) = Taken By, (c) = Cosigned By    Initials Name Provider Type    AA Alejandra Begum, PT Physical Therapist        Physical Therapy Education                 Title: PT OT SLP Therapies (In Progress)     Topic: Physical Therapy (Done)     Point: Mobility training (Done)     Learning Progress Summary           Patient Acceptance, E,D,H, VU,DU,NR by AA at 10/8/2020 0922    Acceptance, E,D, VU by SHANON at 10/7/2020 1355    Comment: Educated on safe sequencing with bed mobility, ambulatory transfers, and gait. Reviewed HEP and knee precautions.                   Point: Home exercise program (Done)     Learning Progress Summary           Patient Acceptance, E,D,H, VU,DU,NR by AA at 10/8/2020 0922    Acceptance, E,D, VU by SHANON at 10/7/2020 1355    Comment: Educated on safe sequencing with bed mobility, ambulatory transfers, and gait. Reviewed HEP and knee precautions.                   Point: Body mechanics (Done)     Learning Progress Summary           Patient Acceptance, E,D,H, VU,DU,NR by AA at 10/8/2020 0922    Acceptance, E,D, VU by SHANON at 10/7/2020 1355    Comment: Educated on safe sequencing with bed mobility, ambulatory transfers, and gait. Reviewed HEP and knee precautions.                   Point: Precautions (Done)     Learning Progress Summary           Patient Acceptance, E,D,H, VU,DU,NR by AA at 10/8/2020 0922    Acceptance, E,D, VU by SHANON at 10/7/2020 1355    Comment: Educated on safe sequencing with bed mobility, ambulatory transfers, and gait. Reviewed HEP and knee precautions.                               User Key     Initials Effective Dates Name Provider Type Discipline     04/02/18 -  Alejandra Begum, PT Physical Therapist PT    SHANON 09/10/19 -  Tomas Dodge PT Physical Therapist PT              PT Recommendation and Plan      Plan of Care Reviewed With: patient  Progress: improving  Outcome Summary: Pt perform bed mobility with SV.  Transfers with RW and SBA.  Pt ambulated 100' with RW and CGA with no knee buckle and VC for sequencing.  Fatigue and pain limits mobility.  R knee flexion 82 degrees and extension 5 degrees from 0.  Recommend DC to IRF when appropriate.     Time Calculation:   PT Charges     Row Name 10/08/20 0922             Time Calculation    Start Time  0922  -AA      PT Received On  10/08/20  -AA      PT Goal Re-Cert Due Date  10/17/20  -AA         Time Calculation- PT    Total Timed Code Minutes- PT  40 minute(s)  -AA         Timed Charges    53407 - PT Therapeutic Exercise Minutes  16  -AA      08434 - Gait Training Minutes   13  -AA      50547 - PT Therapeutic Activity Minutes  11  -AA        User Key  (r) = Recorded By, (t) = Taken By, (c) = Cosigned By    Initials Name Provider Type    Alejandra Dooley, PT Physical Therapist        Therapy Charges for Today     Code Description Service Date Service Provider Modifiers Qty    99850282059 HC PT THER PROC EA 15 MIN 10/8/2020 Alejandra Begum, PT GP 1    52783332660 HC GAIT TRAINING EA 15 MIN 10/8/2020 Alejandra Begum, PT GP 1    90498394324 HC PT THERAPEUTIC ACT EA 15 MIN 10/8/2020 Alejandra Begum, PT GP 1          PT G-Codes  Outcome Measure Options: AM-PAC 6 Clicks Daily Activity (OT)  AM-PAC 6 Clicks Score (PT): 19  AM-PAC 6 Clicks Score (OT): 19 April RAUL Begum PT  10/8/2020

## 2020-10-08 NOTE — PROGRESS NOTES
"  Orthopedic Progress Note      Patient: Sarah Raphael  YOB: 1950     Date of Admission: 10/7/2020  7:07 AM Medical Record Number: [id number]     Attending Physician: Adrian Cortes MD    Status Post:  Procedure(s):  RIGHT TOTAL KNEE ARTHROPLASTY Post Operative Day Number: 1    Subjective : No new orthopaedic complaints     Pain Relief: some relief with present medication.     Systemic Complaints: No Complaints  Vitals:    10/07/20 2358 10/08/20 0336 10/08/20 0632 10/08/20 0700   BP: 142/75 158/86  145/64   BP Location: Right arm Right arm  Right arm   Patient Position: Lying Lying  Lying   Pulse: 53 51  51   Resp: 18 18  18   Temp: 97.5 °F (36.4 °C) 97.6 °F (36.4 °C)  97.7 °F (36.5 °C)   TempSrc: Oral Oral  Oral   SpO2: 96% 94%     Weight:   103 kg (227 lb)    Height:   175.3 cm (69\")        Physical Exam: 70 y.o. female    General Appearance:       Alert, cooperative, in no acute distress                  Extremities:    Dressing Clean, Dry and Intact             No clinical sign of DVT        Able to do good movements of digits    Pulses:   Pulses palpable and equal bilaterally           Diagnostic Tests:         Results from last 7 days   Lab Units 10/07/20  0733   POTASSIUM mmol/L 3.4*         No results found for: URICACID  No results found for: CRYSTAL  Microbiology Results (last 10 days)     Procedure Component Value - Date/Time    COVID PRE-OP / PRE-PROCEDURE SCREENING ORDER (NO ISOLATION) - Swab, Nasopharynx [[id number]] Collected: 10/05/20 0955    Lab Status: Final result Specimen: Swab from Nasopharynx Updated: 10/05/20 1629    Narrative:      The following orders were created for panel order COVID PRE-OP / PRE-PROCEDURE SCREENING ORDER (NO ISOLATION) - Swab, Nasopharynx.  Procedure                               Abnormality         Status                     ---------                               -----------         ------                     COVID-19,[facility], NP ...[[id number]]      "                 Final result                 Please view results for these tests on the individual orders.    COVID-19,[facility], NP SWAB IN LEXAR VIRAL TRANSPORT MEDIA 24-30 HR TAT - Swab, Nasopharynx [[id number]] Collected: 10/05/20 0902    Lab Status: Final result Specimen: Swab from Nasopharynx Updated: 10/05/20 1629     SARS-CoV-2 MIROSLAVA Not Detected        Xr Knee 1 Or 2 View Right    Result Date: 10/7/2020  Postsurgical changes of right knee arthroplasty with expected edema and subcutaneous emphysema. No evidence of immediate hardware complication.  This report was finalized on 10/7/2020 11:09 AM by Nick Garner.          Current Medications:  Scheduled Meds:acetaminophen, 1,000 mg, Oral, Q8H  amLODIPine, 5 mg, Oral, Daily  aspirin, 81 mg, Oral, Q12H  budesonide, 0.5 mg, Nebulization, BID - RT  citalopram, 60 mg, Oral, Daily  guaiFENesin, 1,200 mg, Oral, Q12H  levothyroxine, 200 mcg, Oral, Q AM  levothyroxine, 50 mcg, Oral, Q AM  losartan, 100 mg, Oral, Daily  meloxicam, 15 mg, Oral, Daily  montelukast, 10 mg, Oral, Nightly  pantoprazole, 40 mg, Oral, BID  tamoxifen, 20 mg, Oral, Daily      Continuous Infusions:lactated ringers, 9 mL/hr, Last Rate: 9 mL/hr (10/07/20 0737)  lactated ringers, 100 mL/hr  Ropivacine HCl-NaCl, 10 mL/hr, Last Rate: 10 mL/hr (10/08/20 0628)      PRN Meds:.albuterol  •  diphenhydrAMINE **OR** diphenhydrAMINE  •  HYDROmorphone **AND** naloxone  •  ketorolac  •  labetalol  •  melatonin  •  ondansetron **OR** ondansetron  •  oxyCODONE  •  oxyCODONE  •  sodium chloride    Assessment: Status post  MT PLASTY KNEE,MED OR LAT COMPARTMT [[id number]] (RIGHT LATERAL UNILATERAL VS TOTAL KNEE ARTHROPLASTY)    Patient Active Problem List   Diagnosis   • Anxiety   • Gastroesophageal reflux disease without esophagitis   • Hyperlipidemia   • Hypertension   • Hypothyroidism   • Cataract   • Cancer of overlapping sites of right female breast (CMS/HCC)   • Estrogen receptor positive   • Arthritis of right  knee   • Status post total right knee replacement   • Elevated hemoglobin A1c       PLAN:   Continues current post-op course  Anticoagulation: Aspirin started  Mobilize with PT as tolerated per protocol    Weight Bearing: WBAT  Discharge Plan: OK to plan for discharge in  today to home vs rehab pending pt progress  from orthopadic perspective.    Adrian Cortes MD    Date: 10/8/2020    Time: 08:07 EDT

## 2020-10-09 VITALS
RESPIRATION RATE: 18 BRPM | HEIGHT: 69 IN | OXYGEN SATURATION: 95 % | TEMPERATURE: 98.5 F | SYSTOLIC BLOOD PRESSURE: 142 MMHG | DIASTOLIC BLOOD PRESSURE: 65 MMHG | HEART RATE: 93 BPM | WEIGHT: 227 LBS | BODY MASS INDEX: 33.62 KG/M2

## 2020-10-09 LAB
ANION GAP SERPL CALCULATED.3IONS-SCNC: 7 MMOL/L (ref 5–15)
BUN SERPL-MCNC: 18 MG/DL (ref 8–23)
BUN/CREAT SERPL: 22.2 (ref 7–25)
CALCIUM SPEC-SCNC: 8.2 MG/DL (ref 8.6–10.5)
CHLORIDE SERPL-SCNC: 96 MMOL/L (ref 98–107)
CO2 SERPL-SCNC: 21 MMOL/L (ref 22–29)
CREAT SERPL-MCNC: 0.81 MG/DL (ref 0.57–1)
GFR SERPL CREATININE-BSD FRML MDRD: 70 ML/MIN/1.73
GLUCOSE SERPL-MCNC: 98 MG/DL (ref 65–99)
HCT VFR BLD AUTO: 35.2 % (ref 34–46.6)
HGB BLD-MCNC: 11.7 G/DL (ref 12–15.9)
OSMOLALITY UR: 438 MOSM/KG (ref 300–1100)
POTASSIUM SERPL-SCNC: 4 MMOL/L (ref 3.5–5.2)
SODIUM SERPL-SCNC: 124 MMOL/L (ref 136–145)

## 2020-10-09 PROCEDURE — 80048 BASIC METABOLIC PNL TOTAL CA: CPT | Performed by: NURSE PRACTITIONER

## 2020-10-09 PROCEDURE — 85014 HEMATOCRIT: CPT | Performed by: NURSE PRACTITIONER

## 2020-10-09 PROCEDURE — 83935 ASSAY OF URINE OSMOLALITY: CPT | Performed by: NURSE PRACTITIONER

## 2020-10-09 PROCEDURE — 97530 THERAPEUTIC ACTIVITIES: CPT

## 2020-10-09 PROCEDURE — 85018 HEMOGLOBIN: CPT | Performed by: NURSE PRACTITIONER

## 2020-10-09 PROCEDURE — 94799 UNLISTED PULMONARY SVC/PX: CPT

## 2020-10-09 PROCEDURE — 97116 GAIT TRAINING THERAPY: CPT

## 2020-10-09 RX ORDER — BISACODYL 10 MG
10 SUPPOSITORY, RECTAL RECTAL DAILY
Status: DISCONTINUED | OUTPATIENT
Start: 2020-10-09 | End: 2020-10-09 | Stop reason: HOSPADM

## 2020-10-09 RX ORDER — ASPIRIN 81 MG/1
81 TABLET ORAL DAILY
Start: 2020-10-09

## 2020-10-09 RX ORDER — SODIUM CHLORIDE 1000 MG
1 TABLET, SOLUBLE MISCELLANEOUS 2 TIMES DAILY WITH MEALS
Start: 2020-10-09 | End: 2021-02-10

## 2020-10-09 RX ORDER — POLYETHYLENE GLYCOL 3350 17 G/17G
17 POWDER, FOR SOLUTION ORAL DAILY
Status: DISCONTINUED | OUTPATIENT
Start: 2020-10-09 | End: 2020-10-09 | Stop reason: HOSPADM

## 2020-10-09 RX ORDER — AMOXICILLIN 250 MG
1 CAPSULE ORAL DAILY
Start: 2020-10-09 | End: 2021-02-10

## 2020-10-09 RX ORDER — POLYETHYLENE GLYCOL 3350 17 G/17G
17 POWDER, FOR SOLUTION ORAL DAILY
Start: 2020-10-09

## 2020-10-09 RX ORDER — IBUPROFEN 600 MG/1
600 TABLET ORAL EVERY 8 HOURS PRN
Qty: 270 TABLET | Refills: 1
Start: 2020-10-09 | End: 2021-02-10

## 2020-10-09 RX ORDER — SODIUM CHLORIDE 1000 MG
1 TABLET, SOLUBLE MISCELLANEOUS 2 TIMES DAILY WITH MEALS
Status: DISCONTINUED | OUTPATIENT
Start: 2020-10-09 | End: 2020-10-09 | Stop reason: HOSPADM

## 2020-10-09 RX ORDER — HYDROCODONE BITARTRATE AND ACETAMINOPHEN 7.5; 325 MG/1; MG/1
1 TABLET ORAL EVERY 6 HOURS PRN
Start: 2020-10-09 | End: 2021-02-10

## 2020-10-09 RX ORDER — BISACODYL 5 MG/1
10 TABLET, DELAYED RELEASE ORAL DAILY PRN
Status: DISCONTINUED | OUTPATIENT
Start: 2020-10-09 | End: 2020-10-09 | Stop reason: HOSPADM

## 2020-10-09 RX ORDER — ASPIRIN 81 MG/1
81 TABLET ORAL EVERY 12 HOURS SCHEDULED
Start: 2020-10-09 | End: 2021-02-10 | Stop reason: SDUPTHER

## 2020-10-09 RX ADMIN — ACETAMINOPHEN 1000 MG: 500 TABLET, FILM COATED ORAL at 12:46

## 2020-10-09 RX ADMIN — BUDESONIDE 0.5 MG: 0.5 INHALANT RESPIRATORY (INHALATION) at 09:16

## 2020-10-09 RX ADMIN — OXYCODONE 10 MG: 5 TABLET ORAL at 12:46

## 2020-10-09 RX ADMIN — GUAIFENESIN 1200 MG: 600 TABLET, EXTENDED RELEASE ORAL at 08:36

## 2020-10-09 RX ADMIN — TAMOXIFEN CITRATE 20 MG: 10 TABLET, FILM COATED ORAL at 08:36

## 2020-10-09 RX ADMIN — MELOXICAM 15 MG: 7.5 TABLET ORAL at 08:35

## 2020-10-09 RX ADMIN — PANTOPRAZOLE SODIUM 40 MG: 40 TABLET, DELAYED RELEASE ORAL at 08:35

## 2020-10-09 RX ADMIN — BISACODYL 10 MG: 10 SUPPOSITORY RECTAL at 11:18

## 2020-10-09 RX ADMIN — ACETAMINOPHEN 1000 MG: 500 TABLET, FILM COATED ORAL at 04:26

## 2020-10-09 RX ADMIN — POLYETHYLENE GLYCOL 3350 17 G: 17 POWDER, FOR SOLUTION ORAL at 11:18

## 2020-10-09 RX ADMIN — BISACODYL 10 MG: 5 TABLET, COATED ORAL at 11:18

## 2020-10-09 RX ADMIN — LEVOTHYROXINE SODIUM 50 MCG: 100 TABLET ORAL at 04:27

## 2020-10-09 RX ADMIN — ASPIRIN 81 MG: 81 TABLET, COATED ORAL at 08:35

## 2020-10-09 RX ADMIN — LEVOTHYROXINE SODIUM 200 MCG: 100 TABLET ORAL at 04:27

## 2020-10-09 RX ADMIN — LOSARTAN POTASSIUM 100 MG: 50 TABLET, FILM COATED ORAL at 08:36

## 2020-10-09 RX ADMIN — OXYCODONE 10 MG: 5 TABLET ORAL at 04:28

## 2020-10-09 RX ADMIN — AMLODIPINE BESYLATE 5 MG: 5 TABLET ORAL at 08:36

## 2020-10-09 RX ADMIN — CITALOPRAM HYDROBROMIDE 60 MG: 20 TABLET ORAL at 08:35

## 2020-10-09 NOTE — THERAPY TREATMENT NOTE
Patient Name: Sarah Raphael  : 1950    MRN: [id number]                              Today's Date: 10/9/2020       Admit Date: 10/7/2020    Visit Dx: No diagnosis found.  Patient Active Problem List   Diagnosis   • Anxiety   • Gastroesophageal reflux disease without esophagitis   • Hyperlipidemia   • Hypertension   • Hypothyroidism   • Cataract   • Cancer of overlapping sites of right female breast (CMS/HCC)   • Estrogen receptor positive   • Arthritis of right knee   • Status post total right knee replacement   • Elevated hemoglobin A1c   • Hyponatremia   • Acute postoperative pain     Past Medical History:   Diagnosis Date   • Arthritis    • Back pain    • Breast cancer (CMS/HCC)    • Celiac disease    • Disease of thyroid gland    • GERD (gastroesophageal reflux disease)    • Hepatitis     NOT SURE OF TYPE, CAN'T DONATE BLOOD   • History of transfusion    • Hypertension      Past Surgical History:   Procedure Laterality Date   • COLONOSCOPY         • EYE SURGERY      bilateral cataracts removed   • HYSTERECTOMY  1983   • KNEE ARTHROPLASTY, PARTIAL REPLACEMENT Right 10/7/2020    Procedure: TOTAL KNEE ARTHROPLASTY RIGHT;  Surgeon: Adrian Cortes MD;  Location: [facility];  Service: Orthopedics;  Laterality: Right;   • MASTECTOMY Bilateral 2017   • MASTECTOMY WITH SENTINEL NODE BIOPSY AND AXILLARY NODE DISSECTION Bilateral 2017    Procedure: LEFT BREAST MASTECTOMY WITH LEFT  SENTINEL NODE BIOPSY AND RIGHT PROPHYLACTIC MASTECTOMY;  Surgeon: Caitlin Green MD;  Location: [facility];  Service:    • OOPHORECTOMY     • SECONDARY INTRAOCULAR LENSE IMPLANTATION  2015    also in 2016   • SHOULDER ARTHROSCOPY Left 2014   • SKIN CANCER EXCISION  10/2015    basal cell   • THYROID SURGERY  1996   • TONSILLECTOMY  1976     General Information     Row Name 10/09/20 1000          Physical Therapy Time and Intention    Document Type  therapy note (daily note)  -LO     Mode of  Treatment  physical therapy  -     Row Name 10/09/20 1000          General Information    Patient Profile Reviewed  yes  -LO     Existing Precautions/Restrictions  fall;other (see comments)  -     Row Name 10/09/20 1000          Cognition    Orientation Status (Cognition)  oriented x 4  -     Row Name 10/09/20 1000          Safety Issues, Functional Mobility    Impairments Affecting Function (Mobility)  endurance/activity tolerance;range of motion (ROM);strength;pain;balance  -LO       User Key  (r) = Recorded By, (t) = Taken By, (c) = Cosigned By    Initials Name Provider Type    Clare Romero, PT Physical Therapist        Mobility     Row Name 10/09/20 1000          Bed Mobility    Bed Mobility  scooting/bridging;supine-sit  -LO     Scooting/Bridging [place] (Bed Mobility)  supervision;verbal cues  -LO     Supine-Sit [place] (Bed Mobility)  verbal cues;supervision  -LO     Assistive Device (Bed Mobility)  bed rails;head of bed elevated  -     Comment (Bed Mobility)  Occassional cuing for sequencing during bed mobility.  -     Row Name 10/09/20 1000          Transfers    Comment (Transfers)  Patient transfers with CGA and vc for hand and RLE placement for safety and comfort.  -     Row Name 10/09/20 1000          Sit-Stand Transfer    Sit-Stand [place] (Transfers)  contact guard;verbal cues  -LO     Assistive Device (Sit-Stand Transfers)  walker, front-wheeled  -     Row Name 10/09/20 1000          Gait/Stairs (Locomotion)    [place] Level (Gait)  contact guard;verbal cues  -LO     Assistive Device (Gait)  walker, front-wheeled  -LO     Distance in Feet (Gait)  115'  -LO     Deviations/Abnormal Patterns (Gait)  bilateral deviations;jo decreased;gait speed decreased;weight shifting decreased;antalgic  -LO     Bilateral Gait Deviations  forward flexed posture  -LO     Right Sided Gait Deviations  heel strike decreased;weight shift ability decreased  -LO     Comment  (Gait/Stairs)  Patient ambulates with FWW CGA with improvement in stepping pattern this AM. As patient fatigues, requires increased vc for maintaining close distance to walker for safety and for maintaining upright posture.  -     Row Name 10/09/20 1000          Mobility    Extremity Weight-bearing Status  right lower extremity  -LO     Right Lower Extremity (Weight-bearing Status)  weight-bearing as tolerated (WBAT)  -       User Key  (r) = Recorded By, (t) = Taken By, (c) = Cosigned By    Initials Name Provider Type    Clare oRmero PT Physical Therapist        Obj/Interventions     Row Name 10/09/20 1003          Range of Motion Comprehensive    Comment, General Range of Motion  R knee flexion AROM 60 deg, extension -5  -     Row Name 10/09/20 1003          Motor Skills    Therapeutic Exercise  knee;ankle  -     Row Name 10/09/20 1003          Knee (Therapeutic Exercise)    Knee (Therapeutic Exercise)  AROM (active range of motion);isometric exercises;strengthening exercise  -     Knee AROM (Therapeutic Exercise)  right;LAQ (long arc quad);heel slides;10 repetitions  -     Knee Isometrics (Therapeutic Exercise)  quad sets;right;10 repetitions  -LO     Knee Strengthening (Therapeutic Exercise)  right;heel slides;LAQ (long arc quad);10 repetitions  -     Row Name 10/09/20 1003          Ankle (Therapeutic Exercise)    Ankle (Therapeutic Exercise)  AROM (active range of motion)  -     Ankle AROM (Therapeutic Exercise)  dorsiflexion;plantarflexion;bilateral  -     Row Name 10/09/20 1003          Balance    Balance Assessment  sitting static balance;sitting dynamic balance;standing static balance;standing dynamic balance  -LO     Static Sitting Balance  WNL;supported;sitting, edge of bed  -LO     Dynamic Sitting Balance  WNL;supported;sitting, edge of bed  -LO     Static Standing Balance  WNL;supported;standing  -LO     Dynamic Standing Balance  mild impairment;supported;standing  -     Row Name  10/09/20 1003          General Lower Extremity Assessment (Range of Motion)    Comment: Lower Extremity ROM  see above  -LO       User Key  (r) = Recorded By, (t) = Taken By, (c) = Cosigned By    Initials Name Provider Type    Clare Romero, PT Physical Therapist        Goals/Plan    No documentation.       Clinical Impression     Row Name 10/09/20 1007          Pain    Additional Documentation  Pain Scale: Numbers Pre/Post-Treatment (Group)  -     Row Name 10/09/20 1007          Pain Scale: Numbers Pre/Post-Treatment    Pretreatment Pain Rating  2/10  -LO     Posttreatment Pain Rating  4/10  -LO     Pain Location - Side  Right  -LO     Pain Location  knee  -LO     Pre/Posttreatment Pain Comment  RN premedicated with pain meds  -     Pain Intervention(s)  Cold applied;Medication (See MAR);Repositioned  -     Row Name 10/09/20 1007          Plan of Care Review    Plan of Care Reviewed With  patient  -LO     Progress  improving  -     Outcome Summary  Patient continues to demonstrate progressions towards therapy goals this AM session. Performs bed mobility with supervision, transfers with CGA, and ambulates x 115' with FWW and CGA. Increased vc required with increased distance of ambulation due to fatigue and increase in R knee pain. Strong effort with all TE. Cont to recommend IPR at dc.  -     Row Name 10/09/20 1007          Therapy Assessment/Plan (PT)    Rehab Potential (PT)  good, to achieve stated therapy goals  -     Criteria for Skilled Interventions Met (PT)  yes;meets criteria;skilled treatment is necessary  -     Row Name 10/09/20 1007          Vital Signs    O2 Delivery Pre Treatment  room air  -LO     O2 Delivery Intra Treatment  room air  -LO     O2 Delivery Post Treatment  room air  -LO     Pre Patient Position  Supine  -LO     Intra Patient Position  Standing  -LO     Post Patient Position  Sitting  -     Row Name 10/09/20 1007          Positioning and Restraints    Pre-Treatment  Position  in bed  -LO     Post Treatment Position  chair  -LO     In Chair  reclined;call light within reach;encouraged to call for assist;exit alarm on;RUE elevated  -LO       User Key  (r) = Recorded By, (t) = Taken By, (c) = Cosigned By    Initials Name Provider Type    Clare Romero, LANETTE Physical Therapist        Outcome Measures     Row Name 10/09/20 1010          How much help from another person do you currently need...    Turning from your back to your side while in flat bed without using bedrails?  4  -LO     Moving from lying on back to sitting on the side of a flat bed without bedrails?  3  -LO     Moving to and from a bed to a chair (including a wheelchair)?  3  -LO     Standing up from a chair using your arms (e.g., wheelchair, bedside chair)?  3  -LO     Climbing 3-5 steps with a railing?  3  -LO     To walk in hospital room?  3  -LO     AM-PAC 6 Clicks Score (PT)  19  -LO     Row Name 10/09/20 1010          PADD    Diagnosis  1  -LO     Gender  1  -LO     Age Group  1  -LO     Gait Distance  0  -LO     Assist Level  1  -LO     Home Support  0  -LO     PADD Score  4  -LO     Patient Preference  extended rehabilitation  -LO     Prediction by PADD Score  extended rehabilitation  -LO       User Key  (r) = Recorded By, (t) = Taken By, (c) = Cosigned By    Initials Name Provider Type    Clare Romero, LANETTE Physical Therapist        Physical Therapy Education                 Title: PT OT SLP Therapies (In Progress)     Topic: Physical Therapy (In Progress)     Point: Mobility training (In Progress)     Learning Progress Summary           Patient Acceptance, E, NR by CARRIE at 10/9/2020 0935    Comment: Patient education regarding sequencing for bed mobility and transfers.    Acceptance, E,D, VU,NESHA,NR by ELIZABETH at 10/8/2020 1501    Acceptance, E,D,H, KRISTEN,NESHA,NR by ELIZABETH at 10/8/2020 0922    Acceptance, E,D, VU by SHANON at 10/7/2020 1355    Comment: Educated on safe sequencing with bed mobility, ambulatory transfers, and  gait. Reviewed HEP and knee precautions.                   Point: Home exercise program (Done)     Learning Progress Summary           Patient Acceptance, E,D, VU,DU,NR by AA at 10/8/2020 1501    Acceptance, E,D,H, VU,DU,NR by AA at 10/8/2020 0922    Acceptance, E,D, VU by SHANON at 10/7/2020 1355    Comment: Educated on safe sequencing with bed mobility, ambulatory transfers, and gait. Reviewed HEP and knee precautions.                   Point: Body mechanics (Done)     Learning Progress Summary           Patient Acceptance, E,D, VU,DU,NR by AA at 10/8/2020 1501    Acceptance, E,D,H, VU,DU,NR by AA at 10/8/2020 0922    Acceptance, E,D, VU by SHANON at 10/7/2020 1355    Comment: Educated on safe sequencing with bed mobility, ambulatory transfers, and gait. Reviewed HEP and knee precautions.                   Point: Precautions (Done)     Learning Progress Summary           Patient Acceptance, E,D, VU,DU,NR by AA at 10/8/2020 1501    Acceptance, E,D,H, VU,DU,NR by AA at 10/8/2020 0922    Acceptance, E,D, VU by SHANON at 10/7/2020 1355    Comment: Educated on safe sequencing with bed mobility, ambulatory transfers, and gait. Reviewed HEP and knee precautions.                               User Key     Initials Effective Dates Name Provider Type Discipline     04/02/18 -  Alejandra Begum, PT Physical Therapist PT    SHANON 09/10/19 -  Tomas Dodge, PT Physical Therapist PT     03/11/20 -  Clare Chavez, PT Physical Therapist PT              PT Recommendation and Plan     Plan of Care Reviewed With: patient  Progress: improving  Outcome Summary: Patient continues to demonstrate progressions towards therapy goals this AM session. Performs bed mobility with supervision, transfers with CGA, and ambulates x 115' with FWW and CGA. Increased vc required with increased distance of ambulation due to fatigue and increase in R knee pain. Strong effort with all TE. Cont to recommend IPR at dc.     Time Calculation:   PT Charges     Row Name  10/09/20 0935             Time Calculation    Start Time  0935  -LO      PT Received On  10/09/20  -LO      PT Goal Re-Cert Due Date  10/17/20  -LO         Timed Charges    64738 - PT Therapeutic Exercise Minutes  8  -LO      85510 - Gait Training Minutes   16  -LO      81760 - PT Therapeutic Activity Minutes  12  -LO        User Key  (r) = Recorded By, (t) = Taken By, (c) = Cosigned By    Initials Name Provider Type     Clare Chavez, PT Physical Therapist        Therapy Charges for Today     Code Description Service Date Service Provider Modifiers Qty    22550345512 HC GAIT TRAINING EA 15 MIN 10/9/2020 Clare Chavez, PT GP 1    84183826963 HC PT THERAPEUTIC ACT EA 15 MIN 10/9/2020 Clare Chavez, PT GP 1          PT G-Codes  Outcome Measure Options: AM-PAC 6 Clicks Basic Mobility (PT)  AM-PAC 6 Clicks Score (PT): 19  AM-PAC 6 Clicks Score (OT): 19    Clare Chavez PT  10/9/2020

## 2020-10-09 NOTE — PLAN OF CARE
Goal Outcome Evaluation:  Plan of Care Reviewed With: patient  Progress: improving  Outcome Summary: pt rested very well throughout night, minimal c/o pain, no c/o n/v/d, nerve catheter controlling pain well, vss, dressing clean dry and intact, hopeful to discharge today, will continue to monitor.

## 2020-10-09 NOTE — PLAN OF CARE
Problem: Adult Inpatient Plan of Care  Goal: Plan of Care Review  Outcome: Adequate for Care Transition  Goal: Patient-Specific Goal (Individualized)  Outcome: Adequate for Care Transition  Goal: Absence of Hospital-Acquired Illness or Injury  Outcome: Adequate for Care Transition  Intervention: Identify and Manage Fall Risk  Recent Flowsheet Documentation  Taken 10/9/2020 1200 by Hope Candelario RN  Safety Promotion/Fall Prevention:   activity supervised   assistive device/personal items within reach   clutter free environment maintained   fall prevention program maintained   nonskid shoes/slippers when out of bed   room organization consistent   safety round/check completed  Taken 10/9/2020 1000 by Hope Candelario RN  Safety Promotion/Fall Prevention:   activity supervised   assistive device/personal items within reach   clutter free environment maintained   fall prevention program maintained   nonskid shoes/slippers when out of bed   room organization consistent   safety round/check completed  Taken 10/9/2020 0800 by Hope Candelario RN  Safety Promotion/Fall Prevention:   activity supervised   assistive device/personal items within reach   clutter free environment maintained   fall prevention program maintained   nonskid shoes/slippers when out of bed   room organization consistent   safety round/check completed  Intervention: Prevent Skin Injury  Recent Flowsheet Documentation  Taken 10/9/2020 1200 by Hope Candelario RN  Body Position: (back to chair ) --  Taken 10/9/2020 1000 by Hope Candelario RN  Body Position: (up in chair ) --  Taken 10/9/2020 0800 by Hope Candelario RN  Body Position: position changed independently  Intervention: Prevent and Manage VTE (venous thromboembolism) Risk  Recent Flowsheet Documentation  Taken 10/9/2020 0800 by Hope Candelario RN  VTE Prevention/Management:   bilateral   sequential compression devices on  Intervention: Prevent Infection  Recent Flowsheet  Documentation  Taken 10/9/2020 1200 by Hope Candelario RN  Infection Prevention:   environmental surveillance performed   rest/sleep promoted  Taken 10/9/2020 1000 by Hope Candelario RN  Infection Prevention:   environmental surveillance performed   rest/sleep promoted  Taken 10/9/2020 0800 by Hope Candelario RN  Infection Prevention:   environmental surveillance performed   rest/sleep promoted  Goal: Optimal Comfort and Wellbeing  Outcome: Adequate for Care Transition  Intervention: Provide Person-Centered Care  Recent Flowsheet Documentation  Taken 10/9/2020 0800 by Hope Candelario RN  Trust Relationship/Rapport:   care explained   choices provided   emotional support provided   empathic listening provided   questions answered   questions encouraged   reassurance provided   thoughts/feelings acknowledged  Goal: Readiness for Transition of Care  Outcome: Adequate for Care Transition     Problem: Fall Injury Risk  Goal: Absence of Fall and Fall-Related Injury  Outcome: Adequate for Care Transition  Intervention: Identify and Manage Contributors to Fall Injury Risk  Recent Flowsheet Documentation  Taken 10/9/2020 1200 by Hope Candelario RN  Self-Care Promotion:   independence encouraged   BADL personal objects within reach  Taken 10/9/2020 1000 by Hope Candelario RN  Self-Care Promotion:   BADL personal objects within reach   independence encouraged  Taken 10/9/2020 0800 by Hope Candelario RN  Medication Review/Management: medications reviewed  Self-Care Promotion:   BADL personal objects within reach   independence encouraged  Intervention: Promote Injury-Free Environment  Recent Flowsheet Documentation  Taken 10/9/2020 1200 by Hope Candelario RN  Safety Promotion/Fall Prevention:   activity supervised   assistive device/personal items within reach   clutter free environment maintained   fall prevention program maintained   nonskid shoes/slippers when out of bed   room organization consistent    safety round/check completed  Taken 10/9/2020 1000 by Hope Candelario, RN  Safety Promotion/Fall Prevention:   activity supervised   assistive device/personal items within reach   clutter free environment maintained   fall prevention program maintained   nonskid shoes/slippers when out of bed   room organization consistent   safety round/check completed  Taken 10/9/2020 0800 by Hope Candelario, RN  Safety Promotion/Fall Prevention:   activity supervised   assistive device/personal items within reach   clutter free environment maintained   fall prevention program maintained   nonskid shoes/slippers when out of bed   room organization consistent   safety round/check completed   Goal Outcome Evaluation:  Plan of Care Reviewed With: patient  Progress: improving

## 2020-10-09 NOTE — DISCHARGE INSTRUCTIONS
[facility] Primary Hip and Knee:  1. You have a sterile dressing in place. Only exchange the dressing if it becomes saturated (fluid draining out the sides of dressing) and notify the office. The dressing is water resistant. During the first 7 days after surgery, it is ok to shower. DO NOT scrub on or around the dressing. Do not submerge the dressing.  2. After 7 days, you can remove your dressing. Your sutures are all underneath your skin. There is a layer of glue over the incision. DO NOT pick at this or try to peal it off. If the edges do peel up it is ok to trim them as needed. It is OK to shower with the incision exposed. DO NOT scrub on or around the incision. DO NOT submerge the incision in pools, baths, or hot tubs for 1 month after surgery.  3. No creams or ointments to the incision for 1 month after surgery. After 1 month, it is recommended to massage the scar with vitamin E cream to help decrease scar formation.  4. Check incision every day and notify surgeon immediately if any of the following signs or symptoms are noted:  o Increase in redness  o Increase in swelling around the incision and of the entire extremity  o Increase in pain  o Drainage oozing from the incision  o Pulling apart of the edges of the incision  o Increase in overall body temperature (greater than 100.5 degrees)    Anticoagulants: You will be discharged on an anticoagulant. This is a prophylactic medication that helps prevent blood clots during your post-operative period. Most patients will be on Aspirin 81 mg Enteric coated every 12 hours orally for 30 days. Some patients, due to increased risk factors, will need to be on a stronger anticoagulant. Dr. Cortes will discuss this need with you.   ? While taking the anticoagulant, you should avoid taking any additional aspirin, and limit ibuprofen (Advil or Motrin), Aleve (Naprosyn) or other non-steroidal anti-inflammatory medications.   ? Notify your surgeon immediately if  any erik bleeding is noted in the urine, stool, vomit, or from the nose or the incision. Blood in the stool will often appear as black rather than red. Blood in urine may appear as pink. Blood in vomit may appear as brown/black like coffee grounds.  ? You will need to apply pressure for longer periods of time to any cuts or abrasions to stop bleeding  ? Avoid alcohol while taking anticoagulants  Sequential Compression Device: You maybe be discharged home with a compression device that helps promote blood flow and prevent clots in your legs. Wear these at all times for the first two weeks.   Mobilization: The best way to avoid a blood clot is to get up and walk. 10 times a day get up and walk for 5 minutes for the first two weeks. Walking for longer periods of time will increase pain and swelling, making therapy more difficult. If taking any long travel (car or plane) in the first 1-2 months, be sure to get up and walk at least every one hour.     Stool Softeners: You will be at greater risk of constipation after surgery because of being less mobile and taking the pain medications.   ? Take stool softeners as instructed by your surgeon while on pain medications. Use over the counter Colace 100 mg 1-2 capsules twice daily.   ? If stools become too loose or too frequent, decreases the dosage or stop the stool softener.  ? If constipation occurs despite use of stool softeners, you are to continue the stool softeners and add a laxative (Milk of Magnesia 1 ounce daily as needed).  ? Dulcolax oral tabs or suppository or a fleets enema can also be utilized for constipation and can be obtained over the counter.   ? If above interventions are unsuccessful in inducing bowel movements, please contact your family physician's office/surgeon's office.  ? Drink plenty of fluids and eat fruits and vegetables during your recovery time    Pain Medications utilized after surgery are narcotics and the law requires that the following  information be given to all patients that are prescribed narcotics:  ? CLASSIFICATION: Pain medications are called Opioids and are narcotics  ? LEGALITIES: It is illegal to share narcotics with others and to drive within 24 hours of taking narcotics  ? POTENTIAL SIDE EFFECTS: Potential side effects of opioids include: nausea, vomiting, itching, dizziness, drowsiness, dry mouth, constipation, and difficulty urinating.  ? POTENTIAL ADVERSE EFFECTS:   o Opioid tolerance can develop with use of pain medications and this simply means that it requires more and more of the medication to control pain; however, this is seen more in patients that use Opioids for longer periods of time.  o Opioid dependence can develop with use of Opioids and this simply means that to stop the medication can cause withdrawal symptoms; however, this is seen with patients that use Opioids for longer periods of time.  o Opioid addiction can develop with use of Opioids and the incidence of this is very unlikely in patients who take the medications as ordered and stop the medications as instructed.  o Opioid overdose can be dangerous, but is unlikely when the medication is taken as ordered and stopped when ordered. It is important not to mix opioids with alcohol or with any type of sedative, such as Benadryl, as this can lead to over sedation and respiratory difficulty.  ? DOSAGE:   o Pain medications may be needed consistently for the first week to decrease pain and promote adequate pain relief and participation in physical therapy.  o After the initial surgical pain begins to resolve, you may begin to decrease the pain medication and only take it as needed. By the end of 6 weeks, you should be off of pain medications.  o You can decrease your pain medication consumption by slowly spacing out the time in between the medication, and using 650mg Tylenol when the pain is not as severe. Do not exceed 3500mg of Tylenol in 24 hours.   o Refills will not  be given by the office during evening hours, on weekends, or after 6 weeks post-op.  o To seek refills on pain medications during the initial 6 week post-operative period, you must call the office 48 hours in advance to request the refill. The office will then notify you when to  the prescription. DO NOT wait until you are out of the medication to request a refill.

## 2020-10-09 NOTE — PROGRESS NOTES
Orthopedic Progress Note      Patient: Sarah Raphael  YOB: 1950     Date of Admission: 10/7/2020  7:07 AM Medical Record Number: [id number]     Attending Physician: Adrian Cortes MD    Status Post:  Procedure(s):  RIGHT TOTAL KNEE ARTHROPLASTY Post Operative Day Number: 2    Subjective : No new orthopaedic complaints     Pain Relief: some relief with present medication.     Systemic Complaints: No Complaints  Vitals:    10/08/20 2102 10/09/20 0338 10/09/20 0724 10/09/20 0916   BP: 133/59 146/98 142/65    BP Location: Right arm Right arm Right arm    Patient Position: Lying Lying Lying    Pulse: 81 59 65 93   Resp: 16 16 18    Temp: 98.5 °F (36.9 °C) 98.7 °F (37.1 °C) 98.5 °F (36.9 °C)    TempSrc: Oral Oral Oral    SpO2: 92% 97% 94% 95%   Weight:       Height:           Physical Exam: 70 y.o. female    General Appearance:       Alert, cooperative, in no acute distress                  Extremities:    Dressing Clean, Dry and Intact             No clinical sign of DVT        Able to do good movements of digits    Pulses:   Pulses palpable and equal bilaterally           Diagnostic Tests:     Results from last 7 days   Lab Units 10/08/20  0743   WBC 10*3/mm3 10.43   HEMOGLOBIN g/dL 11.6*   HEMATOCRIT % 35.8   PLATELETS 10*3/mm3 197     Results from last 7 days   Lab Units 10/08/20  0743 10/07/20  0733   SODIUM mmol/L 127*  --    POTASSIUM mmol/L 4.6 3.4*   CHLORIDE mmol/L 96*  --    CO2 mmol/L 22.0  --    BUN mg/dL 18  --    CREATININE mg/dL 0.90  --    GLUCOSE mg/dL 108*  --    CALCIUM mg/dL 8.6  --          No results found for: URICACID  No results found for: CRYSTAL  Microbiology Results (last 10 days)     Procedure Component Value - Date/Time    COVID PRE-OP / PRE-PROCEDURE SCREENING ORDER (NO ISOLATION) - Swab, Nasal Cavity [[id number]]  (Normal) Collected: 10/08/20 0920    Lab Status: Final result Specimen: Swab from Nasal Cavity Updated: 10/08/20 9206    Narrative:      The following orders  were created for panel order COVID PRE-OP / PRE-PROCEDURE SCREENING ORDER (NO ISOLATION) - Swab, Nasal Cavity.  Procedure                               Abnormality         Status                     ---------                               -----------         ------                     COVID-19, [facility]...[[id number]]  Normal              Final result                 Please view results for these tests on the individual orders.    COVID-19, [facility],NP Swab (NO TRANSPORT MEDIA) 2 HR TAT - Swab, Nasal Cavity [[id number]]  (Normal) Collected: 10/08/20 1737    Lab Status: Final result Specimen: Swab from Nasal Cavity Updated: 10/08/20 1856     COVID19 Not Detected    Narrative:      Fact sheet for providers: https://www.fda.gov/media/828841/download     Fact sheet for patients: https://www.fda.gov/media/723323/download    COVID PRE-OP / PRE-PROCEDURE SCREENING ORDER (NO ISOLATION) - Swab, Nasopharynx [[id number]] Collected: 10/05/20 0955    Lab Status: Final result Specimen: Swab from Nasopharynx Updated: 10/05/20 1629    Narrative:      The following orders were created for panel order COVID PRE-OP / PRE-PROCEDURE SCREENING ORDER (NO ISOLATION) - Swab, Nasopharynx.  Procedure                               Abnormality         Status                     ---------                               -----------         ------                     COVID-19,[facility], NP ...[[id number]]                      Final result                 Please view results for these tests on the individual orders.    COVID-19,[facility], NP SWAB IN [facility] VIRAL TRANSPORT MEDIA 24-30 HR TAT - Swab, Nasopharynx [[id number]] Collected: 10/05/20 0947    Lab Status: Final result Specimen: Swab from Nasopharynx Updated: 10/05/20 1629     SARS-CoV-2 MIROSLAVA Not Detected        Xr Knee 1 Or 2 View Right    Result Date: 10/7/2020  Postsurgical changes of right knee arthroplasty with expected edema and subcutaneous emphysema. No evidence of immediate  hardware complication.  This report was finalized on 10/7/2020 11:09 AM by Nick Garner.          Current Medications:  Scheduled Meds:acetaminophen, 1,000 mg, Oral, Q8H  amLODIPine, 5 mg, Oral, Daily  aspirin, 81 mg, Oral, Q12H  budesonide, 0.5 mg, Nebulization, BID - RT  citalopram, 60 mg, Oral, Daily  guaiFENesin, 1,200 mg, Oral, Q12H  levothyroxine, 200 mcg, Oral, Q AM  levothyroxine, 50 mcg, Oral, Q AM  losartan, 100 mg, Oral, Daily  meloxicam, 15 mg, Oral, Daily  montelukast, 10 mg, Oral, Nightly  pantoprazole, 40 mg, Oral, BID  tamoxifen, 20 mg, Oral, Daily      Continuous Infusions:lactated ringers, 9 mL/hr, Last Rate: 9 mL/hr (10/07/20 0737)  lactated ringers, 100 mL/hr  Ropivacine HCl-NaCl, 10 mL/hr, Last Rate: 10 mL/hr (10/08/20 1043)      PRN Meds:.albuterol  •  diphenhydrAMINE **OR** diphenhydrAMINE  •  labetalol  •  melatonin  •  [DISCONTINUED] HYDROmorphone **AND** naloxone  •  ondansetron **OR** ondansetron  •  oxyCODONE  •  oxyCODONE  •  sodium chloride    Assessment: Status post  MI PLASTY KNEE,MED OR LAT COMPARTMT [[id number]] (RIGHT LATERAL UNILATERAL VS TOTAL KNEE ARTHROPLASTY)    Patient Active Problem List   Diagnosis   • Anxiety   • Gastroesophageal reflux disease without esophagitis   • Hyperlipidemia   • Hypertension   • Hypothyroidism   • Cataract   • Cancer of overlapping sites of right female breast (CMS/HCC)   • Estrogen receptor positive   • Arthritis of right knee   • Status post total right knee replacement   • Elevated hemoglobin A1c   • Hyponatremia   • Acute postoperative pain       PLAN:   Continues current post-op course  Anticoagulation: Aspirin started  Mobilize with PT as tolerated per protocol    Weight Bearing: WBAT  Discharge Plan: OK to plan for discharge in  today to rehab  from orthopadic perspective.    Adrian Cortes MD    Date: 10/9/2020    Time: 09:24 EDT

## 2020-10-09 NOTE — PLAN OF CARE
Problem: Adult Inpatient Plan of Care  Goal: Plan of Care Review  Recent Flowsheet Documentation  Taken 10/9/2020 1007 by Clare Chavez PT  Progress: improving  Plan of Care Reviewed With: patient  Outcome Summary: Patient continues to demonstrate progressions towards therapy goals this AM session. Performs bed mobility with supervision, transfers with CGA, and ambulates x 115' with FWW and CGA. Increased vc required with increased distance of ambulation due to fatigue and increase in R knee pain. Strong effort with all TE. Cont to recommend IPR at dc.

## 2020-10-09 NOTE — DISCHARGE SUMMARY
Patient Name: Sarah Raphael  MRN: [id number]  : 1950  DOS: 10/9/2020    Attending: Adrian Cortes MD    Primary Care Provider: Tyrese Recinos MD    Date of Admission:.10/7/2020  7:07 AM    Date of Discharge:  10/9/2020    Discharge Diagnosis:   Status post total right knee replacement    Arthritis of right knee    Hyperlipidemia    Hypertension    Hypothyroidism    Elevated hemoglobin A1c    Hyponatremia    Acute postoperative pain      Hospital Course    At admit:    Patient is a pleasant 70 y.o. female presented for scheduled surgery by Dr. Cortes.  She underwent right total knee arthroplasty under spinal anesthesia.  She tolerated surgery well and was admitted for further medical management.  Her knee has been painful for about a year.  She uses a walker for ambulation.  She did have a fall about 6 months ago and dislocated her right shoulder.     (Above is noted, agree.  Doing well when seen in her room afterwards.  No nausea, vomiting, or shortness of breath.Good pain control.  PT notes afterwards showed her to have ambulated 50 feet using rolling walker and contact-guard assist of 2.  Patient does have some tremors especially in the upper extremities.  He is started over the last couple of months.  It is of note that around the same time she was started on Abilify as an adjunctive treatment for depression.)wy     After admit:    Patient was provided pain medications as needed for pain control, along with adductor canal nerve block infusion of Ropivacaine.    Adjustments were made to pain medications to optimize postop pain management.   Risks and benefits of opiate medications discussed with patient.  Jaison report in chart was reviewed prior to discharge.    Abilify was discontinued after discussion with patient's PCP.    She was seen by PT and OT and has progressed well over her stay.    She used an IS for atelectasis prophylaxis and aspirin along with mechanicals for DVT prophylaxis.  Home  medications were resumed as appropriate, and labs were monitored and remained fairly stable.     She did have postop hyponatremia, likely SIADH. She needs a 1000ml fluid restriction. She is started on twice daily salt tablets at discharge. She will need repeat BMP on [place] 10/11/20.  Her Maxzide could be resumed once her sodium levels greater than 135 with close monitoring.    With the progress she has made, she is ready for DC [place] today.      She will have an Arrow pump (instructed on it during this admit).    Discussed with patient regarding plan and she shows understanding and agreement.        Procedures Performed    Pre-op Diagnosis:   Arthritis of right knee [177022]       Post-Op Diagnosis Codes:     * Arthritis of right knee [M17.11]     Procedure(s):  RIGHT TOTAL KNEE ARTHROPLASTY     Staff:  Surgeon(s):  Adrian Cortes MD    Pertinent Test Results:    I reviewed the patient's new clinical results.   Results from last 7 days   Lab Units 10/09/20  1238 10/08/20  0743   WBC 10*3/mm3  --  10.43   HEMOGLOBIN g/dL 11.7* 11.6*   HEMATOCRIT % 35.2 35.8   PLATELETS 10*3/mm3  --  197     Results from last 7 days   Lab Units 10/09/20  1238 10/08/20  0743 10/07/20  0733   SODIUM mmol/L 124* 127*  --    POTASSIUM mmol/L 4.0 4.6 3.4*   CHLORIDE mmol/L 96* 96*  --    CO2 mmol/L 21.0* 22.0  --    BUN mg/dL 18 18  --    CREATININE mg/dL 0.81 0.90  --    CALCIUM mg/dL 8.2* 8.6  --    GLUCOSE mg/dL 98 108*  --      Results for KARINE MONTALVO (MRN [id number]) as of 10/9/2020 15:37   Ref. Range 10/9/2020 15:08   Osmolality, URINE Latest Ref Range: 300-1,100 mOsm/kg 438     I reviewed the patient's new imaging including images and reports.      Physical therapy: Patient continues to demonstrate progressions towards therapy goals this AM session. Performs bed mobility with supervision, transfers with CGA, and ambulates x 115' with FWW and CGA. Increased vc required with increased distance of ambulation due to fatigue  "and increase in R knee pain. Strong effort with all TE. Cont to recommend IPR at dc.      Discharge Assessment:    Vital Signs  Visit Vitals  /65 (BP Location: Right arm, Patient Position: Lying)   Pulse 93   Temp 98.5 °F (36.9 °C) (Oral)   Resp 18   Ht 175.3 cm (69\")   Wt 103 kg (227 lb)   LMP  (LMP Unknown) Comment: LAST PAP 3/18 BY DR SANDERS   SpO2 95%   Breastfeeding No   BMI 33.52 kg/m²     Temp (24hrs), Av.4 °F (36.9 °C), Min:97.7 °F (36.5 °C), Max:98.7 °F (37.1 °C)      General Appearance:    Alert, cooperative, in no acute distress   Lungs:     Clear to auscultation,respirations regular, even and unlabored    Heart:    Regular rhythm and normal rate, normal S1 and S2   Abdomen:     Normal bowel sounds, no masses, no organomegaly, soft non-tender, non-distended, no guarding, no rebound tenderness   Extremities:   CDI dressing surgical knee, Right. ACB cath present, arrow pump. Moves all extremities well, no edema, no cyanosis, no redness   Pulses:   Pulses palpable and equal bilaterally   Skin:   No bleeding, bruising or rash   Neurologic:   Cranial nerves 2 - 12 grossly intact, sensation intact, Flexion and dorsiflexion intact bilateral feet.       Discharge Disposition: [place]    Discharge Medications:     Discharge Medications      New Medications      Instructions Start Date   HYDROcodone-acetaminophen 7.5-325 MG per tablet  Commonly known as: Norco   1 tablet, Oral, Every 6 Hours PRN      polyethylene glycol 17 g packet  Commonly known as: MIRALAX   17 g, Oral, Daily      Ropivacine HCl-NaCl  Commonly known as: NAROPIN  Notes to patient: continuous   10 mL/hr (20 mg/hr), Peripheral Nerve, Continuous      sennosides-docusate 8.6-50 MG per tablet  Commonly known as: PERICOLACE   1 tablet, Oral, Daily      sodium chloride 1 g tablet   1 g, Oral, 2 Times Daily With Meals, Until Na normal         Changes to Medications      Instructions Start Date   aspirin 81 MG EC tablet  What changed: additional " instructions   81 mg, Oral, Daily, Resume in 1 month      aspirin 81 MG EC tablet  What changed: You were already taking a medication with the same name, and this prescription was added. Make sure you understand how and when to take each.   81 mg, Oral, Every 12 Hours Scheduled, For 1 month      hydrOXYzine 25 MG tablet  Commonly known as: ATARAX  What changed: how much to take   25 mg, Oral, Nightly PRN, at bedtime as needed      ibuprofen 600 MG tablet  Commonly known as: ADVIL,MOTRIN  What changed: additional instructions   600 mg, Oral, Every 8 Hours PRN, Must take an hour after aspirin if needed.      tamoxifen 20 MG chemo tablet  Commonly known as: NOLVADEX  What changed: additional instructions   TAKE ONE TABLET BY MOUTH DAILY         Continue These Medications      Instructions Start Date   albuterol sulfate  (90 Base) MCG/ACT inhaler  Commonly known as: ProAir HFA   2 puffs, Inhalation, Every 6 Hours PRN      amLODIPine 5 MG tablet  Commonly known as: NORVASC   TAKE ONE TABLET BY MOUTH DAILY      Arnuity Ellipta 100 MCG/ACT aerosol powder   Generic drug: Fluticasone Furoate   2 puffs, Inhalation, Daily      citalopram 40 MG tablet  Commonly known as: CeleXA   60 mg, Oral, Daily      Dymista 137-50 MCG/ACT suspension  Generic drug: Azelastine-Fluticasone   2 Squirts, Nasal, Daily      EpiPen 2-Randall 0.3 MG/0.3ML solution auto-injector injection  Generic drug: EPINEPHrine   1 Units, Intramuscular, As Needed, as directed; For: Allergic rhinitis      levothyroxine 200 MCG tablet  Commonly known as: SYNTHROID, LEVOTHROID   200 mcg, Oral, Daily      levothyroxine 50 MCG tablet  Commonly known as: Synthroid   50 mcg, Oral, Daily      losartan 100 MG tablet  Commonly known as: COZAAR   100 mg, Oral, Daily      MAGNESIUM OXIDE PO   500 mg, Oral, Daily      Melatonin 10 MG tablet   1 tablet, Oral, Nightly PRN      montelukast 10 MG tablet  Commonly known as: SINGULAIR   10 mg, Oral, Nightly      Mucinex 600 MG  12 hr tablet  Generic drug: guaiFENesin   1,200 mg, Oral, 2 Times Daily      pantoprazole 40 MG EC tablet  Commonly known as: PROTONIX   40 mg, Oral, 2 Times Daily      Potassium 99 MG tablet   1 tablet, Oral, Daily      vitamin B-12 1000 MCG tablet  Commonly known as: CYANOCOBALAMIN   1,000 mcg, Oral, Daily      VITAMIN D PO   2,000 Units, Oral, Daily, Vitamin D 1000 UNIT CAPS; TAKE 1 TABLET DAILY         Stop These Medications    ARIPiprazole 5 MG tablet  Commonly known as: ABILIFY     diphenhydrAMINE 25 mg capsule  Commonly known as: BENADRYL     diphenhydrAMINE 50 MG/ML injection  Commonly known as: BENADRYL     triamterene-hydrochlorothiazide 37.5-25 MG per tablet  Commonly known as: MAXZIDE-25            Discharge Diet: Regular diet      Activity at Discharge: WBAT RLE      Follow-up Appointments:  Dr. Cortes per his orders  [facility] [place] 10/11/2020    Discharge took over 30 min    CHRISTY Stahl  10/09/20  14:18 EDT

## 2020-11-24 RX ORDER — AMLODIPINE BESYLATE 5 MG/1
TABLET ORAL
Qty: 90 TABLET | Refills: 1 | Status: SHIPPED | OUTPATIENT
Start: 2020-11-24 | End: 2021-03-15

## 2020-11-24 RX ORDER — TAMOXIFEN CITRATE 20 MG/1
20 TABLET ORAL DAILY
Qty: 90 TABLET | Refills: 3 | Status: SHIPPED | OUTPATIENT
Start: 2020-11-24 | End: 2021-07-16 | Stop reason: SDUPTHER

## 2020-12-16 ENCOUNTER — OFFICE VISIT (OUTPATIENT)
Dept: ONCOLOGY | Facility: CLINIC | Age: 70
End: 2020-12-16

## 2020-12-16 VITALS
HEIGHT: 69 IN | DIASTOLIC BLOOD PRESSURE: 59 MMHG | RESPIRATION RATE: 18 BRPM | TEMPERATURE: 96.6 F | OXYGEN SATURATION: 97 % | SYSTOLIC BLOOD PRESSURE: 100 MMHG | HEART RATE: 88 BPM | WEIGHT: 208 LBS | BODY MASS INDEX: 30.81 KG/M2

## 2020-12-16 DIAGNOSIS — Z17.0 MALIGNANT NEOPLASM OF OVERLAPPING SITES OF RIGHT BREAST IN FEMALE, ESTROGEN RECEPTOR POSITIVE (HCC): Primary | ICD-10-CM

## 2020-12-16 DIAGNOSIS — C50.811 MALIGNANT NEOPLASM OF OVERLAPPING SITES OF RIGHT BREAST IN FEMALE, ESTROGEN RECEPTOR POSITIVE (HCC): Primary | ICD-10-CM

## 2020-12-16 PROCEDURE — 99213 OFFICE O/P EST LOW 20 MIN: CPT | Performed by: NURSE PRACTITIONER

## 2020-12-16 NOTE — PROGRESS NOTES
"      PROBLEM LIST:  1.  Left breast cancer  A.  Left mastectomy for well-differentiated invasive ductal breast cancer        stage T1b(2) N1a M0, stage IIA  B.  [place] lymph nodes negative but one positive intramammary node  C.  ER and GA positive and HER-2 negative with Oncotype DX recurrent score of 16  D.  Adjuvant tamoxifen started 9/27/17  2.  Atypical ductal hyperplasia of the right breast, status post right simple mastectomy  3.  Hypothyroidism after prior thyroid surgery for goiter  4.  Hypertension     Chief complaint: Here about adjuvant endocrine treatment for breast cancer          HISTORY OF PRESENT ILLNESS:   Ms. Raphael is here for follow up evaluation of breast cancer management. She continues on tamoxifen for adjuvant endocrine therapy and is tolerating it well. She had a right knee replacement in October and is doing physical therapy. She is feeling good. She denies any new complaints. She denies any cough, dyspnea, headaches, diplopia, or new long bone pain.       Past Medical History, Past Surgical History, Social History, Family History have been reviewed and are without significant changes except as mentioned.    Review of Systems   A comprehensive 14 point review of systems was performed and was negative except as mentioned.    Medications:  The current medication list was reviewed in the EMR    ALLERGIES:  Allergies not on file           /59 Comment: RUE  Pulse 88   Temp 96.6 °F (35.9 °C) (Temporal)   Resp 18   Ht 175.3 cm (69\")   Wt 94.3 kg (208 lb)   LMP  (LMP Unknown) Comment: LAST PAP 3/18 BY DR SANDERS  SpO2 97% Comment: RA  BMI 30.72 kg/m²   Vitals:    12/16/20 1053   PainSc:   3   PainLoc: Knee  Comment: Right          Performance Status: ECOG 0    General: well appearing, in no acute distress  HEENT: sclera anicteric, oropharynx clear  Lymphatics: no cervical, supraclavicular, or axillary adenopathy  Cardiovascular: regular rate and rhythm, no murmurs  Lungs: clear to " auscultation bilaterally  Abdomen: soft, nontender, nondistended.  No palpable organomegaly  Breasts: Her bilateral mastectomy incisions have healed well with no nodules or other evidence recurrence  Extremeties: no lower extremity edema  Skin: no rashes, lesions, bruising, or petechiae    RECENT LABS:   WBC   Date Value Ref Range Status   10/08/2020 10.43 3.40 - 10.80 10*3/mm3 Final     Hemoglobin   Date Value Ref Range Status   10/09/2020 11.7 (L) 12.0 - 15.9 g/dL Final     Hematocrit   Date Value Ref Range Status   10/09/2020 35.2 34.0 - 46.6 % Final     MCV   Date Value Ref Range Status   10/08/2020 85.9 79.0 - 97.0 fL Final     RDW   Date Value Ref Range Status   10/08/2020 12.3 12.3 - 15.4 % Final     MPV   Date Value Ref Range Status   10/08/2020 10.0 6.0 - 12.0 fL Final     Platelets   Date Value Ref Range Status   10/08/2020 197 140 - 450 10*3/mm3 Final     Immature Grans %   Date Value Ref Range Status   09/25/2020 0.0 0.0 - 0.5 % Final     Neutrophils, Absolute   Date Value Ref Range Status   09/25/2020 2.88 1.70 - 7.00 10*3/mm3 Final     Lymphocytes, Absolute   Date Value Ref Range Status   09/25/2020 1.99 0.70 - 3.10 10*3/mm3 Final     Monocytes, Absolute   Date Value Ref Range Status   09/25/2020 0.53 0.10 - 0.90 10*3/mm3 Final     Eosinophils, Absolute   Date Value Ref Range Status   09/25/2020 0.13 0.00 - 0.40 10*3/mm3 Final     Basophils, Absolute   Date Value Ref Range Status   09/25/2020 0.05 0.00 - 0.20 10*3/mm3 Final     Immature Grans, Absolute   Date Value Ref Range Status   09/25/2020 0.00 0.00 - 0.05 10*3/mm3 Final     nRBC   Date Value Ref Range Status   09/25/2020 0.0 0.0 - 0.2 /100 WBC Final       Glucose   Date Value Ref Range Status   10/09/2020 98 65 - 99 mg/dL Final     Sodium   Date Value Ref Range Status   10/09/2020 124 (L) 136 - 145 mmol/L Final     Potassium   Date Value Ref Range Status   10/09/2020 4.0 3.5 - 5.2 mmol/L Final     CO2   Date Value Ref Range Status   10/09/2020  21.0 (L) 22.0 - 29.0 mmol/L Final     Chloride   Date Value Ref Range Status   10/09/2020 96 (L) 98 - 107 mmol/L Final     Anion Gap   Date Value Ref Range Status   10/09/2020 7.0 5.0 - 15.0 mmol/L Final     Creatinine   Date Value Ref Range Status   10/09/2020 0.81 0.57 - 1.00 mg/dL Final     BUN   Date Value Ref Range Status   10/09/2020 18 8 - 23 mg/dL Final     BUN/Creatinine Ratio   Date Value Ref Range Status   10/09/2020 22.2 7.0 - 25.0 Final     Calcium   Date Value Ref Range Status   10/09/2020 8.2 (L) 8.6 - 10.5 mg/dL Final     eGFR Non  Amer   Date Value Ref Range Status   10/09/2020 70 >60 mL/min/1.73 Final     Alkaline Phosphatase   Date Value Ref Range Status   09/25/2020 84 39 - 117 U/L Final     Total Protein   Date Value Ref Range Status   09/25/2020 6.6 6.0 - 8.5 g/dL Final     ALT (SGPT)   Date Value Ref Range Status   09/25/2020 19 1 - 33 U/L Final     AST (SGOT)   Date Value Ref Range Status   09/25/2020 27 1 - 32 U/L Final     Total Bilirubin   Date Value Ref Range Status   09/25/2020 0.3 0.0 - 1.2 mg/dL Final     Albumin   Date Value Ref Range Status   09/25/2020 4.10 3.50 - 5.20 g/dL Final     Globulin   Date Value Ref Range Status   09/25/2020 2.5 gm/dL Final     A/G Ratio   Date Value Ref Range Status   01/14/2020 1.0 0.7 - 1.7 Final       No results found for: LDH, URICACID    Lab Results   Component Value Date    MSPIKE Not Observed 01/14/2020                 Impression:   1.  Breast cancer with low risk features that's doing well on adjuvant tamoxifen. I'll plan to treat her for 5 years until September 2022.     Plan:   1.  I'll continue her adjuvant tamoxifen unchanged.   2.  She will return here in 6 months. She has been instructed to contact us in the interm if any new symptoms arise.                    Janine George, [facility]    12/16/20           CC:

## 2021-01-14 RX ORDER — LOSARTAN POTASSIUM 100 MG/1
TABLET ORAL
Qty: 90 TABLET | Refills: 0 | Status: SHIPPED | OUTPATIENT
Start: 2021-01-14 | End: 2021-03-15 | Stop reason: SDUPTHER

## 2021-02-10 ENCOUNTER — OFFICE VISIT (OUTPATIENT)
Dept: INTERNAL MEDICINE | Facility: CLINIC | Age: 71
End: 2021-02-10

## 2021-02-10 VITALS
HEART RATE: 68 BPM | TEMPERATURE: 97.1 F | BODY MASS INDEX: 28.94 KG/M2 | DIASTOLIC BLOOD PRESSURE: 64 MMHG | WEIGHT: 196 LBS | SYSTOLIC BLOOD PRESSURE: 116 MMHG | RESPIRATION RATE: 18 BRPM

## 2021-02-10 DIAGNOSIS — I10 ESSENTIAL HYPERTENSION: ICD-10-CM

## 2021-02-10 DIAGNOSIS — E03.9 HYPOTHYROIDISM, UNSPECIFIED TYPE: ICD-10-CM

## 2021-02-10 DIAGNOSIS — G47.00 INSOMNIA, UNSPECIFIED TYPE: ICD-10-CM

## 2021-02-10 DIAGNOSIS — E78.5 HYPERLIPIDEMIA, UNSPECIFIED HYPERLIPIDEMIA TYPE: ICD-10-CM

## 2021-02-10 DIAGNOSIS — K21.9 GASTROESOPHAGEAL REFLUX DISEASE WITHOUT ESOPHAGITIS: Primary | ICD-10-CM

## 2021-02-10 LAB
ALBUMIN SERPL-MCNC: 3.8 G/DL (ref 3.5–5.2)
ALBUMIN/GLOB SERPL: 1.4 G/DL
ALP SERPL-CCNC: 65 U/L (ref 39–117)
ALT SERPL W P-5'-P-CCNC: 76 U/L (ref 1–33)
ANION GAP SERPL CALCULATED.3IONS-SCNC: 10.7 MMOL/L (ref 5–15)
AST SERPL-CCNC: 74 U/L (ref 1–32)
BILIRUB SERPL-MCNC: 0.4 MG/DL (ref 0–1.2)
BUN SERPL-MCNC: 17 MG/DL (ref 8–23)
BUN/CREAT SERPL: 17.3 (ref 7–25)
CALCIUM SPEC-SCNC: 10.1 MG/DL (ref 8.6–10.5)
CHLORIDE SERPL-SCNC: 103 MMOL/L (ref 98–107)
CHOLEST SERPL-MCNC: 165 MG/DL (ref 0–200)
CO2 SERPL-SCNC: 22.3 MMOL/L (ref 22–29)
CREAT SERPL-MCNC: 0.98 MG/DL (ref 0.57–1)
GFR SERPL CREATININE-BSD FRML MDRD: 56 ML/MIN/1.73
GLOBULIN UR ELPH-MCNC: 2.8 GM/DL
GLUCOSE SERPL-MCNC: 98 MG/DL (ref 65–99)
HDLC SERPL-MCNC: 41 MG/DL (ref 40–60)
LDLC SERPL CALC-MCNC: 102 MG/DL (ref 0–100)
LDLC/HDLC SERPL: 2.42 {RATIO}
POTASSIUM SERPL-SCNC: 3.6 MMOL/L (ref 3.5–5.2)
PROT SERPL-MCNC: 6.6 G/DL (ref 6–8.5)
SODIUM SERPL-SCNC: 136 MMOL/L (ref 136–145)
T4 FREE SERPL-MCNC: 1.56 NG/DL (ref 0.93–1.7)
TRIGL SERPL-MCNC: 124 MG/DL (ref 0–150)
TSH SERPL DL<=0.05 MIU/L-ACNC: 0.15 UIU/ML (ref 0.27–4.2)
VLDLC SERPL-MCNC: 22 MG/DL (ref 5–40)

## 2021-02-10 PROCEDURE — 99214 OFFICE O/P EST MOD 30 MIN: CPT | Performed by: INTERNAL MEDICINE

## 2021-02-10 PROCEDURE — 80053 COMPREHEN METABOLIC PANEL: CPT | Performed by: INTERNAL MEDICINE

## 2021-02-10 PROCEDURE — 84443 ASSAY THYROID STIM HORMONE: CPT | Performed by: INTERNAL MEDICINE

## 2021-02-10 PROCEDURE — 84439 ASSAY OF FREE THYROXINE: CPT | Performed by: INTERNAL MEDICINE

## 2021-02-10 PROCEDURE — 80061 LIPID PANEL: CPT | Performed by: INTERNAL MEDICINE

## 2021-02-10 PROCEDURE — 36415 COLL VENOUS BLD VENIPUNCTURE: CPT | Performed by: INTERNAL MEDICINE

## 2021-02-10 RX ORDER — DIPHENHYDRAMINE HYDROCHLORIDE 50 MG/ML
INJECTION INTRAMUSCULAR; INTRAVENOUS AS NEEDED
COMMUNITY
Start: 2021-01-04 | End: 2021-07-16

## 2021-02-10 RX ORDER — TRIAMTERENE AND HYDROCHLOROTHIAZIDE 37.5; 25 MG/1; MG/1
1 TABLET ORAL DAILY
COMMUNITY
End: 2021-04-07

## 2021-02-10 NOTE — PROGRESS NOTES
Chief Complaint   Patient presents with   • Follow-up     6 month follow up chronic medical problems       History of Present Illness      The patient presents for a follow-up related to GERD. The patient is on pantoprazole for her gastroesophageal reflux. The medication is taken on a regular basis and gives complete relief of the symptoms. She reports no abdominal pain, belching, chest pain, diarrhea, dysphagia, early satiety, heartburn, hoarseness, nausea, odynophagia, rectal bleeding, vomiting or weight loss. The GERD has no known aggravating factors.    The patient presents for a follow-up related to hyperlipidemia. She is following a low fat diet. She reports that she is exercising. She has lost over 30 pounds. She is not taking medication for hyperlipidemia. She denies shortness of breath, orthopnea, paroxysmal nocturnal dyspnea, dyspnea on exertion, edema, palpitations or syncope.    The patient presents for a follow-up related to hypertension. The patient reports that she has had no headaches or blurred vision. She states that she is taking her medication as prescribed. She is not having medication side effects.    The patient presents for a follow-up related to hypothyroidism. She reports a good energy level. She reports no hair loss, heat intolerance, cold intolerance, constipation or sweats. She is taking her medication as prescribed.    The patient presents for a follow-up related to insomnia. She denies feelings of depression and anxiety. There is no history of excessive caffeine usage. There is no history of nocturia. She is taking Melatonin. This has helped the patient sleep better.    The patient falls asleep easily and typically does not awaken at night. The patient does not snore and denies gasping or stopping breathing at night. She feels well rested during the day and she denies falling asleep while watching television, while driving or during conversation.       Review of Systems    NECK- Denies  Decreased Range of Motion, Stiffness, Thyroid Nodules, Enlarged Lymph Nodes or Goiter.    HENT- Denies Nasal Discharge, Sore Throat, Ear Pain, Ear Drainage, Sinus Pain, Nasal Congestion, Decreased Hearing or Tinnitus.    PULMONARY- Denies Wheezing, Sputum Production, Cough, Hemoptysis or Pleuritic Chest Pain.    CARDIOVASCULAR- Denies Claudication or Irregular Heart Beat.    Medications      Current Outpatient Medications:   •  albuterol (PROAIR HFA) 108 (90 Base) MCG/ACT inhaler, Inhale 2 puffs Every 6 (Six) Hours As Needed for Wheezing or Shortness of Air., Disp: 1 inhaler, Rfl: 5  •  amLODIPine (NORVASC) 5 MG tablet, TAKE ONE TABLET BY MOUTH DAILY, Disp: 90 tablet, Rfl: 1  •  aspirin 81 MG EC tablet, Take 1 tablet by mouth Daily. Resume in 1 month, Disp:  , Rfl:   •  Azelastine-Fluticasone (DYMISTA) 137-50 MCG/ACT suspension, 2 Squirts into the nostril(s) as directed by provider Daily., Disp: , Rfl:   •  Cholecalciferol (VITAMIN D PO), Take 2,000 Units by mouth Daily. Vitamin D 1000 UNIT CAPS; TAKE 1 TABLET DAILY, Disp: , Rfl:   •  citalopram (CeleXA) 40 MG tablet, Take 1.5 tablets by mouth Daily., Disp: 135 tablet, Rfl: 1  •  diphenhydrAMINE (BENADRYL) 50 MG/ML injection, As Needed., Disp: , Rfl:   •  Fluticasone Furoate (ARNUITY ELLIPTA) 100 MCG/ACT aerosol powder , Inhale 2 puffs Daily., Disp: , Rfl:   •  guaiFENesin (MUCINEX) 600 MG 12 hr tablet, Take 1,200 mg by mouth 2 (Two) Times a Day., Disp: , Rfl:   •  hydrOXYzine (ATARAX) 25 MG tablet, Take 1 tablet by mouth At Night As Needed for Allergies. at bedtime as needed (Patient taking differently: Take 50 mg by mouth At Night As Needed for Allergies. at bedtime as needed), Disp: 90 tablet, Rfl: 1  •  levothyroxine (Synthroid) 50 MCG tablet, Take 1 tablet by mouth Daily., Disp: 90 tablet, Rfl: 1  •  levothyroxine (SYNTHROID, LEVOTHROID) 200 MCG tablet, Take 1 tablet by mouth Daily., Disp: 90 tablet, Rfl: 1  •  losartan (COZAAR) 100 MG tablet, TAKE ONE TABLET  BY MOUTH DAILY, Disp: 90 tablet, Rfl: 0  •  MAGNESIUM OXIDE PO, Take 500 mg by mouth Daily., Disp: , Rfl:   •  Melatonin 10 MG tablet, Take 1 tablet by mouth At Night As Needed (sleep)., Disp: , Rfl:   •  montelukast (SINGULAIR) 10 MG tablet, Take 10 mg by mouth Every Night., Disp: , Rfl:   •  pantoprazole (PROTONIX) 40 MG EC tablet, Take 1 tablet by mouth 2 (Two) Times a Day., Disp: 180 tablet, Rfl: 1  •  polyethylene glycol (MIRALAX) 17 g packet, Take 17 g by mouth Daily., Disp:  , Rfl:   •  Potassium 99 MG tablet, Take 1 tablet by mouth Daily., Disp: , Rfl:   •  tamoxifen (NOLVADEX) 20 MG chemo tablet, Take 1 tablet by mouth Daily. Swallow whole. Do not crush or chew., Disp: 90 tablet, Rfl: 3  •  triamterene-hydrochlorothiazide (MAXZIDE-25) 37.5-25 MG per tablet, Take 1 tablet by mouth Daily., Disp: , Rfl:   •  vitamin B-12 (CYANOCOBALAMIN) 1000 MCG tablet, Take 1,000 mcg by mouth Daily., Disp: , Rfl:   •  EPINEPHrine (EPIPEN 2-KIRA) 0.3 MG/0.3ML solution auto-injector injection, Inject 1 Units into the shoulder, thigh, or buttocks As Needed (allergy). as directed; For: Allergic rhinitis, Disp: , Rfl:      Allergies    Allergies   Allergen Reactions   • Nuts Shortness Of Breath     rash   • Other      MSG - HIVES    • Penicillins Shortness Of Breath     rash   • Soybean-Containing Drug Products Shortness Of Breath     rash   • Sunflower Oil Shortness Of Breath     rash   • Cefuroxime      Stomach problems   • Cephalexin      Stomach problems   • Chicken Allergy    • Clindamycin Nausea Only and Nausea And Vomiting   • Erythromycin Nausea Only   • Flu Virus Vaccine      Redness and red rash to area   • Gluten Meal    • Metronidazole Hives   • Naproxen Dizziness   • Parsley Seed    • Thimerosal      Redness and swelling at site of injection   • Varicella Virus Vaccine Live      Redness and swelling at site   • Zostavax [Zoster Vaccine Live]      14811 UNT /0.65 ML subcutaneous solution Recontituted  - redness and  swelling at site   • Apple Rash   • Doxycycline Itching and Rash   • Sulfa Antibiotics Nausea Only and Rash     Other reaction(s): Headache, Vomiting       Problem List    Patient Active Problem List   Diagnosis   • Anxiety   • Gastroesophageal reflux disease without esophagitis   • Hyperlipidemia   • Hypertension   • Hypothyroidism   • Cataract   • Cancer of overlapping sites of right female breast (CMS/HCC)   • Estrogen receptor positive   • Arthritis of right knee   • Status post total right knee replacement   • Elevated hemoglobin A1c   • Hyponatremia   • Acute postoperative pain       Medications, Allergies, Problems List and Past History were reviewed and updated.    Physical Examination    /64 (BP Location: Right arm, Patient Position: Sitting, Cuff Size: Adult)   Pulse 68   Temp 97.1 °F (36.2 °C) (Infrared)   Resp 18   Wt 88.9 kg (196 lb)   LMP  (LMP Unknown) Comment: LAST PAP 3/18 BY DR SANDERS  Breastfeeding No   BMI 28.94 kg/m²       HEENT: Head- Normocephalic Atraumatic. Facies- Within normal limits.     Neck: Thyroid- non enlarged, symmetric and has no nodules. No bruits are detected.    Lungs: Auscultation- Clear to auscultation bilaterally. There are no retractions, clubbing or cyanosis. The Expiratory to Inspiratory ratio is equal.    Cardiovascular: There are no carotid bruits. Heart- Normal Rate with Regular rhythm and no murmurs. There are no gallops. There are no rubs. In the lower extremities there is no edema. The upper extremities do not have edema.    Abdomen: Soft, benign, non-tender with no masses, hernias, organomegaly or scars.    Impression and Assessment    Gastroesophageal Reflux Disease.    Hyperlipidemia.    Essential Hypertension.    Hypothyroidism.    Insomnia.    Plan    Gastroesophageal Reflux Disease Plan: The current plan was continued.    Hyperlipidemia Plan: The patient was instructed to exercise daily and eat a low fat diet.    Essential Hypertension Plan: The  patient was instructed to continue the current medications.    Hypothyroidism Plan: The patient was instructed to continue the current medications.    Insomnia Plan: The patient was instructed to continue the current medications.    Diagnoses and all orders for this visit:    1. Gastroesophageal reflux disease without esophagitis (Primary)    2. Essential hypertension  -     Comprehensive Metabolic Panel    3. Hyperlipidemia, unspecified hyperlipidemia type  -     Comprehensive Metabolic Panel  -     Lipid Panel    4. Hypothyroidism, unspecified type  -     TSH  -     T4, Free    5. Insomnia, unspecified type        Return to Office    The patient was instructed to return for follow-up in 6 months. Next Visit: Physical Examination. The patient was instructed to return sooner if the condition changes, worsens, or does not resolve.

## 2021-03-04 ENCOUNTER — TELEPHONE (OUTPATIENT)
Dept: INTERNAL MEDICINE | Facility: CLINIC | Age: 71
End: 2021-03-04

## 2021-03-04 NOTE — TELEPHONE ENCOUNTER
S/W pt, informed that Dr Watters said to stop her amlodipine and to monitor and log her BP's over the next week and then call up with her results and how she is doing.  Verb good understanding, agreement and apprec.

## 2021-03-04 NOTE — TELEPHONE ENCOUNTER
Patient called states she has been having dizzy spells and fell today morning, her BP when she checked was 76/60, she thinks it may have to do with her BP medication since she is still taking the same amount as before she lost weight, and mentioned that Dr. Recinos told her he might need to reduce her BP meds due to weight loss and diet, please advise   Thank you

## 2021-03-04 NOTE — TELEPHONE ENCOUNTER
Please ask her to stop the amlodipine and keep track of her BPs over the next week and call me with an update.  Tyrese Recinos MD  17:08 EST  03/04/21

## 2021-03-11 ENCOUNTER — TELEPHONE (OUTPATIENT)
Dept: INTERNAL MEDICINE | Facility: CLINIC | Age: 71
End: 2021-03-11

## 2021-03-11 NOTE — TELEPHONE ENCOUNTER
PATIENT CALLED TO GIVE BLOOD PRESSURE READINGS    3-7   92/66  3-8   109/71  3-9   89/60  3-10 112/68  3-11 88/58

## 2021-03-14 NOTE — TELEPHONE ENCOUNTER
These are a bit low.  Please ask her to stop the amlodipine.  Tyrese Recinos MD  16:47 EDT  03/14/21

## 2021-03-15 NOTE — TELEPHONE ENCOUNTER
Patient states she has been off the amlodapine for a week. She is wondering why she is still running low BPs

## 2021-03-15 NOTE — TELEPHONE ENCOUNTER
S/W pt, informed that Dr Watters states most likely due to her wt loss and that he has removed Amlodipine from her medlist but that he also wants her to decrease her Losartan to 50 mg which would be 1/2 tablet. Verb good understanding and agreement.  Inst to call if continues to have problems.  Agreed.

## 2021-03-15 NOTE — TELEPHONE ENCOUNTER
It is likely due to her weight loss.    I have removed amlodipine from her list.     Please decrease losartan to 50mg (1/2 tablet) daily.     Tyrese Recinos MD  17:25 EDT  03/15/21

## 2021-03-16 RX ORDER — LOSARTAN POTASSIUM 100 MG/1
50 TABLET ORAL DAILY
Qty: 90 TABLET | Refills: 0
Start: 2021-03-16 | End: 2021-04-21

## 2021-04-07 RX ORDER — LEVOTHYROXINE SODIUM 0.05 MG/1
TABLET ORAL
Qty: 90 TABLET | Refills: 1 | Status: SHIPPED | OUTPATIENT
Start: 2021-04-07 | End: 2021-08-09 | Stop reason: SDUPTHER

## 2021-04-07 RX ORDER — TRIAMTERENE AND HYDROCHLOROTHIAZIDE 37.5; 25 MG/1; MG/1
TABLET ORAL
Qty: 90 TABLET | Refills: 1 | Status: SHIPPED | OUTPATIENT
Start: 2021-04-07 | End: 2021-04-21

## 2021-04-13 ENCOUNTER — TELEPHONE (OUTPATIENT)
Dept: INTERNAL MEDICINE | Facility: CLINIC | Age: 71
End: 2021-04-13

## 2021-04-13 NOTE — TELEPHONE ENCOUNTER
S/W pt, states she is still having B/P issues.  States she has held her Losartan x 1 week now and her B/P today was 85/55.  States her highest reading was 123/88 and her lowest reading was 79/55.  States her biggest concern with readings is that she has dizziness and gets very lightheaded when getting up. States she is still taking Maxide daily. Expl will relay info to Dr Watters and let her know what he recommends.  Verb apprec.      States also wants to let us know that she received the [employer] vaccine on Friday 4/9.  Chart updated.

## 2021-04-13 NOTE — TELEPHONE ENCOUNTER
Caller: Sarah Raphael    Relationship: Self    Best call back number: [phone]    What is the best time to reach you: ASAP    Who are you requesting to speak with (clinical staff, provider,  specific staff member): DR BUCKLEY OR MALCOLM      What was the call regarding: PT IS REQUESTING A CALL BACK TO DISCUSS BLOOD PRESSURE MEDICATION  losartan (COZAAR) 100 MG tablet    PT DID NOT GO INTO FURTHER DETAIL    Do you require a callback: YES

## 2021-04-13 NOTE — TELEPHONE ENCOUNTER
S/W pt, informed that Dr Watters said to stop the Dyazide and stay off the Losartan and schedule an appt in a week.  Verb good understanding and agreement.  Appt scheduled for 4/21 at 11:15am with Dr Watters.  Verb great apprec.   Xenograft Text: The defect edges were debeveled with a #15 scalpel blade.  Given the location of the defect, shape of the defect and the proximity to free margins a xenograft was deemed most appropriate.  The graft was then trimmed to fit the size of the defect.  The graft was then placed in the primary defect and oriented appropriately.

## 2021-04-13 NOTE — TELEPHONE ENCOUNTER
Stop the dyazide and stay off losartan.  Schedule a follow-up with me in 1 week for BP check  Tyrese Recinos MD  17:14 EDT  04/13/21

## 2021-04-21 ENCOUNTER — OFFICE VISIT (OUTPATIENT)
Dept: INTERNAL MEDICINE | Facility: CLINIC | Age: 71
End: 2021-04-21

## 2021-04-21 VITALS
SYSTOLIC BLOOD PRESSURE: 112 MMHG | BODY MASS INDEX: 27.76 KG/M2 | DIASTOLIC BLOOD PRESSURE: 62 MMHG | RESPIRATION RATE: 16 BRPM | TEMPERATURE: 97.3 F | HEART RATE: 64 BPM | WEIGHT: 188 LBS

## 2021-04-21 DIAGNOSIS — I10 ESSENTIAL HYPERTENSION: Primary | ICD-10-CM

## 2021-04-21 PROCEDURE — 99212 OFFICE O/P EST SF 10 MIN: CPT | Performed by: INTERNAL MEDICINE

## 2021-04-21 NOTE — PROGRESS NOTES
Chief Complaint   Patient presents with   • Follow-up     Follow up B/P       History of Present Illness    The patient presents for a follow-up related to hypertension. The patient reports that she has had no headaches, chest pain, dyspnea, edema, syncope, blurred vision or palpitations. She states that she does not take medication.    Review of Systems    PULMONARY- Denies Wheezing, Sputum Production, Cough, Hemoptysis or Pleuritic Chest Pain.    CARDIOVASCULAR- Denies Claudication or Irregular Heart Beat.    Medications      Current Outpatient Medications:   •  albuterol (PROAIR HFA) 108 (90 Base) MCG/ACT inhaler, Inhale 2 puffs Every 6 (Six) Hours As Needed for Wheezing or Shortness of Air., Disp: 1 inhaler, Rfl: 5  •  aspirin 81 MG EC tablet, Take 1 tablet by mouth Daily. Resume in 1 month, Disp:  , Rfl:   •  Azelastine-Fluticasone (DYMISTA) 137-50 MCG/ACT suspension, 2 Squirts into the nostril(s) as directed by provider Daily., Disp: , Rfl:   •  Cholecalciferol (VITAMIN D PO), Take 2,000 Units by mouth Daily. Vitamin D 1000 UNIT CAPS; TAKE 1 TABLET DAILY, Disp: , Rfl:   •  citalopram (CeleXA) 40 MG tablet, Take 1.5 tablets by mouth Daily., Disp: 135 tablet, Rfl: 1  •  diphenhydrAMINE (BENADRYL) 50 MG/ML injection, As Needed., Disp: , Rfl:   •  Fluticasone Furoate (ARNUITY ELLIPTA) 100 MCG/ACT aerosol powder , Inhale 2 puffs Daily., Disp: , Rfl:   •  guaiFENesin (MUCINEX) 600 MG 12 hr tablet, Take 1,200 mg by mouth 2 (Two) Times a Day., Disp: , Rfl:   •  hydrOXYzine (ATARAX) 25 MG tablet, Take 1 tablet by mouth At Night As Needed for Allergies. at bedtime as needed (Patient taking differently: Take 50 mg by mouth At Night As Needed for Allergies. at bedtime as needed), Disp: 90 tablet, Rfl: 1  •  levothyroxine (SYNTHROID, LEVOTHROID) 200 MCG tablet, Take 1 tablet by mouth Daily., Disp: 90 tablet, Rfl: 1  •  levothyroxine (SYNTHROID, LEVOTHROID) 50 MCG tablet, TAKE ONE TABLET BY MOUTH DAILY, Disp: 90 tablet,  Rfl: 1  •  MAGNESIUM OXIDE PO, Take 500 mg by mouth Daily., Disp: , Rfl:   •  Melatonin 10 MG tablet, Take 1 tablet by mouth At Night As Needed (sleep)., Disp: , Rfl:   •  montelukast (SINGULAIR) 10 MG tablet, Take 10 mg by mouth Every Night., Disp: , Rfl:   •  pantoprazole (PROTONIX) 40 MG EC tablet, Take 1 tablet by mouth 2 (Two) Times a Day., Disp: 180 tablet, Rfl: 1  •  polyethylene glycol (MIRALAX) 17 g packet, Take 17 g by mouth Daily., Disp:  , Rfl:   •  tamoxifen (NOLVADEX) 20 MG chemo tablet, Take 1 tablet by mouth Daily. Swallow whole. Do not crush or chew., Disp: 90 tablet, Rfl: 3  •  vitamin B-12 (CYANOCOBALAMIN) 1000 MCG tablet, Take 1,000 mcg by mouth Daily., Disp: , Rfl:   •  EPINEPHrine (EPIPEN 2-KIRA) 0.3 MG/0.3ML solution auto-injector injection, Inject 1 Units into the shoulder, thigh, or buttocks As Needed (allergy). as directed; For: Allergic rhinitis, Disp: , Rfl:      Allergies    Allergies   Allergen Reactions   • Nuts Shortness Of Breath     rash   • Other      MSG - HIVES    • Penicillins Shortness Of Breath     rash   • Soybean-Containing Drug Products Shortness Of Breath     rash   • Sunflower Oil Shortness Of Breath     rash   • Cefuroxime      Stomach problems   • Cephalexin      Stomach problems   • Chicken Allergy    • Clindamycin Nausea Only and Nausea And Vomiting   • Erythromycin Nausea Only   • Flu Virus Vaccine      Redness and red rash to area   • Gluten Meal    • Metronidazole Hives   • Naproxen Dizziness   • Parsley Seed    • Thimerosal      Redness and swelling at site of injection   • Varicella Virus Vaccine Live      Redness and swelling at site   • Zostavax [Zoster Vaccine Live]      87680 UNT /0.65 ML subcutaneous solution Recontituted  - redness and swelling at site   • Apple Rash   • Doxycycline Itching and Rash   • Sulfa Antibiotics Nausea Only and Rash     Other reaction(s): Headache, Vomiting       Problem List    Patient Active Problem List   Diagnosis   • Anxiety    • Gastroesophageal reflux disease without esophagitis   • Hyperlipidemia   • Hypertension   • Hypothyroidism   • Cataract   • Cancer of overlapping sites of right female breast (CMS/HCC)   • Estrogen receptor positive   • Arthritis of right knee   • Status post total right knee replacement   • Elevated hemoglobin A1c   • Hyponatremia   • Acute postoperative pain       Medications, Allergies, Problems List and Past History were reviewed and updated.    Physical Examination    /62 (BP Location: Right arm, Patient Position: Sitting, Cuff Size: Adult)   Pulse 64   Temp 97.3 °F (36.3 °C) (Infrared)   Resp 16   Wt 85.3 kg (188 lb)   LMP  (LMP Unknown) Comment: LAST PAP 3/18 BY DR SANDERS  Breastfeeding No   BMI 27.76 kg/m²       Neck: Thyroid- non enlarged, symmetric and has no nodules. No bruits are detected.    Lungs: Auscultation- Clear to auscultation bilaterally. There are no retractions, clubbing or cyanosis. The Expiratory to Inspiratory ratio is equal.    Cardiovascular: There are no carotid bruits. Heart- Normal Rate with Regular rhythm and no murmurs. There are no gallops. There are no rubs. In the lower extremities there is no edema. The upper extremities do not have edema.    Impression and Assessment    Essential Hypertension.    Plan    Essential Hypertension Plan: The condition is stable. No change was made in the current plan.    Diagnoses and all orders for this visit:    1. Essential hypertension (Primary)        Return to Office    The patient was instructed to return for follow-up at the next scheduled visit. The patient was instructed to return sooner if the condition changes, worsens, or does not resolve.

## 2021-05-10 ENCOUNTER — TELEPHONE (OUTPATIENT)
Dept: INTERNAL MEDICINE | Facility: CLINIC | Age: 71
End: 2021-05-10

## 2021-05-10 DIAGNOSIS — K21.9 GASTROESOPHAGEAL REFLUX DISEASE WITHOUT ESOPHAGITIS: ICD-10-CM

## 2021-05-10 RX ORDER — CLINDAMYCIN HYDROCHLORIDE 300 MG/1
CAPSULE ORAL
Qty: 2 CAPSULE | Refills: 0 | Status: SHIPPED | OUTPATIENT
Start: 2021-05-10 | End: 2021-05-13

## 2021-05-10 NOTE — TELEPHONE ENCOUNTER
Caller: Sarah Raphael    Relationship: Self    Best call back number: [phone]    What is the best time to reach you: ANYTIME    Who are you requesting to speak with (clinical staff, provider,  specific staff member): DR. BUCKLEY    Do you know the name of the person who called: PATIENT    What was the call regarding: PATIENT IS HAVING A DENTAL PROCEDURE ON Wednesday.  THEY WANT HER TO TAKE AN ANTIBIOTIC PRIOR TO HER APPOINTMENT BUT BOTH OF THE ONES THEY HAVE SHE'S ALLERGIC TO.  SHE WANTS TO KNOW IF SHE SHOULD TAKE 1 DOSE OR IF THERE'S SOMETHING ELSE YOU COULD GIVE HER.    [facility] [address] [facility] [address] - [phone]  - [phone] FX        Do you require a callback: YES

## 2021-05-10 NOTE — TELEPHONE ENCOUNTER
She had nausea with clindamycin.  I would recommend this since it was an adverse reaction and not an allergy.  She could take 2 of the 300 mg tablets 1 hour prior to the procedure.  I have sent in this prescription.

## 2021-05-11 RX ORDER — PANTOPRAZOLE SODIUM 40 MG/1
TABLET, DELAYED RELEASE ORAL
Qty: 180 TABLET | Refills: 1 | Status: SHIPPED | OUTPATIENT
Start: 2021-05-11 | End: 2021-09-15

## 2021-05-12 ENCOUNTER — TELEPHONE (OUTPATIENT)
Dept: INTERNAL MEDICINE | Facility: CLINIC | Age: 71
End: 2021-05-12

## 2021-05-13 RX ORDER — CLINDAMYCIN HYDROCHLORIDE 300 MG/1
CAPSULE ORAL
Qty: 2 CAPSULE | Refills: 0 | Status: SHIPPED | OUTPATIENT
Start: 2021-05-13 | End: 2021-05-17 | Stop reason: SDUPTHER

## 2021-05-17 RX ORDER — CLINDAMYCIN HYDROCHLORIDE 300 MG/1
CAPSULE ORAL
Qty: 2 CAPSULE | Refills: 1 | Status: SHIPPED | OUTPATIENT
Start: 2021-05-17 | End: 2021-07-16

## 2021-05-17 NOTE — TELEPHONE ENCOUNTER
Pt states she took rx last week for initial dental visit and it did not have any refills on it.  States she has to have more dental work this week and needs a refill.

## 2021-06-03 RX ORDER — CITALOPRAM 40 MG/1
TABLET ORAL
Qty: 135 TABLET | Refills: 0 | Status: SHIPPED | OUTPATIENT
Start: 2021-06-03 | End: 2021-09-09

## 2021-07-16 ENCOUNTER — OFFICE VISIT (OUTPATIENT)
Dept: ONCOLOGY | Facility: CLINIC | Age: 71
End: 2021-07-16

## 2021-07-16 VITALS
TEMPERATURE: 97.9 F | SYSTOLIC BLOOD PRESSURE: 173 MMHG | BODY MASS INDEX: 26.81 KG/M2 | WEIGHT: 181 LBS | DIASTOLIC BLOOD PRESSURE: 65 MMHG | HEIGHT: 69 IN | HEART RATE: 64 BPM | OXYGEN SATURATION: 95 % | RESPIRATION RATE: 16 BRPM

## 2021-07-16 DIAGNOSIS — Z17.0 MALIGNANT NEOPLASM OF OVERLAPPING SITES OF RIGHT BREAST IN FEMALE, ESTROGEN RECEPTOR POSITIVE (HCC): Primary | ICD-10-CM

## 2021-07-16 DIAGNOSIS — C50.811 MALIGNANT NEOPLASM OF OVERLAPPING SITES OF RIGHT BREAST IN FEMALE, ESTROGEN RECEPTOR POSITIVE (HCC): Primary | ICD-10-CM

## 2021-07-16 DIAGNOSIS — I89.0 LYMPH EDEMA: ICD-10-CM

## 2021-07-16 PROCEDURE — 99214 OFFICE O/P EST MOD 30 MIN: CPT | Performed by: NURSE PRACTITIONER

## 2021-07-16 RX ORDER — DIPHENHYDRAMINE HYDROCHLORIDE 50 MG/ML
INJECTION INTRAMUSCULAR; INTRAVENOUS
Qty: 10 ML | Refills: 3 | Status: SHIPPED | OUTPATIENT
Start: 2021-07-16 | End: 2021-08-25

## 2021-07-16 RX ORDER — TAMOXIFEN CITRATE 20 MG/1
20 TABLET ORAL DAILY
Qty: 90 TABLET | Refills: 3 | Status: SHIPPED | OUTPATIENT
Start: 2021-07-16 | End: 2022-01-10 | Stop reason: SDUPTHER

## 2021-07-16 RX ORDER — TRIAMTERENE AND HYDROCHLOROTHIAZIDE 37.5; 25 MG/1; MG/1
TABLET ORAL
COMMUNITY
Start: 2021-05-15 | End: 2021-09-15

## 2021-07-16 NOTE — PROGRESS NOTES
"      PROBLEM LIST:  1.  Left breast cancer  A.  Left mastectomy for well-differentiated invasive ductal breast cancer        stage T1b(2) N1a M0, stage IIA  B.  [place] lymph nodes negative but one positive intramammary node  C.  ER and AR positive and HER-2 negative with Oncotype DX recurrent score of 16  D.  Adjuvant tamoxifen started 9/27/17  2.  Atypical ductal hyperplasia of the right breast, status post right simple mastectomy  3.  Hypothyroidism after prior thyroid surgery for goiter  4.  Hypertension     Chief complaint: Here about adjuvant endocrine treatment for breast cancer          HISTORY OF PRESENT ILLNESS:   Ms. Raphael is here for follow up evaluation of breast cancer management.  She continues on tamoxifen and is tolerating it well.  She complains of fluctuating left upper extremity edema since February.  She denies any pain or redness in the left upper extremity.  She has noticed the swelling from the left upper extremity at times does extend into the left chest wall and is painful.  She denies any long bone pain, cough, dyspnea, headaches or diplopia.        Past Medical History, Past Surgical History, Social History, Family History have been reviewed and are without significant changes except as mentioned.    Review of Systems   A comprehensive 14 point review of systems was performed and was negative except as mentioned.    Medications:  The current medication list was reviewed in the EMR    ALLERGIES:  Allergies not on file           /65   Pulse 64   Temp 97.9 °F (36.6 °C)   Resp 16   Ht 175.3 cm (69\")   Wt 82.1 kg (181 lb)   LMP  (LMP Unknown) Comment: LAST PAP 3/18 BY DR SANDERS  SpO2 95%   BMI 26.73 kg/m²   Vitals:    07/16/21 1053   PainSc: 0-No pain          Performance Status: ECOG 0    General: well appearing, in no acute distress  HEENT: sclera anicteric, oropharynx clear  Lymphatics: no cervical, supraclavicular, or axillary adenopathy  Cardiovascular: regular rate and " rhythm, no murmurs  Lungs: clear to auscultation bilaterally  Abdomen: soft, nontender, nondistended.  No palpable organomegaly  Breasts: Bilateral well-healed mastectomy incisions with no masses or skin changes  Extremeties: no lower extremity edema.  Mild left upper extremity lymphedema noted  Skin: no rashes, lesions, bruising, or petechiae    RECENT LABS:   WBC   Date Value Ref Range Status   10/08/2020 10.43 3.40 - 10.80 10*3/mm3 Final     Hemoglobin   Date Value Ref Range Status   10/09/2020 11.7 (L) 12.0 - 15.9 g/dL Final     Hematocrit   Date Value Ref Range Status   10/09/2020 35.2 34.0 - 46.6 % Final     MCV   Date Value Ref Range Status   10/08/2020 85.9 79.0 - 97.0 fL Final     RDW   Date Value Ref Range Status   10/08/2020 12.3 12.3 - 15.4 % Final     MPV   Date Value Ref Range Status   10/08/2020 10.0 6.0 - 12.0 fL Final     Platelets   Date Value Ref Range Status   10/08/2020 197 140 - 450 10*3/mm3 Final     Immature Grans %   Date Value Ref Range Status   09/25/2020 0.0 0.0 - 0.5 % Final     Neutrophils, Absolute   Date Value Ref Range Status   09/25/2020 2.88 1.70 - 7.00 10*3/mm3 Final     Lymphocytes, Absolute   Date Value Ref Range Status   09/25/2020 1.99 0.70 - 3.10 10*3/mm3 Final     Monocytes, Absolute   Date Value Ref Range Status   09/25/2020 0.53 0.10 - 0.90 10*3/mm3 Final     Eosinophils, Absolute   Date Value Ref Range Status   09/25/2020 0.13 0.00 - 0.40 10*3/mm3 Final     Basophils, Absolute   Date Value Ref Range Status   09/25/2020 0.05 0.00 - 0.20 10*3/mm3 Final     Immature Grans, Absolute   Date Value Ref Range Status   09/25/2020 0.00 0.00 - 0.05 10*3/mm3 Final     nRBC   Date Value Ref Range Status   09/25/2020 0.0 0.0 - 0.2 /100 WBC Final       Glucose   Date Value Ref Range Status   02/10/2021 98 65 - 99 mg/dL Final     Sodium   Date Value Ref Range Status   02/10/2021 136 136 - 145 mmol/L Final     Potassium   Date Value Ref Range Status   02/10/2021 3.6 3.5 - 5.2 mmol/L Final      CO2   Date Value Ref Range Status   02/10/2021 22.3 22.0 - 29.0 mmol/L Final     Chloride   Date Value Ref Range Status   02/10/2021 103 98 - 107 mmol/L Final     Anion Gap   Date Value Ref Range Status   02/10/2021 10.7 5.0 - 15.0 mmol/L Final     Creatinine   Date Value Ref Range Status   02/10/2021 0.98 0.57 - 1.00 mg/dL Final     BUN   Date Value Ref Range Status   02/10/2021 17 8 - 23 mg/dL Final     BUN/Creatinine Ratio   Date Value Ref Range Status   02/10/2021 17.3 7.0 - 25.0 Final     Calcium   Date Value Ref Range Status   02/10/2021 10.1 8.6 - 10.5 mg/dL Final     eGFR Non  Amer   Date Value Ref Range Status   02/10/2021 56 (L) >60 mL/min/1.73 Final     Alkaline Phosphatase   Date Value Ref Range Status   02/10/2021 65 39 - 117 U/L Final     Total Protein   Date Value Ref Range Status   02/10/2021 6.6 6.0 - 8.5 g/dL Final     ALT (SGPT)   Date Value Ref Range Status   02/10/2021 76 (H) 1 - 33 U/L Final     AST (SGOT)   Date Value Ref Range Status   02/10/2021 74 (H) 1 - 32 U/L Final     Total Bilirubin   Date Value Ref Range Status   02/10/2021 0.4 0.0 - 1.2 mg/dL Final     Albumin   Date Value Ref Range Status   02/10/2021 3.80 3.50 - 5.20 g/dL Final     Globulin   Date Value Ref Range Status   02/10/2021 2.8 gm/dL Final     A/G Ratio   Date Value Ref Range Status   01/14/2020 1.0 0.7 - 1.7 Final       No results found for: LDH, URICACID    Lab Results   Component Value Date    MSPIKE Not Observed 01/14/2020                 Impression:   1.  Breast cancer with low risk features that's doing well on adjuvant tamoxifen.  The original plan was to treat her for 5 years until September 2022.  We had an at length discussion regarding extended adjuvant endocrine therapy.  She would like to have breast cancer index testing completed to see if she is a candidate for extended endocrine therapy.  I will contact her with results when they are available and we can make further decisions at that time.  2.   Left upper extremity lymphedema.  Referral made to Occupational Therapy for evaluation and treatment of lymphedema.    Plan:   1.  I'll continue her adjuvant tamoxifen unchanged.  Refill sent to pharmacy today.  2.  Occupational Therapy referral for lymphedema.   3.  Breast cancer index testing.  4.  Follow-up in 6 months.                 I spent 37 minutes caring for Sarah on this date of service. This time includes time spent by me in the following activities: preparing for the visit, obtaining and/or reviewing a separately obtained history, performing a medically appropriate examination and/or evaluation, counseling and educating the patient/family/caregiver, ordering medications, tests, or procedures and documenting information in the medical record      CHRISTY Onofre  [facility]    07/16/21           CC:

## 2021-07-20 ENCOUNTER — HOSPITAL ENCOUNTER (OUTPATIENT)
Dept: OCCUPATIONAL THERAPY | Facility: HOSPITAL | Age: 71
Setting detail: THERAPIES SERIES
Discharge: HOME | End: 2021-07-20

## 2021-07-20 DIAGNOSIS — C50.811 MALIGNANT NEOPLASM OF OVERLAPPING SITES OF RIGHT BREAST IN FEMALE, ESTROGEN RECEPTOR POSITIVE (HCC): Primary | ICD-10-CM

## 2021-07-20 DIAGNOSIS — I97.2 POSTMASTECTOMY LYMPHEDEMA SYNDROME OF LEFT UPPER EXTREMITY: ICD-10-CM

## 2021-07-20 DIAGNOSIS — L90.5 ADHERENT SCAR, SKIN: ICD-10-CM

## 2021-07-20 DIAGNOSIS — Z17.0 MALIGNANT NEOPLASM OF OVERLAPPING SITES OF RIGHT BREAST IN FEMALE, ESTROGEN RECEPTOR POSITIVE (HCC): Primary | ICD-10-CM

## 2021-07-20 PROCEDURE — 97166 OT EVAL MOD COMPLEX 45 MIN: CPT

## 2021-07-20 NOTE — THERAPY EVALUATION
Outpatient Occupational Therapy Lymphedema Initial Evaluation   [place]     Patient Name: Sarah Raphael  : 1950  MRN: [id number]  Today's Date: 2021      Visit Date: 2021    Patient Active Problem List   Diagnosis   • Anxiety   • Gastroesophageal reflux disease without esophagitis   • Hyperlipidemia   • Hypertension   • Hypothyroidism   • Cataract   • Cancer of overlapping sites of right female breast (CMS/HCC)   • Estrogen receptor positive   • Arthritis of right knee   • Status post total right knee replacement   • Elevated hemoglobin A1c   • Hyponatremia   • Acute postoperative pain        Past Medical History:   Diagnosis Date   • Arthritis    • Back pain    • Breast cancer (CMS/HCC)    • Celiac disease    • Disease of thyroid gland    • GERD (gastroesophageal reflux disease)    • Hepatitis     NOT SURE OF TYPE, CAN'T DONATE BLOOD   • History of transfusion    • Hypertension         Past Surgical History:   Procedure Laterality Date   • COLONOSCOPY         • EYE SURGERY      bilateral cataracts removed   • HYSTERECTOMY  1983   • KNEE ARTHROPLASTY, PARTIAL REPLACEMENT Right 10/7/2020    Procedure: TOTAL KNEE ARTHROPLASTY RIGHT;  Surgeon: Adrian Cortes MD;  Location: [facility];  Service: Orthopedics;  Laterality: Right;   • MASTECTOMY Bilateral 2017   • MASTECTOMY WITH SENTINEL NODE BIOPSY AND AXILLARY NODE DISSECTION Bilateral 2017    Procedure: LEFT BREAST MASTECTOMY WITH LEFT  SENTINEL NODE BIOPSY AND RIGHT PROPHYLACTIC MASTECTOMY;  Surgeon: Caitlin Green MD;  Location: [facility];  Service:    • OOPHORECTOMY     • SECONDARY INTRAOCULAR LENSE IMPLANTATION  2015    also in 2016   • SHOULDER ARTHROSCOPY Left 2014   • SKIN CANCER EXCISION  10/2015    basal cell   • THYROID SURGERY  1996   • TONSILLECTOMY  1976         Visit Dx:     ICD-10-CM ICD-9-CM   1. Malignant neoplasm of overlapping sites of right breast in female, estrogen receptor  positive (CMS/[facility])  C50.811 174.8    Z17.0 V86.0   2. 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 Adherent scar, skin  L90.5 709.2       Patient History     Row Name 07/20/21 0900             History    Chief Complaint  Swelling;Pain  -SG      Type of Pain  Chest pain  -SG      Date Current Problem(s) Began  -- Per pt report, approx January 2021  -SG      Brief Description of Current Complaint  Pt is a 71 yo female w/ h/o breast cancer.  Left mastectomy for well-differentiated invasive ductal breast cancer stage T1b(2) N1a M0, stage IIA in 2017.  [place] lymph nodes negative but one positive intramammary node. ER and MD positive and HER-2 negative with Oncotype DX recurrent score of 16. Adjuvant tamoxifen started 9/27/17. Atypical ductal hyperplasia of the right breast, status post right simple mastectomy.  She was seen in our therapy clinic in 2018 for tigthness post mastectomy w/ some LUE swelling. At that time, she was wearing compression for swelling. Once she was discharged, she had swelling managed and did not need the compression any longer. Since that time, she has not had any trouble w/ swelling in LUE until January of this year. Pt reports in January she started having pain in her left chest incision and into her axilla, in addition to LUE edema. It has only progressed and gotten worse, and she presents today for lymphedema treatment.  -SG      Patient/Caregiver Goals  Relieve pain;Return to prior level of function;Improve mobility;Know what to do to help the symptoms;Decrease swelling  -SG      Hand Dominance  right-handed  -SG         Pain     Pain Location  Chest;Arm axilla  -SG      Pain at Present  2  -SG      Pain at Best  0  -SG      Pain at Worst  8  -SG      Pain Frequency  Intermittent  -SG      Pain Description  Sharp;Shooting;Heaviness;Tightness;Discomfort  -SG         Daily Activities    Pt Participated in POC and Goals  Yes  -SG        User Key  (r) =  Recorded By, (t) = Taken By, (c) = Cosigned By    Initials Name Provider Type    Janine Haney, OTR/L Occupational Therapist          Lymphedema     Row Name 07/20/21 0900             Subjective Pain    Able to rate subjective pain?  yes  -SG      Pre-Treatment Pain Level  0  -SG      Post-Treatment Pain Level  0  -SG         Lymphedema Assessment    Lymphedema Classification  LUE:;stage 1 (Spontaneously Reversible)  -SG      Lymphedema Cancer Related Sx  bilateral;simple mastectomy;left;sentinel node biopsy  -SG      Lymphedema Surgery Comments  2017  -SG      Chemo Received  no Adjuvant tamoxifen started 9/27/17  -SG      Radiation Therapy Received  no  -SG      Infections or Cellulitis?  no  -SG         Posture/Observations    Alignment Options  Forward head;Thoracic kyphosis;Rounded shoulders  -SG      Posture/Observations Comments  Forward protective posture  -SG         General ROM    GENERAL ROM COMMENTS  BUE WNL  -SG         MMT (Manual Muscle Testing)    General MMT Comments  BUE WNL  -SG         Lymphedema Edema Assessment    Edema Assessment Comment  Edema LUE and L axilla/trunk  -SG         Skin Changes/Observations    Location/Assessment  Upper Extremity;Upper Quadrant  -SG      Upper Extremity Conditions  bilateral:;normal;intact;clean  -SG      Upper Extremity Color/Pigment  bilateral:;normal  -SG      Upper Quadrant Conditions  bilateral:;normal;intact;clean  -SG      Upper Quadrant Color/Pigment  bilateral:;normal  -SG      Skin Observations Comment  Tight soft tissue restrictions/scar tissue present bilateral chest  -SG         Lymphedema Measurements    Measurement Type(s)  Quick Girth  -SG      Quick Girth Areas  Upper extremities  -SG         LUE Quick Girth (cm)    Axilla  35 cm  -SG      Mid upper arm  32 cm  -SG      Elbow  28.5 cm  -SG      Mid forearm  25 cm  -SG      Wrist crease  18 cm  -SG      Web space  20 cm  -SG      Met-heads  18.5 cm  -SG      LUE Quick Girth Total  177  -SG    "      RUE Quick Girth (cm)    Axilla  33.5 cm  -SG      Mid upper arm  29.5 cm  -SG      Elbow  26 cm  -SG      Mid forearm  24.5 cm  -SG      Wrist crease  17 cm  -SG      Web space  20 cm  -SG      Met-heads  19 cm  -SG      RUE Quick Girth Total  169.5  -SG         Compression/Skin Care    Compression/Skin Care  compression garment  -      Compression Garment Comments  Jobst Suzan Lite 20-30 mmHg compression sleeve and gauntlet  -SG         L-Dex Bioimpedence Screening    L-Dex Measurement Extremity  LUE  -SG      L-Dex Patient Position  Standing  -SG      L-Dex UE Dominate Side  Right  -SG      L-Dex UE At Risk Side  Left  -SG      L-Dex UE Pre Surgical Value  No  -SG      L-Dex UE Score  8  -SG      L-Dex UE Comment  8 is in the yellow zone, indicating stage 1 of lymphedema. At this stage, there is potential for reversal. However, pt must wear compression for 8-10 hours per day for 4-6 weeks  -        User Key  (r) = Recorded By, (t) = Taken By, (c) = Cosigned By    Initials Name Provider Type    Janine Haney, OTR/L Occupational Therapist                  Therapy Education  Education Details: Pt was edu on LDex score of 8 and indications for treatment. She was edu on wearing compression sleeve and glove for 8-10 hours per day for 4-6 weeks in order to reduce Ldex score to at least 6.5 (in normal range). HEP initiated: \"wand exercises\" for shoulder flexion and abduction, bye byes for neural and lymphatic mobilization, postrual/scapular stabilization.  pt provided w/ [place] [facility] info where she will go to obtain another compression sleeve and glove  Given: HEP, Symptoms/condition management, Posture/body mechanics, Edema management  Program: New  How Provided: Verbal, Demonstration, Written  Provided to: Patient  Level of Understanding: Verbalized, Demonstrated        OT Goals     Row Name 07/20/21 0900          OT Short Term Goals    STG Date to Achieve  08/19/21  -     STG 1  \"Pt will understand " "lymphedema precautions to decrease the risk of infection and exacerbation of the lymphedema.  -SG     STG 2  \"Pt will develop a tolerance for wearing compression between treatment sessions to facilitate limb decongestion.  -SG     STG 3  \"Pt will be independent with HEP for shoulder ROM and soft tissue flexibility.  -SG     STG 4  Left chest soft tissue restrictions will demo at least 25% improvement.  -SG        Long Term Goals    LTG 1  \"Treatment will achieve maximum edema and/or lymphedema reduction to enable functional improvements and a return to PLOF  -SG     LTG 2  \"Pt will demonstrate awareness of individualized lymphedema precautions based on personal risk factors and when to seek medical attn. for assessment of early signs of lymphedema or cellulitis of the affected region  -SG     LTG 3  Left chest soft tissue restrictions will demo at least 75% improvement.  -SG     LTG 4  Ldex score will decrease from 8 to at least 6.5 to reduce LUE lymphedema.  -SG        Time Calculation    OT Goal Re-Cert Due Date  10/18/21  -SG       User Key  (r) = Recorded By, (t) = Taken By, (c) = Cosigned By    Initials Name Provider Type    Janine Haney OTR/L Occupational Therapist          OT Assessment/Plan     Row Name 07/20/21 0900          OT Assessment    Functional Limitations  Limitations in functional capacity and performance;Performance in self-care ADL  -SG     Impairments  Edema;Pain;Impaired lymphatic circulation  -SG     Assessment Comments  Pt presents w the following: pain and stiffness in chest which limits ROM and participation in ADLs/IADLs; strength: decreased core stabilization and decreaed UE/ strength; posture: forward neck and shoulder posture, disrupted scapulo-humeral rhythm; increased tissue tightness over chest, stress across pectoralis major muscle, s-c joint line, c-v joint line, and associated ST through shoulders and trunk; w/ tight soft tissue restrictions and scar tissue; lymphedema " in LUE. She will benefit from continuing with OP OT to address symptoms.  -SG     Please refer to paper survey for additional self-reported information  Yes  -SG     OT Rehab Potential  Excellent  -SG     Patient/caregiver participated in establishment of treatment plan and goals  Yes  -SG     Patient would benefit from skilled therapy intervention  Yes  -SG        OT Plan    OT Frequency  2x/week  -SG     Predicted Duration of Therapy Intervention (OT)  20 visits  -SG     Planned CPT's?  OT EVAL MOD COMPLEXITY: 75085;OT THER ACT EA 15 MIN: 78772QB;OT THER PROC EA 15 MIN: [id number];OT SELF CARE/MGMT/TRAIN 15 MIN: 02565;OT MANUAL THERAPY EA 15 MIN: 29964;OT BIS XTRACELL FLUID ANALYSIS: 76311  -SG     Planned Therapy Interventions (Optional Details)  home exercise program;joint mobilization;manual therapy techniques;patient/family education;postural re-education;ROM (Range of Motion);strengthening;stretching  -SG     OT Plan Comments  Initiate PORi protocol and CDT including MLD, compression, skin care, and exercise. Initiate ASTYM/STM as needed. Progress as tolerated.  -SG       User Key  (r) = Recorded By, (t) = Taken By, (c) = Cosigned By    Initials Name Provider Type    Janine Haney OTR/L Occupational Therapist          Outcome Measure Options: Quick DASH  Quick DASH  Open a tight or new jar.: Unable  Do heavy household chores (e.g., wash walls, wash floors): Moderate Difficulty  Carry a shopping bag or briefcase: No Difficulty  Wash your back: No Difficulty  Use a knife to cut food: No Difficulty  Recreational activities in which you take some force or impact through your arm, should or hand (e.g. golf, hammering, tennis, etc.): Moderate Difficulty  During the past week, to what extent has your arm, shoulder, or hand problem interfered with your normal social activites with family, friends, neighbors or groups?: Not at all  During the past week, were you limited in your work or other regular daily activities  as a result of your arm, shoulder or hand problem?: Not limited at all  Arm, Shoulder, or hand pain: Moderate  Tingling (pins and needles) in your arm, shoulder, or hand: None  During the past week, how much difficulty have you had sleeping because of the pain in your arm, shoulder or hand?: Mild Difficulty  Number of Questions Answered: 11  Quick DASH Score: 25         Time Calculation:   OT Start Time: 0900     Therapy Charges for Today     Code Description Service Date Service Provider Modifiers Qty    [id number]  OT EVAL MOD COMPLEXITY 4 7/20/2021 Janine Sheriff OTR/L GO 1                    CONSUELO Grullon/RAUL  7/20/2021

## 2021-07-26 ENCOUNTER — HOSPITAL ENCOUNTER (OUTPATIENT)
Dept: OCCUPATIONAL THERAPY | Facility: HOSPITAL | Age: 71
Setting detail: THERAPIES SERIES
Discharge: HOME | End: 2021-07-26

## 2021-07-26 DIAGNOSIS — Z17.0 MALIGNANT NEOPLASM OF OVERLAPPING SITES OF RIGHT BREAST IN FEMALE, ESTROGEN RECEPTOR POSITIVE (HCC): Primary | ICD-10-CM

## 2021-07-26 DIAGNOSIS — L90.5 ADHERENT SCAR, SKIN: ICD-10-CM

## 2021-07-26 DIAGNOSIS — C50.811 MALIGNANT NEOPLASM OF OVERLAPPING SITES OF RIGHT BREAST IN FEMALE, ESTROGEN RECEPTOR POSITIVE (HCC): Primary | ICD-10-CM

## 2021-07-26 DIAGNOSIS — I97.2 POSTMASTECTOMY LYMPHEDEMA SYNDROME OF LEFT UPPER EXTREMITY: ICD-10-CM

## 2021-07-26 PROCEDURE — 97140 MANUAL THERAPY 1/> REGIONS: CPT

## 2021-07-26 NOTE — THERAPY TREATMENT NOTE
Outpatient Occupational Therapy Lymphedema Treatment Note   [facility]     Patient Name: Sarah Raphael  : 1950  MRN: [id number]  Today's Date: 2021      Visit Date: 2021    Patient Active Problem List   Diagnosis   • Anxiety   • Gastroesophageal reflux disease without esophagitis   • Hyperlipidemia   • Hypertension   • Hypothyroidism   • Cataract   • Cancer of overlapping sites of right female breast (CMS/HCC)   • Estrogen receptor positive   • Arthritis of right knee   • Status post total right knee replacement   • Elevated hemoglobin A1c   • Hyponatremia   • Acute postoperative pain        Past Medical History:   Diagnosis Date   • Arthritis    • Back pain    • Breast cancer (CMS/HCC)    • Celiac disease    • Disease of thyroid gland    • GERD (gastroesophageal reflux disease)    • Hepatitis     NOT SURE OF TYPE, CAN'T DONATE BLOOD   • History of transfusion    • Hypertension         Past Surgical History:   Procedure Laterality Date   • COLONOSCOPY         • EYE SURGERY      bilateral cataracts removed   • HYSTERECTOMY  1983   • KNEE ARTHROPLASTY, PARTIAL REPLACEMENT Right 10/7/2020    Procedure: TOTAL KNEE ARTHROPLASTY RIGHT;  Surgeon: Adrian Cortes MD;  Location: [facility];  Service: Orthopedics;  Laterality: Right;   • MASTECTOMY Bilateral 2017   • MASTECTOMY WITH SENTINEL NODE BIOPSY AND AXILLARY NODE DISSECTION Bilateral 2017    Procedure: LEFT BREAST MASTECTOMY WITH LEFT  SENTINEL NODE BIOPSY AND RIGHT PROPHYLACTIC MASTECTOMY;  Surgeon: Caitlin Green MD;  Location: [facility];  Service:    • OOPHORECTOMY     • SECONDARY INTRAOCULAR LENSE IMPLANTATION  2015    also in 2016   • SHOULDER ARTHROSCOPY Left 2014   • SKIN CANCER EXCISION  10/2015    basal cell   • THYROID SURGERY  1996   • TONSILLECTOMY  1976         Visit Dx:      ICD-10-CM ICD-9-CM   1. Malignant neoplasm of overlapping sites of right breast in female, estrogen receptor positive  (CMS/[facility])  C50.811 174.8    Z17.0 V86.0   2. 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 Adherent scar, skin  L90.5 709.2       Lymphedema     Row Name 07/26/21 1500             Subjective Pain    Able to rate subjective pain?  yes  -SG      Pre-Treatment Pain Level  0  -SG      Post-Treatment Pain Level  0  -SG         Subjective Comments    Subjective Comments  Pt reports copmliant w/ wearing compression daily (she received new compression sleeve and glove from [employer]). She has been completing her HEP and that is going well. She has not had any pain in her chest or arm  -SG         Lymphedema Assessment    Lymphedema Classification  LUE:;stage 1 (Spontaneously Reversible)  -SG      Lymphedema Cancer Related Sx  bilateral;simple mastectomy;left;sentinel node biopsy  -SG      Lymphedema Surgery Comments  2017  -SG      Chemo Received  no Adjuvant tamoxifen started 9/27/17  -SG      Radiation Therapy Received  no  -SG      Infections or Cellulitis?  no  -SG         Posture/Observations    Alignment Options  Forward head;Thoracic kyphosis;Rounded shoulders  -SG      Posture/Observations Comments  Forward protective posture  -SG         General ROM    GENERAL ROM COMMENTS  BUE WNL  -SG         MMT (Manual Muscle Testing)    General MMT Comments  BUE WNL  -SG         Lymphedema Edema Assessment    Edema Assessment Comment  Edema LUE and L axilla/trunk  -SG         Skin Changes/Observations    Location/Assessment  Upper Extremity;Upper Quadrant  -SG      Upper Extremity Conditions  bilateral:;normal;intact;clean  -SG      Upper Extremity Color/Pigment  bilateral:;normal  -SG      Upper Quadrant Conditions  bilateral:;normal;intact;clean  -SG      Upper Quadrant Color/Pigment  bilateral:;normal  -SG      Skin Observations Comment  Tight soft tissue restrictions/scar tissue present bilateral chest  -SG         Lymphedema Measurements    Measurement Type(s)  Quick Girth  -SG       Quick Girth Areas  Upper extremities  -SG         Manual Lymphatic Drainage    Manual Lymphatic Drainage  opened regional lymph nodes;initial sequence;extremity treatment;astym  -SG      Initial Sequence  supraclavicular  -SG      Supraclavicular  left  -SG      Opened Regional Lymph Nodes  axillary;ribs;paraspinal  -SG      Axillary  left  -SG      Extremity Treatment  MLD to full limb  -SG      Astym  mastectomy protocol  -SG      Mastectomy Protocol  supine  -SG      Mastectomy Protocol Comment  left  -SG      Manual Therapy  See manual rx for PORi protocol  -SG         Compression/Skin Care    Compression/Skin Care  compression garment  -SG      Compression Garment Comments  [employer] Suzan Lite 20-30 mmHg compression sleeve and glove  -SG         L-Dex Bioimpedence Screening    L-Dex Measurement Extremity  LUE  -SG      L-Dex Patient Position  Standing  -SG      L-Dex UE Dominate Side  Right  -SG      L-Dex UE At Risk Side  Left  -SG      L-Dex UE Pre Surgical Value  No  -SG        User Key  (r) = Recorded By, (t) = Taken By, (c) = Cosigned By    Initials Name Provider Type    Janine Haney OTR/L Occupational Therapist                  OT Assessment/Plan     Row Name 07/26/21 1500          OT Assessment    Functional Limitations  Limitations in functional capacity and performance;Performance in self-care ADL  -SG     Impairments  Edema;Pain;Impaired lymphatic circulation  -SG     Assessment Comments  Pt presents w the following: pain and stiffness in chest which limits ROM and participation in ADLs/IADLs; strength: decreased core stabilization and decreaed UE/ strength; posture: forward neck and shoulder posture, disrupted scapulo-humeral rhythm; increased tissue tightness over chest, stress across pectoralis major muscle, s-c joint line, c-v joint line, and associated ST through shoulders and trunk; w/ tight soft tissue restrictions and scar tissue; lymphedema in LUE. She will benefit from continuing with  OP OT to address symptoms.  -SG     OT Rehab Potential  Excellent  -SG     Patient/caregiver participated in establishment of treatment plan and goals  Yes  -SG     Patient would benefit from skilled therapy intervention  Yes  -SG        OT Plan    OT Frequency  2x/week  -SG     Planned CPT's?  OT EVAL MOD COMPLEXITY: 62209;OT THER ACT EA 15 MIN: 86224PP;OT THER PROC EA 15 MIN: 41550MN;OT SELF CARE/MGMT/TRAIN 15 MIN: 57986;OT MANUAL THERAPY EA 15 MIN: 83018;OT BIS XTRACELL FLUID ANALYSIS: 16544  -     Planned Therapy Interventions (Optional Details)  home exercise program;joint mobilization;manual therapy techniques;patient/family education;postural re-education;ROM (Range of Motion);strengthening;stretching  -SG     OT Plan Comments  Continue PORi protocol and CDT including MLD, compression, skin care, and exercise. Continue ASTYM/STM as needed. Progress as tolerated.  -       User Key  (r) = Recorded By, (t) = Taken By, (c) = Cosigned By    Initials Name Provider Type    Janine Haney OTR/L Occupational Therapist                 Manual Rx (last 36 hours)      Manual Treatments     Row Name 07/26/21 1500             Total Minutes    28544 - OT Manual Therapy Minutes  53  -SG         Manual Rx 1    Manual Rx 1 Location  Arm--Orthopedic: Forearm extensors, biceps muscles, lateral intermuscular septum tension line upper arm, pectoralis major  -      Manual Rx 1 Type  Trigger Point Release, Myofascial Bending  -      Manual Rx 1 Duration  Trigger Point Release for count of 3 seconds, repeat each section 5 times; Myofascial stretching w/ tissue/muscle bending 3-5 times  -         Manual Rx 2    Manual Rx 2 Location  Axilla--Orthopedic: Medial Intermuscular Septum tension line upper arm, pectoralis minor, subscapularis, latissimus dorsi, teres major  -      Manual Rx 2 Type  Trigger Point Release; Myofascial bending  -      Manual Rx 2 Duration  Trigger Point Release for count of 3 seconds, repeat each  section 5 times; Myofascial stretching w/ tissue/muscle bending 3-5 times  -SG         Manual Rx 3    Manual Rx 3 Location  Chest: Lymph System: Zone 1--pectoral region, Zone 2--superior Chest, Zone 3--inferior Chest  -SG      Manual Rx 3 Type  Fluid movement/ MLD; parasternal LN node pumps  -SG      Manual Rx 3 Duration  Repeat each Line x1 and corresponding parasternal node pumps x5, repeat each zone x3  -SG         Manual Rx 4    Manual Rx 4 Location  Lateral chest/trunk--Lymph System: zone 1--lateral, zone 2--inferior, zone 3--central, zone 4-superior  -SG      Manual Rx 4 Type  Intercostal Lymph Node Pumps  -SG      Manual Rx 4 Duration  Repeat all 4 zones x 3  -SG         Manual Rx 5    Manual Rx 5 Location  Upper Arm: Lymph System  -SG      Manual Rx 5 Type  Upper arm fluid clearance of axillary pathway; medial confluence fluid clearance--antecubital fossa; lateral upper arm-cephalic pathway  -SG      Manual Rx 5 Duration  x3 lines of treatment  -SG         Manual Rx 6    Manual Rx 6 Location  Forearm--Orthopedic: radius and ulna-interosseous membrane; carpals-joint capsules-metacarpals-interosseous membranes; thenar muscles  -SG      Manual Rx 6 Type  Joint mobilization, Trigger Point Release, Drainage of forearm  -SG      Manual Rx 6 Duration  Joint Mob: each section x5 in each position; drainage of forearm: perform x3-5 lines of treatment  -SG         Manual Rx 7    Manual Rx 7 Location  Back--orthopedic: upper trapezius, infraspinatus, teres minor, rhomboids, quadratus lumborum; T12, L1, L2 mobilization  -SG      Manual Rx 7 Type  Trigger Point Release, Joint mobilization  -SG      Manual Rx 7 Duration  Trigger Point Release for count of 3 seconds, repeat each section 5 times; perform 5-10 oscillations of each vertebra successively  -      Additional Treatment?  Yes  -SG         Manual Rx 8    Manual Rx 8 Location  Back--lymph system  -SG      Manual Rx 8 Type  Fluid movement; paraspinal lymph node  pumps  -SG      Manual Rx 8 Duration  Repeat each line x1 and corresponding paraspinal node pumps x5; repeat each zone x3  -SG        User Key  (r) = Recorded By, (t) = Taken By, (c) = Cosigned By    Initials Name Provider Type    Janine Haney OTR/L Occupational Therapist            Therapy Education  Education Details: Pt was edu to continue w/ HEP and lymphedema management  Given: HEP, Symptoms/condition management, Posture/body mechanics, Edema management  Program: Reinforced  How Provided: Verbal, Demonstration, Written  Provided to: Patient  Level of Understanding: Verbalized, Demonstrated                Time Calculation:   OT Start Time: 1500  Timed Charges  49487 - OT Manual Therapy Minutes: 53  Total Minutes  Timed Charges Total Minutes: 53   Total Minutes: 53     Therapy Charges for Today     Code Description Service Date Service Provider Modifiers Qty    [id number]  OT MANUAL THERAPY EA 15 MIN 7/26/2021 Janine Sheriff OTR/L GO 4                      CONSUELO Grullon/RAUL  7/26/2021

## 2021-08-03 ENCOUNTER — HOSPITAL ENCOUNTER (OUTPATIENT)
Dept: OCCUPATIONAL THERAPY | Facility: HOSPITAL | Age: 71
Setting detail: THERAPIES SERIES
Discharge: HOME | End: 2021-08-03

## 2021-08-03 DIAGNOSIS — L90.5 ADHERENT SCAR, SKIN: ICD-10-CM

## 2021-08-03 DIAGNOSIS — Z17.0 MALIGNANT NEOPLASM OF OVERLAPPING SITES OF RIGHT BREAST IN FEMALE, ESTROGEN RECEPTOR POSITIVE (HCC): Primary | ICD-10-CM

## 2021-08-03 DIAGNOSIS — I97.2 POSTMASTECTOMY LYMPHEDEMA SYNDROME OF LEFT UPPER EXTREMITY: ICD-10-CM

## 2021-08-03 DIAGNOSIS — C50.811 MALIGNANT NEOPLASM OF OVERLAPPING SITES OF RIGHT BREAST IN FEMALE, ESTROGEN RECEPTOR POSITIVE (HCC): Primary | ICD-10-CM

## 2021-08-03 PROCEDURE — 97140 MANUAL THERAPY 1/> REGIONS: CPT

## 2021-08-03 NOTE — THERAPY TREATMENT NOTE
Outpatient Occupational Therapy Lymphedema Treatment Note   [facility]     Patient Name: Sarah Raphael  : 1950  MRN: [id number]  Today's Date: 8/3/2021      Visit Date: 2021    Patient Active Problem List   Diagnosis   • Anxiety   • Gastroesophageal reflux disease without esophagitis   • Hyperlipidemia   • Hypertension   • Hypothyroidism   • Cataract   • Cancer of overlapping sites of right female breast (CMS/HCC)   • Estrogen receptor positive   • Arthritis of right knee   • Status post total right knee replacement   • Elevated hemoglobin A1c   • Hyponatremia   • Acute postoperative pain        Past Medical History:   Diagnosis Date   • Arthritis    • Back pain    • Breast cancer (CMS/HCC)    • Celiac disease    • Disease of thyroid gland    • GERD (gastroesophageal reflux disease)    • Hepatitis     NOT SURE OF TYPE, CAN'T DONATE BLOOD   • History of transfusion    • Hypertension         Past Surgical History:   Procedure Laterality Date   • COLONOSCOPY         • EYE SURGERY      bilateral cataracts removed   • HYSTERECTOMY  1983   • KNEE ARTHROPLASTY, PARTIAL REPLACEMENT Right 10/7/2020    Procedure: TOTAL KNEE ARTHROPLASTY RIGHT;  Surgeon: Adrian Cortes MD;  Location: Replaced by [facility];  Service: Orthopedics;  Laterality: Right;   • MASTECTOMY Bilateral 2017   • MASTECTOMY WITH SENTINEL NODE BIOPSY AND AXILLARY NODE DISSECTION Bilateral 2017    Procedure: LEFT BREAST MASTECTOMY WITH LEFT  SENTINEL NODE BIOPSY AND RIGHT PROPHYLACTIC MASTECTOMY;  Surgeon: Caitlin Green MD;  Location: Replaced by [facility];  Service:    • OOPHORECTOMY     • SECONDARY INTRAOCULAR LENSE IMPLANTATION  2015    also in 2016   • SHOULDER ARTHROSCOPY Left 2014   • SKIN CANCER EXCISION  10/2015    basal cell   • THYROID SURGERY  1996   • TONSILLECTOMY  1976         Visit Dx:      ICD-10-CM ICD-9-CM   1. Malignant neoplasm of overlapping sites of right breast in female, estrogen receptor positive  (CMS/[facility])  C50.811 174.8    Z17.0 V86.0   2. 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 Adherent scar, skin  L90.5 709.2       Lymphedema     Row Name 08/03/21 1100             Subjective Pain    Able to rate subjective pain?  yes  -SG      Pre-Treatment Pain Level  0  -SG      Post-Treatment Pain Level  0  -SG         Subjective Comments    Subjective Comments  Pt reports that she is doing ok, continuing w/ daily compression wear and exercises  -SG         Lymphedema Assessment    Lymphedema Classification  LUE:;stage 1 (Spontaneously Reversible)  -SG      Lymphedema Cancer Related Sx  bilateral;simple mastectomy;left;sentinel node biopsy  -SG      Lymphedema Surgery Comments  2017  -SG      Chemo Received  no Adjuvant tamoxifen started 9/27/17  -SG      Radiation Therapy Received  no  -SG      Infections or Cellulitis?  no  -SG         Posture/Observations    Alignment Options  Forward head;Thoracic kyphosis;Rounded shoulders  -SG      Posture/Observations Comments  Forward protective posture  -SG         General ROM    GENERAL ROM COMMENTS  BUE WNL  -SG         MMT (Manual Muscle Testing)    General MMT Comments  BUE WNL  -SG         Lymphedema Edema Assessment    Edema Assessment Comment  Edema LUE and L axilla/trunk  -SG         Skin Changes/Observations    Location/Assessment  Upper Extremity;Upper Quadrant  -SG      Upper Extremity Conditions  bilateral:;normal;intact;clean  -SG      Upper Extremity Color/Pigment  bilateral:;normal  -SG      Upper Quadrant Conditions  bilateral:;normal;intact;clean  -SG      Upper Quadrant Color/Pigment  bilateral:;normal  -SG      Skin Observations Comment  Tight soft tissue restrictions/scar tissue present bilateral chest  -SG         Lymphedema Measurements    Measurement Type(s)  Quick Girth  -SG      Quick Girth Areas  Upper extremities  -SG         Manual Lymphatic Drainage    Manual Lymphatic Drainage  opened regional lymph  nodes;initial sequence;extremity treatment  -SG      Initial Sequence  supraclavicular  -SG      Supraclavicular  left  -SG      Opened Regional Lymph Nodes  axillary;ribs;paraspinal  -SG      Axillary  left  -SG      Extremity Treatment  MLD to full limb  -SG      Astym  --  -SG      Mastectomy Protocol  --  -SG      Manual Therapy  See manual rx for PORi protocol  -SG         Compression/Skin Care    Compression/Skin Care  compression garment  -SG      Compression Garment Comments  Jobst Suzan Lite 20-30 mmHg compression sleeve and glove  -SG         L-Dex Bioimpedence Screening    L-Dex Measurement Extremity  LUE  -SG      L-Dex Patient Position  Standing  -SG      L-Dex UE Dominate Side  Right  -SG      L-Dex UE At Risk Side  Left  -SG      L-Dex UE Pre Surgical Value  No  -SG        User Key  (r) = Recorded By, (t) = Taken By, (c) = Cosigned By    Initials Name Provider Type    Janine Haney, OTR/L Occupational Therapist                  OT Assessment/Plan     Row Name 08/03/21 1100          OT Assessment    Functional Limitations  Limitations in functional capacity and performance;Performance in self-care ADL  -SG     Impairments  Edema;Pain;Impaired lymphatic circulation  -SG     Assessment Comments  Pt presents w the following: pain and stiffness in chest which limits ROM and participation in ADLs/IADLs; strength: decreased core stabilization and decreaed UE/ strength; posture: forward neck and shoulder posture, disrupted scapulo-humeral rhythm; increased tissue tightness over chest, stress across pectoralis major muscle, s-c joint line, c-v joint line, and associated ST through shoulders and trunk; w/ tight soft tissue restrictions and scar tissue; lymphedema in LUE. She will benefit from continuing with OP OT to address symptoms.  -SG     OT Rehab Potential  Excellent  -SG     Patient/caregiver participated in establishment of treatment plan and goals  Yes  -SG     Patient would benefit from skilled  therapy intervention  Yes  -SG        OT Plan    OT Frequency  2x/week  -     Planned CPT's?  OT EVAL MOD COMPLEXITY: 63662;OT THER ACT EA 15 MIN: 24837YA;OT THER PROC EA 15 MIN: 99353AD;OT SELF CARE/MGMT/TRAIN 15 MIN: 88719;OT MANUAL THERAPY EA 15 MIN: 30095;OT BIS XTRACELL FLUID ANALYSIS: 17583  -     Planned Therapy Interventions (Optional Details)  home exercise program;joint mobilization;manual therapy techniques;patient/family education;postural re-education;ROM (Range of Motion);strengthening;stretching  -SG     OT Plan Comments  Continue PORi protocol and CDT including MLD, compression, skin care, and exercise. Continue ASTYM/STM as needed. Progress as tolerated.  -       User Key  (r) = Recorded By, (t) = Taken By, (c) = Cosigned By    Initials Name Provider Type    Janine Haney OTR/L Occupational Therapist                 Manual Rx (last 36 hours)      Manual Treatments     Row Name 08/03/21 1100             Total Minutes    25230 - OT Manual Therapy Minutes  53  -         Manual Rx 1    Manual Rx 1 Location  Arm--Orthopedic: Forearm extensors, biceps muscles, lateral intermuscular septum tension line upper arm, pectoralis major  -      Manual Rx 1 Type  Trigger Point Release, Myofascial Bending  -      Manual Rx 1 Duration  Trigger Point Release for count of 3 seconds, repeat each section 5 times; Myofascial stretching w/ tissue/muscle bending 3-5 times  -         Manual Rx 2    Manual Rx 2 Location  Axilla--Orthopedic: Medial Intermuscular Septum tension line upper arm, pectoralis minor, subscapularis, latissimus dorsi, teres major  -      Manual Rx 2 Type  Trigger Point Release; Myofascial bending  -      Manual Rx 2 Duration  Trigger Point Release for count of 3 seconds, repeat each section 5 times; Myofascial stretching w/ tissue/muscle bending 3-5 times  -         Manual Rx 3    Manual Rx 3 Location  Chest: Lymph System: Zone 1--pectoral region, Zone 2--superior Chest, Zone  3--inferior Chest  -SG      Manual Rx 3 Type  Fluid movement/ MLD; parasternal LN node pumps  -SG      Manual Rx 3 Duration  Repeat each Line x1 and corresponding parasternal node pumps x5, repeat each zone x3  -SG         Manual Rx 4    Manual Rx 4 Location  Lateral chest/trunk--Lymph System: zone 1--lateral, zone 2--inferior, zone 3--central, zone 4-superior  -SG      Manual Rx 4 Type  Intercostal Lymph Node Pumps  -SG      Manual Rx 4 Duration  Repeat all 4 zones x 3  -SG         Manual Rx 5    Manual Rx 5 Location  Upper Arm: Lymph System  -SG      Manual Rx 5 Type  Upper arm fluid clearance of axillary pathway; medial confluence fluid clearance--antecubital fossa; lateral upper arm-cephalic pathway  -SG      Manual Rx 5 Duration  x3 lines of treatment  -SG         Manual Rx 6    Manual Rx 6 Location  Forearm--Orthopedic: radius and ulna-interosseous membrane; carpals-joint capsules-metacarpals-interosseous membranes; thenar muscles  -SG      Manual Rx 6 Type  Joint mobilization, Trigger Point Release, Drainage of forearm  -SG      Manual Rx 6 Duration  Joint Mob: each section x5 in each position; drainage of forearm: perform x3-5 lines of treatment  -SG         Manual Rx 7    Manual Rx 7 Location  Back--orthopedic: upper trapezius, infraspinatus, teres minor, rhomboids, quadratus lumborum; T12, L1, L2 mobilization  -SG      Manual Rx 7 Type  Trigger Point Release, Joint mobilization  -SG      Manual Rx 7 Duration  Trigger Point Release for count of 3 seconds, repeat each section 5 times; perform 5-10 oscillations of each vertebra successively  -SG      Additional Treatment?  Yes  -SG         Manual Rx 8    Manual Rx 8 Location  Back--lymph system  -SG      Manual Rx 8 Type  Fluid movement; paraspinal lymph node pumps  -SG      Manual Rx 8 Duration  Repeat each line x1 and corresponding paraspinal node pumps x5; repeat each zone x3  -SG        User Key  (r) = Recorded By, (t) = Taken By, (c) = Cosigned By     Initials Name Provider Type     Janine Sheriff OTR/L Occupational Therapist            Therapy Education  Education Details: Pt was edu to continue w/ HEP and lymphedema management  Given: HEP, Symptoms/condition management, Posture/body mechanics, Edema management  Program: Reinforced  How Provided: Verbal, Demonstration, Written  Provided to: Patient  Level of Understanding: Verbalized, Demonstrated                Time Calculation:   OT Start Time: 1100  Timed Charges  57857 - OT Manual Therapy Minutes: 53  Total Minutes  Timed Charges Total Minutes: 53   Total Minutes: 53     Therapy Charges for Today     Code Description Service Date Service Provider Modifiers Qty    [id number]  OT MANUAL THERAPY EA 15 MIN 8/3/2021 Janine Sheriff OTR/L GO 4                      CONSUELO Grullon/RAUL  8/3/2021

## 2021-08-09 ENCOUNTER — TELEPHONE (OUTPATIENT)
Dept: ONCOLOGY | Facility: CLINIC | Age: 71
End: 2021-08-09

## 2021-08-09 RX ORDER — LEVOTHYROXINE SODIUM 0.05 MG/1
50 TABLET ORAL DAILY
Qty: 90 TABLET | Refills: 1 | Status: SHIPPED | OUTPATIENT
Start: 2021-08-09 | End: 2021-08-17

## 2021-08-09 RX ORDER — LEVOTHYROXINE SODIUM 0.2 MG/1
200 TABLET ORAL DAILY
Qty: 90 TABLET | Refills: 1 | Status: SHIPPED | OUTPATIENT
Start: 2021-08-09 | End: 2021-08-17

## 2021-08-09 NOTE — TELEPHONE ENCOUNTER
Reviewed breast cancer index test results with patient.  Predictive results MRI unlikely to benefit from extended endocrine therapy?  No.  Prognostic result what is my risk of late distant recurrence?  1.1%.    I explained to the patient that we will continue with adjuvant endocrine therapy to complete 5 years and she will not need extended endocrine therapy after that point.

## 2021-08-10 ENCOUNTER — HOSPITAL ENCOUNTER (OUTPATIENT)
Dept: OCCUPATIONAL THERAPY | Facility: HOSPITAL | Age: 71
Setting detail: THERAPIES SERIES
Discharge: HOME | End: 2021-08-10

## 2021-08-10 DIAGNOSIS — C50.811 MALIGNANT NEOPLASM OF OVERLAPPING SITES OF RIGHT BREAST IN FEMALE, ESTROGEN RECEPTOR POSITIVE (HCC): Primary | ICD-10-CM

## 2021-08-10 DIAGNOSIS — L90.5 ADHERENT SCAR, SKIN: ICD-10-CM

## 2021-08-10 DIAGNOSIS — Z17.0 MALIGNANT NEOPLASM OF OVERLAPPING SITES OF RIGHT BREAST IN FEMALE, ESTROGEN RECEPTOR POSITIVE (HCC): Primary | ICD-10-CM

## 2021-08-10 DIAGNOSIS — I97.2 POSTMASTECTOMY LYMPHEDEMA SYNDROME OF LEFT UPPER EXTREMITY: ICD-10-CM

## 2021-08-10 PROCEDURE — 97140 MANUAL THERAPY 1/> REGIONS: CPT

## 2021-08-10 NOTE — THERAPY TREATMENT NOTE
Outpatient Occupational Therapy Lymphedema Treatment Note   [place]     Patient Name: Sarah Raphael  : 1950  MRN: [id number]  Today's Date: 8/10/2021      Visit Date: 08/10/2021    Patient Active Problem List   Diagnosis   • Anxiety   • Gastroesophageal reflux disease without esophagitis   • Hyperlipidemia   • Hypertension   • Hypothyroidism   • Cataract   • Cancer of overlapping sites of right female breast (CMS/HCC)   • Estrogen receptor positive   • Arthritis of right knee   • Status post total right knee replacement   • Elevated hemoglobin A1c   • Hyponatremia   • Acute postoperative pain        Past Medical History:   Diagnosis Date   • Arthritis    • Back pain    • Breast cancer (CMS/HCC)    • Celiac disease    • Disease of thyroid gland    • GERD (gastroesophageal reflux disease)    • Hepatitis     NOT SURE OF TYPE, CAN'T DONATE BLOOD   • History of transfusion    • Hypertension         Past Surgical History:   Procedure Laterality Date   • COLONOSCOPY         • EYE SURGERY      bilateral cataracts removed   • HYSTERECTOMY  1983   • KNEE ARTHROPLASTY, PARTIAL REPLACEMENT Right 10/7/2020    Procedure: TOTAL KNEE ARTHROPLASTY RIGHT;  Surgeon: Adrian Cortes MD;  Location: [facility];  Service: Orthopedics;  Laterality: Right;   • MASTECTOMY Bilateral 2017   • MASTECTOMY WITH SENTINEL NODE BIOPSY AND AXILLARY NODE DISSECTION Bilateral 2017    Procedure: LEFT BREAST MASTECTOMY WITH LEFT  SENTINEL NODE BIOPSY AND RIGHT PROPHYLACTIC MASTECTOMY;  Surgeon: Caitlin Green MD;  Location: [facility];  Service:    • OOPHORECTOMY     • SECONDARY INTRAOCULAR LENSE IMPLANTATION  2015    also in 2016   • SHOULDER ARTHROSCOPY Left 2014   • SKIN CANCER EXCISION  10/2015    basal cell   • THYROID SURGERY  1996   • TONSILLECTOMY  1976         Visit Dx:      ICD-10-CM ICD-9-CM   1. Malignant neoplasm of overlapping sites of right breast in female, estrogen receptor positive  (CMS/[facility])  C50.811 174.8    Z17.0 V86.0   2. 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 Adherent scar, skin  L90.5 709.2       Lymphedema     Row Name 08/10/21 1600             Subjective Pain    Able to rate subjective pain?  yes  -SG      Pre-Treatment Pain Level  0  -SG      Post-Treatment Pain Level  0  -SG         Subjective Comments    Subjective Comments  Pt reports compliance w/ all lymphedema management including compression wear and exercise.  -SG         Lymphedema Assessment    Lymphedema Classification  LUE:;stage 1 (Spontaneously Reversible)  -SG      Lymphedema Cancer Related Sx  bilateral;simple mastectomy;left;sentinel node biopsy  -SG      Lymphedema Surgery Comments  2017  -SG      Chemo Received  no Adjuvant tamoxifen started 9/27/17  -SG      Radiation Therapy Received  no  -SG      Infections or Cellulitis?  no  -SG         Posture/Observations    Alignment Options  Forward head;Thoracic kyphosis;Rounded shoulders  -SG      Posture/Observations Comments  Forward protective posture  -SG         General ROM    GENERAL ROM COMMENTS  BUE WNL  -SG         MMT (Manual Muscle Testing)    General MMT Comments  BUE WNL  -SG         Lymphedema Edema Assessment    Edema Assessment Comment  Edema LUE and L axilla/trunk  -SG         Skin Changes/Observations    Location/Assessment  Upper Extremity;Upper Quadrant  -SG      Upper Extremity Conditions  bilateral:;normal;intact;clean  -SG      Upper Extremity Color/Pigment  bilateral:;normal  -SG      Upper Quadrant Conditions  bilateral:;normal;intact;clean  -SG      Upper Quadrant Color/Pigment  bilateral:;normal  -SG      Skin Observations Comment  Tight soft tissue restrictions/scar tissue present bilateral chest  -SG         Lymphedema Measurements    Measurement Type(s)  Quick Girth  -SG      Quick Girth Areas  Upper extremities  -SG         Manual Lymphatic Drainage    Manual Lymphatic Drainage  opened regional lymph  nodes;initial sequence;extremity treatment  -SG      Initial Sequence  supraclavicular  -SG      Supraclavicular  left  -SG      Opened Regional Lymph Nodes  axillary;ribs;paraspinal  -SG      Axillary  left  -SG      Extremity Treatment  MLD to full limb  -SG      Manual Therapy  See manual rx for PORi protocol  -SG         Compression/Skin Care    Compression/Skin Care  compression garment  -SG      Compression Garment Comments  Jobst Suzan Lite 20-30 mmHg compression sleeve and glove  -SG         L-Dex Bioimpedence Screening    L-Dex Measurement Extremity  LUE  -SG      L-Dex Patient Position  Standing  -SG      L-Dex UE Dominate Side  Right  -SG      L-Dex UE At Risk Side  Left  -SG      L-Dex UE Pre Surgical Value  No  -SG        User Key  (r) = Recorded By, (t) = Taken By, (c) = Cosigned By    Initials Name Provider Type    Janine Haney, OTR/L Occupational Therapist                  OT Assessment/Plan     Row Name 08/10/21 1100          OT Assessment    Functional Limitations  Limitations in functional capacity and performance;Performance in self-care ADL  -SG     Impairments  Edema;Pain;Impaired lymphatic circulation  -SG     Assessment Comments  Pt presents w the following: pain and stiffness in chest which limits ROM and participation in ADLs/IADLs; strength: decreased core stabilization and decreaed UE/ strength; posture: forward neck and shoulder posture, disrupted scapulo-humeral rhythm; increased tissue tightness over chest, stress across pectoralis major muscle, s-c joint line, c-v joint line, and associated ST through shoulders and trunk; w/ tight soft tissue restrictions and scar tissue; lymphedema in LUE. She will benefit from continuing with OP OT to address symptoms.  -SG     OT Rehab Potential  Excellent  -SG     Patient/caregiver participated in establishment of treatment plan and goals  Yes  -SG     Patient would benefit from skilled therapy intervention  Yes  -SG        OT Plan    OT  Frequency  2x/week  -     Planned CPT's?  OT EVAL MOD COMPLEXITY: 85041;OT THER ACT EA 15 MIN: 10650TX;OT THER PROC EA 15 MIN: 37248YP;OT SELF CARE/MGMT/TRAIN 15 MIN: 23555;OT MANUAL THERAPY EA 15 MIN: 14358;OT BIS XTRACELL FLUID ANALYSIS: 19549  -     Planned Therapy Interventions (Optional Details)  home exercise program;joint mobilization;manual therapy techniques;patient/family education;postural re-education;ROM (Range of Motion);strengthening;stretching  -     OT Plan Comments  Continue PORi protocol and CDT including MLD, compression, skin care, and exercise. Continue ASTYM/STM as needed. Progress as tolerated.  -       User Key  (r) = Recorded By, (t) = Taken By, (c) = Cosigned By    Initials Name Provider Type    Janine Haney OTR/L Occupational Therapist                 Manual Rx (last 36 hours)      Manual Treatments     Row Name 08/10/21 1100             Total Minutes    90177 - OT Manual Therapy Minutes  53  -         Manual Rx 1    Manual Rx 1 Location  Arm--Orthopedic: Forearm extensors, biceps muscles, lateral intermuscular septum tension line upper arm, pectoralis major  -      Manual Rx 1 Type  Trigger Point Release, Myofascial Bending  -      Manual Rx 1 Duration  Trigger Point Release for count of 3 seconds, repeat each section 5 times; Myofascial stretching w/ tissue/muscle bending 3-5 times  -         Manual Rx 2    Manual Rx 2 Location  Axilla--Orthopedic: Medial Intermuscular Septum tension line upper arm, pectoralis minor, subscapularis, latissimus dorsi, teres major  -      Manual Rx 2 Type  Trigger Point Release; Myofascial bending  -      Manual Rx 2 Duration  Trigger Point Release for count of 3 seconds, repeat each section 5 times; Myofascial stretching w/ tissue/muscle bending 3-5 times  -         Manual Rx 3    Manual Rx 3 Location  Chest: Lymph System: Zone 1--pectoral region, Zone 2--superior Chest, Zone 3--inferior Chest  -      Manual Rx 3 Type  Fluid  movement/ MLD; parasternal LN node pumps  -      Manual Rx 3 Duration  Repeat each Line x1 and corresponding parasternal node pumps x5, repeat each zone x3  -SG         Manual Rx 4    Manual Rx 4 Location  Lateral chest/trunk--Lymph System: zone 1--lateral, zone 2--inferior, zone 3--central, zone 4-superior  -      Manual Rx 4 Type  Intercostal Lymph Node Pumps  -SG      Manual Rx 4 Duration  Repeat all 4 zones x 3  -SG         Manual Rx 5    Manual Rx 5 Location  Upper Arm: Lymph System  -      Manual Rx 5 Type  Upper arm fluid clearance of axillary pathway; medial confluence fluid clearance--antecubital fossa; lateral upper arm-cephalic pathway  -      Manual Rx 5 Duration  x3 lines of treatment  -SG         Manual Rx 6    Manual Rx 6 Location  Forearm--Orthopedic: radius and ulna-interosseous membrane; carpals-joint capsules-metacarpals-interosseous membranes; thenar muscles  -      Manual Rx 6 Type  Joint mobilization, Trigger Point Release, Drainage of forearm  -      Manual Rx 6 Duration  Joint Mob: each section x5 in each position; drainage of forearm: perform x3-5 lines of treatment  -         Manual Rx 7    Manual Rx 7 Location  Back--orthopedic: upper trapezius, infraspinatus, teres minor, rhomboids, quadratus lumborum; T12, L1, L2 mobilization  -      Manual Rx 7 Type  Trigger Point Release, Joint mobilization  -      Manual Rx 7 Duration  Trigger Point Release for count of 3 seconds, repeat each section 5 times; perform 5-10 oscillations of each vertebra successively  -      Additional Treatment?  Yes  -         Manual Rx 8    Manual Rx 8 Location  Back--lymph system  -      Manual Rx 8 Type  Fluid movement; paraspinal lymph node pumps  -      Manual Rx 8 Duration  Repeat each line x1 and corresponding paraspinal node pumps x5; repeat each zone x3  -SG        User Key  (r) = Recorded By, (t) = Taken By, (c) = Cosigned By    Initials Name Provider Type    Janine Haney OTR/RAUL  Occupational Therapist            Therapy Education  Education Details: Pt was edu to continue w/ HEP and lymphedema management  Given: HEP, Symptoms/condition management, Posture/body mechanics, Edema management  Program: Reinforced  How Provided: Verbal, Demonstration, Written  Provided to: Patient  Level of Understanding: Verbalized, Demonstrated                Time Calculation:   OT Start Time: 1100  Timed Charges  66289 - OT Manual Therapy Minutes: 53  Total Minutes  Timed Charges Total Minutes: 53   Total Minutes: 53     Therapy Charges for Today     Code Description Service Date Service Provider Modifiers Qty    [id number]  OT MANUAL THERAPY EA 15 MIN 8/10/2021 Janine Sheriff, OTR/L GO 4                      Janine Sheriff OTR/L  8/10/2021

## 2021-08-11 LAB
CYTO UR: NORMAL
LAB AP BIOTHERANOSTICS, ADDENDUM: NORMAL
LAB AP CASE REPORT: NORMAL
LAB AP CLINICAL INFORMATION: NORMAL
LAB AP DIAGNOSIS COMMENT: NORMAL
LAB AP SPECIAL STAINS: NORMAL
Lab: NORMAL
Lab: NORMAL
PATH REPORT.ADDENDUM SPEC: NORMAL
PATH REPORT.FINAL DX SPEC: NORMAL
PATH REPORT.GROSS SPEC: NORMAL

## 2021-08-17 RX ORDER — LEVOTHYROXINE SODIUM 0.2 MG/1
TABLET ORAL
Qty: 90 TABLET | Refills: 1 | Status: SHIPPED | OUTPATIENT
Start: 2021-08-17 | End: 2022-02-28 | Stop reason: SDUPTHER

## 2021-08-17 RX ORDER — LEVOTHYROXINE SODIUM 0.05 MG/1
TABLET ORAL
Qty: 90 TABLET | Refills: 1 | Status: SHIPPED | OUTPATIENT
Start: 2021-08-17 | End: 2021-11-29 | Stop reason: SDUPTHER

## 2021-08-24 ENCOUNTER — HOSPITAL ENCOUNTER (OUTPATIENT)
Dept: OCCUPATIONAL THERAPY | Facility: HOSPITAL | Age: 71
Setting detail: THERAPIES SERIES
Discharge: HOME | End: 2021-08-24

## 2021-08-24 DIAGNOSIS — C50.811 MALIGNANT NEOPLASM OF OVERLAPPING SITES OF RIGHT BREAST IN FEMALE, ESTROGEN RECEPTOR POSITIVE (HCC): Primary | ICD-10-CM

## 2021-08-24 DIAGNOSIS — I97.2 POSTMASTECTOMY LYMPHEDEMA SYNDROME OF LEFT UPPER EXTREMITY: ICD-10-CM

## 2021-08-24 DIAGNOSIS — Z17.0 MALIGNANT NEOPLASM OF OVERLAPPING SITES OF RIGHT BREAST IN FEMALE, ESTROGEN RECEPTOR POSITIVE (HCC): Primary | ICD-10-CM

## 2021-08-24 DIAGNOSIS — L90.5 ADHERENT SCAR, SKIN: ICD-10-CM

## 2021-08-24 PROCEDURE — 93702 BIS XTRACELL FLUID ANALYSIS: CPT

## 2021-08-24 PROCEDURE — 97140 MANUAL THERAPY 1/> REGIONS: CPT

## 2021-08-24 NOTE — THERAPY PROGRESS REPORT/RE-CERT
Outpatient Occupational Therapy Lymphedema Progress Note   [place]     Patient Name: Sarah Raphael  : 1950  MRN: [id number]  Today's Date: 2021      Visit Date: 2021    Patient Active Problem List   Diagnosis   • Anxiety   • Gastroesophageal reflux disease without esophagitis   • Hyperlipidemia   • Hypertension   • Hypothyroidism   • Cataract   • Cancer of overlapping sites of right female breast (CMS/HCC)   • Estrogen receptor positive   • Arthritis of right knee   • Status post total right knee replacement   • Elevated hemoglobin A1c   • Hyponatremia   • Acute postoperative pain        Past Medical History:   Diagnosis Date   • Arthritis    • Back pain    • Breast cancer (CMS/HCC)    • Celiac disease    • Disease of thyroid gland    • GERD (gastroesophageal reflux disease)    • Hepatitis     NOT SURE OF TYPE, CAN'T DONATE BLOOD   • History of transfusion    • Hypertension         Past Surgical History:   Procedure Laterality Date   • COLONOSCOPY         • EYE SURGERY      bilateral cataracts removed   • HYSTERECTOMY  1983   • KNEE ARTHROPLASTY, PARTIAL REPLACEMENT Right 10/7/2020    Procedure: TOTAL KNEE ARTHROPLASTY RIGHT;  Surgeon: Adrian Cortes MD;  Location: [facility];  Service: Orthopedics;  Laterality: Right;   • MASTECTOMY Bilateral 2017   • MASTECTOMY WITH SENTINEL NODE BIOPSY AND AXILLARY NODE DISSECTION Bilateral 2017    Procedure: LEFT BREAST MASTECTOMY WITH LEFT  SENTINEL NODE BIOPSY AND RIGHT PROPHYLACTIC MASTECTOMY;  Surgeon: Caitlin Green MD;  Location: [facility];  Service:    • OOPHORECTOMY     • SECONDARY INTRAOCULAR LENSE IMPLANTATION  2015    also in 2016   • SHOULDER ARTHROSCOPY Left 2014   • SKIN CANCER EXCISION  10/2015    basal cell   • THYROID SURGERY  1996   • TONSILLECTOMY  1976         Visit Dx:      ICD-10-CM ICD-9-CM   1. Malignant neoplasm of overlapping sites of right breast in female, estrogen receptor positive  (CMS/[facility])  C50.811 174.8    Z17.0 V86.0   2. 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 Adherent scar, skin  L90.5 709.2       Lymphedema     Row Name 08/24/21 1500             Subjective Pain    Able to rate subjective pain?  yes  -SG      Pre-Treatment Pain Level  0  -SG      Post-Treatment Pain Level  0  -SG         Subjective Comments    Subjective Comments  Pt reports that she has been compliant w/ compression. She reports that she still has stiffness in her hand when she wakes up, unable to make a fist.  -SG         Lymphedema Assessment    Lymphedema Classification  LUE:;stage 1 (Spontaneously Reversible)  -SG      Lymphedema Cancer Related Sx  bilateral;simple mastectomy;left;sentinel node biopsy  -SG      Lymphedema Surgery Comments  2017  -SG      Chemo Received  no Adjuvant tamoxifen started 9/27/17  -SG      Radiation Therapy Received  no  -SG      Infections or Cellulitis?  no  -SG         Posture/Observations    Alignment Options  Forward head;Thoracic kyphosis;Rounded shoulders  -SG      Posture/Observations Comments  Forward protective posture  -SG         General ROM    GENERAL ROM COMMENTS  BUE WNL  -SG         MMT (Manual Muscle Testing)    General MMT Comments  BUE WNL  -SG         Lymphedema Edema Assessment    Edema Assessment Comment  Edema LUE and L axilla/trunk  -SG         Skin Changes/Observations    Location/Assessment  Upper Extremity;Upper Quadrant  -SG      Upper Extremity Conditions  bilateral:;normal;intact;clean  -SG      Upper Extremity Color/Pigment  bilateral:;normal  -SG      Upper Quadrant Conditions  bilateral:;normal;intact;clean  -SG      Upper Quadrant Color/Pigment  bilateral:;normal  -SG      Skin Observations Comment  Tight soft tissue restrictions/scar tissue present bilateral chest  -SG         Lymphedema Measurements    Measurement Type(s)  Quick Girth  -SG      Quick Girth Areas  Upper extremities  -SG         LUE Quick Girth (cm)     Axilla  33.5 cm  -SG      Mid upper arm  30.5 cm  -SG      Elbow  26.5 cm  -SG      Mid forearm  24.5 cm  -SG      Wrist crease  17 cm  -SG      Web space  20 cm  -SG      Met-heads  18.5 cm  -SG      LUE Quick Girth Total  170.5  -SG         Manual Lymphatic Drainage    Manual Lymphatic Drainage  opened regional lymph nodes;initial sequence;extremity treatment  -SG      Initial Sequence  supraclavicular  -SG      Supraclavicular  left  -SG      Opened Regional Lymph Nodes  axillary;ribs;paraspinal  -SG      Axillary  left  -SG      Extremity Treatment  MLD to full limb  -SG      Manual Therapy  See manual rx for PORi protocol  -SG         Compression/Skin Care    Compression/Skin Care  compression garment  -SG      Compression Garment Comments  Jobst Suzan Lite 20-30 mmHg compression sleeve and glove  -SG         L-Dex Bioimpedence Screening    L-Dex Measurement Extremity  LUE  -SG      L-Dex Patient Position  Standing  -SG      L-Dex UE Dominate Side  Right  -SG      L-Dex UE At Risk Side  Left  -SG      L-Dex UE Pre Surgical Value  No  -SG      L-Dex UE Score  1.4  -SG      L-Dex UE Comment  The patient had a follow up SOZO measurement which I reviewed today. The score is within normal limits, 1.4, see scanned to EMR. Bioimpedance spectroscopy helps identify the onset of lymphedema in an arm or leg before patients experience noticeable swelling. Research has shown that the early detection of lymphedema using L-Dex combined with treatment can reduce progression to chronic lymphedema by 95% in breast cancer patients. Whenever possible, patients are tested for baseline L-Dex score before cancer treatment begins and then are reassessed during regular follow-up visits using the SOZO device. Otherwise, this can be started postoperatively and continued during regular follow-up visits. If the patient’s L-Dex score increases above normal levels, that is a sign that lymphedema is developing and a referral is made to  physical therapy for further evaluation and early compression treatment. Lymphedema assessment with the SOZO L-Dex score is recommended to be done every 3 months for the first 3 years and then every 6 months for years 4 and 5 followed by annually afterwards.  -SG        User Key  (r) = Recorded By, (t) = Taken By, (c) = Cosigned By    Initials Name Provider Type    Janine Haney OTR/L Occupational Therapist                  OT Assessment/Plan     Row Name 08/24/21 1500          OT Assessment    Functional Limitations  Limitations in functional capacity and performance;Performance in self-care ADL  -SG     Impairments  Edema;Pain;Impaired lymphatic circulation  -SG     Assessment Comments  Pt presents w the following: pain and stiffness in chest which limits ROM and participation in ADLs/IADLs; strength: decreased core stabilization and decreaed UE/ strength; posture: forward neck and shoulder posture, disrupted scapulo-humeral rhythm; increased tissue tightness over chest, stress across pectoralis major muscle, s-c joint line, c-v joint line, and associated ST through shoulders and trunk; w/ tight soft tissue restrictions and scar tissue; lymphedema in LUE. She will benefit from continuing with OP OT to address symptoms.  -SG     OT Rehab Potential  Excellent  -SG     Patient/caregiver participated in establishment of treatment plan and goals  Yes  -SG     Patient would benefit from skilled therapy intervention  Yes  -SG        OT Plan    OT Frequency  1x/week  -SG     Planned CPT's?  OT EVAL MOD COMPLEXITY: 83203;OT THER ACT EA 15 MIN: 04974CG;OT THER PROC EA 15 MIN: 30421RO;OT SELF CARE/MGMT/TRAIN 15 MIN: 96376;OT MANUAL THERAPY EA 15 MIN: 77735;OT BIS XTRACELL FLUID ANALYSIS: 13233  -     Planned Therapy Interventions (Optional Details)  home exercise program;joint mobilization;manual therapy techniques;patient/family education;postural re-education;ROM (Range of Motion);strengthening;stretching  -SG      OT Plan Comments  Continue PORi protocol and CDT including MLD, compression, skin care, and exercise. Continue ASTYM/STM as needed. Progress as tolerated.  -       User Key  (r) = Recorded By, (t) = Taken By, (c) = Cosigned By    Initials Name Provider Type    Janine Haney OTR/L Occupational Therapist                 Manual Rx (last 36 hours)      Manual Treatments     Row Name 08/24/21 1500             Total Minutes    49714 - OT Manual Therapy Minutes  45  -         Manual Rx 1    Manual Rx 1 Location  Arm--Orthopedic: Forearm extensors, biceps muscles, lateral intermuscular septum tension line upper arm, pectoralis major  -      Manual Rx 1 Type  Trigger Point Release, Myofascial Bending  -      Manual Rx 1 Duration  Trigger Point Release for count of 3 seconds, repeat each section 5 times; Myofascial stretching w/ tissue/muscle bending 3-5 times  -         Manual Rx 2    Manual Rx 2 Location  Axilla--Orthopedic: Medial Intermuscular Septum tension line upper arm, pectoralis minor, subscapularis, latissimus dorsi, teres major  -      Manual Rx 2 Type  Trigger Point Release; Myofascial bending  -      Manual Rx 2 Duration  Trigger Point Release for count of 3 seconds, repeat each section 5 times; Myofascial stretching w/ tissue/muscle bending 3-5 times  -         Manual Rx 3    Manual Rx 3 Location  Chest: Lymph System: Zone 1--pectoral region, Zone 2--superior Chest, Zone 3--inferior Chest  -      Manual Rx 3 Type  Fluid movement/ MLD; parasternal LN node pumps  -      Manual Rx 3 Duration  Repeat each Line x1 and corresponding parasternal node pumps x5, repeat each zone x3  -         Manual Rx 4    Manual Rx 4 Location  Lateral chest/trunk--Lymph System: zone 1--lateral, zone 2--inferior, zone 3--central, zone 4-superior  -      Manual Rx 4 Type  Intercostal Lymph Node Pumps  -      Manual Rx 4 Duration  Repeat all 4 zones x 3  -SG         Manual Rx 5    Manual Rx 5 Location   "Upper Arm: Lymph System  -      Manual Rx 5 Type  Upper arm fluid clearance of axillary pathway; medial confluence fluid clearance--antecubital fossa; lateral upper arm-cephalic pathway  -SG      Manual Rx 5 Duration  x3 lines of treatment  -         Manual Rx 6    Manual Rx 6 Location  Forearm--Orthopedic: radius and ulna-interosseous membrane; carpals-joint capsules-metacarpals-interosseous membranes; thenar muscles  -SG      Manual Rx 6 Type  Joint mobilization, Trigger Point Release, Drainage of forearm  -SG      Manual Rx 6 Duration  Joint Mob: each section x5 in each position; drainage of forearm: perform x3-5 lines of treatment  -         Manual Rx 7    Manual Rx 7 Location  Back--orthopedic: upper trapezius, infraspinatus, teres minor, rhomboids, quadratus lumborum; T12, L1, L2 mobilization  -SG      Manual Rx 7 Type  Trigger Point Release, Joint mobilization  -      Manual Rx 7 Duration  Trigger Point Release for count of 3 seconds, repeat each section 5 times; perform 5-10 oscillations of each vertebra successively  -      Additional Treatment?  Yes  -         Manual Rx 8    Manual Rx 8 Location  Back--lymph system  -SG      Manual Rx 8 Type  Fluid movement; paraspinal lymph node pumps  -SG      Manual Rx 8 Duration  Repeat each line x1 and corresponding paraspinal node pumps x5; repeat each zone x3  -        User Key  (r) = Recorded By, (t) = Taken By, (c) = Cosigned By    Initials Name Provider Type    Janine Haney OTR/L Occupational Therapist        OT Goals     Row Name 08/24/21 1500          OT Short Term Goals    STG Date to Achieve  08/19/21  -     STG 1  \"Pt will understand lymphedema precautions to decrease the risk of infection and exacerbation of the lymphedema.  -     STG 1 Progress  Met  -     STG 2  \"Pt will develop a tolerance for wearing compression between treatment sessions to facilitate limb decongestion.  -     STG 2 Progress  Met  -     STG 3  \"Pt will be " "independent with HEP for shoulder ROM and soft tissue flexibility.  -SG     STG 3 Progress  Met  -SG     STG 4  Left chest soft tissue restrictions will demo at least 25% improvement.  -SG     STG 4 Progress  Met  -SG        Long Term Goals    LTG 1  \"Treatment will achieve maximum edema and/or lymphedema reduction to enable functional improvements and a return to PLOF  -SG     LTG 1 Progress  Progressing  -SG     LTG 2  \"Pt will demonstrate awareness of individualized lymphedema precautions based on personal risk factors and when to seek medical attn. for assessment of early signs of lymphedema or cellulitis of the affected region  -SG     LTG 2 Progress  Progressing  -SG     LTG 3  Left chest soft tissue restrictions will demo at least 75% improvement.  -SG     LTG 3 Progress  Progressing  -SG     LTG 4  Ldex score will decrease from 8 to at least 6.5 to reduce LUE lymphedema.  -SG     LTG 4 Progress  Met  -SG        Time Calculation    OT Goal Re-Cert Due Date  10/18/21  -       User Key  (r) = Recorded By, (t) = Taken By, (c) = Cosigned By    Initials Name Provider Type    Janine Haney, OTR/L Occupational Therapist          Therapy Education  Education Details: Pt was edu on reassessment results, including Ldex score. Pt's score today is 1.4, which is significantly improved from last measurement of 8. 8 is in the \"yellow zone\" indicating stage 1 of lymphedema. However, after 4 weeks of wearing her compression daily, pt has reduced her ldex score back down to a normal range, 1.4. She was edu that she should wear her compression now as needed i.e. when doing more labor intensive activity, exercise, travel, etc.   Given: HEP, Symptoms/condition management, Posture/body mechanics, Edema management  Program: Reinforced  How Provided: Verbal, Demonstration, Written  Provided to: Patient  Level of Understanding: Verbalized, Demonstrated                Time Calculation:   OT Start Time: 1500  Timed Charges  77299 " - OT Manual Therapy Minutes: 45  Total Minutes  Timed Charges Total Minutes: 45   Total Minutes: 45     Therapy Charges for Today     Code Description Service Date Service Provider Modifiers Qty    89977152378 HC OT MANUAL THERAPY EA 15 MIN 8/24/2021 Janine Sheriff OTR/L GO 3    [id number]  OT BIS XTRACELL FLUID ANALYSIS 8/24/2021 Janine Sheriff OTR/L GO 1                      Janine Sheriff OTR/L  8/24/2021

## 2021-08-25 RX ORDER — DIPHENHYDRAMINE HYDROCHLORIDE 50 MG/ML
INJECTION INTRAMUSCULAR; INTRAVENOUS
Qty: 10 ML | Refills: 1 | Status: SHIPPED | OUTPATIENT
Start: 2021-08-25 | End: 2021-09-02

## 2021-09-02 RX ORDER — DIPHENHYDRAMINE HYDROCHLORIDE 50 MG/ML
INJECTION INTRAMUSCULAR; INTRAVENOUS
Qty: 10 ML | Refills: 1 | Status: SHIPPED | OUTPATIENT
Start: 2021-09-02 | End: 2021-10-04

## 2021-09-03 ENCOUNTER — HOSPITAL ENCOUNTER (OUTPATIENT)
Dept: OCCUPATIONAL THERAPY | Facility: HOSPITAL | Age: 71
Setting detail: THERAPIES SERIES
Discharge: HOME | End: 2021-09-03

## 2021-09-03 DIAGNOSIS — L90.5 ADHERENT SCAR, SKIN: ICD-10-CM

## 2021-09-03 DIAGNOSIS — C50.811 MALIGNANT NEOPLASM OF OVERLAPPING SITES OF RIGHT BREAST IN FEMALE, ESTROGEN RECEPTOR POSITIVE (HCC): Primary | ICD-10-CM

## 2021-09-03 DIAGNOSIS — I97.2 POSTMASTECTOMY LYMPHEDEMA SYNDROME OF LEFT UPPER EXTREMITY: ICD-10-CM

## 2021-09-03 DIAGNOSIS — Z17.0 MALIGNANT NEOPLASM OF OVERLAPPING SITES OF RIGHT BREAST IN FEMALE, ESTROGEN RECEPTOR POSITIVE (HCC): Primary | ICD-10-CM

## 2021-09-03 PROCEDURE — 97535 SELF CARE MNGMENT TRAINING: CPT

## 2021-09-03 PROCEDURE — 97140 MANUAL THERAPY 1/> REGIONS: CPT

## 2021-09-03 NOTE — THERAPY TREATMENT NOTE
Outpatient Occupational Therapy Lymphedema Treatment Note   [facility]     Patient Name: Sarah Raphael  : 1950  MRN: [id number]  Today's Date: 9/3/2021      Visit Date: 2021    Patient Active Problem List   Diagnosis   • Anxiety   • Gastroesophageal reflux disease without esophagitis   • Hyperlipidemia   • Hypertension   • Hypothyroidism   • Cataract   • Cancer of overlapping sites of right female breast (CMS/HCC)   • Estrogen receptor positive   • Arthritis of right knee   • Status post total right knee replacement   • Elevated hemoglobin A1c   • Hyponatremia   • Acute postoperative pain        Past Medical History:   Diagnosis Date   • Arthritis    • Back pain    • Breast cancer (CMS/HCC)    • Celiac disease    • Disease of thyroid gland    • GERD (gastroesophageal reflux disease)    • Hepatitis     NOT SURE OF TYPE, CAN'T DONATE BLOOD   • History of transfusion    • Hypertension         Past Surgical History:   Procedure Laterality Date   • COLONOSCOPY         • EYE SURGERY      bilateral cataracts removed   • HYSTERECTOMY  1983   • KNEE ARTHROPLASTY, PARTIAL REPLACEMENT Right 10/7/2020    Procedure: TOTAL KNEE ARTHROPLASTY RIGHT;  Surgeon: Adrian Cortes MD;  Location: [facility];  Service: Orthopedics;  Laterality: Right;   • MASTECTOMY Bilateral 2017   • MASTECTOMY WITH SENTINEL NODE BIOPSY AND AXILLARY NODE DISSECTION Bilateral 2017    Procedure: LEFT BREAST MASTECTOMY WITH LEFT  SENTINEL NODE BIOPSY AND RIGHT PROPHYLACTIC MASTECTOMY;  Surgeon: Caitlin Green MD;  Location: [facility];  Service:    • OOPHORECTOMY     • SECONDARY INTRAOCULAR LENSE IMPLANTATION  2015    also in 2016   • SHOULDER ARTHROSCOPY Left 2014   • SKIN CANCER EXCISION  10/2015    basal cell   • THYROID SURGERY  1996   • TONSILLECTOMY  1976         Visit Dx:      ICD-10-CM ICD-9-CM   1. Malignant neoplasm of overlapping sites of right breast in female, estrogen receptor positive  (CMS/[facility])  C50.811 174.8    Z17.0 V86.0   2. 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 Adherent scar, skin  L90.5 709.2       Lymphedema     Row Name 09/03/21 0900             Subjective Pain    Able to rate subjective pain?  yes  -SG      Pre-Treatment Pain Level  0  -SG      Post-Treatment Pain Level  0  -SG         Subjective Comments    Subjective Comments  Pt reports that she has not worn her compression sleeve since our last therapy visit. She is feeling good, has not noticed any more swelling .  -SG         Lymphedema Assessment    Lymphedema Classification  LUE:;stage 1 (Spontaneously Reversible)  -SG      Lymphedema Cancer Related Sx  bilateral;simple mastectomy;left;sentinel node biopsy  -SG      Lymphedema Surgery Comments  2017  -SG      Chemo Received  no Adjuvant tamoxifen started 9/27/17  -SG      Radiation Therapy Received  no  -SG      Infections or Cellulitis?  no  -SG         Posture/Observations    Alignment Options  Forward head;Thoracic kyphosis;Rounded shoulders  -SG      Posture/Observations Comments  Forward protective posture  -SG         General ROM    GENERAL ROM COMMENTS  BUE WNL  -SG         MMT (Manual Muscle Testing)    General MMT Comments  BUE WNL  -SG         Lymphedema Edema Assessment    Edema Assessment Comment  Edema LUE and L axilla/trunk  -SG         Skin Changes/Observations    Location/Assessment  Upper Extremity;Upper Quadrant  -SG      Upper Extremity Conditions  bilateral:;normal;intact;clean  -SG      Upper Extremity Color/Pigment  bilateral:;normal  -SG      Upper Quadrant Conditions  bilateral:;normal;intact;clean  -SG      Upper Quadrant Color/Pigment  bilateral:;normal  -SG      Skin Observations Comment  Tight soft tissue restrictions/scar tissue present bilateral chest  -SG         Lymphedema Measurements    Measurement Type(s)  Quick Girth  -SG      Quick Girth Areas  Upper extremities  -SG         Manual Lymphatic Drainage     Manual Lymphatic Drainage  opened regional lymph nodes;initial sequence;extremity treatment  -SG      Initial Sequence  supraclavicular  -SG      Supraclavicular  left  -SG      Opened Regional Lymph Nodes  axillary;ribs;paraspinal  -SG      Axillary  left  -SG      Extremity Treatment  MLD to full limb  -SG      Manual Therapy  See manual rx for PORi protocol  -SG         Compression/Skin Care    Compression/Skin Care  --  -SG         L-Dex Bioimpedence Screening    L-Dex Measurement Extremity  LUE  -SG      L-Dex Patient Position  Standing  -SG      L-Dex UE Dominate Side  Right  -SG      L-Dex UE At Risk Side  Left  -SG      L-Dex UE Pre Surgical Value  No  -SG        User Key  (r) = Recorded By, (t) = Taken By, (c) = Cosigned By    Initials Name Provider Type    Janine Haney OTR/L Occupational Therapist                  OT Assessment/Plan     Row Name 09/03/21 0900          OT Assessment    Functional Limitations  Limitations in functional capacity and performance;Performance in self-care ADL  -SG     Impairments  Edema;Pain;Impaired lymphatic circulation  -SG     Assessment Comments  Pt presents w the following: pain and stiffness in chest which limits ROM and participation in ADLs/IADLs; strength: decreased core stabilization and decreaed UE/ strength; posture: forward neck and shoulder posture, disrupted scapulo-humeral rhythm; increased tissue tightness over chest, stress across pectoralis major muscle, s-c joint line, c-v joint line, and associated ST through shoulders and trunk; w/ tight soft tissue restrictions and scar tissue; lymphedema in LUE. She will benefit from continuing with OP OT to address symptoms.  -SG     OT Rehab Potential  Excellent  -SG     Patient/caregiver participated in establishment of treatment plan and goals  Yes  -SG     Patient would benefit from skilled therapy intervention  Yes  -SG        OT Plan    OT Frequency  1x/week  -SG     Planned CPT's?  OT EVAL MOD  COMPLEXITY: 21757;OT THER ACT EA 15 MIN: 56290SL;OT THER PROC EA 15 MIN: 43895WL;OT SELF CARE/MGMT/TRAIN 15 MIN: 34251;OT MANUAL THERAPY EA 15 MIN: 11645;OT BIS XTRACELL FLUID ANALYSIS: 43861  -     Planned Therapy Interventions (Optional Details)  home exercise program;joint mobilization;manual therapy techniques;patient/family education;postural re-education;ROM (Range of Motion);strengthening;stretching  -     OT Plan Comments  Continue PORi protocol and CDT including MLD, compression, skin care, and exercise. Continue ASTYM/STM as needed. Progress as tolerated.  -       User Key  (r) = Recorded By, (t) = Taken By, (c) = Cosigned By    Initials Name Provider Type    Janine Haney OTR/L Occupational Therapist                 Manual Rx (last 36 hours)      Manual Treatments     Row Name 09/03/21 0900             Total Minutes    47607 - OT Manual Therapy Minutes  45  -         Manual Rx 1    Manual Rx 1 Location  Arm--Orthopedic: Forearm extensors, biceps muscles, lateral intermuscular septum tension line upper arm, pectoralis major  -      Manual Rx 1 Type  Trigger Point Release, Myofascial Bending  -      Manual Rx 1 Duration  Trigger Point Release for count of 3 seconds, repeat each section 5 times; Myofascial stretching w/ tissue/muscle bending 3-5 times  -         Manual Rx 2    Manual Rx 2 Location  Axilla--Orthopedic: Medial Intermuscular Septum tension line upper arm, pectoralis minor, subscapularis, latissimus dorsi, teres major  -      Manual Rx 2 Type  Trigger Point Release; Myofascial bending  -      Manual Rx 2 Duration  Trigger Point Release for count of 3 seconds, repeat each section 5 times; Myofascial stretching w/ tissue/muscle bending 3-5 times  -         Manual Rx 3    Manual Rx 3 Location  Chest: Lymph System: Zone 1--pectoral region, Zone 2--superior Chest, Zone 3--inferior Chest  -      Manual Rx 3 Type  Fluid movement/ MLD; parasternal LN node pumps  -      Manual  Rx 3 Duration  Repeat each Line x1 and corresponding parasternal node pumps x5, repeat each zone x3  -         Manual Rx 4    Manual Rx 4 Location  Lateral chest/trunk--Lymph System: zone 1--lateral, zone 2--inferior, zone 3--central, zone 4-superior  -      Manual Rx 4 Type  Intercostal Lymph Node Pumps  -      Manual Rx 4 Duration  Repeat all 4 zones x 3  -SG         Manual Rx 5    Manual Rx 5 Location  Upper Arm: Lymph System  -      Manual Rx 5 Type  Upper arm fluid clearance of axillary pathway; medial confluence fluid clearance--antecubital fossa; lateral upper arm-cephalic pathway  -      Manual Rx 5 Duration  x3 lines of treatment  -         Manual Rx 6    Manual Rx 6 Location  Forearm--Orthopedic: radius and ulna-interosseous membrane; carpals-joint capsules-metacarpals-interosseous membranes; thenar muscles  -      Manual Rx 6 Type  Joint mobilization, Trigger Point Release, Drainage of forearm  -      Manual Rx 6 Duration  Joint Mob: each section x5 in each position; drainage of forearm: perform x3-5 lines of treatment  -         Manual Rx 7    Manual Rx 7 Location  Back--orthopedic: upper trapezius, infraspinatus, teres minor, rhomboids, quadratus lumborum; T12, L1, L2 mobilization  -      Manual Rx 7 Type  Trigger Point Release, Joint mobilization  -      Manual Rx 7 Duration  Trigger Point Release for count of 3 seconds, repeat each section 5 times; perform 5-10 oscillations of each vertebra successively  -      Additional Treatment?  Yes  -         Manual Rx 8    Manual Rx 8 Location  Back--lymph system  -      Manual Rx 8 Type  Fluid movement; paraspinal lymph node pumps  -      Manual Rx 8 Duration  Repeat each line x1 and corresponding paraspinal node pumps x5; repeat each zone x3  -        User Key  (r) = Recorded By, (t) = Taken By, (c) = Cosigned By    Initials Name Provider Type    Janine Haney OTR/L Occupational Therapist            Therapy  Education  Education Details: Continue w/ HEP and lymphedema management as needed  Given: HEP, Symptoms/condition management, Posture/body mechanics, Edema management  Program: Reinforced  How Provided: Verbal, Demonstration, Written  Provided to: Patient  Level of Understanding: Verbalized, Demonstrated  20945 - OT Self Care/Mgmt Minutes: 10                Time Calculation:   OT Start Time: 0900  Timed Charges  25184 - OT Manual Therapy Minutes: 45  42738 - OT Self Care/Mgmt Minutes: 10  Total Minutes  Timed Charges Total Minutes: 55   Total Minutes: 55     Therapy Charges for Today     Code Description Service Date Service Provider Modifiers Qty    17840466063 HC OT MANUAL THERAPY EA 15 MIN 9/3/2021 Janine Sheriff OTR/L GO 3    90314015917 HC OT SELF CARE/MGMT/TRAIN EA 15 MIN 9/3/2021 Janine Sheriff OTR/L GO 1                      Janine Sheriff OTR/L  9/3/2021

## 2021-09-09 RX ORDER — CITALOPRAM 40 MG/1
TABLET ORAL
Qty: 135 TABLET | Refills: 1 | Status: SHIPPED | OUTPATIENT
Start: 2021-09-09 | End: 2022-02-28 | Stop reason: SDUPTHER

## 2021-09-15 DIAGNOSIS — K21.9 GASTROESOPHAGEAL REFLUX DISEASE WITHOUT ESOPHAGITIS: ICD-10-CM

## 2021-09-15 RX ORDER — PANTOPRAZOLE SODIUM 40 MG/1
TABLET, DELAYED RELEASE ORAL
Qty: 180 TABLET | Refills: 1 | Status: SHIPPED | OUTPATIENT
Start: 2021-09-15 | End: 2022-05-16

## 2021-09-15 RX ORDER — TRIAMTERENE AND HYDROCHLOROTHIAZIDE 37.5; 25 MG/1; MG/1
TABLET ORAL
Qty: 90 TABLET | Refills: 1 | Status: SHIPPED | OUTPATIENT
Start: 2021-09-15 | End: 2022-02-28 | Stop reason: SDUPTHER

## 2021-09-17 ENCOUNTER — HOSPITAL ENCOUNTER (OUTPATIENT)
Dept: OCCUPATIONAL THERAPY | Facility: HOSPITAL | Age: 71
Setting detail: THERAPIES SERIES
Discharge: HOME | End: 2021-09-17

## 2021-09-17 DIAGNOSIS — Z17.0 MALIGNANT NEOPLASM OF OVERLAPPING SITES OF RIGHT BREAST IN FEMALE, ESTROGEN RECEPTOR POSITIVE (HCC): Primary | ICD-10-CM

## 2021-09-17 DIAGNOSIS — I97.2 POSTMASTECTOMY LYMPHEDEMA SYNDROME OF LEFT UPPER EXTREMITY: ICD-10-CM

## 2021-09-17 DIAGNOSIS — L90.5 ADHERENT SCAR, SKIN: ICD-10-CM

## 2021-09-17 DIAGNOSIS — C50.811 MALIGNANT NEOPLASM OF OVERLAPPING SITES OF RIGHT BREAST IN FEMALE, ESTROGEN RECEPTOR POSITIVE (HCC): Primary | ICD-10-CM

## 2021-09-17 PROCEDURE — 97140 MANUAL THERAPY 1/> REGIONS: CPT

## 2021-09-17 NOTE — THERAPY TREATMENT NOTE
Outpatient Occupational Therapy Lymphedema Treatment Note   [facility]     Patient Name: Sarah Raphael  : 1950  MRN: [id number]  Today's Date: 2021      Visit Date: 2021    Patient Active Problem List   Diagnosis   • Anxiety   • Gastroesophageal reflux disease without esophagitis   • Hyperlipidemia   • Hypertension   • Hypothyroidism   • Cataract   • Cancer of overlapping sites of right female breast (CMS/HCC)   • Estrogen receptor positive   • Arthritis of right knee   • Status post total right knee replacement   • Elevated hemoglobin A1c   • Hyponatremia   • Acute postoperative pain        Past Medical History:   Diagnosis Date   • Arthritis    • Back pain    • Breast cancer (CMS/HCC)    • Celiac disease    • Disease of thyroid gland    • GERD (gastroesophageal reflux disease)    • Hepatitis     NOT SURE OF TYPE, CAN'T DONATE BLOOD   • History of transfusion    • Hypertension         Past Surgical History:   Procedure Laterality Date   • COLONOSCOPY         • EYE SURGERY      bilateral cataracts removed   • HYSTERECTOMY  1983   • KNEE ARTHROPLASTY, PARTIAL REPLACEMENT Right 10/7/2020    Procedure: TOTAL KNEE ARTHROPLASTY RIGHT;  Surgeon: Adrian Cortes MD;  Location: [facility];  Service: Orthopedics;  Laterality: Right;   • MASTECTOMY Bilateral 2017   • MASTECTOMY WITH SENTINEL NODE BIOPSY AND AXILLARY NODE DISSECTION Bilateral 2017    Procedure: LEFT BREAST MASTECTOMY WITH LEFT  SENTINEL NODE BIOPSY AND RIGHT PROPHYLACTIC MASTECTOMY;  Surgeon: Caitlin Green MD;  Location: [facility];  Service:    • OOPHORECTOMY     • SECONDARY INTRAOCULAR LENSE IMPLANTATION  2015    also in 2016   • SHOULDER ARTHROSCOPY Left 2014   • SKIN CANCER EXCISION  10/2015    basal cell   • THYROID SURGERY  1996   • TONSILLECTOMY  1976         Visit Dx:      ICD-10-CM ICD-9-CM   1. Malignant neoplasm of overlapping sites of right breast in female, estrogen receptor positive  (CMS/[facility])  C50.811 174.8    Z17.0 V86.0   2. 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 Adherent scar, skin  L90.5 709.2       Lymphedema     Row Name 09/17/21 1100             Subjective Pain    Able to rate subjective pain?  yes  -SG      Pre-Treatment Pain Level  0  -SG      Post-Treatment Pain Level  0  -SG         Subjective Comments    Subjective Comments  Pt reports that she has had increased arm/hand pain over the last week or so. Her hand gets to where she is unable to make a fist in the morning or at night d/t stiffness.  -SG         Lymphedema Assessment    Lymphedema Classification  LUE:;stage 1 (Spontaneously Reversible)  -SG      Lymphedema Cancer Related Sx  bilateral;simple mastectomy;left;sentinel node biopsy  -SG      Lymphedema Surgery Comments  2017  -SG      Chemo Received  no Adjuvant tamoxifen started 9/27/17  -SG      Radiation Therapy Received  no  -SG      Infections or Cellulitis?  no  -SG         Posture/Observations    Alignment Options  Forward head;Thoracic kyphosis;Rounded shoulders  -SG      Posture/Observations Comments  Forward protective posture  -SG         General ROM    GENERAL ROM COMMENTS  BUE WNL  -SG         MMT (Manual Muscle Testing)    General MMT Comments  BUE WNL  -SG         Lymphedema Edema Assessment    Edema Assessment Comment  Edema LUE and L axilla/trunk  -SG         Skin Changes/Observations    Location/Assessment  Upper Extremity;Upper Quadrant  -SG      Upper Extremity Conditions  bilateral:;normal;intact;clean  -SG      Upper Extremity Color/Pigment  bilateral:;normal  -SG      Upper Quadrant Conditions  bilateral:;normal;intact;clean  -SG      Upper Quadrant Color/Pigment  bilateral:;normal  -SG      Skin Observations Comment  Tight soft tissue restrictions/scar tissue present bilateral chest  -SG         Lymphedema Measurements    Measurement Type(s)  Quick Girth  -SG      Quick Girth Areas  Upper extremities  -SG          Manual Lymphatic Drainage    Manual Lymphatic Drainage  opened regional lymph nodes;initial sequence;extremity treatment  -SG      Initial Sequence  supraclavicular  -SG      Supraclavicular  left  -SG      Opened Regional Lymph Nodes  axillary;ribs;paraspinal  -SG      Axillary  left  -SG      Extremity Treatment  MLD to full limb  -SG      Manual Therapy  See manual rx for PORi protocol  -SG         L-Dex Bioimpedence Screening    L-Dex Measurement Extremity  LUE  -SG      L-Dex Patient Position  Standing  -SG      L-Dex UE Dominate Side  Right  -SG      L-Dex UE At Risk Side  Left  -SG      L-Dex UE Pre Surgical Value  No  -SG        User Key  (r) = Recorded By, (t) = Taken By, (c) = Cosigned By    Initials Name Provider Type    Janine Haney OTR/L Occupational Therapist                  OT Assessment/Plan     Row Name 09/17/21 1100          OT Assessment    Functional Limitations  Limitations in functional capacity and performance;Performance in self-care ADL  -SG     Impairments  Edema;Pain;Impaired lymphatic circulation  -SG     Assessment Comments  Pt presents w the following: pain and stiffness in chest which limits ROM and participation in ADLs/IADLs; strength: decreased core stabilization and decreaed UE/ strength; posture: forward neck and shoulder posture, disrupted scapulo-humeral rhythm; increased tissue tightness over chest, stress across pectoralis major muscle, s-c joint line, c-v joint line, and associated ST through shoulders and trunk; w/ tight soft tissue restrictions and scar tissue; lymphedema in LUE. She will benefit from continuing with OP OT to address symptoms.  -SG     OT Rehab Potential  Excellent  -SG     Patient/caregiver participated in establishment of treatment plan and goals  Yes  -SG     Patient would benefit from skilled therapy intervention  Yes  -SG        OT Plan    OT Frequency  1x/week  -SG     Planned CPT's?  OT EVAL MOD COMPLEXITY: 27752;OT THER ACT EA 15 MIN:  58846TA;OT THER PROC EA 15 MIN: 12065NI;OT SELF CARE/MGMT/TRAIN 15 MIN: 57988;OT MANUAL THERAPY EA 15 MIN: 43413;OT BIS XTRACELL FLUID ANALYSIS: 75817  -     Planned Therapy Interventions (Optional Details)  home exercise program;joint mobilization;manual therapy techniques;patient/family education;postural re-education;ROM (Range of Motion);strengthening;stretching  -     OT Plan Comments  Continue PORi protocol and CDT including MLD, compression, skin care, and exercise. Continue ASTYM/STM as needed. Progress as tolerated.  -       User Key  (r) = Recorded By, (t) = Taken By, (c) = Cosigned By    Initials Name Provider Type    Janine Haney OTR/L Occupational Therapist                 Manual Rx (last 36 hours)      Manual Treatments     Row Name 09/17/21 1100             Total Minutes    30413 - OT Manual Therapy Minutes  53  -         Manual Rx 1    Manual Rx 1 Location  Arm--Orthopedic: Forearm extensors, biceps muscles, lateral intermuscular septum tension line upper arm, pectoralis major  -      Manual Rx 1 Type  Trigger Point Release, Myofascial Bending  -      Manual Rx 1 Duration  Trigger Point Release for count of 3 seconds, repeat each section 5 times; Myofascial stretching w/ tissue/muscle bending 3-5 times  -         Manual Rx 2    Manual Rx 2 Location  Axilla--Orthopedic: Medial Intermuscular Septum tension line upper arm, pectoralis minor, subscapularis, latissimus dorsi, teres major  -      Manual Rx 2 Type  Trigger Point Release; Myofascial bending  -      Manual Rx 2 Duration  Trigger Point Release for count of 3 seconds, repeat each section 5 times; Myofascial stretching w/ tissue/muscle bending 3-5 times  -         Manual Rx 3    Manual Rx 3 Location  Chest: Lymph System: Zone 1--pectoral region, Zone 2--superior Chest, Zone 3--inferior Chest  -      Manual Rx 3 Type  Fluid movement/ MLD; parasternal LN node pumps  -      Manual Rx 3 Duration  Repeat each Line x1 and  corresponding parasternal node pumps x5, repeat each zone x3  -SG         Manual Rx 4    Manual Rx 4 Location  Lateral chest/trunk--Lymph System: zone 1--lateral, zone 2--inferior, zone 3--central, zone 4-superior  -      Manual Rx 4 Type  Intercostal Lymph Node Pumps  -SG      Manual Rx 4 Duration  Repeat all 4 zones x 3  -SG         Manual Rx 5    Manual Rx 5 Location  Upper Arm: Lymph System  -SG      Manual Rx 5 Type  Upper arm fluid clearance of axillary pathway; medial confluence fluid clearance--antecubital fossa; lateral upper arm-cephalic pathway  -      Manual Rx 5 Duration  x3 lines of treatment  -SG         Manual Rx 6    Manual Rx 6 Location  Forearm--Orthopedic: radius and ulna-interosseous membrane; carpals-joint capsules-metacarpals-interosseous membranes; thenar muscles  -      Manual Rx 6 Type  Joint mobilization, Trigger Point Release, Drainage of forearm  -      Manual Rx 6 Duration  Joint Mob: each section x5 in each position; drainage of forearm: perform x3-5 lines of treatment  -         Manual Rx 7    Manual Rx 7 Location  Back--orthopedic: upper trapezius, infraspinatus, teres minor, rhomboids, quadratus lumborum; T12, L1, L2 mobilization  -      Manual Rx 7 Type  Trigger Point Release, Joint mobilization  -SG      Manual Rx 7 Duration  Trigger Point Release for count of 3 seconds, repeat each section 5 times; perform 5-10 oscillations of each vertebra successively  -      Additional Treatment?  Yes  -         Manual Rx 8    Manual Rx 8 Location  Back--lymph system  -SG      Manual Rx 8 Type  Fluid movement; paraspinal lymph node pumps  -SG      Manual Rx 8 Duration  Repeat each line x1 and corresponding paraspinal node pumps x5; repeat each zone x3  -        User Key  (r) = Recorded By, (t) = Taken By, (c) = Cosigned By    Initials Name Provider Type    Janine Haney OTR/L Occupational Therapist            Therapy Education  Education Details: Pt encouraged on wear  schedule for compression garment  Given: HEP, Symptoms/condition management, Posture/body mechanics, Edema management  Program: Reinforced  How Provided: Verbal, Demonstration, Written  Provided to: Patient  Level of Understanding: Verbalized, Demonstrated                Time Calculation:   OT Start Time: 1100  Timed Charges  80128 - OT Manual Therapy Minutes: 53  Total Minutes  Timed Charges Total Minutes: 53   Total Minutes: 53     Therapy Charges for Today     Code Description Service Date Service Provider Modifiers Qty    11274142033 HC OT MANUAL THERAPY EA 15 MIN 9/17/2021 Janine Sheriff, OTR/L GO 4                      Janine Sheriff OTR/L  9/17/2021

## 2021-09-24 ENCOUNTER — HOSPITAL ENCOUNTER (OUTPATIENT)
Dept: OCCUPATIONAL THERAPY | Facility: HOSPITAL | Age: 71
Setting detail: THERAPIES SERIES
Discharge: HOME | End: 2021-09-24

## 2021-09-24 DIAGNOSIS — L90.5 ADHERENT SCAR, SKIN: ICD-10-CM

## 2021-09-24 DIAGNOSIS — C50.811 MALIGNANT NEOPLASM OF OVERLAPPING SITES OF RIGHT BREAST IN FEMALE, ESTROGEN RECEPTOR POSITIVE (HCC): Primary | ICD-10-CM

## 2021-09-24 DIAGNOSIS — I97.2 POSTMASTECTOMY LYMPHEDEMA SYNDROME OF LEFT UPPER EXTREMITY: ICD-10-CM

## 2021-09-24 DIAGNOSIS — Z17.0 MALIGNANT NEOPLASM OF OVERLAPPING SITES OF RIGHT BREAST IN FEMALE, ESTROGEN RECEPTOR POSITIVE (HCC): Primary | ICD-10-CM

## 2021-09-24 PROCEDURE — 97140 MANUAL THERAPY 1/> REGIONS: CPT

## 2021-09-24 PROCEDURE — 97535 SELF CARE MNGMENT TRAINING: CPT

## 2021-09-24 NOTE — THERAPY TREATMENT NOTE
Outpatient Occupational Therapy Lymphedema Treatment Note   [place]     Patient Name: Sarah Raphael  : 1950  MRN: [id number]  Today's Date: 2021      Visit Date: 2021    Patient Active Problem List   Diagnosis   • Anxiety   • Gastroesophageal reflux disease without esophagitis   • Hyperlipidemia   • Hypertension   • Hypothyroidism   • Cataract   • Cancer of overlapping sites of right female breast (CMS/HCC)   • Estrogen receptor positive   • Arthritis of right knee   • Status post total right knee replacement   • Elevated hemoglobin A1c   • Hyponatremia   • Acute postoperative pain        Past Medical History:   Diagnosis Date   • Arthritis    • Back pain    • Breast cancer (CMS/HCC)    • Celiac disease    • Disease of thyroid gland    • GERD (gastroesophageal reflux disease)    • Hepatitis     NOT SURE OF TYPE, CAN'T DONATE BLOOD   • History of transfusion    • Hypertension         Past Surgical History:   Procedure Laterality Date   • COLONOSCOPY         • EYE SURGERY      bilateral cataracts removed   • HYSTERECTOMY  1983   • KNEE ARTHROPLASTY, PARTIAL REPLACEMENT Right 10/7/2020    Procedure: TOTAL KNEE ARTHROPLASTY RIGHT;  Surgeon: Adrian Cortes MD;  Location: [facility], later [facility];  Service: Orthopedics;  Laterality: Right;   • MASTECTOMY Bilateral 2017   • MASTECTOMY WITH SENTINEL NODE BIOPSY AND AXILLARY NODE DISSECTION Bilateral 2017    Procedure: LEFT BREAST MASTECTOMY WITH LEFT  SENTINEL NODE BIOPSY AND RIGHT PROPHYLACTIC MASTECTOMY;  Surgeon: Caitlin Green MD;  Location: [facility], [facility];  Service:    • OOPHORECTOMY     • SECONDARY INTRAOCULAR LENSE IMPLANTATION  2015    also in 2016   • SHOULDER ARTHROSCOPY Left 2014   • SKIN CANCER EXCISION  10/2015    basal cell   • THYROID SURGERY  1996   • TONSILLECTOMY  1976         Visit Dx:      ICD-10-CM ICD-9-CM   1. Malignant neoplasm of overlapping sites of right breast in female, estrogen receptor positive  ([facility])  C50.811 174.8    Z17.0 V86.0   2. 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 Adherent scar, skin  L90.5 709.2       Lymphedema     Row Name 09/24/21 1100             Subjective Pain    Able to rate subjective pain?  yes  -SG      Pre-Treatment Pain Level  2  -SG      Post-Treatment Pain Level  2  -SG         Subjective Comments    Subjective Comments  Pt reports that her hand continues to bother her--see education for details  -SG         Lymphedema Assessment    Lymphedema Classification  LUE:;stage 1 (Spontaneously Reversible)  -SG      Lymphedema Cancer Related Sx  bilateral;simple mastectomy;left;sentinel node biopsy  -SG      Lymphedema Surgery Comments  2017  -SG      Chemo Received  no Adjuvant tamoxifen started 9/27/17  -SG      Radiation Therapy Received  no  -SG      Infections or Cellulitis?  no  -SG         Posture/Observations    Alignment Options  Forward head;Thoracic kyphosis;Rounded shoulders  -SG      Posture/Observations Comments  Forward protective posture  -SG         General ROM    GENERAL ROM COMMENTS  BUE WNL  -SG         MMT (Manual Muscle Testing)    General MMT Comments  BUE WNL  -SG         Lymphedema Edema Assessment    Edema Assessment Comment  Edema LUE and L axilla/trunk  -SG         Skin Changes/Observations    Location/Assessment  Upper Extremity;Upper Quadrant  -SG      Upper Extremity Conditions  bilateral:;normal;intact;clean  -SG      Upper Extremity Color/Pigment  bilateral:;normal  -SG      Upper Quadrant Conditions  bilateral:;normal;intact;clean  -SG      Upper Quadrant Color/Pigment  bilateral:;normal  -SG      Skin Observations Comment  Tight soft tissue restrictions/scar tissue present bilateral chest  -SG         Lymphedema Measurements    Measurement Type(s)  Quick Girth  -SG      Quick Girth Areas  Upper extremities  -SG         Manual Lymphatic Drainage    Manual Lymphatic Drainage  opened regional lymph nodes;initial  sequence;extremity treatment  -SG      Initial Sequence  supraclavicular  -SG      Supraclavicular  left  -SG      Opened Regional Lymph Nodes  axillary;ribs;paraspinal  -SG      Axillary  left  -SG      Extremity Treatment  MLD to full limb  -SG      Manual Therapy  See manual rx for PORi protocol  -SG         Compression/Skin Care    Compression/Skin Care  compression garment  -SG      Compression Garment Comments  Jobst Suzan Lite 20-30 mmHg  -SG         L-Dex Bioimpedence Screening    L-Dex Measurement Extremity  LUE  -SG      L-Dex Patient Position  Standing  -SG      L-Dex UE Dominate Side  Right  -SG      L-Dex UE At Risk Side  Left  -SG      L-Dex UE Pre Surgical Value  No  -SG        User Key  (r) = Recorded By, (t) = Taken By, (c) = Cosigned By    Initials Name Provider Type    Janine Haney, OTR/L Occupational Therapist                  OT Assessment/Plan     Row Name 09/24/21 1100          OT Assessment    Functional Limitations  Limitations in functional capacity and performance;Performance in self-care ADL  -SG     Impairments  Edema;Pain;Impaired lymphatic circulation  -     Assessment Comments  Pt presents w the following: pain and stiffness in chest which limits ROM and participation in ADLs/IADLs; strength: decreased core stabilization and decreaed UE/ strength; posture: forward neck and shoulder posture, disrupted scapulo-humeral rhythm; increased tissue tightness over chest, stress across pectoralis major muscle, s-c joint line, c-v joint line, and associated ST through shoulders and trunk; w/ tight soft tissue restrictions and scar tissue; lymphedema in LUE. She will benefit from continuing with OP OT to address symptoms.  -SG     OT Rehab Potential  Excellent  -SG     Patient/caregiver participated in establishment of treatment plan and goals  Yes  -SG     Patient would benefit from skilled therapy intervention  Yes  -SG        OT Plan    OT Frequency  1x/week  -SG     Planned CPT's?   OT EVAL MOD COMPLEXITY: 17674;OT THER ACT EA 15 MIN: 99835UG;OT THER PROC EA 15 MIN: 85020MW;OT SELF CARE/MGMT/TRAIN 15 MIN: 12878;OT MANUAL THERAPY EA 15 MIN: 30286;OT BIS XTRACELL FLUID ANALYSIS: 00668  -     Planned Therapy Interventions (Optional Details)  home exercise program;joint mobilization;manual therapy techniques;patient/family education;postural re-education;ROM (Range of Motion);strengthening;stretching  -     OT Plan Comments  Continue PORi protocol and CDT including MLD, compression, skin care, and exercise. Continue ASTYM/STM as needed. Progress as tolerated.  -       User Key  (r) = Recorded By, (t) = Taken By, (c) = Cosigned By    Initials Name Provider Type    Janine Haney OTR/L Occupational Therapist                 Manual Rx (last 36 hours)      Manual Treatments     Row Name 09/24/21 1100             Total Minutes    58119 - OT Manual Therapy Minutes  45  -         Manual Rx 1    Manual Rx 1 Location  Arm--Orthopedic: Forearm extensors, biceps muscles, lateral intermuscular septum tension line upper arm, pectoralis major  -      Manual Rx 1 Type  Trigger Point Release, Myofascial Bending  -      Manual Rx 1 Duration  Trigger Point Release for count of 3 seconds, repeat each section 5 times; Myofascial stretching w/ tissue/muscle bending 3-5 times  -         Manual Rx 2    Manual Rx 2 Location  Axilla--Orthopedic: Medial Intermuscular Septum tension line upper arm, pectoralis minor, subscapularis, latissimus dorsi, teres major  -      Manual Rx 2 Type  Trigger Point Release; Myofascial bending  -      Manual Rx 2 Duration  Trigger Point Release for count of 3 seconds, repeat each section 5 times; Myofascial stretching w/ tissue/muscle bending 3-5 times  -         Manual Rx 3    Manual Rx 3 Location  Chest: Lymph System: Zone 1--pectoral region, Zone 2--superior Chest, Zone 3--inferior Chest  -      Manual Rx 3 Type  Fluid movement/ MLD; parasternal LN node pumps  -       Manual Rx 3 Duration  Repeat each Line x1 and corresponding parasternal node pumps x5, repeat each zone x3  -SG         Manual Rx 4    Manual Rx 4 Location  Lateral chest/trunk--Lymph System: zone 1--lateral, zone 2--inferior, zone 3--central, zone 4-superior  -      Manual Rx 4 Type  Intercostal Lymph Node Pumps  -SG      Manual Rx 4 Duration  Repeat all 4 zones x 3  -SG         Manual Rx 5    Manual Rx 5 Location  Upper Arm: Lymph System  -      Manual Rx 5 Type  Upper arm fluid clearance of axillary pathway; medial confluence fluid clearance--antecubital fossa; lateral upper arm-cephalic pathway  -      Manual Rx 5 Duration  x3 lines of treatment  -         Manual Rx 6    Manual Rx 6 Location  Forearm--Orthopedic: radius and ulna-interosseous membrane; carpals-joint capsules-metacarpals-interosseous membranes; thenar muscles  -      Manual Rx 6 Type  Joint mobilization, Trigger Point Release, Drainage of forearm  -      Manual Rx 6 Duration  Joint Mob: each section x5 in each position; drainage of forearm: perform x3-5 lines of treatment  -         Manual Rx 7    Manual Rx 7 Location  Back--orthopedic: upper trapezius, infraspinatus, teres minor, rhomboids, quadratus lumborum; T12, L1, L2 mobilization  -      Manual Rx 7 Type  Trigger Point Release, Joint mobilization  -      Manual Rx 7 Duration  Trigger Point Release for count of 3 seconds, repeat each section 5 times; perform 5-10 oscillations of each vertebra successively  -      Additional Treatment?  Yes  -         Manual Rx 8    Manual Rx 8 Location  Back--lymph system  -      Manual Rx 8 Type  Fluid movement; paraspinal lymph node pumps  -      Manual Rx 8 Duration  Repeat each line x1 and corresponding paraspinal node pumps x5; repeat each zone x3  -        User Key  (r) = Recorded By, (t) = Taken By, (c) = Cosigned By    Initials Name Provider Type    Janine Haney OTR/L Occupational Therapist            Therapy  Education  Education Details: Progressed HEP to include stretch sequence for chest and shoulder tightness. Pt was edu on her left hand symptoms congruent w/ Dupuytren's Contracture. She was provided w/ information diagnosis including sypmtoms, symptoms management, and treatment optoins.  Given: HEP, Symptoms/condition management, Posture/body mechanics, Edema management  Program: Reinforced  How Provided: Verbal, Demonstration, Written  Provided to: Patient  Level of Understanding: Verbalized, Demonstrated  64628 - OT Self Care/Mgmt Minutes: 10                Time Calculation:   Timed Charges  93751 - OT Manual Therapy Minutes: 45  84538 - OT Self Care/Mgmt Minutes: 10  Total Minutes  Timed Charges Total Minutes: 55   Total Minutes: 55     Therapy Charges for Today     Code Description Service Date Service Provider Modifiers Qty    24642219300 HC OT MANUAL THERAPY EA 15 MIN 9/24/2021 Janine Sheriff OTR/L GO 3    [id number] HC OT SELF CARE/MGMT/TRAIN EA 15 MIN 9/24/2021 Janine Sheriff OTR/L GO 1                      CONSEULO Grullon/RAUL  9/24/2021

## 2021-10-01 ENCOUNTER — HOSPITAL ENCOUNTER (OUTPATIENT)
Dept: OCCUPATIONAL THERAPY | Facility: HOSPITAL | Age: 71
Setting detail: THERAPIES SERIES
Discharge: HOME | End: 2021-10-01

## 2021-10-01 DIAGNOSIS — I97.2 POSTMASTECTOMY LYMPHEDEMA SYNDROME OF LEFT UPPER EXTREMITY: ICD-10-CM

## 2021-10-01 DIAGNOSIS — C50.811 MALIGNANT NEOPLASM OF OVERLAPPING SITES OF RIGHT BREAST IN FEMALE, ESTROGEN RECEPTOR POSITIVE (HCC): Primary | ICD-10-CM

## 2021-10-01 DIAGNOSIS — Z17.0 MALIGNANT NEOPLASM OF OVERLAPPING SITES OF RIGHT BREAST IN FEMALE, ESTROGEN RECEPTOR POSITIVE (HCC): Primary | ICD-10-CM

## 2021-10-01 DIAGNOSIS — L90.5 ADHERENT SCAR, SKIN: ICD-10-CM

## 2021-10-01 PROCEDURE — 97535 SELF CARE MNGMENT TRAINING: CPT

## 2021-10-01 PROCEDURE — 97140 MANUAL THERAPY 1/> REGIONS: CPT

## 2021-10-01 NOTE — THERAPY PROGRESS REPORT/RE-CERT
Outpatient Occupational Therapy Lymphedema Progress Note  ADITHYA Temple     Patient Name: Sarah Raphael  : 1950  MRN: [id number]  Today's Date: 10/1/2021      Visit Date: 10/01/2021    Patient Active Problem List   Diagnosis   • Anxiety   • Gastroesophageal reflux disease without esophagitis   • Hyperlipidemia   • Hypertension   • Hypothyroidism   • Cataract   • Cancer of overlapping sites of right female breast (HCC)   • Estrogen receptor positive   • Arthritis of right knee   • Status post total right knee replacement   • Elevated hemoglobin A1c   • Hyponatremia   • Acute postoperative pain        Past Medical History:   Diagnosis Date   • Arthritis    • Back pain    • Breast cancer (CMS/HCC)    • Celiac disease    • Disease of thyroid gland    • GERD (gastroesophageal reflux disease)    • Hepatitis     NOT SURE OF TYPE, CAN'T DONATE BLOOD   • History of transfusion    • Hypertension         Past Surgical History:   Procedure Laterality Date   • COLONOSCOPY         • EYE SURGERY      bilateral cataracts removed   • HYSTERECTOMY  1983   • KNEE ARTHROPLASTY, PARTIAL REPLACEMENT Right 10/7/2020    Procedure: TOTAL KNEE ARTHROPLASTY RIGHT;  Surgeon: Adrian Cortes MD;  Location: [facility];  Service: Orthopedics;  Laterality: Right;   • MASTECTOMY Bilateral 2017   • MASTECTOMY WITH SENTINEL NODE BIOPSY AND AXILLARY NODE DISSECTION Bilateral 2017    Procedure: LEFT BREAST MASTECTOMY WITH LEFT  SENTINEL NODE BIOPSY AND RIGHT PROPHYLACTIC MASTECTOMY;  Surgeon: Caitlin Green MD;  Location: [facility];  Service:    • OOPHORECTOMY     • SECONDARY INTRAOCULAR LENSE IMPLANTATION  2015    also in 2016   • SHOULDER ARTHROSCOPY Left 2014   • SKIN CANCER EXCISION  10/2015    basal cell   • THYROID SURGERY  1996   • TONSILLECTOMY  1976         Visit Dx:      ICD-10-CM ICD-9-CM   1. Malignant neoplasm of overlapping sites of right breast in female, estrogen receptor positive (HCC)   C50.811 174.8    Z17.0 V86.0   2. 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 Adherent scar, skin  L90.5 709.2       Lymphedema     Row Name 10/01/21 1100             Subjective Pain    Able to rate subjective pain?  yes  -SG      Pre-Treatment Pain Level  3  -SG      Post-Treatment Pain Level  2  -SG         Subjective Comments    Subjective Comments  Pt reports that she feels like her LUE is more swollen, but her RUE also feels swollen to her. She has been wearing the compression sleeve on her LUE compliantly.  -SG         Lymphedema Assessment    Lymphedema Classification  LUE:;stage 1 (Spontaneously Reversible)  -SG      Lymphedema Cancer Related Sx  bilateral;simple mastectomy;left;sentinel node biopsy  -SG      Lymphedema Surgery Comments  2017  -SG      Chemo Received  no Adjuvant tamoxifen started 9/27/17  -SG      Radiation Therapy Received  no  -SG      Infections or Cellulitis?  no  -SG         Posture/Observations    Alignment Options  Forward head;Thoracic kyphosis;Rounded shoulders  -SG      Posture/Observations Comments  Forward protective posture  -SG         General ROM    GENERAL ROM COMMENTS  BUE WNL  -SG         MMT (Manual Muscle Testing)    General MMT Comments  BUE WNL  -SG         Lymphedema Edema Assessment    Edema Assessment Comment  Edema LUE and L axilla/trunk  -SG         Skin Changes/Observations    Location/Assessment  Upper Extremity;Upper Quadrant  -SG      Upper Extremity Conditions  bilateral:;normal;intact;clean  -SG      Upper Extremity Color/Pigment  bilateral:;normal  -SG      Upper Quadrant Conditions  bilateral:;normal;intact;clean  -SG      Upper Quadrant Color/Pigment  bilateral:;normal  -SG      Skin Observations Comment  Tight soft tissue restrictions/scar tissue present bilateral chest  -SG         Lymphedema Measurements    Measurement Type(s)  Quick Girth  -SG      Quick Girth Areas  Upper extremities  -SG         LUE Quick Girth (cm)     Axilla  33.5 cm  -SG      Mid upper arm  29.5 cm  -SG      Elbow  26 cm  -SG      Mid forearm  24.5 cm  -SG      Wrist crease  17 cm  -SG      Web space  20 cm  -SG      Met-heads  19 cm  -SG      LUE Quick Girth Total  169.5  -SG         RUE Quick Girth (cm)    Axilla  34 cm  -SG      Mid upper arm  31.5 cm  -SG      Elbow  27.5 cm  -SG      Mid forearm  25 cm  -SG      Wrist crease  18 cm  -SG      Web space  20 cm  -SG      Met-heads  19 cm  -SG      RUE Quick Girth Total  175  -SG         Manual Lymphatic Drainage    Manual Lymphatic Drainage  opened regional lymph nodes;initial sequence;extremity treatment  -SG      Initial Sequence  supraclavicular  -SG      Supraclavicular  left;right  -SG      Opened Regional Lymph Nodes  axillary;ribs;paraspinal  -SG      Axillary  left;right  -SG      Extremity Treatment  MLD to full limb  -SG      Manual Therapy  See manual rx for PORi protocol  -SG         Compression/Skin Care    Compression/Skin Care  compression garment  -SG      Compression Garment Comments  Jobst Suzan Lite 20-30 mmHg  -SG         L-Dex Bioimpedence Screening    L-Dex Measurement Extremity  LUE  -SG      L-Dex Patient Position  Standing  -SG      L-Dex UE Dominate Side  Right  -SG      L-Dex UE At Risk Side  Left  -SG      L-Dex UE Pre Surgical Value  No  -SG      L-Dex UE Score  7  -SG      L-Dex UE Comment  The patient had an follow up SOZO measurement which I reviewed today. The score is out of normal range, 7, see scanned to EMR. Bioimpedance spectroscopy helps identify the onset of lymphedema in an arm or leg before patients experience noticeable swelling. Research has shown that the early detection of lymphedema using L-Dex combined with treatment can reduce progression to chronic lymphedema by 95% in breast cancer patients. Whenever possible, patients are tested for baseline L-Dex score before cancer treatment begins and then are reassessed during regular follow-up visits using the SOZO  device. Otherwise, this can be started postoperatively and continued during regular follow-up visits. If the patient’s L-Dex score increases above normal levels, that is a sign that lymphedema is developing and a referral is made to occupational/physical therapy for further evaluation and early compression treatment. Lymphedema assessment with the SOZO L-Dex score is recommended to be done every 3 months for the first 3 years and then every 6 months for years 4 and 5 followed by annually afterwards.  Last measurement was 1.4, now it has increased to 7.  -SG        User Key  (r) = Recorded By, (t) = Taken By, (c) = Cosigned By    Initials Name Provider Type    Janine Haney, OTR/L Occupational Therapist                  OT Assessment/Plan     Row Name 10/01/21 1100          OT Assessment    Functional Limitations  Limitations in functional capacity and performance;Performance in self-care ADL  -SG     Impairments  Edema;Pain;Impaired lymphatic circulation  -SG     Assessment Comments  Pt presents w the following: pain and stiffness in chest which limits ROM and participation in ADLs/IADLs; strength: decreased core stabilization and decreaed UE/ strength; posture: forward neck and shoulder posture, disrupted scapulo-humeral rhythm; increased tissue tightness over chest, stress across pectoralis major muscle, s-c joint line, c-v joint line, and associated ST through shoulders and trunk; w/ tight soft tissue restrictions and scar tissue; lymphedema in LUE. She will benefit from continuing with OP OT to address symptoms.  -SG     OT Rehab Potential  Excellent  -SG     Patient/caregiver participated in establishment of treatment plan and goals  Yes  -SG     Patient would benefit from skilled therapy intervention  Yes  -SG        OT Plan    OT Frequency  1x/week  -SG     Planned CPT's?  OT EVAL MOD COMPLEXITY: 17690;OT THER ACT EA 15 MIN: [id number];OT THER PROC EA 15 MIN: [id number];OT SELF CARE/MGMT/TRAIN 15 MIN:  60167;OT MANUAL THERAPY EA 15 MIN: 18523;OT BIS XTRACELL FLUID ANALYSIS: 02079  -     Planned Therapy Interventions (Optional Details)  home exercise program;joint mobilization;manual therapy techniques;patient/family education;postural re-education;ROM (Range of Motion);strengthening;stretching  -     OT Plan Comments  Continue PORi protocol and CDT including MLD, compression, skin care, and exercise. Continue ASTYM/STM as needed. Progress as tolerated.  -       User Key  (r) = Recorded By, (t) = Taken By, (c) = Cosigned By    Initials Name Provider Type    Janine Haney OTR/L Occupational Therapist                 Manual Rx (last 36 hours)      Manual Treatments     Row Name 10/01/21 1100             Total Minutes    71938 - OT Manual Therapy Minutes  35  -         Manual Rx 1    Manual Rx 1 Location  Arm--Orthopedic: Forearm extensors, biceps muscles, lateral intermuscular septum tension line upper arm, pectoralis major  -      Manual Rx 1 Type  Trigger Point Release, Myofascial Bending  -      Manual Rx 1 Duration  Trigger Point Release for count of 3 seconds, repeat each section 5 times; Myofascial stretching w/ tissue/muscle bending 3-5 times  -         Manual Rx 2    Manual Rx 2 Location  Axilla--Orthopedic: Medial Intermuscular Septum tension line upper arm, pectoralis minor, subscapularis, latissimus dorsi, teres major  -      Manual Rx 2 Type  Trigger Point Release; Myofascial bending  -      Manual Rx 2 Duration  Trigger Point Release for count of 3 seconds, repeat each section 5 times; Myofascial stretching w/ tissue/muscle bending 3-5 times  -         Manual Rx 3    Manual Rx 3 Location  Chest: Lymph System: Zone 1--pectoral region, Zone 2--superior Chest, Zone 3--inferior Chest  -      Manual Rx 3 Type  Fluid movement/ MLD; parasternal LN node pumps  -      Manual Rx 3 Duration  Repeat each Line x1 and corresponding parasternal node pumps x5, repeat each zone x3  -          "Manual Rx 4    Manual Rx 4 Location  Lateral chest/trunk--Lymph System: zone 1--lateral, zone 2--inferior, zone 3--central, zone 4-superior  -      Manual Rx 4 Type  Intercostal Lymph Node Pumps  -SG      Manual Rx 4 Duration  Repeat all 4 zones x 3  -SG         Manual Rx 5    Manual Rx 5 Location  Upper Arm: Lymph System  -SG      Manual Rx 5 Type  Upper arm fluid clearance of axillary pathway; medial confluence fluid clearance--antecubital fossa; lateral upper arm-cephalic pathway  -SG      Manual Rx 5 Duration  x3 lines of treatment  -SG         Manual Rx 6    Manual Rx 6 Location  Forearm--Orthopedic: radius and ulna-interosseous membrane; carpals-joint capsules-metacarpals-interosseous membranes; thenar muscles  -SG      Manual Rx 6 Type  Joint mobilization, Trigger Point Release, Drainage of forearm  -SG      Manual Rx 6 Duration  Joint Mob: each section x5 in each position; drainage of forearm: perform x3-5 lines of treatment  -SG         Manual Rx 7    Manual Rx 7 Location  Back--orthopedic: upper trapezius, infraspinatus, teres minor, rhomboids, quadratus lumborum; T12, L1, L2 mobilization  -SG      Manual Rx 7 Type  Trigger Point Release, Joint mobilization  -SG      Manual Rx 7 Duration  Trigger Point Release for count of 3 seconds, repeat each section 5 times; perform 5-10 oscillations of each vertebra successively  -      Additional Treatment?  Yes  -         Manual Rx 8    Manual Rx 8 Location  Back--lymph system  -SG      Manual Rx 8 Type  Fluid movement; paraspinal lymph node pumps  -SG      Manual Rx 8 Duration  Repeat each line x1 and corresponding paraspinal node pumps x5; repeat each zone x3  -SG        User Key  (r) = Recorded By, (t) = Taken By, (c) = Cosigned By    Initials Name Provider Type     Janine Sheriff OTR/L Occupational Therapist        OT Goals     Row Name 10/01/21 1100          OT Short Term Goals    STG Date to Achieve  08/19/21  -     STG 1  \"Pt will understand " "lymphedema precautions to decrease the risk of infection and exacerbation of the lymphedema.  -SG     STG 1 Progress  Met  -SG     STG 2  \"Pt will develop a tolerance for wearing compression between treatment sessions to facilitate limb decongestion.  -SG     STG 2 Progress  Met  -SG     STG 3  \"Pt will be independent with HEP for shoulder ROM and soft tissue flexibility.  -SG     STG 3 Progress  Met  -SG     STG 4  Left chest soft tissue restrictions will demo at least 25% improvement.  -SG     STG 4 Progress  Met  -SG        Long Term Goals    LTG 1  \"Treatment will achieve maximum edema and/or lymphedema reduction to enable functional improvements and a return to PLOF  -SG     LTG 1 Progress  Progressing  -SG     LTG 2  \"Pt will demonstrate awareness of individualized lymphedema precautions based on personal risk factors and when to seek medical attn. for assessment of early signs of lymphedema or cellulitis of the affected region  -SG     LTG 2 Progress  Progressing  -SG     LTG 3  Left chest soft tissue restrictions will demo at least 75% improvement.  -SG     LTG 3 Progress  Progressing  -SG     LTG 4  Ldex score will decrease from 8 to at least 6.5 to reduce LUE lymphedema.  -SG     LTG 4 Progress  Progressing  -SG        Time Calculation    OT Goal Re-Cert Due Date  10/18/21  -SG       User Key  (r) = Recorded By, (t) = Taken By, (c) = Cosigned By    Initials Name Provider Type    Janine Hnaey, OTR/L Occupational Therapist          Therapy Education  Education Details: Pt was edu on assessment results, including updated Ldex score of 7. Becuase of the increase in Ldex score and tape measurements, she will complete CDT including daily compression wear. Updated schedule.  Given: HEP, Symptoms/condition management, Posture/body mechanics, Edema management  Program: Reinforced, New  How Provided: Verbal, Demonstration, Written  Provided to: Patient  Level of Understanding: Verbalized, Demonstrated  77605 - " OT Self Care/Mgmt Minutes: 20                Time Calculation:   OT Start Time: 1100  Timed Charges  27730 - OT Manual Therapy Minutes: 35  40303 - OT Self Care/Mgmt Minutes: 20  Total Minutes  Timed Charges Total Minutes: 55   Total Minutes: 55     Therapy Charges for Today     Code Description Service Date Service Provider Modifiers Qty    32784732444 HC OT MANUAL THERAPY EA 15 MIN 10/1/2021 Janine Sheriff OTR/L GO 3    05137018416 HC OT SELF CARE/MGMT/TRAIN EA 15 MIN 10/1/2021 Janine Sheriff OTR/L GO 1                      Janine Sheriff OTR/L  10/1/2021

## 2021-10-04 RX ORDER — DIPHENHYDRAMINE HYDROCHLORIDE 50 MG/ML
INJECTION INTRAMUSCULAR; INTRAVENOUS
Qty: 10 ML | Refills: 1 | Status: SHIPPED | OUTPATIENT
Start: 2021-10-04 | End: 2021-11-01

## 2021-10-05 ENCOUNTER — TELEPHONE (OUTPATIENT)
Dept: INTERNAL MEDICINE | Facility: CLINIC | Age: 71
End: 2021-10-05

## 2021-10-05 NOTE — TELEPHONE ENCOUNTER
S/W pt, states she had a cough on Sunday night but that has resolved and no longer coughing but then Monday morning developed vomiting followed by diarrhea.  States vomiting stopped mid-day yesterday but that she is only drinking and not eating yet but doesn't feel nauseated.  States had diarrhea thru this morning but none since early this morning.  Denies fever or pain.  States she doesn't know if she is just having a celiac flare or a virus.  Expl sounds like she is improving and to continue to push the fluids and start with a BRAT or bland soft diet and see how she does.  Expl will forward message to Dr Watters for tomorrow as he is gone for today and let her know what he advises.  Verb understanding, agreement and great apprec.

## 2021-10-05 NOTE — TELEPHONE ENCOUNTER
Caller: Sarah Raphael    Relationship: Self    Best call back number: [phone]      Who are you requesting to speak with (clinical staff, provider,  specific staff member): MALCOLM COOK     What was the call regarding: PATIENT IS HAVING DIARRHEA  AND VOMITING NOW FOR 2 DAYS     Do you require a callback: YES

## 2021-10-11 ENCOUNTER — HOSPITAL ENCOUNTER (OUTPATIENT)
Dept: OCCUPATIONAL THERAPY | Facility: HOSPITAL | Age: 71
Setting detail: THERAPIES SERIES
Discharge: HOME | End: 2021-10-11

## 2021-10-11 DIAGNOSIS — Z17.0 MALIGNANT NEOPLASM OF OVERLAPPING SITES OF RIGHT BREAST IN FEMALE, ESTROGEN RECEPTOR POSITIVE (HCC): Primary | ICD-10-CM

## 2021-10-11 DIAGNOSIS — L90.5 ADHERENT SCAR, SKIN: ICD-10-CM

## 2021-10-11 DIAGNOSIS — I97.2 POSTMASTECTOMY LYMPHEDEMA SYNDROME OF LEFT UPPER EXTREMITY: ICD-10-CM

## 2021-10-11 DIAGNOSIS — C50.811 MALIGNANT NEOPLASM OF OVERLAPPING SITES OF RIGHT BREAST IN FEMALE, ESTROGEN RECEPTOR POSITIVE (HCC): Primary | ICD-10-CM

## 2021-10-11 PROCEDURE — 97140 MANUAL THERAPY 1/> REGIONS: CPT

## 2021-10-11 NOTE — THERAPY TREATMENT NOTE
Outpatient Occupational Therapy Lymphedema Treatment Note  ADITHYA Temple     Patient Name: Sarah Raphael  : 1950  MRN: [id number]  Today's Date: 10/11/2021      Visit Date: 10/11/2021    Patient Active Problem List   Diagnosis   • Anxiety   • Gastroesophageal reflux disease without esophagitis   • Hyperlipidemia   • Hypertension   • Hypothyroidism   • Cataract   • Cancer of overlapping sites of right female breast (HCC)   • Estrogen receptor positive   • Arthritis of right knee   • Status post total right knee replacement   • Elevated hemoglobin A1c   • Hyponatremia   • Acute postoperative pain        Past Medical History:   Diagnosis Date   • Arthritis    • Back pain    • Breast cancer (CMS/HCC)    • Celiac disease    • Disease of thyroid gland    • GERD (gastroesophageal reflux disease)    • Hepatitis     NOT SURE OF TYPE, CAN'T DONATE BLOOD   • History of transfusion    • Hypertension         Past Surgical History:   Procedure Laterality Date   • COLONOSCOPY         • EYE SURGERY      bilateral cataracts removed   • HYSTERECTOMY  1983   • KNEE ARTHROPLASTY, PARTIAL REPLACEMENT Right 10/7/2020    Procedure: TOTAL KNEE ARTHROPLASTY RIGHT;  Surgeon: Adrian Cortes MD;  Location: [facility];  Service: Orthopedics;  Laterality: Right;   • MASTECTOMY Bilateral 2017   • MASTECTOMY WITH SENTINEL NODE BIOPSY AND AXILLARY NODE DISSECTION Bilateral 2017    Procedure: LEFT BREAST MASTECTOMY WITH LEFT  SENTINEL NODE BIOPSY AND RIGHT PROPHYLACTIC MASTECTOMY;  Surgeon: Caitlin Green MD;  Location: [facility];  Service:    • OOPHORECTOMY     • SECONDARY INTRAOCULAR LENSE IMPLANTATION  2015    also in 2016   • SHOULDER ARTHROSCOPY Left 2014   • SKIN CANCER EXCISION  10/2015    basal cell   • THYROID SURGERY  1996   • TONSILLECTOMY  1976         Visit Dx:      ICD-10-CM ICD-9-CM   1. Malignant neoplasm of overlapping sites of right breast in female, estrogen receptor positive  (Formerly [facility])  C50.811 174.8    Z17.0 V86.0   2. 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 Adherent scar, skin  L90.5 709.2        Lymphedema     Row Name 10/11/21 1340             Subjective Pain    Able to rate subjective pain? yes  -SG      Pre-Treatment Pain Level 0  -SG      Post-Treatment Pain Level 0  -SG              Subjective Comments    Subjective Comments Pt reports that she is feeling much better--she has been wearing her sleeve each day and can tell an improvement in her arm.  -SG              Lymphedema Assessment    Lymphedema Classification LUE:; stage 1 (Spontaneously Reversible)  -SG      Lymphedema Cancer Related Sx bilateral; simple mastectomy; left; sentinel node biopsy  -SG      Lymphedema Surgery Comments 2017  -SG      Chemo Received no  Adjuvant tamoxifen started 9/27/17  -SG      Radiation Therapy Received no  -SG      Infections or Cellulitis? no  -SG              Posture/Observations    Alignment Options Forward head; Thoracic kyphosis; Rounded shoulders  -SG      Posture/Observations Comments Forward protective posture  -SG              General ROM    GENERAL ROM COMMENTS BUE WNL  -SG              MMT (Manual Muscle Testing)    General MMT Comments BUE WNL  -SG              Lymphedema Edema Assessment    Edema Assessment Comment Edema LUE and L axilla/trunk  -SG              Skin Changes/Observations    Location/Assessment Upper Extremity; Upper Quadrant  -SG      Upper Extremity Conditions bilateral:; normal; intact; clean  -SG      Upper Extremity Color/Pigment bilateral:; normal  -SG      Upper Quadrant Conditions bilateral:; normal; intact; clean  -SG      Upper Quadrant Color/Pigment bilateral:; normal  -SG      Skin Observations Comment Tight soft tissue restrictions/scar tissue present bilateral chest  -SG              Lymphedema Measurements    Measurement Type(s) Quick Girth  -SG      Quick Girth Areas Upper extremities  -SG              Manual  Lymphatic Drainage    Manual Lymphatic Drainage opened regional lymph nodes; initial sequence; extremity treatment  -SG      Initial Sequence supraclavicular  -SG      Supraclavicular left; right  -SG      Opened Regional Lymph Nodes axillary; ribs; paraspinal  -SG      Axillary left; right  -SG      Extremity Treatment MLD to full limb  -SG      Manual Therapy See manual rx for PORi protocol  -SG              Compression/Skin Care    Compression/Skin Care compression garment  -SG      Compression Garment Comments Jobst Suzan Lite 20-30 mmHg  -SG              L-Dex Bioimpedence Screening    L-Dex Measurement Extremity LUE  -SG      L-Dex Patient Position Standing  -SG      L-Dex UE Dominate Side Right  -SG      L-Dex UE At Risk Side Left  -SG      L-Dex UE Pre Surgical Value No  -SG            User Key  (r) = Recorded By, (t) = Taken By, (c) = Cosigned By    Initials Name Provider Type    Janine Haney OTR/L Occupational Therapist                         OT Assessment/Plan     Row Name 10/11/21 8909          OT Assessment    Functional Limitations Limitations in functional capacity and performance; Performance in self-care ADL  -SG     Impairments Edema; Pain; Impaired lymphatic circulation  -SG     Assessment Comments Pt presents w the following: pain and stiffness in chest which limits ROM and participation in ADLs/IADLs; strength: decreased core stabilization and decreaed UE/ strength; posture: forward neck and shoulder posture, disrupted scapulo-humeral rhythm; increased tissue tightness over chest, stress across pectoralis major muscle, s-c joint line, c-v joint line, and associated ST through shoulders and trunk; w/ tight soft tissue restrictions and scar tissue; lymphedema in LUE. She will benefit from continuing with OP OT to address symptoms.  -SG     OT Rehab Potential Excellent  -SG     Patient/caregiver participated in establishment of treatment plan and goals Yes  -SG     Patient would benefit  from skilled therapy intervention Yes  -SG            OT Plan    OT Frequency 1x/week  -     Planned CPT's? OT EVAL MOD COMPLEXITY: 48384; OT THER ACT EA 15 MIN: 65729HS; OT THER PROC EA 15 MIN: 55050JM; OT SELF CARE/MGMT/TRAIN 15 MIN: 21770; OT MANUAL THERAPY EA 15 MIN: 78250; OT BIS XTRACELL FLUID ANALYSIS: 88591  -     Planned Therapy Interventions (Optional Details) home exercise program; joint mobilization; manual therapy techniques; patient/family education; postural re-education; ROM (Range of Motion); strengthening; stretching  -     OT Plan Comments Continue PORi protocol and CDT including MLD, compression, skin care, and exercise. Continue ASTYM/STM as needed. Progress as tolerated.  -           User Key  (r) = Recorded By, (t) = Taken By, (c) = Cosigned By    Initials Name Provider Type    Janine Haney OTR/L Occupational Therapist                    Manual Rx (last 36 hours)     Manual Treatments     Row Name 10/11/21 1340             Total Minutes    82192 - OT Manual Therapy Minutes 20  -              Manual Rx 1    Manual Rx 1 Location Arm--Orthopedic: Forearm extensors, biceps muscles, lateral intermuscular septum tension line upper arm, pectoralis major  -      Manual Rx 1 Type Trigger Point Release, Myofascial Bending  -      Manual Rx 1 Duration Trigger Point Release for count of 3 seconds, repeat each section 5 times; Myofascial stretching w/ tissue/muscle bending 3-5 times  -              Manual Rx 2    Manual Rx 2 Location Axilla--Orthopedic: Medial Intermuscular Septum tension line upper arm, pectoralis minor, subscapularis, latissimus dorsi, teres major  -      Manual Rx 2 Type Trigger Point Release; Myofascial bending  -      Manual Rx 2 Duration Trigger Point Release for count of 3 seconds, repeat each section 5 times; Myofascial stretching w/ tissue/muscle bending 3-5 times  -              Manual Rx 3    Manual Rx 3 Location Chest: Lymph System: Zone 1--pectoral  region, Zone 2--superior Chest, Zone 3--inferior Chest  -SG      Manual Rx 3 Type Fluid movement/ MLD; parasternal LN node pumps  -SG      Manual Rx 3 Duration Repeat each Line x1 and corresponding parasternal node pumps x5, repeat each zone x3  -SG              Manual Rx 4    Manual Rx 4 Location Lateral chest/trunk--Lymph System: zone 1--lateral, zone 2--inferior, zone 3--central, zone 4-superior  -SG      Manual Rx 4 Type Intercostal Lymph Node Pumps  -SG      Manual Rx 4 Duration Repeat all 4 zones x 3  -SG              Manual Rx 5    Manual Rx 5 Location Upper Arm: Lymph System  -SG      Manual Rx 5 Type Upper arm fluid clearance of axillary pathway; medial confluence fluid clearance--antecubital fossa; lateral upper arm-cephalic pathway  -SG      Manual Rx 5 Duration x3 lines of treatment  -SG              Manual Rx 6    Manual Rx 6 Location Forearm--Orthopedic: radius and ulna-interosseous membrane; carpals-joint capsules-metacarpals-interosseous membranes; thenar muscles  -SG      Manual Rx 6 Type Joint mobilization, Trigger Point Release, Drainage of forearm  -SG      Manual Rx 6 Duration Joint Mob: each section x5 in each position; drainage of forearm: perform x3-5 lines of treatment  -SG            User Key  (r) = Recorded By, (t) = Taken By, (c) = Cosigned By    Initials Name Provider Type    Janine Haney OTR/L Occupational Therapist                  Therapy Education  Education Details: Continue HEP  Given: HEP, Symptoms/condition management, Posture/body mechanics, Edema management  Program: Reinforced, New  How Provided: Verbal, Demonstration, Written  Provided to: Patient  Level of Understanding: Verbalized, Demonstrated                Time Calculation:   OT Start Time: 1340  Timed Charges  66670 - OT Manual Therapy Minutes: 20  Total Minutes  Timed Charges Total Minutes: 20   Total Minutes: 20     Therapy Charges for Today     Code Description Service Date Service Provider Modifiers Qty     [id number]  OT MANUAL THERAPY EA 15 MIN 10/11/2021 Janine Sheriff, OTR/L GO 1                      CONSUELO Grullon/RAUL  10/11/2021

## 2021-10-22 ENCOUNTER — HOSPITAL ENCOUNTER (OUTPATIENT)
Dept: OCCUPATIONAL THERAPY | Facility: HOSPITAL | Age: 71
Setting detail: THERAPIES SERIES
Discharge: HOME | End: 2021-10-22

## 2021-10-22 DIAGNOSIS — C50.811 MALIGNANT NEOPLASM OF OVERLAPPING SITES OF RIGHT BREAST IN FEMALE, ESTROGEN RECEPTOR POSITIVE (HCC): Primary | ICD-10-CM

## 2021-10-22 DIAGNOSIS — L90.5 ADHERENT SCAR, SKIN: ICD-10-CM

## 2021-10-22 DIAGNOSIS — Z17.0 MALIGNANT NEOPLASM OF OVERLAPPING SITES OF RIGHT BREAST IN FEMALE, ESTROGEN RECEPTOR POSITIVE (HCC): Primary | ICD-10-CM

## 2021-10-22 DIAGNOSIS — I97.2 POSTMASTECTOMY LYMPHEDEMA SYNDROME OF LEFT UPPER EXTREMITY: ICD-10-CM

## 2021-10-22 PROCEDURE — 97140 MANUAL THERAPY 1/> REGIONS: CPT

## 2021-10-22 NOTE — THERAPY TREATMENT NOTE
Outpatient Occupational Therapy Lymphedema Treatment Note  ADITHYA Temple     Patient Name: Sarah Raphael  : 1950  MRN: [id number]  Today's Date: 10/22/2021      Visit Date: 10/22/2021    Patient Active Problem List   Diagnosis   • Anxiety   • Gastroesophageal reflux disease without esophagitis   • Hyperlipidemia   • Hypertension   • Hypothyroidism   • Cataract   • Cancer of overlapping sites of right female breast (HCC)   • Estrogen receptor positive   • Arthritis of right knee   • Status post total right knee replacement   • Elevated hemoglobin A1c   • Hyponatremia   • Acute postoperative pain        Past Medical History:   Diagnosis Date   • Arthritis    • Back pain    • Breast cancer (CMS/HCC)    • Celiac disease    • Disease of thyroid gland    • GERD (gastroesophageal reflux disease)    • Hepatitis     NOT SURE OF TYPE, CAN'T DONATE BLOOD   • History of transfusion    • Hypertension         Past Surgical History:   Procedure Laterality Date   • COLONOSCOPY         • EYE SURGERY      bilateral cataracts removed   • HYSTERECTOMY  1983   • KNEE ARTHROPLASTY, PARTIAL REPLACEMENT Right 10/7/2020    Procedure: TOTAL KNEE ARTHROPLASTY RIGHT;  Surgeon: Adrian Cortes MD;  Location: [facility];  Service: Orthopedics;  Laterality: Right;   • MASTECTOMY Bilateral 2017   • MASTECTOMY WITH SENTINEL NODE BIOPSY AND AXILLARY NODE DISSECTION Bilateral 2017    Procedure: LEFT BREAST MASTECTOMY WITH LEFT  SENTINEL NODE BIOPSY AND RIGHT PROPHYLACTIC MASTECTOMY;  Surgeon: Caitlin Green MD;  Location: [facility];  Service:    • OOPHORECTOMY     • SECONDARY INTRAOCULAR LENSE IMPLANTATION  2015    also in 2016   • SHOULDER ARTHROSCOPY Left 2014   • SKIN CANCER EXCISION  10/2015    basal cell   • THYROID SURGERY  1996   • TONSILLECTOMY  1976         Visit Dx:      ICD-10-CM ICD-9-CM   1. Malignant neoplasm of overlapping sites of right breast in female, estrogen receptor positive  (Formerly [facility])  C50.811 174.8    Z17.0 V86.0   2. 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 Adherent scar, skin  L90.5 709.2        Lymphedema     Row Name 10/22/21 1100             Subjective Pain    Able to rate subjective pain? yes  -SG      Pre-Treatment Pain Level 0  -SG      Post-Treatment Pain Level 0  -SG              Subjective Comments    Subjective Comments Pt reports compliance w/ compression throughout her trip and through travel  -SG              Lymphedema Assessment    Lymphedema Classification LUE:; stage 1 (Spontaneously Reversible)  -SG      Lymphedema Cancer Related Sx bilateral; simple mastectomy; left; sentinel node biopsy  -SG      Lymphedema Surgery Comments 2017  -SG      Chemo Received no  Adjuvant tamoxifen started 9/27/17  -SG      Radiation Therapy Received no  -SG      Infections or Cellulitis? no  -SG              Posture/Observations    Alignment Options Forward head; Thoracic kyphosis; Rounded shoulders  -SG      Posture/Observations Comments Forward protective posture  -SG              General ROM    GENERAL ROM COMMENTS BUE WNL  -SG              MMT (Manual Muscle Testing)    General MMT Comments BUE WNL  -SG              Lymphedema Edema Assessment    Edema Assessment Comment Edema LUE and L axilla/trunk  -SG              Skin Changes/Observations    Location/Assessment Upper Extremity; Upper Quadrant  -SG      Upper Extremity Conditions bilateral:; normal; intact; clean  -SG      Upper Extremity Color/Pigment bilateral:; normal  -SG      Upper Quadrant Conditions bilateral:; normal; intact; clean  -SG      Upper Quadrant Color/Pigment bilateral:; normal  -SG      Skin Observations Comment Tight soft tissue restrictions/scar tissue present bilateral chest  -SG              Lymphedema Measurements    Measurement Type(s) Quick Girth  -SG      Quick Girth Areas Upper extremities  -SG              Manual Lymphatic Drainage    Manual Lymphatic Drainage opened  regional lymph nodes; initial sequence; extremity treatment  -SG      Initial Sequence supraclavicular  -SG      Supraclavicular left; right  -SG      Opened Regional Lymph Nodes axillary; ribs; paraspinal  -SG      Axillary left; right  -SG      Extremity Treatment MLD to full limb  -SG      Manual Therapy See manual rx for PORi protocol  -SG              Compression/Skin Care    Compression/Skin Care compression garment  -SG      Compression Garment Comments Jobst Suzan Lite 20-30 mmHg  -SG              L-Dex Bioimpedence Screening    L-Dex Measurement Extremity LUE  -SG      L-Dex Patient Position Standing  -SG      L-Dex UE Dominate Side Right  -SG      L-Dex UE At Risk Side Left  -SG      L-Dex UE Pre Surgical Value No  -SG            User Key  (r) = Recorded By, (t) = Taken By, (c) = Cosigned By    Initials Name Provider Type    Janine Haney, OTR/L Occupational Therapist                         OT Assessment/Plan     Row Name 10/22/21 1100          OT Assessment    Functional Limitations Limitations in functional capacity and performance; Performance in self-care ADL  -     Impairments Edema; Pain; Impaired lymphatic circulation  -SG     Assessment Comments Pt presents w the following: pain and stiffness in chest which limits ROM and participation in ADLs/IADLs; strength: decreased core stabilization and decreaed UE/ strength; posture: forward neck and shoulder posture, disrupted scapulo-humeral rhythm; increased tissue tightness over chest, stress across pectoralis major muscle, s-c joint line, c-v joint line, and associated ST through shoulders and trunk; w/ tight soft tissue restrictions and scar tissue; lymphedema in LUE. She will benefit from continuing with OP OT to address symptoms.  -SG     OT Rehab Potential Excellent  -SG     Patient/caregiver participated in establishment of treatment plan and goals Yes  -SG     Patient would benefit from skilled therapy intervention Yes  -SG            OT  Plan    OT Frequency 1x/week  -     Planned CPT's? OT EVAL MOD COMPLEXITY: 65651; OT THER ACT EA 15 MIN: 32172PG; OT THER PROC EA 15 MIN: 51549JJ; OT SELF CARE/MGMT/TRAIN 15 MIN: 26213; OT MANUAL THERAPY EA 15 MIN: 16860; OT BIS XTRACELL FLUID ANALYSIS: 63923  -     Planned Therapy Interventions (Optional Details) home exercise program; joint mobilization; manual therapy techniques; patient/family education; postural re-education; ROM (Range of Motion); strengthening; stretching  -     OT Plan Comments Continue PORi protocol and CDT including MLD, compression, skin care, and exercise. Continue ASTYM/STM as needed. Progress as tolerated.  -           User Key  (r) = Recorded By, (t) = Taken By, (c) = Cosigned By    Initials Name Provider Type    Janine Haney OTR/L Occupational Therapist                    Manual Rx (last 36 hours)     Manual Treatments     Row Name 10/22/21 1100             Total Minutes    76030 - OT Manual Therapy Minutes 45  -              Manual Rx 1    Manual Rx 1 Location Arm--Orthopedic: Forearm extensors, biceps muscles, lateral intermuscular septum tension line upper arm, pectoralis major  -      Manual Rx 1 Type Trigger Point Release, Myofascial Bending  -      Manual Rx 1 Duration Trigger Point Release for count of 3 seconds, repeat each section 5 times; Myofascial stretching w/ tissue/muscle bending 3-5 times  -              Manual Rx 2    Manual Rx 2 Location Axilla--Orthopedic: Medial Intermuscular Septum tension line upper arm, pectoralis minor, subscapularis, latissimus dorsi, teres major  -      Manual Rx 2 Type Trigger Point Release; Myofascial bending  -      Manual Rx 2 Duration Trigger Point Release for count of 3 seconds, repeat each section 5 times; Myofascial stretching w/ tissue/muscle bending 3-5 times  -              Manual Rx 3    Manual Rx 3 Location Chest: Lymph System: Zone 1--pectoral region, Zone 2--superior Chest, Zone 3--inferior Chest   -SG      Manual Rx 3 Type Fluid movement/ MLD; parasternal LN node pumps  -SG      Manual Rx 3 Duration Repeat each Line x1 and corresponding parasternal node pumps x5, repeat each zone x3  -SG              Manual Rx 4    Manual Rx 4 Location Lateral chest/trunk--Lymph System: zone 1--lateral, zone 2--inferior, zone 3--central, zone 4-superior  -SG      Manual Rx 4 Type Intercostal Lymph Node Pumps  -SG      Manual Rx 4 Duration Repeat all 4 zones x 3  -SG              Manual Rx 5    Manual Rx 5 Location Upper Arm: Lymph System  -SG      Manual Rx 5 Type Upper arm fluid clearance of axillary pathway; medial confluence fluid clearance--antecubital fossa; lateral upper arm-cephalic pathway  -SG      Manual Rx 5 Duration x3 lines of treatment  -SG              Manual Rx 6    Manual Rx 6 Location Forearm--Orthopedic: radius and ulna-interosseous membrane; carpals-joint capsules-metacarpals-interosseous membranes; thenar muscles  -SG      Manual Rx 6 Type Joint mobilization, Trigger Point Release, Drainage of forearm  -SG      Manual Rx 6 Duration Joint Mob: each section x5 in each position; drainage of forearm: perform x3-5 lines of treatment  -SG              Manual Rx 7    Manual Rx 7 Location Back--orthopedic: upper trapezius, infraspinatus, teres minor, rhomboids, quadratus lumborum; T12, L1, L2 mobilization  -SG      Manual Rx 7 Type Trigger Point Release, Joint mobilization  -SG      Manual Rx 7 Duration Trigger Point Release for count of 3 seconds, repeat each section 5 times; perform 5-10 oscillations of each vertebra successively  -SG      Additional Treatment? Yes  -SG              Manual Rx 8    Manual Rx 8 Location Back--lymph system  -SG      Manual Rx 8 Type Fluid movement; paraspinal lymph node pumps  -SG      Manual Rx 8 Duration Repeat each line x1 and corresponding paraspinal node pumps x5; repeat each zone x3  -SG            User Key  (r) = Recorded By, (t) = Taken By, (c) = Cosigned By    Initials  Name Provider Type     Janine Sheriff OTR/L Occupational Therapist                  Therapy Education  Education Details: Continue HEP  Given: HEP, Symptoms/condition management, Posture/body mechanics, Edema management  Program: Reinforced, New  How Provided: Verbal, Demonstration, Written  Provided to: Patient  Level of Understanding: Verbalized, Demonstrated                Time Calculation:   OT Start Time: 1100  Timed Charges  88558 - OT Manual Therapy Minutes: 45  Total Minutes  Timed Charges Total Minutes: 45   Total Minutes: 45     Therapy Charges for Today     Code Description Service Date Service Provider Modifiers Qty    [id number]  OT MANUAL THERAPY EA 15 MIN 10/22/2021 Janine Sheriff OTR/L GO 3                      CONSUELO Grullon/RAUL  10/22/2021

## 2021-11-01 RX ORDER — DIPHENHYDRAMINE HYDROCHLORIDE 50 MG/ML
INJECTION INTRAMUSCULAR; INTRAVENOUS
Qty: 10 ML | Refills: 1 | Status: SHIPPED | OUTPATIENT
Start: 2021-11-01 | End: 2021-11-24 | Stop reason: SDUPTHER

## 2021-11-02 ENCOUNTER — HOSPITAL ENCOUNTER (OUTPATIENT)
Dept: OCCUPATIONAL THERAPY | Facility: HOSPITAL | Age: 71
Setting detail: THERAPIES SERIES
Discharge: HOME | End: 2021-11-02

## 2021-11-02 DIAGNOSIS — L90.5 ADHERENT SCAR, SKIN: ICD-10-CM

## 2021-11-02 DIAGNOSIS — C50.811 MALIGNANT NEOPLASM OF OVERLAPPING SITES OF RIGHT BREAST IN FEMALE, ESTROGEN RECEPTOR POSITIVE (HCC): Primary | ICD-10-CM

## 2021-11-02 DIAGNOSIS — I97.2 POSTMASTECTOMY LYMPHEDEMA SYNDROME OF LEFT UPPER EXTREMITY: ICD-10-CM

## 2021-11-02 DIAGNOSIS — Z17.0 MALIGNANT NEOPLASM OF OVERLAPPING SITES OF RIGHT BREAST IN FEMALE, ESTROGEN RECEPTOR POSITIVE (HCC): Primary | ICD-10-CM

## 2021-11-02 PROCEDURE — 97535 SELF CARE MNGMENT TRAINING: CPT

## 2021-11-02 PROCEDURE — 97140 MANUAL THERAPY 1/> REGIONS: CPT

## 2021-11-02 NOTE — THERAPY PROGRESS REPORT/RE-CERT
Outpatient Occupational Therapy Lymphedema Progress Note   [place]     Patient Name: Sarah Raphael  : 1950  MRN: [id number]  Today's Date: 2021      Visit Date: 2021    Patient Active Problem List   Diagnosis   • Anxiety   • Gastroesophageal reflux disease without esophagitis   • Hyperlipidemia   • Hypertension   • Hypothyroidism   • Cataract   • Cancer of overlapping sites of right female breast (HCC)   • Estrogen receptor positive   • Arthritis of right knee   • Status post total right knee replacement   • Elevated hemoglobin A1c   • Hyponatremia   • Acute postoperative pain        Past Medical History:   Diagnosis Date   • Arthritis    • Back pain    • Breast cancer (CMS/HCC)    • Celiac disease    • Disease of thyroid gland    • GERD (gastroesophageal reflux disease)    • Hepatitis     NOT SURE OF TYPE, CAN'T DONATE BLOOD   • History of transfusion    • Hypertension         Past Surgical History:   Procedure Laterality Date   • COLONOSCOPY         • EYE SURGERY      bilateral cataracts removed   • HYSTERECTOMY  1983   • KNEE ARTHROPLASTY, PARTIAL REPLACEMENT Right 10/7/2020    Procedure: TOTAL KNEE ARTHROPLASTY RIGHT;  Surgeon: Adrian Cortes MD;  Location: [facility];  Service: Orthopedics;  Laterality: Right;   • MASTECTOMY Bilateral 2017   • MASTECTOMY WITH SENTINEL NODE BIOPSY AND AXILLARY NODE DISSECTION Bilateral 2017    Procedure: LEFT BREAST MASTECTOMY WITH LEFT  SENTINEL NODE BIOPSY AND RIGHT PROPHYLACTIC MASTECTOMY;  Surgeon: Caitlin Green MD;  Location: [facility];  Service:    • OOPHORECTOMY     • SECONDARY INTRAOCULAR LENSE IMPLANTATION  2015    also in 2016   • SHOULDER ARTHROSCOPY Left 2014   • SKIN CANCER EXCISION  10/2015    basal cell   • THYROID SURGERY  1996   • TONSILLECTOMY  1976         Visit Dx:      ICD-10-CM ICD-9-CM   1. Malignant neoplasm of overlapping sites of right breast in female, estrogen receptor positive (HCC)   C50.811 174.8    Z17.0 V86.0   2. 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 Adherent scar, skin  L90.5 709.2        Lymphedema     Row Name 11/02/21 1300             Subjective Pain    Able to rate subjective pain? yes  -SG      Pre-Treatment Pain Level 0  -SG      Post-Treatment Pain Level 0  -SG              Subjective Comments    Subjective Comments Pt reports that her arm is feeling much better--doing the stretches morning and night has helped the tightness in her hand and she is able to move easier in the morning without as much stiffness  -SG              Lymphedema Assessment    Lymphedema Classification LUE:; stage 1 (Spontaneously Reversible)  -SG      Lymphedema Cancer Related Sx bilateral; simple mastectomy; left; sentinel node biopsy  -SG      Lymphedema Surgery Comments 2017  -SG      Chemo Received no  Adjuvant tamoxifen started 9/27/17  -SG      Radiation Therapy Received no  -SG      Infections or Cellulitis? no  -SG              Posture/Observations    Alignment Options Forward head; Thoracic kyphosis; Rounded shoulders  -SG      Posture/Observations Comments Forward protective posture  -SG              General ROM    GENERAL ROM COMMENTS BUE WNL  -SG              MMT (Manual Muscle Testing)    General MMT Comments BUE WNL  -SG              Lymphedema Edema Assessment    Edema Assessment Comment Edema LUE and L axilla/trunk  -SG              Skin Changes/Observations    Location/Assessment Upper Extremity; Upper Quadrant  -SG      Upper Extremity Conditions bilateral:; normal; intact; clean  -SG      Upper Extremity Color/Pigment bilateral:; normal  -SG      Upper Quadrant Conditions bilateral:; normal; intact; clean  -SG      Upper Quadrant Color/Pigment bilateral:; normal  -SG      Skin Observations Comment Tight soft tissue restrictions/scar tissue present bilateral chest  -SG              Lymphedema Measurements    Measurement Type(s) Quick Girth  -SG       Quick Girth Areas Upper extremities  -SG              Manual Lymphatic Drainage    Manual Lymphatic Drainage opened regional lymph nodes; initial sequence; extremity treatment  -SG      Initial Sequence supraclavicular  -SG      Supraclavicular left; right  -SG      Opened Regional Lymph Nodes axillary; ribs; paraspinal  -SG      Axillary left; right  -SG      Extremity Treatment MLD to full limb  -SG      Manual Therapy See manual rx for PORi protocol  -SG              Compression/Skin Care    Compression/Skin Care compression garment  -SG      Compression Garment Comments See manual rx for PORi protocol  -SG              L-Dex Bioimpedence Screening    L-Dex Measurement Extremity LUE  -SG      L-Dex Patient Position Standing  -SG      L-Dex UE Dominate Side Right  -SG      L-Dex UE At Risk Side Left  -SG      L-Dex UE Pre Surgical Value No  -SG      L-Dex UE Score 3  -SG      L-Dex UE Baseline Score 8  -SG      L-Dex UE Value Change -5  -SG      L-Dex UE Comment The patient had a follow up SOZO measurement which I reviewed today. The score is in normal range, 3, see scanned to EMR. Bioimpedance spectroscopy helps identify the onset of lymphedema in an arm or leg before patients experience noticeable swelling. Research has shown that the early detection of lymphedema using L-Dex combined with treatment can reduce progression to chronic lymphedema by 95% in breast cancer patients. Whenever possible, patients are tested for baseline L-Dex score before cancer treatment begins and then are reassessed during regular follow-up visits using the SOZO device. Otherwise, this can be started postoperatively and continued during regular follow-up visits. If the patient’s L-Dex score increases above normal levels, that is a sign that lymphedema is developing and a referral is made to occupational/physical therapy for further evaluation and early compression treatment. Lymphedema assessment with the SOZO L-Dex score is  recommended to be done every 3 months for the first 3 years and then every 6 months for years 4 and 5 followed by annually afterwards.  -SG            User Key  (r) = Recorded By, (t) = Taken By, (c) = Cosigned By    Initials Name Provider Type    Janine Haney OTR/L Occupational Therapist                         OT Assessment/Plan     Row Name 11/02/21 1300          OT Assessment    Functional Limitations Limitations in functional capacity and performance; Performance in self-care ADL  -SG     Impairments Edema; Pain; Impaired lymphatic circulation  -SG     Assessment Comments Pt presents w the following: pain and stiffness in chest which limits ROM and participation in ADLs/IADLs; strength: decreased core stabilization and decreaed UE/ strength; posture: forward neck and shoulder posture, disrupted scapulo-humeral rhythm; increased tissue tightness over chest, stress across pectoralis major muscle, s-c joint line, c-v joint line, and associated ST through shoulders and trunk; w/ tight soft tissue restrictions and scar tissue; lymphedema in LUE. She will benefit from continuing with OP OT to address symptoms.  -SG     OT Rehab Potential Excellent  -SG     Patient/caregiver participated in establishment of treatment plan and goals Yes  -SG     Patient would benefit from skilled therapy intervention Yes  -SG            OT Plan    OT Frequency 1x/week  -SG     Planned CPT's? OT EVAL MOD COMPLEXITY: 67509; OT THER ACT EA 15 MIN: [id number]; OT THER PROC EA 15 MIN: 84835DW; OT SELF CARE/MGMT/TRAIN 15 MIN: 50243; OT MANUAL THERAPY EA 15 MIN: 13896; OT BIS XTRACELL FLUID ANALYSIS: 53355  -     Planned Therapy Interventions (Optional Details) home exercise program; joint mobilization; manual therapy techniques; patient/family education; postural re-education; ROM (Range of Motion); strengthening; stretching  -SG     OT Plan Comments Continue PORi protocol and CDT including MLD, compression, skin care, and exercise.  Continue ASTYM/STM as needed. Progress as tolerated.  -           User Key  (r) = Recorded By, (t) = Taken By, (c) = Cosigned By    Initials Name Provider Type    Janine Haney OTR/L Occupational Therapist                    Manual Rx (last 36 hours)     Manual Treatments     Row Name 11/02/21 1300             Total Minutes    90678 - OT Manual Therapy Minutes 30  -              Manual Rx 1    Manual Rx 1 Location Arm--Orthopedic: Forearm extensors, biceps muscles, lateral intermuscular septum tension line upper arm, pectoralis major  -      Manual Rx 1 Type Trigger Point Release, Myofascial Bending  -      Manual Rx 1 Duration Trigger Point Release for count of 3 seconds, repeat each section 5 times; Myofascial stretching w/ tissue/muscle bending 3-5 times  -              Manual Rx 2    Manual Rx 2 Location Axilla--Orthopedic: Medial Intermuscular Septum tension line upper arm, pectoralis minor, subscapularis, latissimus dorsi, teres major  -      Manual Rx 2 Type Trigger Point Release; Myofascial bending  -      Manual Rx 2 Duration Trigger Point Release for count of 3 seconds, repeat each section 5 times; Myofascial stretching w/ tissue/muscle bending 3-5 times  -              Manual Rx 3    Manual Rx 3 Location Chest: Lymph System: Zone 1--pectoral region, Zone 2--superior Chest, Zone 3--inferior Chest  -      Manual Rx 3 Type Fluid movement/ MLD; parasternal LN node pumps  -      Manual Rx 3 Duration Repeat each Line x1 and corresponding parasternal node pumps x5, repeat each zone x3  -              Manual Rx 4    Manual Rx 4 Location Lateral chest/trunk--Lymph System: zone 1--lateral, zone 2--inferior, zone 3--central, zone 4-superior  -      Manual Rx 4 Type Intercostal Lymph Node Pumps  -      Manual Rx 4 Duration Repeat all 4 zones x 3  -SG              Manual Rx 5    Manual Rx 5 Location Upper Arm: Lymph System  -      Manual Rx 5 Type Upper arm fluid clearance of axillary  "pathway; medial confluence fluid clearance--antecubital fossa; lateral upper arm-cephalic pathway  -      Manual Rx 5 Duration x3 lines of treatment  -              Manual Rx 6    Manual Rx 6 Location Forearm--Orthopedic: radius and ulna-interosseous membrane; carpals-joint capsules-metacarpals-interosseous membranes; thenar muscles  -SG      Manual Rx 6 Type Joint mobilization, Trigger Point Release, Drainage of forearm  -SG      Manual Rx 6 Duration Joint Mob: each section x5 in each position; drainage of forearm: perform x3-5 lines of treatment  -              Manual Rx 7    Manual Rx 7 Location Back--orthopedic: upper trapezius, infraspinatus, teres minor, rhomboids, quadratus lumborum; T12, L1, L2 mobilization  -SG      Manual Rx 7 Type Trigger Point Release, Joint mobilization  -SG      Manual Rx 7 Duration Trigger Point Release for count of 3 seconds, repeat each section 5 times; perform 5-10 oscillations of each vertebra successively  -SG      Additional Treatment? Yes  -SG              Manual Rx 8    Manual Rx 8 Location Back--lymph system  -      Manual Rx 8 Type Fluid movement; paraspinal lymph node pumps  -      Manual Rx 8 Duration Repeat each line x1 and corresponding paraspinal node pumps x5; repeat each zone x3  -SG            User Key  (r) = Recorded By, (t) = Taken By, (c) = Cosigned By    Initials Name Provider Type    Janine Haney OTR/L Occupational Therapist               OT Goals     Row Name 11/02/21 1300          OT Short Term Goals    STG Date to Achieve 08/19/21  -     STG 1 \"Pt will understand lymphedema precautions to decrease the risk of infection and exacerbation of the lymphedema.  -     STG 1 Progress Met  -SG     STG 2 \"Pt will develop a tolerance for wearing compression between treatment sessions to facilitate limb decongestion.  -     STG 2 Progress Met  -SG     STG 3 \"Pt will be independent with HEP for shoulder ROM and soft tissue flexibility.  -SG     STG 3 " "Progress Met  -SG     STG 4 Left chest soft tissue restrictions will demo at least 25% improvement.  -SG     STG 4 Progress Met  -SG            Long Term Goals    LTG 1 \"Treatment will achieve maximum edema and/or lymphedema reduction to enable functional improvements and a return to PLOF  -SG     LTG 1 Progress Progressing  -SG     LTG 2 \"Pt will demonstrate awareness of individualized lymphedema precautions based on personal risk factors and when to seek medical attn. for assessment of early signs of lymphedema or cellulitis of the affected region  -SG     LTG 2 Progress Progressing  -SG     LTG 3 Left chest soft tissue restrictions will demo at least 75% improvement.  -SG     LTG 3 Progress Progressing  -SG     LTG 4 Ldex score will decrease from 8 to at least 6.5 to reduce LUE lymphedema.  -SG     LTG 4 Progress Progressing  -SG            Time Calculation    OT Goal Re-Cert Due Date 01/31/22  -SG           User Key  (r) = Recorded By, (t) = Taken By, (c) = Cosigned By    Initials Name Provider Type    Janine Haney OTR/L Occupational Therapist                Therapy Education  Education Details: Pt edu on reassessment details, including Ldex score which has decreased and back in normal range. Pt encouraged to continue with HEP and lymphdema management to continue w/ progress and fluid reduction  Given: HEP, Symptoms/condition management, Posture/body mechanics, Edema management  Program: Reinforced, New  How Provided: Verbal, Demonstration, Written  Provided to: Patient  Level of Understanding: Verbalized, Demonstrated  80076 - OT Self Care/Mgmt Minutes: 15                Time Calculation:   OT Start Time: 1300  Timed Charges  01929 - OT Manual Therapy Minutes: 30  41857 - OT Self Care/Mgmt Minutes: 15  Total Minutes  Timed Charges Total Minutes: 45   Total Minutes: 45     Therapy Charges for Today     Code Description Service Date Service Provider Modifiers Qty    71866876761 HC OT MANUAL THERAPY EA 15 MIN " 11/2/2021 Janine Sheriff OTR/L GO 2    [id number] HC OT SELF CARE/MGMT/TRAIN EA 15 MIN 11/2/2021 Janine Sheriff OTR/L GO 1                      CONSUELO Grullon/RAUL  11/2/2021

## 2021-11-08 ENCOUNTER — HOSPITAL ENCOUNTER (OUTPATIENT)
Dept: OCCUPATIONAL THERAPY | Facility: HOSPITAL | Age: 71
Setting detail: THERAPIES SERIES
Discharge: HOME | End: 2021-11-08

## 2021-11-08 DIAGNOSIS — C50.811 MALIGNANT NEOPLASM OF OVERLAPPING SITES OF RIGHT BREAST IN FEMALE, ESTROGEN RECEPTOR POSITIVE (HCC): Primary | ICD-10-CM

## 2021-11-08 DIAGNOSIS — L90.5 ADHERENT SCAR, SKIN: ICD-10-CM

## 2021-11-08 DIAGNOSIS — Z17.0 MALIGNANT NEOPLASM OF OVERLAPPING SITES OF RIGHT BREAST IN FEMALE, ESTROGEN RECEPTOR POSITIVE (HCC): Primary | ICD-10-CM

## 2021-11-08 DIAGNOSIS — I97.2 POSTMASTECTOMY LYMPHEDEMA SYNDROME OF LEFT UPPER EXTREMITY: ICD-10-CM

## 2021-11-08 PROCEDURE — 97140 MANUAL THERAPY 1/> REGIONS: CPT

## 2021-11-08 NOTE — THERAPY TREATMENT NOTE
Outpatient Occupational Therapy Lymphedema Treatment Note  ADITHYA Temple     Patient Name: Sarah Raphael  : 1950  MRN: [id number]  Today's Date: 2021      Visit Date: 2021    Patient Active Problem List   Diagnosis   • Anxiety   • Gastroesophageal reflux disease without esophagitis   • Hyperlipidemia   • Hypertension   • Hypothyroidism   • Cataract   • Cancer of overlapping sites of right female breast (HCC)   • Estrogen receptor positive   • Arthritis of right knee   • Status post total right knee replacement   • Elevated hemoglobin A1c   • Hyponatremia   • Acute postoperative pain        Past Medical History:   Diagnosis Date   • Arthritis    • Back pain    • Breast cancer (CMS/HCC)    • Celiac disease    • Disease of thyroid gland    • GERD (gastroesophageal reflux disease)    • Hepatitis     NOT SURE OF TYPE, CAN'T DONATE BLOOD   • History of transfusion    • Hypertension         Past Surgical History:   Procedure Laterality Date   • COLONOSCOPY         • EYE SURGERY      bilateral cataracts removed   • HYSTERECTOMY  1983   • KNEE ARTHROPLASTY, PARTIAL REPLACEMENT Right 10/7/2020    Procedure: TOTAL KNEE ARTHROPLASTY RIGHT;  Surgeon: Adrian Cortes MD;  Location: [facility];  Service: Orthopedics;  Laterality: Right;   • MASTECTOMY Bilateral 2017   • MASTECTOMY WITH SENTINEL NODE BIOPSY AND AXILLARY NODE DISSECTION Bilateral 2017    Procedure: LEFT BREAST MASTECTOMY WITH LEFT  SENTINEL NODE BIOPSY AND RIGHT PROPHYLACTIC MASTECTOMY;  Surgeon: Caitlin Green MD;  Location: [facility];  Service:    • OOPHORECTOMY     • SECONDARY INTRAOCULAR LENSE IMPLANTATION  2015    also in 2016   • SHOULDER ARTHROSCOPY Left 2014   • SKIN CANCER EXCISION  10/2015    basal cell   • THYROID SURGERY  1996   • TONSILLECTOMY  1976         Visit Dx:      ICD-10-CM ICD-9-CM   1. Malignant neoplasm of overlapping sites of right breast in female, estrogen receptor positive (HCC)   C50.811 174.8    Z17.0 V86.0   2. 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 Adherent scar, skin  L90.5 709.2        Lymphedema     Row Name 11/08/21 1400             Subjective Pain    Able to rate subjective pain? yes  -SG      Pre-Treatment Pain Level 0  -SG      Post-Treatment Pain Level 0  -SG              Subjective Comments    Subjective Comments Pt reports that she is doing ok today--feeling stiff, her back is bothering her.  -SG              Lymphedema Assessment    Lymphedema Classification LUE:; stage 1 (Spontaneously Reversible)  -SG      Lymphedema Cancer Related Sx bilateral; simple mastectomy; left; sentinel node biopsy  -SG      Lymphedema Surgery Comments 2017  -SG      Chemo Received no  Adjuvant tamoxifen started 9/27/17  -SG      Radiation Therapy Received no  -SG      Infections or Cellulitis? no  -SG              Posture/Observations    Alignment Options Forward head; Thoracic kyphosis; Rounded shoulders  -SG      Posture/Observations Comments Forward protective posture  -SG              General ROM    GENERAL ROM COMMENTS BUE WNL  -SG              MMT (Manual Muscle Testing)    General MMT Comments BUE WNL  -SG              Lymphedema Edema Assessment    Edema Assessment Comment Edema LUE and L axilla/trunk  -SG              Skin Changes/Observations    Location/Assessment Upper Extremity; Upper Quadrant  -SG      Upper Extremity Conditions bilateral:; normal; intact; clean  -SG      Upper Extremity Color/Pigment bilateral:; normal  -SG      Upper Quadrant Conditions bilateral:; normal; intact; clean  -SG      Upper Quadrant Color/Pigment bilateral:; normal  -SG      Skin Observations Comment Tight soft tissue restrictions/scar tissue present bilateral chest  -SG              Lymphedema Measurements    Measurement Type(s) Quick Girth  -SG      Quick Girth Areas Upper extremities  -SG              Manual Lymphatic Drainage    Manual Lymphatic Drainage opened  regional lymph nodes; initial sequence; extremity treatment  -SG      Initial Sequence supraclavicular  -SG      Supraclavicular left; right  -SG      Opened Regional Lymph Nodes axillary; ribs; paraspinal  -SG      Axillary left; right  -SG      Extremity Treatment MLD to full limb  -SG      Manual Therapy See manual rx for PORi protocol; additional STM post PORi protocol for back/neck  -SG              Compression/Skin Care    Compression/Skin Care compression garment  -SG      Compression Garment Comments See manual rx for PORi protocol  -SG              L-Dex Bioimpedence Screening    L-Dex Measurement Extremity LUE  -SG      L-Dex Patient Position Standing  -SG      L-Dex UE Dominate Side Right  -SG      L-Dex UE At Risk Side Left  -SG      L-Dex UE Pre Surgical Value No  -SG      L-Dex UE Baseline Score 8  -SG            User Key  (r) = Recorded By, (t) = Taken By, (c) = Cosigned By    Initials Name Provider Type    Janine Haney OTR/L Occupational Therapist                         OT Assessment/Plan     Row Name 11/08/21 1400          OT Assessment    Functional Limitations Limitations in functional capacity and performance; Performance in self-care ADL  -     Impairments Edema; Pain; Impaired lymphatic circulation  -     Assessment Comments Pt presents w the following: pain and stiffness in chest which limits ROM and participation in ADLs/IADLs; strength: decreased core stabilization and decreaed UE/ strength; posture: forward neck and shoulder posture, disrupted scapulo-humeral rhythm; increased tissue tightness over chest, stress across pectoralis major muscle, s-c joint line, c-v joint line, and associated ST through shoulders and trunk; w/ tight soft tissue restrictions and scar tissue; lymphedema in LUE. She will benefit from continuing with OP OT to address symptoms.  -SG     OT Rehab Potential Excellent  -SG     Patient/caregiver participated in establishment of treatment plan and goals  Yes  -SG     Patient would benefit from skilled therapy intervention Yes  -SG            OT Plan    OT Frequency 1x/week  -SG     Planned CPT's? OT EVAL MOD COMPLEXITY: 92196; OT THER ACT EA 15 MIN: [id number]; OT THER PROC EA 15 MIN: [id number]; OT SELF CARE/MGMT/TRAIN 15 MIN: 31763; OT MANUAL THERAPY EA 15 MIN: 74389; OT BIS XTRACELL FLUID ANALYSIS: 29314  -SG     Planned Therapy Interventions (Optional Details) home exercise program; joint mobilization; manual therapy techniques; patient/family education; postural re-education; ROM (Range of Motion); strengthening; stretching  -SG     OT Plan Comments Continue PORi protocol and CDT including MLD, compression, skin care, and exercise. Continue ASTYM/STM as needed. Progress as tolerated.  -           User Key  (r) = Recorded By, (t) = Taken By, (c) = Cosigned By    Initials Name Provider Type    Janine Haney OTR/L Occupational Therapist                    Manual Rx (last 36 hours)     Manual Treatments     Row Name 11/08/21 1400             Total Minutes    37632 - OT Manual Therapy Minutes 45  -              Manual Rx 1    Manual Rx 1 Location Arm--Orthopedic: Forearm extensors, biceps muscles, lateral intermuscular septum tension line upper arm, pectoralis major  -      Manual Rx 1 Type Trigger Point Release, Myofascial Bending  -      Manual Rx 1 Duration Trigger Point Release for count of 3 seconds, repeat each section 5 times; Myofascial stretching w/ tissue/muscle bending 3-5 times  -              Manual Rx 2    Manual Rx 2 Location Axilla--Orthopedic: Medial Intermuscular Septum tension line upper arm, pectoralis minor, subscapularis, latissimus dorsi, teres major  -      Manual Rx 2 Type Trigger Point Release; Myofascial bending  -      Manual Rx 2 Duration Trigger Point Release for count of 3 seconds, repeat each section 5 times; Myofascial stretching w/ tissue/muscle bending 3-5 times  -              Manual Rx 3    Manual Rx 3 Location  Chest: Lymph System: Zone 1--pectoral region, Zone 2--superior Chest, Zone 3--inferior Chest  -SG      Manual Rx 3 Type Fluid movement/ MLD; parasternal LN node pumps  -SG      Manual Rx 3 Duration Repeat each Line x1 and corresponding parasternal node pumps x5, repeat each zone x3  -SG              Manual Rx 4    Manual Rx 4 Location Lateral chest/trunk--Lymph System: zone 1--lateral, zone 2--inferior, zone 3--central, zone 4-superior  -SG      Manual Rx 4 Type Intercostal Lymph Node Pumps  -SG      Manual Rx 4 Duration Repeat all 4 zones x 3  -SG              Manual Rx 5    Manual Rx 5 Location Upper Arm: Lymph System  -SG      Manual Rx 5 Type Upper arm fluid clearance of axillary pathway; medial confluence fluid clearance--antecubital fossa; lateral upper arm-cephalic pathway  -SG      Manual Rx 5 Duration x3 lines of treatment  -SG              Manual Rx 6    Manual Rx 6 Location Forearm--Orthopedic: radius and ulna-interosseous membrane; carpals-joint capsules-metacarpals-interosseous membranes; thenar muscles  -SG      Manual Rx 6 Type Joint mobilization, Trigger Point Release, Drainage of forearm  -SG      Manual Rx 6 Duration Joint Mob: each section x5 in each position; drainage of forearm: perform x3-5 lines of treatment  -SG              Manual Rx 7    Manual Rx 7 Location Back--orthopedic: upper trapezius, infraspinatus, teres minor, rhomboids, quadratus lumborum; T12, L1, L2 mobilization  -SG      Manual Rx 7 Type Trigger Point Release, Joint mobilization  -SG      Manual Rx 7 Duration Trigger Point Release for count of 3 seconds, repeat each section 5 times; perform 5-10 oscillations of each vertebra successively  -SG      Additional Treatment? Yes  -SG              Manual Rx 8    Manual Rx 8 Location Back--lymph system  -SG      Manual Rx 8 Type Fluid movement; paraspinal lymph node pumps  -SG      Manual Rx 8 Duration Repeat each line x1 and corresponding paraspinal node pumps x5; repeat each zone x3   -FABIOLA            User Key  (r) = Recorded By, (t) = Taken By, (c) = Cosigned By    Initials Name Provider Type    Janine Haney OTR/L Occupational Therapist                  Therapy Education  Education Details: Pt edu to cntinue with HEP and lymphedema management  Given: HEP, Symptoms/condition management, Posture/body mechanics, Edema management  Program: Reinforced, New  How Provided: Verbal, Demonstration, Written  Provided to: Patient  Level of Understanding: Verbalized, Demonstrated                Time Calculation:   OT Start Time: 1400  Timed Charges  16034 - OT Manual Therapy Minutes: 45  Total Minutes  Timed Charges Total Minutes: 45   Total Minutes: 45     Therapy Charges for Today     Code Description Service Date Service Provider Modifiers Qty    [id number] HC OT MANUAL THERAPY EA 15 MIN 11/8/2021 Janine Sheriff OTR/L GO 3                      CONSUELO Grullon/RAUL  11/8/2021

## 2021-11-15 ENCOUNTER — HOSPITAL ENCOUNTER (OUTPATIENT)
Dept: OCCUPATIONAL THERAPY | Facility: HOSPITAL | Age: 71
Setting detail: THERAPIES SERIES
Discharge: HOME | End: 2021-11-15

## 2021-11-15 DIAGNOSIS — L90.5 ADHERENT SCAR, SKIN: ICD-10-CM

## 2021-11-15 DIAGNOSIS — Z17.0 MALIGNANT NEOPLASM OF OVERLAPPING SITES OF RIGHT BREAST IN FEMALE, ESTROGEN RECEPTOR POSITIVE (HCC): Primary | ICD-10-CM

## 2021-11-15 DIAGNOSIS — I97.2 POSTMASTECTOMY LYMPHEDEMA SYNDROME OF LEFT UPPER EXTREMITY: ICD-10-CM

## 2021-11-15 DIAGNOSIS — C50.811 MALIGNANT NEOPLASM OF OVERLAPPING SITES OF RIGHT BREAST IN FEMALE, ESTROGEN RECEPTOR POSITIVE (HCC): Primary | ICD-10-CM

## 2021-11-15 PROCEDURE — 97140 MANUAL THERAPY 1/> REGIONS: CPT

## 2021-11-15 NOTE — THERAPY TREATMENT NOTE
Outpatient Occupational Therapy Lymphedema Treatment Note  ADITHYA Temple     Patient Name: Sarah Raphael  : 1950  MRN: [id number]  Today's Date: 11/15/2021      Visit Date: 11/15/2021    Patient Active Problem List   Diagnosis   • Anxiety   • Gastroesophageal reflux disease without esophagitis   • Hyperlipidemia   • Hypertension   • Hypothyroidism   • Cataract   • Cancer of overlapping sites of right female breast (HCC)   • Estrogen receptor positive   • Arthritis of right knee   • Status post total right knee replacement   • Elevated hemoglobin A1c   • Hyponatremia   • Acute postoperative pain        Past Medical History:   Diagnosis Date   • Arthritis    • Back pain    • Breast cancer (CMS/HCC)    • Celiac disease    • Disease of thyroid gland    • GERD (gastroesophageal reflux disease)    • Hepatitis     NOT SURE OF TYPE, CAN'T DONATE BLOOD   • History of transfusion    • Hypertension         Past Surgical History:   Procedure Laterality Date   • COLONOSCOPY         • EYE SURGERY      bilateral cataracts removed   • HYSTERECTOMY  1983   • KNEE ARTHROPLASTY, PARTIAL REPLACEMENT Right 10/7/2020    Procedure: TOTAL KNEE ARTHROPLASTY RIGHT;  Surgeon: Adrian Cortes MD;  Location: [facility];  Service: Orthopedics;  Laterality: Right;   • MASTECTOMY Bilateral 2017   • MASTECTOMY WITH SENTINEL NODE BIOPSY AND AXILLARY NODE DISSECTION Bilateral 2017    Procedure: LEFT BREAST MASTECTOMY WITH LEFT  SENTINEL NODE BIOPSY AND RIGHT PROPHYLACTIC MASTECTOMY;  Surgeon: Caitlin Green MD;  Location: [facility];  Service:    • OOPHORECTOMY     • SECONDARY INTRAOCULAR LENSE IMPLANTATION  2015    also in 2016   • SHOULDER ARTHROSCOPY Left 2014   • SKIN CANCER EXCISION  10/2015    basal cell   • THYROID SURGERY  1996   • TONSILLECTOMY  1976         Visit Dx:      ICD-10-CM ICD-9-CM   1. Malignant neoplasm of overlapping sites of right breast in female, estrogen receptor positive  ([facility])  C50.811 174.8    Z17.0 V86.0   2. 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 Adherent scar, skin  L90.5 709.2        Lymphedema     Row Name 11/15/21 1500             Subjective Pain    Able to rate subjective pain? yes  -SG      Pre-Treatment Pain Level 4  -SG      Post-Treatment Pain Level 2  -SG              Subjective Comments    Subjective Comments Pt reports that she has been having increased stiffness/tightness in LUE, especially hand  -SG              Lymphedema Assessment    Lymphedema Classification LUE:; stage 1 (Spontaneously Reversible)  -SG      Lymphedema Cancer Related Sx bilateral; simple mastectomy; left; sentinel node biopsy  -SG      Lymphedema Surgery Comments 2017  -SG      Chemo Received no  Adjuvant tamoxifen started 9/27/17  -SG      Radiation Therapy Received no  -SG      Infections or Cellulitis? no  -SG              Posture/Observations    Alignment Options Forward head; Thoracic kyphosis; Rounded shoulders  -SG      Posture/Observations Comments Forward protective posture  -SG              General ROM    GENERAL ROM COMMENTS BUE WNL  -SG              MMT (Manual Muscle Testing)    General MMT Comments BUE WNL  -SG              Lymphedema Edema Assessment    Edema Assessment Comment Edema LUE and L axilla/trunk  -SG              Skin Changes/Observations    Location/Assessment Upper Extremity; Upper Quadrant  -SG      Upper Extremity Conditions bilateral:; normal; intact; clean  -SG      Upper Extremity Color/Pigment bilateral:; normal  -SG      Upper Quadrant Conditions bilateral:; normal; intact; clean  -SG      Upper Quadrant Color/Pigment bilateral:; normal  -SG      Skin Observations Comment Tight soft tissue restrictions/scar tissue present bilateral chest  -SG              Lymphedema Measurements    Measurement Type(s) Quick Girth  -SG      Quick Girth Areas Upper extremities  -SG              Manual Lymphatic Drainage    Manual Lymphatic  Drainage opened regional lymph nodes; initial sequence; extremity treatment; astym  -SG      Initial Sequence supraclavicular  -SG      Supraclavicular left; right  -SG      Opened Regional Lymph Nodes axillary; ribs; paraspinal  -SG      Axillary left; right  -SG      Extremity Treatment MLD to full limb  -SG      Astym mastectomy protocol; other astym  -SG      Mastectomy Protocol supine  -SG      Mastectomy Protocol Comment bilateral  -SG      Other Astym left hand  -SG      Manual Therapy See manual rx for PORi protocol; additional STM post PORi protocol for back/neck  -SG              Compression/Skin Care    Compression/Skin Care compression garment  -SG      Compression Garment Comments Jobst Suzan Lite 20-30 mmHg  -SG              L-Dex Bioimpedence Screening    L-Dex Measurement Extremity LUE  -SG      L-Dex Patient Position Standing  -SG      L-Dex UE Dominate Side Right  -SG      L-Dex UE At Risk Side Left  -SG      L-Dex UE Pre Surgical Value No  -SG      L-Dex UE Baseline Score 8  -SG            User Key  (r) = Recorded By, (t) = Taken By, (c) = Cosigned By    Initials Name Provider Type    Janine Haney, OTR/L Occupational Therapist                         OT Assessment/Plan     Row Name 11/15/21 0150          OT Assessment    Functional Limitations Limitations in functional capacity and performance; Performance in self-care ADL  -     Impairments Edema; Pain; Impaired lymphatic circulation  -SG     Assessment Comments Pt presents w the following: pain and stiffness in chest which limits ROM and participation in ADLs/IADLs; strength: decreased core stabilization and decreaed UE/ strength; posture: forward neck and shoulder posture, disrupted scapulo-humeral rhythm; increased tissue tightness over chest, stress across pectoralis major muscle, s-c joint line, c-v joint line, and associated ST through shoulders and trunk; w/ tight soft tissue restrictions and scar tissue; lymphedema in LUE. She  will benefit from continuing with OP OT to address symptoms.  -     OT Rehab Potential Excellent  -SG     Patient/caregiver participated in establishment of treatment plan and goals Yes  -SG     Patient would benefit from skilled therapy intervention Yes  -SG            OT Plan    OT Frequency 1x/week  -     Planned CPT's? OT EVAL MOD COMPLEXITY: 03211; OT THER ACT EA 15 MIN: [id number]; OT THER PROC EA 15 MIN: 53847EH; OT SELF CARE/MGMT/TRAIN 15 MIN: 16124; OT MANUAL THERAPY EA 15 MIN: 41288; OT BIS XTRACELL FLUID ANALYSIS: 54648  -     Planned Therapy Interventions (Optional Details) home exercise program; joint mobilization; manual therapy techniques; patient/family education; postural re-education; ROM (Range of Motion); strengthening; stretching  -     OT Plan Comments Continue PORi protocol and CDT including MLD, compression, skin care, and exercise. Continue ASTYM/STM as needed. Progress as tolerated.  -           User Key  (r) = Recorded By, (t) = Taken By, (c) = Cosigned By    Initials Name Provider Type    Janine Haney OTR/L Occupational Therapist                    Manual Rx (last 36 hours)     Manual Treatments     Row Name 11/15/21 1500             Total Minutes    51301 - OT Manual Therapy Minutes 55  -              Manual Rx 1    Manual Rx 1 Location Arm--Orthopedic: Forearm extensors, biceps muscles, lateral intermuscular septum tension line upper arm, pectoralis major  -      Manual Rx 1 Type Trigger Point Release, Myofascial Bending  -      Manual Rx 1 Duration Trigger Point Release for count of 3 seconds, repeat each section 5 times; Myofascial stretching w/ tissue/muscle bending 3-5 times  -              Manual Rx 2    Manual Rx 2 Location Axilla--Orthopedic: Medial Intermuscular Septum tension line upper arm, pectoralis minor, subscapularis, latissimus dorsi, teres major  -      Manual Rx 2 Type Trigger Point Release; Myofascial bending  -      Manual Rx 2 Duration  Trigger Point Release for count of 3 seconds, repeat each section 5 times; Myofascial stretching w/ tissue/muscle bending 3-5 times  -SG              Manual Rx 3    Manual Rx 3 Location Chest: Lymph System: Zone 1--pectoral region, Zone 2--superior Chest, Zone 3--inferior Chest  -SG      Manual Rx 3 Type Fluid movement/ MLD; parasternal LN node pumps  -SG      Manual Rx 3 Duration Repeat each Line x1 and corresponding parasternal node pumps x5, repeat each zone x3  -SG              Manual Rx 4    Manual Rx 4 Location Lateral chest/trunk--Lymph System: zone 1--lateral, zone 2--inferior, zone 3--central, zone 4-superior  -SG      Manual Rx 4 Type Intercostal Lymph Node Pumps  -SG      Manual Rx 4 Duration Repeat all 4 zones x 3  -SG              Manual Rx 5    Manual Rx 5 Location Upper Arm: Lymph System  -SG      Manual Rx 5 Type Upper arm fluid clearance of axillary pathway; medial confluence fluid clearance--antecubital fossa; lateral upper arm-cephalic pathway  -SG      Manual Rx 5 Duration x3 lines of treatment  -SG              Manual Rx 6    Manual Rx 6 Location Forearm--Orthopedic: radius and ulna-interosseous membrane; carpals-joint capsules-metacarpals-interosseous membranes; thenar muscles  -SG      Manual Rx 6 Type Joint mobilization, Trigger Point Release, Drainage of forearm  -SG      Manual Rx 6 Duration Joint Mob: each section x5 in each position; drainage of forearm: perform x3-5 lines of treatment  -SG              Manual Rx 7    Manual Rx 7 Location Back--orthopedic: upper trapezius, infraspinatus, teres minor, rhomboids, quadratus lumborum; T12, L1, L2 mobilization  -SG      Manual Rx 7 Type Trigger Point Release, Joint mobilization  -SG      Manual Rx 7 Duration Trigger Point Release for count of 3 seconds, repeat each section 5 times; perform 5-10 oscillations of each vertebra successively  -SG      Additional Treatment? Yes  -SG              Manual Rx 8    Manual Rx 8 Location Back--lymph system   -SG      Manual Rx 8 Type Fluid movement; paraspinal lymph node pumps  -SG      Manual Rx 8 Duration Repeat each line x1 and corresponding paraspinal node pumps x5; repeat each zone x3  -SG            User Key  (r) = Recorded By, (t) = Taken By, (c) = Cosigned By    Initials Name Provider Type    Janine Haney OTR/L Occupational Therapist                  Therapy Education  Education Details: Pt was edu on ASTYM use and benefits  Given: HEP, Symptoms/condition management, Posture/body mechanics, Edema management  Program: Reinforced, New  How Provided: Verbal, Demonstration, Written  Provided to: Patient  Level of Understanding: Verbalized, Demonstrated                Time Calculation:   OT Start Time: 1500  Timed Charges  79029 - OT Manual Therapy Minutes: 55  Total Minutes  Timed Charges Total Minutes: 55   Total Minutes: 55     Therapy Charges for Today     Code Description Service Date Service Provider Modifiers Qty    [id number]  OT MANUAL THERAPY EA 15 MIN 11/15/2021 Janine Sheriff OTR/L GO 4                      CONSUELO Grullon/RAUL  11/15/2021

## 2021-11-16 NOTE — TELEPHONE ENCOUNTER
Medication has been called in to [facility] pharmacy.     Sarah Raphael [phone]  Spoke to pt, advised of clinical message. Pt is in agreement with plan. Video visit has been scheduled for 05/07/2020 @ 1:00pm. Good verbal understanding.    Pulmonary Progress Note    Admit Date: 11/10/2021  CODE: No CPR- Do NOT Intubate    Reason for Consult: right pleural effusion, lung abscess, bacterial pneumonia    Assessment/Plan:   79 year old lady with history of stage 1 breast cancer s/p lumpectomy and radiation in 2015.  Breakthrough Covid infection diagnosed 10/28.  Presented to the hospital on 11/10 and found to have right sided pleural effusion.  Drained dry with thoracentesis.  Studies found to be consistent with complicated parapneumonic effusion, likely due to Streptococcus pneumonia.    Started intrapleural lytics on 11/14 overnight with significant increase in output (> 1 L). Further doses were held at her request due to significant pain from this, and she continues to rate 10/1 pain intermittently at chest tube site.     CT continues to show a fairly significant posterior fluid pocket, that the current chest tube does not access. The currently chest tube has drained as much as it can. I reviewed options with her today - resuming intrapleural lytics, or another procedure to drain that pocket. I discussed with IR as well, who felt the current tube could not be re-positioned into this space. Options would be an attempt at US thoracentesis to drain this, or a second chest tube into this pocket (CT guided).    Plan:  - She agrees to try resumption of intrapleural lytics, as she had a good response to the first dose. Appreciate pain consult to help her through this.  - continue to water seal  - titrate FiO2 for goal SpO2 92%, avoid hyperoxia  -Continue ceftriaxone  -Continue to follow cultures - neg to date  - encourage OOB, PT/OT, push IS  - Chest x-ray daily      Discussed with bedside nursing. We will continue to follow along.    Nadege Harris MD  Pulmonary and Critical Care Medicine  [facility]  Office: [phone]    Subjective/Interim Events:     Zero chest tube output in the last shift  CT scan reviewed with the patient in detail this  "morning     Medications:       - MEDICATION INSTRUCTIONS -         acetaminophen  975 mg Oral Q6H     alteplase (ACTIVASE) 10 mg and dornase 5 mg in NS syringe for chest tube instillation  50 mL Chest Tube BID     calcium carbonate-vitamin D  1 tablet Oral BID     cefTRIAXone  1 g Intravenous Q24H     enoxaparin ANTICOAGULANT  40 mg Subcutaneous Q24H     lactobacillus rhamnosus (GG)  1 capsule Oral Daily     lidocaine  3 patch Transdermal Q24H     lidocaine   Transdermal Q8H     magnesium oxide  200 mg Oral BID     pantoprazole  40 mg Oral Daily     polyethylene glycol  17 g Oral Daily     senna-docusate  2 tablet Oral BID     sertraline  200 mg Oral Daily     sodium chloride (PF)  3 mL Irrigation Q8H     sodium chloride (PF)  3 mL Intracatheter Q8H     traZODone  150 mg Oral At Bedtime     valACYclovir  1,000 mg Oral At Bedtime     vitamin C  250 mg Oral At Bedtime     Exam/Data:   Vitals  /51 (BP Location: Left arm)   Pulse 89   Temp 98.5  F (36.9  C) (Oral)   Resp 20   Ht 1.664 m (5' 5.5\")   Wt 59 kg (130 lb)   SpO2 98%   BMI 21.30 kg/m       I/O last 3 completed shifts:  In: 420 [P.O.:420]  Out: 1149 [Chest Tube:1149]  Weight change:   Resp: 20    EXAM:  Gen: awake, alert, oriented, no distress  HEENT: NT, no PHILLIP  CV: RRR, no m/g/r  Resp: diminished at right lateral aspect. R chest tube in place, to water seal.  Abd: soft, nontender, BS+  Skin: no rashes or lesions  Ext: no edema  Neuro: PERRL, nonfocal exam    ROS:  A 10-system review was obtained and is negative with the exception of the symptoms noted above.    Micro  Urine Strep pneumo antigen positive  Legionella urine Ag neg  Blood with staph hominis    Pleural fluid 11/10  Studies reviewed.  Pleural culture negative.    RIGHT PLEURAL FLUID:    LARGE NUMBERS OF ACUTE INFLAMMATORY CELLS, POSSIBLE EARLY EMPYEMA    NEGATIVE FOR ATYPICAL OR MALIGNANT CELLS    IMAGIN/15/21 CXR:  Right pigtail pleural drain with trace pneumothorax near tube " insertion site but no new significant pneumothorax. Further decrease in the small right pleural effusion and improved aeration of the right mid and lower lung. Decreasing retrocardiac left lower lung atelectasis or infiltrate. Stable borderline enlarged cardiac silhouette. Normal pulmonary vascularity. Healing fractures left ribs 3-6.

## 2021-11-24 ENCOUNTER — OFFICE VISIT (OUTPATIENT)
Dept: INTERNAL MEDICINE | Facility: CLINIC | Age: 71
End: 2021-11-24

## 2021-11-24 VITALS
BODY MASS INDEX: 29.22 KG/M2 | HEART RATE: 67 BPM | DIASTOLIC BLOOD PRESSURE: 74 MMHG | WEIGHT: 192.8 LBS | OXYGEN SATURATION: 97 % | HEIGHT: 68 IN | SYSTOLIC BLOOD PRESSURE: 130 MMHG | TEMPERATURE: 98.2 F

## 2021-11-24 DIAGNOSIS — E03.9 HYPOTHYROIDISM, UNSPECIFIED TYPE: ICD-10-CM

## 2021-11-24 DIAGNOSIS — Z00.00 MEDICARE ANNUAL WELLNESS VISIT, SUBSEQUENT: Primary | ICD-10-CM

## 2021-11-24 DIAGNOSIS — K21.9 GASTROESOPHAGEAL REFLUX DISEASE WITHOUT ESOPHAGITIS: ICD-10-CM

## 2021-11-24 DIAGNOSIS — E78.5 HYPERLIPIDEMIA, UNSPECIFIED HYPERLIPIDEMIA TYPE: ICD-10-CM

## 2021-11-24 DIAGNOSIS — I10 ESSENTIAL HYPERTENSION: ICD-10-CM

## 2021-11-24 LAB
ALBUMIN SERPL-MCNC: 3.6 G/DL (ref 3.5–5.2)
ALBUMIN/GLOB SERPL: 1.2 G/DL
ALP SERPL-CCNC: 72 U/L (ref 39–117)
ALT SERPL W P-5'-P-CCNC: 15 U/L (ref 1–33)
ANION GAP SERPL CALCULATED.3IONS-SCNC: 9.1 MMOL/L (ref 5–15)
AST SERPL-CCNC: 18 U/L (ref 1–32)
BILIRUB SERPL-MCNC: 0.2 MG/DL (ref 0–1.2)
BUN SERPL-MCNC: 10 MG/DL (ref 8–23)
BUN/CREAT SERPL: 14.9 (ref 7–25)
CALCIUM SPEC-SCNC: 9.1 MG/DL (ref 8.6–10.5)
CHLORIDE SERPL-SCNC: 99 MMOL/L (ref 98–107)
CHOLEST SERPL-MCNC: 154 MG/DL (ref 0–200)
CO2 SERPL-SCNC: 27.9 MMOL/L (ref 22–29)
CREAT SERPL-MCNC: 0.67 MG/DL (ref 0.57–1)
GFR SERPL CREATININE-BSD FRML MDRD: 87 ML/MIN/1.73
GLOBULIN UR ELPH-MCNC: 2.9 GM/DL
GLUCOSE SERPL-MCNC: 91 MG/DL (ref 65–99)
HDLC SERPL-MCNC: 43 MG/DL (ref 40–60)
LDLC SERPL CALC-MCNC: 78 MG/DL (ref 0–100)
LDLC/HDLC SERPL: 1.68 {RATIO}
POTASSIUM SERPL-SCNC: 3.6 MMOL/L (ref 3.5–5.2)
PROT SERPL-MCNC: 6.5 G/DL (ref 6–8.5)
SODIUM SERPL-SCNC: 136 MMOL/L (ref 136–145)
T4 FREE SERPL-MCNC: 1.72 NG/DL (ref 0.93–1.7)
TRIGL SERPL-MCNC: 193 MG/DL (ref 0–150)
TSH SERPL DL<=0.05 MIU/L-ACNC: 0.01 UIU/ML (ref 0.27–4.2)
VLDLC SERPL-MCNC: 33 MG/DL (ref 5–40)

## 2021-11-24 PROCEDURE — 1160F RVW MEDS BY RX/DR IN RCRD: CPT | Performed by: INTERNAL MEDICINE

## 2021-11-24 PROCEDURE — 80053 COMPREHEN METABOLIC PANEL: CPT | Performed by: INTERNAL MEDICINE

## 2021-11-24 PROCEDURE — 84439 ASSAY OF FREE THYROXINE: CPT | Performed by: INTERNAL MEDICINE

## 2021-11-24 PROCEDURE — G0439 PPPS, SUBSEQ VISIT: HCPCS | Performed by: INTERNAL MEDICINE

## 2021-11-24 PROCEDURE — 80061 LIPID PANEL: CPT | Performed by: INTERNAL MEDICINE

## 2021-11-24 PROCEDURE — 36415 COLL VENOUS BLD VENIPUNCTURE: CPT | Performed by: INTERNAL MEDICINE

## 2021-11-24 PROCEDURE — 84443 ASSAY THYROID STIM HORMONE: CPT | Performed by: INTERNAL MEDICINE

## 2021-11-24 PROCEDURE — 1170F FXNL STATUS ASSESSED: CPT | Performed by: INTERNAL MEDICINE

## 2021-11-24 RX ORDER — DIPHENHYDRAMINE HYDROCHLORIDE 50 MG/ML
INJECTION INTRAMUSCULAR; INTRAVENOUS
Qty: 10 ML | Refills: 1 | Status: SHIPPED | OUTPATIENT
Start: 2021-11-24 | End: 2021-12-16

## 2021-11-24 NOTE — PROGRESS NOTES
Chief Complaint   Patient presents with   • Annual Exam   • Medicare Wellness-subsequent       History of Present Illness      The patient presents for a follow-up related to hypertension. The patient reports that she has had no headaches, chest pain, dyspnea, edema, syncope, blurred vision or palpitations. She states that she is taking her medication as prescribed. She is not having medication side effects.    The patient presents for a follow-up related to hyperlipidemia. She is following a low fat diet. She reports that she is exercising. She is not taking medication for hyperlipidemia. She denies orthopnea, paroxysmal nocturnal dyspnea or dyspnea on exertion.    The patient presents for a follow-up related to GERD. The patient is on pantoprazole for her gastroesophageal reflux. The medication is taken on a regular basis and gives complete relief of the symptoms. She reports no abdominal pain, belching, diarrhea, dysphagia, early satiety, heartburn, hoarseness, nausea, odynophagia, rectal bleeding, vomiting or weight loss. The GERD has no known aggravating factors.    The patient presents for a follow-up related to hypothyroidism. She reports a good energy level. She reports no hair loss, heat intolerance, cold intolerance, constipation or sweats. She is taking her medication as prescribed.    Review of Systems    CONSTITUTIONAL- Denies Fever, Chills, Sweats, Weakness or Malaise.    PULMONARY- Denies Wheezing, Sputum Production, Cough, Hemoptysis or Pleuritic Chest Pain.    CARDIOVASCULAR- Denies Claudication or Irregular Heart Beat.    Medications      Current Outpatient Medications:   •  albuterol (PROAIR HFA) 108 (90 Base) MCG/ACT inhaler, Inhale 2 puffs Every 6 (Six) Hours As Needed for Wheezing or Shortness of Air., Disp: 1 inhaler, Rfl: 5  •  aspirin 81 MG EC tablet, Take 1 tablet by mouth Daily. Resume in 1 month, Disp:  , Rfl:   •  Azelastine-Fluticasone (DYMISTA) 137-50 MCG/ACT suspension, 2 Squirts into  the nostril(s) as directed by provider Daily., Disp: , Rfl:   •  Cholecalciferol (VITAMIN D PO), Take 2,000 Units by mouth Daily. Vitamin D 1000 UNIT CAPS; TAKE 1 TABLET DAILY, Disp: , Rfl:   •  citalopram (CeleXA) 40 MG tablet, TAKE 1 AND 1/2 TABLET BY MOUTH DAILY, Disp: 135 tablet, Rfl: 1  •  diphenhydrAMINE (BENADRYL) 50 MG/ML injection, INJECT 1ml INTRAMUSCULARLY AS directed one time as needed for itching or allergies, Disp: 10 mL, Rfl: 1  •  guaiFENesin (MUCINEX) 600 MG 12 hr tablet, Take 1,200 mg by mouth 2 (Two) Times a Day., Disp: , Rfl:   •  hydrOXYzine (ATARAX) 25 MG tablet, Take 1 tablet by mouth At Night As Needed for Allergies. at bedtime as needed (Patient taking differently: Take 50 mg by mouth At Night As Needed for Allergies. at bedtime as needed), Disp: 90 tablet, Rfl: 1  •  levothyroxine (SYNTHROID, LEVOTHROID) 200 MCG tablet, TAKE ONE TABLET BY MOUTH DAILY, Disp: 90 tablet, Rfl: 1  •  levothyroxine (SYNTHROID, LEVOTHROID) 50 MCG tablet, TAKE ONE TABLET BY MOUTH DAILY, Disp: 90 tablet, Rfl: 1  •  MAGNESIUM OXIDE PO, Take 500 mg by mouth Daily., Disp: , Rfl:   •  Melatonin 10 MG tablet, Take 1 tablet by mouth At Night As Needed (sleep)., Disp: , Rfl:   •  montelukast (SINGULAIR) 10 MG tablet, Take 10 mg by mouth Every Night., Disp: , Rfl:   •  pantoprazole (PROTONIX) 40 MG EC tablet, TAKE ONE TABLET BY MOUTH TWICE A DAY, Disp: 180 tablet, Rfl: 1  •  polyethylene glycol (MIRALAX) 17 g packet, Take 17 g by mouth Daily. (Patient taking differently: Take 17 g by mouth Daily As Needed.), Disp:  , Rfl:   •  tamoxifen (NOLVADEX) 20 MG chemo tablet, Take 1 tablet by mouth Daily. Swallow whole. Do not crush or chew., Disp: 90 tablet, Rfl: 3  •  triamterene-hydrochlorothiazide (MAXZIDE-25) 37.5-25 MG per tablet, TAKE ONE TABLET BY MOUTH DAILY, Disp: 90 tablet, Rfl: 1  •  vitamin B-12 (CYANOCOBALAMIN) 1000 MCG tablet, Take 1,000 mcg by mouth Daily., Disp: , Rfl:   •  EPINEPHrine (EPIPEN 2-KIRA) 0.3 MG/0.3ML  "solution auto-injector injection, Inject 1 Units into the shoulder, thigh, or buttocks As Needed (allergy). as directed; For: Allergic rhinitis, Disp: , Rfl:      Allergies    Allergies   Allergen Reactions   • Nuts Shortness Of Breath     rash   • Other      MSG - HIVES    • Penicillins Shortness Of Breath     rash   • Soybean-Containing Drug Products Shortness Of Breath     rash   • Sunflower Oil Shortness Of Breath     rash   • Cefuroxime      Stomach problems   • Cephalexin      Stomach problems   • Chicken Allergy    • Clindamycin Nausea Only and Nausea And Vomiting   • Erythromycin Nausea Only   • Flu Virus Vaccine      Redness and red rash to area   • Gluten Meal    • Metronidazole Hives   • Naproxen Dizziness   • Parsley Seed    • Thimerosal      Redness and swelling at site of injection   • Varicella Virus Vaccine Live      Redness and swelling at site   • Zostavax [Zoster Vaccine Live]      40044 UNT /0.65 ML subcutaneous solution Recontituted  - redness and swelling at site   • Apple Rash   • Doxycycline Itching and Rash   • Sulfa Antibiotics Nausea Only and Rash     Other reaction(s): Headache, Vomiting       Problem List    Patient Active Problem List   Diagnosis   • Anxiety   • Gastroesophageal reflux disease without esophagitis   • Hyperlipidemia   • Hypertension   • Hypothyroidism   • Cataract   • Cancer of overlapping sites of right female breast (HCC)   • Estrogen receptor positive   • Arthritis of right knee   • Status post total right knee replacement   • Elevated hemoglobin A1c   • Hyponatremia   • Acute postoperative pain       Medications, Allergies, Problems List and Past History were reviewed and updated.    Physical Examination    /74 (BP Location: Right arm, Patient Position: Sitting, Cuff Size: Adult)   Pulse 67   Temp 98.2 °F (36.8 °C) (Infrared)   Ht 172.7 cm (68\")   Wt 87.5 kg (192 lb 12.8 oz)   LMP  (LMP Unknown) Comment: LAST PAP 3/18 BY DR SANDERS  SpO2 97%   " Breastfeeding No   BMI 29.32 kg/m²     HEENT: Head- Normocephalic Atraumatic. Facies- Within normal limits. Pinnas- Normal texture and shape bilaterally. Canals- Normal bilaterally. TMs- Normal bilaterally. Nares- Patent bilaterally. Nasal Septum- is normal. There is no tenderness to palpation over the frontal or maxillary sinuses. Lids- Normal bilaterally. Conjunctiva- Clear bilaterally. Sclera- Anicteric bilaterally. Oropharynx- Moist with no lesions. Tonsils- No enlargement, erythema or exudate.    Neck: Thyroid- non enlarged, symmetric and has no nodules. No bruits are detected.    Lungs: Auscultation- Clear to auscultation bilaterally. There are no retractions, clubbing or cyanosis. The Expiratory to Inspiratory ratio is equal.    Cardiovascular: There are no carotid bruits. Heart- Normal Rate with Regular rhythm and no murmurs. There are no gallops. There are no rubs. In the lower extremities there is no edema. The upper extremities do not have edema.    Abdomen: Soft, benign, non-tender with no masses, hernias, organomegaly or scars.    Impression and Assessment    Essential Hypertension.    Hyperlipidemia.    Gastroesophageal Reflux Disease.    Hypothyroidism.    Plan    Gastroesophageal Reflux Disease Plan: The patient was instructed to continue the current medications.    Essential Hypertension Plan: The patient was instructed to continue the current medications.    Hyperlipidemia Plan: The patient was instructed to exercise daily and eat a low fat diet.    Hypothyroidism Plan: The patient was instructed to continue the current medications.    Diagnoses and all orders for this visit:    1. Medicare annual wellness visit, subsequent (Primary)    2. Essential hypertension  -     Comprehensive Metabolic Panel; Future    3. Hyperlipidemia, unspecified hyperlipidemia type  -     Comprehensive Metabolic Panel; Future  -     Lipid Panel; Future    4. Gastroesophageal reflux disease without esophagitis    5.  Hypothyroidism, unspecified type  -     TSH; Future  -     T4, Free; Future    Other orders  -     diphenhydrAMINE (BENADRYL) 50 MG/ML injection; INJECT 1ml INTRAMUSCULARLY AS directed one time as needed for itching or allergies  Dispense: 10 mL; Refill: 1        Return to Office    The patient was instructed to return for follow-up in 4 months. Next Visit: Follow-up and Physical Examination. The patient was instructed to return sooner if the condition changes, worsens, or does not resolve.

## 2021-11-24 NOTE — PATIENT INSTRUCTIONS
Medicare Wellness  Personal Prevention Plan of Service     Date of Office Visit:  2021  Encounter Provider:  Tyrese Recinos MD  Place of Service:  [facility]  Patient Name: Sarah Raphael  :  1950    As part of the Medicare Wellness portion of your visit today, we are providing you with this personalized preventive plan of services (PPPS). This plan is based upon recommendations of the [place] Preventive Services Task Force (USPSTF) and the Advisory Committee on Immunization Practices (ACIP).    This lists the preventive care services that should be considered, and provides dates of when you are due. Items listed as completed are up-to-date and do not require any further intervention.    Health Maintenance   Topic Date Due   • DXA SCAN  Never done   • PAP SMEAR  2020   • COVID-19 Vaccine (2 - Booster for Veronica series) 2021   • LIPID PANEL  02/10/2022   • TDAP/TD VACCINES (2 - Td or Tdap) 2022   • ANNUAL WELLNESS VISIT  2022   • COLORECTAL CANCER SCREENING  2029   • HEPATITIS C SCREENING  Completed   • Pneumococcal Vaccine 65+  Completed   • MAMMOGRAM  Discontinued       Orders Placed This Encounter   Procedures   • Comprehensive Metabolic Panel     Standing Status:   Future     Standing Expiration Date:   2022     Order Specific Question:   Release to patient     Answer:   Immediate   • Lipid Panel     Standing Status:   Future     Standing Expiration Date:   2022   • TSH     Standing Status:   Future     Standing Expiration Date:   2022     Order Specific Question:   Release to patient     Answer:   Immediate   • T4, Free     Standing Status:   Future     Standing Expiration Date:   2022     Order Specific Question:   Release to patient     Answer:   Immediate       Return in about 4 months (around 3/24/2022) for Annual physical, Follow-up.          Heart-Healthy Eating  Plan  Heart-healthy meal planning includes:  · Eating less unhealthy fats.  · Eating more healthy fats.  · Making other changes in your diet.  Talk with your doctor or a diet specialist (dietitian) to create an eating plan that is right for you.  What is my plan?  Your doctor may recommend an eating plan that includes:  · Total fat: ______% or less of total calories a day.  · Saturated fat: ______% or less of total calories a day.  · Cholesterol: less than _________mg a day.  What are tips for following this plan?  Cooking  Avoid frying your food. Try to bake, boil, grill, or broil it instead. You can also reduce fat by:  · Removing the skin from poultry.  · Removing all visible fats from meats.  · Steaming vegetables in water or broth.  Meal planning    · At meals, divide your plate into four equal parts:  ? Fill one-half of your plate with vegetables and green salads.  ? Fill one-fourth of your plate with whole grains.  ? Fill one-fourth of your plate with lean protein foods.  · Eat 4-5 servings of vegetables per day. A serving of vegetables is:  ? 1 cup of raw or cooked vegetables.  ? 2 cups of raw leafy greens.  · Eat 4-5 servings of fruit per day. A serving of fruit is:  ? 1 medium whole fruit.  ? ¼ cup of dried fruit.  ? ½ cup of fresh, frozen, or canned fruit.  ? ½ cup of 100% fruit juice.  · Eat more foods that have soluble fiber. These are apples, broccoli, carrots, beans, peas, and barley. Try to get 20-30 g of fiber per day.  · Eat 4-5 servings of nuts, legumes, and seeds per week:  ? 1 serving of dried beans or legumes equals ½ cup after being cooked.  ? 1 serving of nuts is ¼ cup.  ? 1 serving of seeds equals 1 tablespoon.    General information  · Eat more home-cooked food. Eat less restaurant, buffet, and fast food.  · Limit or avoid alcohol.  · Limit foods that are high in starch and sugar.  · Avoid fried foods.  · Lose weight if you are overweight.  · Keep track of how much salt (sodium) you eat.  This is important if you have high blood pressure. Ask your doctor to tell you more about this.  · Try to add vegetarian meals each week.  Fats  · Choose healthy fats. These include olive oil and canola oil, flaxseeds, walnuts, almonds, and seeds.  · Eat more omega-3 fats. These include salmon, mackerel, sardines, tuna, flaxseed oil, and ground flaxseeds. Try to eat fish at least 2 times each week.  · Check food labels. Avoid foods with trans fats or high amounts of saturated fat.  · Limit saturated fats.  ? These are often found in animal products, such as meats, butter, and cream.  ? These are also found in plant foods, such as palm oil, palm kernel oil, and coconut oil.  · Avoid foods with partially hydrogenated oils in them. These have trans fats. Examples are stick margarine, some tub margarines, cookies, crackers, and other baked goods.  What foods can I eat?  Fruits  All fresh, canned (in natural juice), or frozen fruits.  Vegetables  Fresh or frozen vegetables (raw, steamed, roasted, or grilled). Green salads.  Grains  Most grains. Choose whole wheat and whole grains most of the time. Rice and pasta, including brown rice and pastas made with whole wheat.  Meats and other proteins  Lean, well-trimmed beef, veal, pork, and lamb. Chicken and turkey without skin. All fish and shellfish. Wild duck, rabbit, pheasant, and venison. Egg whites or low-cholesterol egg substitutes. Dried beans, peas, lentils, and tofu. Seeds and most nuts.  Dairy  Low-fat or nonfat cheeses, including ricotta and mozzarella. Skim or 1% milk that is liquid, powdered, or evaporated. Buttermilk that is made with low-fat milk. Nonfat or low-fat yogurt.  Fats and oils  Non-hydrogenated (trans-free) margarines. Vegetable oils, including soybean, sesame, sunflower, olive, peanut, safflower, corn, canola, and cottonseed. Salad dressings or mayonnaise made with a vegetable oil.  Beverages  Mineral water. Coffee and tea. Diet carbonated  beverages.  Sweets and desserts  Sherbet, gelatin, and fruit ice. Small amounts of dark chocolate.  Limit all sweets and desserts.  Seasonings and condiments  All seasonings and condiments.  The items listed above may not be a complete list of foods and drinks you can eat. Contact a dietitian for more options.  What foods should I avoid?  Fruits  Canned fruit in heavy syrup. Fruit in cream or butter sauce. Fried fruit. Limit coconut.  Vegetables  Vegetables cooked in cheese, cream, or butter sauce. Fried vegetables.  Grains  Breads that are made with saturated or trans fats, oils, or whole milk. Croissants. Sweet rolls. Donuts. High-fat crackers, such as cheese crackers.  Meats and other proteins  Fatty meats, such as hot dogs, ribs, sausage, nagel, rib-eye roast or steak. High-fat deli meats, such as salami and bologna. Caviar. Domestic duck and goose. Organ meats, such as liver.  Dairy  Cream, sour cream, cream cheese, and creamed cottage cheese. Whole-milk cheeses. Whole or 2% milk that is liquid, evaporated, or condensed. Whole buttermilk. Cream sauce or high-fat cheese sauce. Yogurt that is made from whole milk.  Fats and oils  Meat fat, or shortening. Cocoa butter, hydrogenated oils, palm oil, coconut oil, palm kernel oil. Solid fats and shortenings, including nagel fat, salt pork, lard, and butter. Nondairy cream substitutes. Salad dressings with cheese or sour cream.  Beverages  Regular sodas and juice drinks with added sugar.  Sweets and desserts  Frosting. Pudding. Cookies. Cakes. Pies. Milk chocolate or white chocolate. Buttered syrups. Full-fat ice cream or ice cream drinks.  The items listed above may not be a complete list of foods and drinks to avoid. Contact a dietitian for more information.  Summary  · Heart-healthy meal planning includes eating less unhealthy fats, eating more healthy fats, and making other changes in your diet.  · Eat a balanced diet. This includes fruits and vegetables, low-fat  or nonfat dairy, lean protein, nuts and legumes, whole grains, and heart-healthy oils and fats.  This information is not intended to replace advice given to you by your health care provider. Make sure you discuss any questions you have with your health care provider.  Document Revised: 02/21/2019 Document Reviewed: 01/25/2019  [employer] Patient Education © 2021 [employer]

## 2021-11-24 NOTE — PROGRESS NOTES
The ABCs of the Annual Wellness Visit  Subsequent Medicare Wellness Visit    Chief Complaint   Patient presents with   • Annual Exam   • Medicare Wellness-subsequent      Subjective    History of Present Illness:  Sarah Raphael is a 71 y.o. female who presents for a Subsequent Medicare Wellness Visit.    The following portions of the patient's history were reviewed and   updated as appropriate: allergies, current medications, past family history, past medical history, past social history, past surgical history and problem list.    Compared to one year ago, the patient feels her physical   health is the same.    Compared to one year ago, the patient feels her mental   health is the same.    Recent Hospitalizations:  She was not admitted to the hospital during the last year.       Current Medical Providers:  Patient Care Team:  Tyrese Recinos MD as PCP - General  [facility] Phys[facility] (General Surgery)    Outpatient Medications Prior to Visit   Medication Sig Dispense Refill   • albuterol (PROAIR HFA) 108 (90 Base) MCG/ACT inhaler Inhale 2 puffs Every 6 (Six) Hours As Needed for Wheezing or Shortness of Air. 1 inhaler 5   • aspirin 81 MG EC tablet Take 1 tablet by mouth Daily. Resume in 1 month     • Azelastine-Fluticasone (DYMISTA) 137-50 MCG/ACT suspension 2 Squirts into the nostril(s) as directed by provider Daily.     • Cholecalciferol (VITAMIN D PO) Take 2,000 Units by mouth Daily. Vitamin D 1000 UNIT CAPS; TAKE 1 TABLET DAILY     • citalopram (CeleXA) 40 MG tablet TAKE 1 AND 1/2 TABLET BY MOUTH DAILY 135 tablet 1   • diphenhydrAMINE (BENADRYL) 50 MG/ML injection INJECT 1 ML INTRAMUSCULARLY AS DIRECTED ONE TIME AS NEEDED FOR ITCHING OR ALLERGIES 10 mL 1   • guaiFENesin (MUCINEX) 600 MG 12 hr tablet Take 1,200 mg by mouth 2 (Two) Times a Day.     • hydrOXYzine (ATARAX) 25 MG tablet Take 1 tablet by mouth At Night As Needed for Allergies. at bedtime as needed (Patient taking  differently: Take 50 mg by mouth At Night As Needed for Allergies. at bedtime as needed) 90 tablet 1   • levothyroxine (SYNTHROID, LEVOTHROID) 200 MCG tablet TAKE ONE TABLET BY MOUTH DAILY 90 tablet 1   • levothyroxine (SYNTHROID, LEVOTHROID) 50 MCG tablet TAKE ONE TABLET BY MOUTH DAILY 90 tablet 1   • MAGNESIUM OXIDE PO Take 500 mg by mouth Daily.     • Melatonin 10 MG tablet Take 1 tablet by mouth At Night As Needed (sleep).     • montelukast (SINGULAIR) 10 MG tablet Take 10 mg by mouth Every Night.     • pantoprazole (PROTONIX) 40 MG EC tablet TAKE ONE TABLET BY MOUTH TWICE A  tablet 1   • polyethylene glycol (MIRALAX) 17 g packet Take 17 g by mouth Daily. (Patient taking differently: Take 17 g by mouth Daily As Needed.)     • tamoxifen (NOLVADEX) 20 MG chemo tablet Take 1 tablet by mouth Daily. Swallow whole. Do not crush or chew. 90 tablet 3   • triamterene-hydrochlorothiazide (MAXZIDE-25) 37.5-25 MG per tablet TAKE ONE TABLET BY MOUTH DAILY 90 tablet 1   • vitamin B-12 (CYANOCOBALAMIN) 1000 MCG tablet Take 1,000 mcg by mouth Daily.     • EPINEPHrine (EPIPEN 2-KIRA) 0.3 MG/0.3ML solution auto-injector injection Inject 1 Units into the shoulder, thigh, or buttocks As Needed (allergy). as directed; For: Allergic rhinitis     • Fluticasone Furoate (ARNUITY ELLIPTA) 100 MCG/ACT aerosol powder  Inhale 2 puffs Daily.       No facility-administered medications prior to visit.       No opioid medication identified on active medication list. I have reviewed chart for other potential  high risk medication/s and harmful drug interactions in the elderly.          Aspirin is on active medication list. Aspirin use is indicated based on review of current medical condition/s. Pros and cons of this therapy have been discussed today. Benefits of this medication outweigh potential harm.  Patient has been encouraged to continue taking this medication.  .      Patient Active Problem List   Diagnosis   • Anxiety   •  "Gastroesophageal reflux disease without esophagitis   • Hyperlipidemia   • Hypertension   • Hypothyroidism   • Cataract   • Cancer of overlapping sites of right female breast (HCC)   • Estrogen receptor positive   • Arthritis of right knee   • Status post total right knee replacement   • Elevated hemoglobin A1c   • Hyponatremia   • Acute postoperative pain     Advance Care Planning  Advance Directive is on file.  ACP discussion was held with the patient during this visit. Patient has an advance directive in EMR which is still valid.           Objective    Vitals:    11/24/21 0921   BP: 130/74   BP Location: Right arm   Patient Position: Sitting   Cuff Size: Adult   Pulse: 67   Temp: 98.2 °F (36.8 °C)   TempSrc: Infrared   SpO2: 97%   Weight: 87.5 kg (192 lb 12.8 oz)   Height: 172.7 cm (68\")     BMI Readings from Last 1 Encounters:   11/24/21 29.32 kg/m²   BMI is above normal parameters. Recommendations include: exercise counseling and nutrition counseling    Does the patient have evidence of cognitive impairment? No    Physical Exam       Finger Rub Hearing{Test (right ear):passed  Finger Rub Hearing{Test (left ear):failed          HEALTH RISK ASSESSMENT    Smoking Status:  Social History     Tobacco Use   Smoking Status Never Smoker   Smokeless Tobacco Never Used     Alcohol Consumption:  Social History     Substance and Sexual Activity   Alcohol Use Yes    Comment: one or less per week     Fall Risk Screen:    STUART Fall Risk Assessment was completed, and patient is at HIGH risk for falls. Assessment completed on:11/24/2021    Depression Screening:  PHQ-2/PHQ-9 Depression Screening 11/24/2021   Little interest or pleasure in doing things 0   Feeling down, depressed, or hopeless 0   Trouble falling or staying asleep, or sleeping too much -   Feeling tired or having little energy -   Poor appetite or overeating -   Feeling bad about yourself - or that you are a failure or have let yourself or your family down - "   Trouble concentrating on things, such as reading the newspaper or watching television -   Moving or speaking so slowly that other people could have noticed. Or the opposite - being so fidgety or restless that you have been moving around a lot more than usual -   Thoughts that you would be better off dead, or of hurting yourself in some way -   Total Score 0   If you checked off any problems, how difficult have these problems made it for you to do your work, take care of things at home, or get along with other people? -       Health Habits and Functional and Cognitive Screening:  Functional & Cognitive Status 11/24/2021   Do you have difficulty preparing food and eating? No   Do you have difficulty bathing yourself, getting dressed or grooming yourself? No   Do you have difficulty using the toilet? No   Do you have difficulty moving around from place to place? No   Do you have trouble with steps or getting out of a bed or a chair? Yes   Current Diet Well Balanced Diet   Dental Exam Up to date   Eye Exam Up to date   Exercise (times per week) 7 times per week   Current Exercises Include Walking   Current Exercise Activities Include -   Do you need help using the phone?  No   Are you deaf or do you have serious difficulty hearing?  No   Do you need help with transportation? No   Do you need help shopping? No   Do you need help preparing meals?  No   Do you need help with housework?  No   Do you need help with laundry? No   Do you need help taking your medications? No   Do you need help managing money? No   Do you ever drive or ride in a car without wearing a seat belt? No   Have you felt unusual stress, anger or loneliness in the last month? No   Who do you live with? Alone   If you need help, do you have trouble finding someone available to you? No   Have you been bothered in the last four weeks by sexual problems? No   Do you have difficulty concentrating, remembering or making decisions? No       Age-appropriate  Screening Schedule:  Refer to the list below for future screening recommendations based on patient's age, sex and/or medical conditions. Orders for these recommended tests are listed in the plan section. The patient has been provided with a written plan.    Health Maintenance   Topic Date Due   • DXA SCAN  Never done   • PAP SMEAR  03/13/2020   • LIPID PANEL  02/10/2022   • TDAP/TD VACCINES (2 - Td or Tdap) 11/01/2022   • MAMMOGRAM  Discontinued              Assessment/Plan   CMS Preventative Services Quick Reference  Risk Factors Identified During Encounter  Obesity/Overweight   The above risks/problems have been discussed with the patient.  Follow up actions/plans if indicated are seen below in the Assessment/Plan Section.  Pertinent information has been shared with the patient in the After Visit Summary.    Diagnoses and all orders for this visit:    1. Medicare annual wellness visit, subsequent (Primary)    2. Essential hypertension  -     Comprehensive Metabolic Panel; Future    3. Hyperlipidemia, unspecified hyperlipidemia type  -     Comprehensive Metabolic Panel; Future  -     Lipid Panel; Future    4. Gastroesophageal reflux disease without esophagitis    5. Hypothyroidism, unspecified type  -     TSH; Future  -     T4, Free; Future    Other orders  -     diphenhydrAMINE (BENADRYL) 50 MG/ML injection; INJECT 1ml INTRAMUSCULARLY AS directed one time as needed for itching or allergies  Dispense: 10 mL; Refill: 1      Patient's Body mass index is 29.32 kg/m². indicating that she is overweight (BMI 25-29.9). Obesity-related health conditions include the following: hypertension and dyslipidemias. Obesity is unchanged. BMI is is above average; BMI management plan is completed. We discussed portion control and increasing exercise..

## 2021-11-29 ENCOUNTER — TELEPHONE (OUTPATIENT)
Dept: INTERNAL MEDICINE | Facility: CLINIC | Age: 71
End: 2021-11-29

## 2021-11-29 ENCOUNTER — HOSPITAL ENCOUNTER (OUTPATIENT)
Dept: OCCUPATIONAL THERAPY | Facility: HOSPITAL | Age: 71
Setting detail: THERAPIES SERIES
Discharge: HOME | End: 2021-11-29

## 2021-11-29 DIAGNOSIS — I97.2 POSTMASTECTOMY LYMPHEDEMA SYNDROME OF LEFT UPPER EXTREMITY: ICD-10-CM

## 2021-11-29 DIAGNOSIS — C50.811 MALIGNANT NEOPLASM OF OVERLAPPING SITES OF RIGHT BREAST IN FEMALE, ESTROGEN RECEPTOR POSITIVE (HCC): Primary | ICD-10-CM

## 2021-11-29 DIAGNOSIS — Z17.0 MALIGNANT NEOPLASM OF OVERLAPPING SITES OF RIGHT BREAST IN FEMALE, ESTROGEN RECEPTOR POSITIVE (HCC): Primary | ICD-10-CM

## 2021-11-29 DIAGNOSIS — L90.5 ADHERENT SCAR, SKIN: ICD-10-CM

## 2021-11-29 PROCEDURE — 97140 MANUAL THERAPY 1/> REGIONS: CPT

## 2021-11-29 RX ORDER — LEVOTHYROXINE SODIUM 0.03 MG/1
25 TABLET ORAL DAILY
Qty: 90 TABLET | Refills: 1 | Status: SHIPPED | OUTPATIENT
Start: 2021-11-29 | End: 2022-05-16

## 2021-11-29 NOTE — TELEPHONE ENCOUNTER
Pt returned call. States she received her [facility] message from Dr Watters and understands to decrease Levothyroxine dose to 225 mcg daily.  Requests new rx be sent to Khai at [facility].  Expl will send now.  Verb great apprec.     Rx sent via erx.

## 2021-11-29 NOTE — TELEPHONE ENCOUNTER
Dariela Durant MA   11/29/2021 11:50 AM EST Back to Top        Left  for pt. To return call office number given     Tyrese Recinos MD   11/28/2021  3:01 PM EST         Call patient with results.  See result note routed to patient.  D/C the 50 mcg synthroid tablet.  Continue the 200 mcg synthroid tablet.  Call in levothyroxine 25 mcg daily.  Please call in 6 month supply (Either 1 month with RF 5 or 3 months with RF 1).

## 2021-11-30 NOTE — THERAPY PROGRESS REPORT/RE-CERT
Outpatient Occupational Therapy Lymphedema Progress Note   [place]     Patient Name: Sarah Raphael  : 1950  MRN: [id number]  Today's Date: 2021      Visit Date: 2021    Patient Active Problem List   Diagnosis   • Anxiety   • Gastroesophageal reflux disease without esophagitis   • Hyperlipidemia   • Hypertension   • Hypothyroidism   • Cataract   • Cancer of overlapping sites of right female breast (HCC)   • Estrogen receptor positive   • Arthritis of right knee   • Status post total right knee replacement   • Elevated hemoglobin A1c   • Hyponatremia   • Acute postoperative pain        Past Medical History:   Diagnosis Date   • Arthritis    • Back pain    • Breast cancer (HCC)    • Celiac disease    • Disease of thyroid gland    • GERD (gastroesophageal reflux disease)    • Hepatitis     NOT SURE OF TYPE, CAN'T DONATE BLOOD   • History of transfusion    • Hypertension         Past Surgical History:   Procedure Laterality Date   • COLONOSCOPY         • EYE SURGERY      bilateral cataracts removed   • HYSTERECTOMY  1983   • KNEE ARTHROPLASTY, PARTIAL REPLACEMENT Right 10/7/2020    Procedure: TOTAL KNEE ARTHROPLASTY RIGHT;  Surgeon: Adrian Cortes MD;  Location: [facility];  Service: Orthopedics;  Laterality: Right;   • MASTECTOMY Bilateral 2017   • MASTECTOMY WITH SENTINEL NODE BIOPSY AND AXILLARY NODE DISSECTION Bilateral 2017    Procedure: LEFT BREAST MASTECTOMY WITH LEFT  SENTINEL NODE BIOPSY AND RIGHT PROPHYLACTIC MASTECTOMY;  Surgeon: Caitlin Green MD;  Location: [facility];  Service:    • OOPHORECTOMY     • SECONDARY INTRAOCULAR LENSE IMPLANTATION  2015    also in 2016   • SHOULDER ARTHROSCOPY Left 2014   • SKIN CANCER EXCISION  10/2015    basal cell   • THYROID SURGERY  1996   • TONSILLECTOMY  1976         Visit Dx:      ICD-10-CM ICD-9-CM   1. Malignant neoplasm of overlapping sites of right breast in female, estrogen receptor positive (HCC)   C50.811 174.8    Z17.0 V86.0   2. 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 Adherent scar, skin  L90.5 709.2        Lymphedema     Row Name 11/29/21 1300             Subjective Pain    Able to rate subjective pain? yes  -SG      Pre-Treatment Pain Level 2  -SG      Post-Treatment Pain Level 1  -SG              Subjective Comments    Subjective Comments Pt reports that she continues w/ stiffness in her hand. She has requested to measure on sozo next session as she will wear more appropriate footwear. She is also concerned about gaining weight and the effect that will have on Ldex score  -SG              Lymphedema Assessment    Lymphedema Classification LUE:; stage 1 (Spontaneously Reversible)  -SG      Lymphedema Cancer Related Sx bilateral; simple mastectomy; left; sentinel node biopsy  -SG      Lymphedema Surgery Comments 2017  -SG      Chemo Received no  Adjuvant tamoxifen started 9/27/17  -SG      Radiation Therapy Received no  -SG      Infections or Cellulitis? no  -SG              Posture/Observations    Alignment Options Forward head; Thoracic kyphosis; Rounded shoulders  -SG      Posture/Observations Comments Forward protective posture  -SG              General ROM    GENERAL ROM COMMENTS BUE WNL  -SG              MMT (Manual Muscle Testing)    General MMT Comments BUE WNL  -SG              Lymphedema Edema Assessment    Edema Assessment Comment Edema LUE and L axilla/trunk  -SG              Skin Changes/Observations    Location/Assessment Upper Extremity; Upper Quadrant  -SG      Upper Extremity Conditions bilateral:; normal; intact; clean  -SG      Upper Extremity Color/Pigment bilateral:; normal  -SG      Upper Quadrant Conditions bilateral:; normal; intact; clean  -SG      Upper Quadrant Color/Pigment bilateral:; normal  -SG      Skin Observations Comment Tight soft tissue restrictions/scar tissue present bilateral chest  -SG              Lymphedema Measurements     Measurement Type(s) Quick Girth  -SG      Quick Girth Areas Upper extremities  -SG              Manual Lymphatic Drainage    Manual Lymphatic Drainage opened regional lymph nodes; initial sequence; extremity treatment; astym  -SG      Initial Sequence supraclavicular  -SG      Supraclavicular left; right  -SG      Opened Regional Lymph Nodes axillary; ribs; paraspinal  -SG      Axillary left; right  -SG      Extremity Treatment MLD to full limb  -SG      Astym mastectomy protocol  -SG      Mastectomy Protocol supine  -SG      Mastectomy Protocol Comment left  -SG      Manual Therapy See manual rx for PORi protocol  -SG              Compression/Skin Care    Compression/Skin Care compression garment  -SG      Compression Garment Comments Jobst Suzan Lite 20-30 mmHg  -SG              L-Dex Bioimpedence Screening    L-Dex Measurement Extremity LUE  -SG      L-Dex Patient Position Standing  -SG      L-Dex UE Dominate Side Right  -SG      L-Dex UE At Risk Side Left  -SG      L-Dex UE Pre Surgical Value No  -SG      L-Dex UE Baseline Score 8  -SG            User Key  (r) = Recorded By, (t) = Taken By, (c) = Cosigned By    Initials Name Provider Type    Janine Haney, OTR/L Occupational Therapist                         OT Assessment/Plan     Row Name 11/29/21 1300          OT Assessment    Functional Limitations Limitations in functional capacity and performance; Performance in self-care ADL  -     Impairments Edema; Pain; Impaired lymphatic circulation  -     Assessment Comments Pt presents w the following: pain and stiffness in chest which limits ROM and participation in ADLs/IADLs; strength: decreased core stabilization and decreaed UE/ strength; posture: forward neck and shoulder posture, disrupted scapulo-humeral rhythm; increased tissue tightness over chest, stress across pectoralis major muscle, s-c joint line, c-v joint line, and associated ST through shoulders and trunk; w/ tight soft tissue  restrictions and scar tissue; lymphedema in LUE. She will benefit from continuing with OP OT to address symptoms.  -     OT Rehab Potential Excellent  -SG     Patient/caregiver participated in establishment of treatment plan and goals Yes  -SG     Patient would benefit from skilled therapy intervention Yes  -SG            OT Plan    OT Frequency 1x/week  -     Planned CPT's? OT EVAL MOD COMPLEXITY: 19379; OT THER ACT EA 15 MIN: [id number]; OT THER PROC EA 15 MIN: [id number]; OT SELF CARE/MGMT/TRAIN 15 MIN: 18309; OT MANUAL THERAPY EA 15 MIN: 76666; OT BIS XTRACELL FLUID ANALYSIS: 14691  -     Planned Therapy Interventions (Optional Details) home exercise program; joint mobilization; manual therapy techniques; patient/family education; postural re-education; ROM (Range of Motion); strengthening; stretching  -     OT Plan Comments Continue PORi protocol and CDT including MLD, compression, skin care, and exercise. Continue ASTYM/STM as needed. Progress as tolerated.  -           User Key  (r) = Recorded By, (t) = Taken By, (c) = Cosigned By    Initials Name Provider Type    Janine Haney OTR/L Occupational Therapist                    Manual Rx (last 36 hours)     Manual Treatments     Row Name 11/29/21 1300             Total Minutes    51627 - OT Manual Therapy Minutes 53  -              Manual Rx 1    Manual Rx 1 Location Arm--Orthopedic: Forearm extensors, biceps muscles, lateral intermuscular septum tension line upper arm, pectoralis major  -      Manual Rx 1 Type Trigger Point Release, Myofascial Bending  -      Manual Rx 1 Duration Trigger Point Release for count of 3 seconds, repeat each section 5 times; Myofascial stretching w/ tissue/muscle bending 3-5 times  -              Manual Rx 2    Manual Rx 2 Location Axilla--Orthopedic: Medial Intermuscular Septum tension line upper arm, pectoralis minor, subscapularis, latissimus dorsi, teres major  -      Manual Rx 2 Type Trigger Point Release;  Myofascial bending  -SG      Manual Rx 2 Duration Trigger Point Release for count of 3 seconds, repeat each section 5 times; Myofascial stretching w/ tissue/muscle bending 3-5 times  -SG              Manual Rx 3    Manual Rx 3 Location Chest: Lymph System: Zone 1--pectoral region, Zone 2--superior Chest, Zone 3--inferior Chest  -SG      Manual Rx 3 Type Fluid movement/ MLD; parasternal LN node pumps  -SG      Manual Rx 3 Duration Repeat each Line x1 and corresponding parasternal node pumps x5, repeat each zone x3  -SG              Manual Rx 4    Manual Rx 4 Location Lateral chest/trunk--Lymph System: zone 1--lateral, zone 2--inferior, zone 3--central, zone 4-superior  -SG      Manual Rx 4 Type Intercostal Lymph Node Pumps  -SG      Manual Rx 4 Duration Repeat all 4 zones x 3  -SG              Manual Rx 5    Manual Rx 5 Location Upper Arm: Lymph System  -SG      Manual Rx 5 Type Upper arm fluid clearance of axillary pathway; medial confluence fluid clearance--antecubital fossa; lateral upper arm-cephalic pathway  -SG      Manual Rx 5 Duration x3 lines of treatment  -SG              Manual Rx 6    Manual Rx 6 Location Forearm--Orthopedic: radius and ulna-interosseous membrane; carpals-joint capsules-metacarpals-interosseous membranes; thenar muscles  -SG      Manual Rx 6 Type Joint mobilization, Trigger Point Release, Drainage of forearm  -SG      Manual Rx 6 Duration Joint Mob: each section x5 in each position; drainage of forearm: perform x3-5 lines of treatment  -SG              Manual Rx 7    Manual Rx 7 Location Back--orthopedic: upper trapezius, infraspinatus, teres minor, rhomboids, quadratus lumborum; T12, L1, L2 mobilization  -SG      Manual Rx 7 Type Trigger Point Release, Joint mobilization  -SG      Manual Rx 7 Duration Trigger Point Release for count of 3 seconds, repeat each section 5 times; perform 5-10 oscillations of each vertebra successively  -SG      Additional Treatment? Yes  -SG               "Manual Rx 8    Manual Rx 8 Location Back--lymph system  -SG      Manual Rx 8 Type Fluid movement; paraspinal lymph node pumps  -SG      Manual Rx 8 Duration Repeat each line x1 and corresponding paraspinal node pumps x5; repeat each zone x3  -SG            User Key  (r) = Recorded By, (t) = Taken By, (c) = Cosigned By    Initials Name Provider Type    Janine Haney, OTR/L Occupational Therapist               OT Goals     Row Name 11/29/21 1300          OT Short Term Goals    STG Date to Achieve 08/19/21  -SG     STG 1 \"Pt will understand lymphedema precautions to decrease the risk of infection and exacerbation of the lymphedema.  -SG     STG 1 Progress Met  -SG     STG 2 \"Pt will develop a tolerance for wearing compression between treatment sessions to facilitate limb decongestion.  -SG     STG 2 Progress Met  -SG     STG 3 \"Pt will be independent with HEP for shoulder ROM and soft tissue flexibility.  -SG     STG 3 Progress Met  -SG     STG 4 Left chest soft tissue restrictions will demo at least 25% improvement.  -SG     STG 4 Progress Met  -SG            Long Term Goals    LTG 1 \"Treatment will achieve maximum edema and/or lymphedema reduction to enable functional improvements and a return to PLOF  -SG     LTG 1 Progress Progressing  -SG     LTG 2 \"Pt will demonstrate awareness of individualized lymphedema precautions based on personal risk factors and when to seek medical attn. for assessment of early signs of lymphedema or cellulitis of the affected region  -SG     LTG 2 Progress Progressing  -SG     LTG 3 Left chest soft tissue restrictions will demo at least 75% improvement.  -SG     LTG 3 Progress Progressing  -SG     LTG 4 Ldex score will decrease from 8 to at least 6.5 to reduce LUE lymphedema.  -SG     LTG 4 Progress Progressing  -SG            Time Calculation    OT Goal Re-Cert Due Date 01/31/22  -SG           User Key  (r) = Recorded By, (t) = Taken By, (c) = Cosigned By    Initials Name Provider " Type    SG Janine Sheriff OTR/L Occupational Therapist                Therapy Education  Education Details: Pt was edu to continue with HEP and lymphedema management. We discussed her wearing her compression sleeve indefinitely as this is a chronic condition  Given: HEP, Symptoms/condition management, Posture/body mechanics, Edema management  Program: Reinforced, New  How Provided: Verbal, Demonstration, Written  Provided to: Patient  Level of Understanding: Verbalized, Demonstrated                Time Calculation:   OT Start Time: 1300  Timed Charges  52247 - OT Manual Therapy Minutes: 53  Total Minutes  Timed Charges Total Minutes: 53   Total Minutes: 53     Therapy Charges for Today     Code Description Service Date Service Provider Modifiers Qty    [id number]  OT MANUAL THERAPY EA 15 MIN 11/29/2021 Janine Sheriff OTR/L GO 4                      CONSUELO Grullon/RAUL  11/30/2021

## 2021-12-07 ENCOUNTER — HOSPITAL ENCOUNTER (OUTPATIENT)
Dept: OCCUPATIONAL THERAPY | Facility: HOSPITAL | Age: 71
Setting detail: THERAPIES SERIES
Discharge: HOME | End: 2021-12-07

## 2021-12-07 DIAGNOSIS — C50.811 MALIGNANT NEOPLASM OF OVERLAPPING SITES OF RIGHT BREAST IN FEMALE, ESTROGEN RECEPTOR POSITIVE (HCC): Primary | ICD-10-CM

## 2021-12-07 DIAGNOSIS — L90.5 ADHERENT SCAR, SKIN: ICD-10-CM

## 2021-12-07 DIAGNOSIS — I97.2 POSTMASTECTOMY LYMPHEDEMA SYNDROME OF LEFT UPPER EXTREMITY: ICD-10-CM

## 2021-12-07 DIAGNOSIS — Z17.0 MALIGNANT NEOPLASM OF OVERLAPPING SITES OF RIGHT BREAST IN FEMALE, ESTROGEN RECEPTOR POSITIVE (HCC): Primary | ICD-10-CM

## 2021-12-07 PROCEDURE — 97140 MANUAL THERAPY 1/> REGIONS: CPT

## 2021-12-07 NOTE — THERAPY TREATMENT NOTE
Outpatient Occupational Therapy Lymphedema Treatment Note  ADITHYA Temple     Patient Name: Sarah Raphael  : 1950  MRN: [id number]  Today's Date: 2021      Visit Date: 2021    Patient Active Problem List   Diagnosis   • Anxiety   • Gastroesophageal reflux disease without esophagitis   • Hyperlipidemia   • Hypertension   • Hypothyroidism   • Cataract   • Cancer of overlapping sites of right female breast (HCC)   • Estrogen receptor positive   • Arthritis of right knee   • Status post total right knee replacement   • Elevated hemoglobin A1c   • Hyponatremia   • Acute postoperative pain        Past Medical History:   Diagnosis Date   • Arthritis    • Back pain    • Breast cancer (HCC)    • Celiac disease    • Disease of thyroid gland    • GERD (gastroesophageal reflux disease)    • Hepatitis     NOT SURE OF TYPE, CAN'T DONATE BLOOD   • History of transfusion    • Hypertension         Past Surgical History:   Procedure Laterality Date   • COLONOSCOPY         • EYE SURGERY      bilateral cataracts removed   • HYSTERECTOMY  1983   • KNEE ARTHROPLASTY, PARTIAL REPLACEMENT Right 10/7/2020    Procedure: TOTAL KNEE ARTHROPLASTY RIGHT;  Surgeon: Adrian Cortes MD;  Location: [facility];  Service: Orthopedics;  Laterality: Right;   • MASTECTOMY Bilateral 2017   • MASTECTOMY WITH SENTINEL NODE BIOPSY AND AXILLARY NODE DISSECTION Bilateral 2017    Procedure: LEFT BREAST MASTECTOMY WITH LEFT  SENTINEL NODE BIOPSY AND RIGHT PROPHYLACTIC MASTECTOMY;  Surgeon: Caitlin Green MD;  Location: [facility];  Service:    • OOPHORECTOMY     • SECONDARY INTRAOCULAR LENSE IMPLANTATION  2015    also in 2016   • SHOULDER ARTHROSCOPY Left 2014   • SKIN CANCER EXCISION  10/2015    basal cell   • THYROID SURGERY  1996   • TONSILLECTOMY  1976         Visit Dx:      ICD-10-CM ICD-9-CM   1. Malignant neoplasm of overlapping sites of right breast in female, estrogen receptor positive (HCC)   C50.811 174.8    Z17.0 V86.0   2. 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 Adherent scar, skin  L90.5 709.2        Lymphedema     Row Name 12/07/21 1400             Subjective Pain    Able to rate subjective pain? yes  -SG      Pre-Treatment Pain Level 0  -SG      Post-Treatment Pain Level 0  -SG              Subjective Comments    Subjective Comments Pt reports that she is doing well, compliant w/ CDT  -SG              Lymphedema Assessment    Lymphedema Classification LUE:; stage 1 (Spontaneously Reversible)  -SG      Lymphedema Cancer Related Sx bilateral; simple mastectomy; left; sentinel node biopsy  -SG      Lymphedema Surgery Comments 2017  -SG      Chemo Received no  Adjuvant tamoxifen started 9/27/17  -SG      Radiation Therapy Received no  -SG      Infections or Cellulitis? no  -SG              Posture/Observations    Alignment Options Forward head; Thoracic kyphosis; Rounded shoulders  -SG      Posture/Observations Comments Forward protective posture  -SG              General ROM    GENERAL ROM COMMENTS BUE WNL  -SG              MMT (Manual Muscle Testing)    General MMT Comments BUE WNL  -SG              Lymphedema Edema Assessment    Edema Assessment Comment Edema LUE and L axilla/trunk  -SG              Skin Changes/Observations    Location/Assessment Upper Extremity; Upper Quadrant  -SG      Upper Extremity Conditions bilateral:; normal; intact; clean  -SG      Upper Extremity Color/Pigment bilateral:; normal  -SG      Upper Quadrant Conditions bilateral:; normal; intact; clean  -SG      Upper Quadrant Color/Pigment bilateral:; normal  -SG      Skin Observations Comment Tight soft tissue restrictions/scar tissue present bilateral chest  -SG              Lymphedema Measurements    Measurement Type(s) Quick Girth  -SG      Quick Girth Areas Upper extremities  -SG              Manual Lymphatic Drainage    Manual Lymphatic Drainage opened regional lymph nodes; initial  sequence; extremity treatment; astym  -SG      Initial Sequence supraclavicular  -SG      Supraclavicular left; right  -SG      Opened Regional Lymph Nodes axillary; ribs; paraspinal  -SG      Axillary left; right  -SG      Extremity Treatment MLD to full limb  -SG      Astym mastectomy protocol  -SG      Mastectomy Protocol supine  -SG      Mastectomy Protocol Comment See manual rx for PORi protocol  -SG      Manual Therapy See manual rx for PORi protocol  -SG              Compression/Skin Care    Compression/Skin Care compression garment  -SG      Compression Garment Comments Jobst Suzan Lite 20-30 mmHg  -SG              L-Dex Bioimpedence Screening    L-Dex Measurement Extremity LUE  -SG      L-Dex Patient Position Standing  -SG      L-Dex UE Dominate Side Right  -SG      L-Dex UE At Risk Side Left  -SG      L-Dex UE Pre Surgical Value No  -SG      L-Dex UE Baseline Score 8  -SG            User Key  (r) = Recorded By, (t) = Taken By, (c) = Cosigned By    Initials Name Provider Type    Janine Haney, OTR/L Occupational Therapist                         OT Assessment/Plan     Row Name 12/07/21 1400          OT Assessment    Functional Limitations Limitations in functional capacity and performance; Performance in self-care ADL  -SG     Impairments Edema; Pain; Impaired lymphatic circulation  -SG     Assessment Comments Pt presents w the following: pain and stiffness in chest which limits ROM and participation in ADLs/IADLs; strength: decreased core stabilization and decreaed UE/ strength; posture: forward neck and shoulder posture, disrupted scapulo-humeral rhythm; increased tissue tightness over chest, stress across pectoralis major muscle, s-c joint line, c-v joint line, and associated ST through shoulders and trunk; w/ tight soft tissue restrictions and scar tissue; lymphedema in LUE. She will benefit from continuing with OP OT to address symptoms.  -SG     OT Rehab Potential Excellent  -SG      Patient/caregiver participated in establishment of treatment plan and goals Yes  -SG     Patient would benefit from skilled therapy intervention Yes  -SG            OT Plan    OT Frequency 1x/week  -SG     Planned CPT's? OT EVAL MOD COMPLEXITY: 15174; OT THER ACT EA 15 MIN: 32060YM; OT THER PROC EA 15 MIN: 19504NG; OT SELF CARE/MGMT/TRAIN 15 MIN: 60388; OT MANUAL THERAPY EA 15 MIN: 72675; OT BIS XTRACELL FLUID ANALYSIS: 07597  -     Planned Therapy Interventions (Optional Details) home exercise program; joint mobilization; manual therapy techniques; patient/family education; postural re-education; ROM (Range of Motion); strengthening; stretching  -SG     OT Plan Comments Continue PORi protocol and CDT including MLD, compression, skin care, and exercise. Continue ASTYM/STM as needed. Progress as tolerated.  -           User Key  (r) = Recorded By, (t) = Taken By, (c) = Cosigned By    Initials Name Provider Type    Janine Haney OTR/L Occupational Therapist                    Manual Rx (last 36 hours)     Manual Treatments     Row Name 12/07/21 1400             Total Minutes    32397 - OT Manual Therapy Minutes 53  -              Manual Rx 1    Manual Rx 1 Location Arm--Orthopedic: Forearm extensors, biceps muscles, lateral intermuscular septum tension line upper arm, pectoralis major  -      Manual Rx 1 Type Trigger Point Release, Myofascial Bending  -      Manual Rx 1 Duration Trigger Point Release for count of 3 seconds, repeat each section 5 times; Myofascial stretching w/ tissue/muscle bending 3-5 times  -              Manual Rx 2    Manual Rx 2 Location Axilla--Orthopedic: Medial Intermuscular Septum tension line upper arm, pectoralis minor, subscapularis, latissimus dorsi, teres major  -      Manual Rx 2 Type Trigger Point Release; Myofascial bending  -      Manual Rx 2 Duration Trigger Point Release for count of 3 seconds, repeat each section 5 times; Myofascial stretching w/ tissue/muscle  bending 3-5 times  -SG              Manual Rx 3    Manual Rx 3 Location Chest: Lymph System: Zone 1--pectoral region, Zone 2--superior Chest, Zone 3--inferior Chest  -SG      Manual Rx 3 Type Fluid movement/ MLD; parasternal LN node pumps  -SG      Manual Rx 3 Duration Repeat each Line x1 and corresponding parasternal node pumps x5, repeat each zone x3  -SG              Manual Rx 4    Manual Rx 4 Location Lateral chest/trunk--Lymph System: zone 1--lateral, zone 2--inferior, zone 3--central, zone 4-superior  -SG      Manual Rx 4 Type Intercostal Lymph Node Pumps  -SG      Manual Rx 4 Duration Repeat all 4 zones x 3  -SG              Manual Rx 5    Manual Rx 5 Location Upper Arm: Lymph System  -SG      Manual Rx 5 Type Upper arm fluid clearance of axillary pathway; medial confluence fluid clearance--antecubital fossa; lateral upper arm-cephalic pathway  -SG      Manual Rx 5 Duration x3 lines of treatment  -SG              Manual Rx 6    Manual Rx 6 Location Forearm--Orthopedic: radius and ulna-interosseous membrane; carpals-joint capsules-metacarpals-interosseous membranes; thenar muscles  -SG      Manual Rx 6 Type Joint mobilization, Trigger Point Release, Drainage of forearm  -SG      Manual Rx 6 Duration Joint Mob: each section x5 in each position; drainage of forearm: perform x3-5 lines of treatment  -SG              Manual Rx 7    Manual Rx 7 Location Back--orthopedic: upper trapezius, infraspinatus, teres minor, rhomboids, quadratus lumborum; T12, L1, L2 mobilization  -SG      Manual Rx 7 Type Trigger Point Release, Joint mobilization  -SG      Manual Rx 7 Duration Trigger Point Release for count of 3 seconds, repeat each section 5 times; perform 5-10 oscillations of each vertebra successively  -SG      Additional Treatment? Yes  -SG              Manual Rx 8    Manual Rx 8 Location Back--lymph system  -SG      Manual Rx 8 Type Fluid movement; paraspinal lymph node pumps  -SG      Manual Rx 8 Duration Repeat each  line x1 and corresponding paraspinal node pumps x5; repeat each zone x3  -            User Key  (r) = Recorded By, (t) = Taken By, (c) = Cosigned By    Initials Name Provider Type    Janine Haney OTR/L Occupational Therapist                  Therapy Education  Education Details: Pt was edu to continue with HEP  Given: HEP, Symptoms/condition management, Posture/body mechanics, Edema management  Program: Reinforced, New  How Provided: Verbal, Demonstration, Written  Provided to: Patient  Level of Understanding: Verbalized, Demonstrated                Time Calculation:   OT Start Time: 1400  Timed Charges  46314 - OT Manual Therapy Minutes: 53  Total Minutes  Timed Charges Total Minutes: 53   Total Minutes: 53     Therapy Charges for Today     Code Description Service Date Service Provider Modifiers Qty    61217781042 HC OT MANUAL THERAPY EA 15 MIN 12/7/2021 Janine Sheriff OTR/L GO 4                      CONSUELO Grullon/RAUL  12/7/2021

## 2021-12-10 ENCOUNTER — TRANSCRIBE ORDERS (OUTPATIENT)
Dept: LAB | Facility: HOSPITAL | Age: 71
End: 2021-12-10

## 2021-12-10 ENCOUNTER — LAB (OUTPATIENT)
Dept: LAB | Facility: HOSPITAL | Age: 71
End: 2021-12-10

## 2021-12-10 DIAGNOSIS — Z91.018 FOOD ALLERGY: ICD-10-CM

## 2021-12-10 DIAGNOSIS — Z91.018 ALLERGY TO OTHER FOODS: ICD-10-CM

## 2021-12-10 DIAGNOSIS — Z91.018 ALLERGY TO OTHER FOODS: Primary | ICD-10-CM

## 2021-12-10 PROCEDURE — 86008 ALLG SPEC IGE RECOMB EA: CPT

## 2021-12-10 PROCEDURE — 86003 ALLG SPEC IGE CRUDE XTRC EA: CPT

## 2021-12-10 PROCEDURE — 36415 COLL VENOUS BLD VENIPUNCTURE: CPT

## 2021-12-13 ENCOUNTER — HOSPITAL ENCOUNTER (OUTPATIENT)
Dept: OCCUPATIONAL THERAPY | Facility: HOSPITAL | Age: 71
Setting detail: THERAPIES SERIES
Discharge: HOME | End: 2021-12-13

## 2021-12-13 DIAGNOSIS — L90.5 ADHERENT SCAR, SKIN: ICD-10-CM

## 2021-12-13 DIAGNOSIS — C50.811 MALIGNANT NEOPLASM OF OVERLAPPING SITES OF RIGHT BREAST IN FEMALE, ESTROGEN RECEPTOR POSITIVE (HCC): Primary | ICD-10-CM

## 2021-12-13 DIAGNOSIS — Z17.0 MALIGNANT NEOPLASM OF OVERLAPPING SITES OF RIGHT BREAST IN FEMALE, ESTROGEN RECEPTOR POSITIVE (HCC): Primary | ICD-10-CM

## 2021-12-13 DIAGNOSIS — I97.2 POSTMASTECTOMY LYMPHEDEMA SYNDROME OF LEFT UPPER EXTREMITY: ICD-10-CM

## 2021-12-13 PROCEDURE — 97140 MANUAL THERAPY 1/> REGIONS: CPT

## 2021-12-13 NOTE — THERAPY TREATMENT NOTE
Outpatient Occupational Therapy Lymphedema Treatment Note  ADITHYA Temple     Patient Name: Sarah Rapheal  : 1950  MRN: [id number]  Today's Date: 2021      Visit Date: 2021    Patient Active Problem List   Diagnosis   • Anxiety   • Gastroesophageal reflux disease without esophagitis   • Hyperlipidemia   • Hypertension   • Hypothyroidism   • Cataract   • Cancer of overlapping sites of right female breast (HCC)   • Estrogen receptor positive   • Arthritis of right knee   • Status post total right knee replacement   • Elevated hemoglobin A1c   • Hyponatremia   • Acute postoperative pain        Past Medical History:   Diagnosis Date   • Arthritis    • Back pain    • Breast cancer (HCC)    • Celiac disease    • Disease of thyroid gland    • GERD (gastroesophageal reflux disease)    • Hepatitis     NOT SURE OF TYPE, CAN'T DONATE BLOOD   • History of transfusion    • Hypertension         Past Surgical History:   Procedure Laterality Date   • COLONOSCOPY         • EYE SURGERY      bilateral cataracts removed   • HYSTERECTOMY  1983   • KNEE ARTHROPLASTY, PARTIAL REPLACEMENT Right 10/7/2020    Procedure: TOTAL KNEE ARTHROPLASTY RIGHT;  Surgeon: Adrian Cortes MD;  Location: [facility];  Service: Orthopedics;  Laterality: Right;   • MASTECTOMY Bilateral 2017   • MASTECTOMY WITH SENTINEL NODE BIOPSY AND AXILLARY NODE DISSECTION Bilateral 2017    Procedure: LEFT BREAST MASTECTOMY WITH LEFT  SENTINEL NODE BIOPSY AND RIGHT PROPHYLACTIC MASTECTOMY;  Surgeon: Caitlin Green MD;  Location: [facility];  Service:    • OOPHORECTOMY     • SECONDARY INTRAOCULAR LENSE IMPLANTATION  2015    also in 2016   • SHOULDER ARTHROSCOPY Left 2014   • SKIN CANCER EXCISION  10/2015    basal cell   • THYROID SURGERY  1996   • TONSILLECTOMY  1976         Visit Dx:      ICD-10-CM ICD-9-CM   1. Malignant neoplasm of overlapping sites of right breast in female, estrogen receptor positive (HCC)   C50.811 174.8    Z17.0 V86.0   2. 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 Adherent scar, skin  L90.5 709.2        Lymphedema     Row Name 12/13/21 1300             Subjective Pain    Able to rate subjective pain? yes  -SG      Pre-Treatment Pain Level 0  -SG      Post-Treatment Pain Level 0  -SG              Subjective Comments    Subjective Comments Pt reports that her arm is feeling better, improving.  -SG              Lymphedema Assessment    Lymphedema Classification LUE:; stage 1 (Spontaneously Reversible)  -SG      Lymphedema Cancer Related Sx bilateral; simple mastectomy; left; sentinel node biopsy  -SG      Lymphedema Surgery Comments 2017  -SG      Chemo Received no  Adjuvant tamoxifen started 9/27/17  -SG      Radiation Therapy Received no  -SG      Infections or Cellulitis? no  -SG              Posture/Observations    Alignment Options Forward head; Thoracic kyphosis; Rounded shoulders  -SG      Posture/Observations Comments Forward protective posture  -SG              General ROM    GENERAL ROM COMMENTS BUE WNL  -SG              MMT (Manual Muscle Testing)    General MMT Comments BUE WNL  -SG              Lymphedema Edema Assessment    Edema Assessment Comment Edema LUE and L axilla/trunk  -SG              Skin Changes/Observations    Location/Assessment Upper Extremity; Upper Quadrant  -SG      Upper Extremity Conditions bilateral:; normal; intact; clean  -SG      Upper Extremity Color/Pigment bilateral:; normal  -SG      Upper Quadrant Conditions bilateral:; normal; intact; clean  -SG      Upper Quadrant Color/Pigment bilateral:; normal  -SG      Skin Observations Comment Tight soft tissue restrictions/scar tissue present bilateral chest  -SG              Lymphedema Measurements    Measurement Type(s) Quick Girth  -SG      Quick Girth Areas Upper extremities  -SG              Manual Lymphatic Drainage    Manual Lymphatic Drainage opened regional lymph nodes; initial  sequence; extremity treatment; astym  -SG      Initial Sequence supraclavicular  -SG      Supraclavicular left; right  -SG      Opened Regional Lymph Nodes axillary; ribs; paraspinal  -SG      Axillary left; right  -SG      Extremity Treatment MLD to full limb  -SG      Astym mastectomy protocol  -SG      Mastectomy Protocol supine  -SG      Mastectomy Protocol Comment left  -SG      Manual Therapy See manual rx for PORi protocol  -SG              Compression/Skin Care    Compression/Skin Care compression garment  -SG      Compression Garment Comments Jobst Suzan Lite 20-30 mmHg  -SG              L-Dex Bioimpedence Screening    L-Dex Measurement Extremity LUE  -SG      L-Dex Patient Position Standing  -SG      L-Dex UE Dominate Side Right  -SG      L-Dex UE At Risk Side Left  -SG      L-Dex UE Pre Surgical Value No  -SG      L-Dex UE Baseline Score 8  -SG            User Key  (r) = Recorded By, (t) = Taken By, (c) = Cosigned By    Initials Name Provider Type    Janine Haney OTR/L Occupational Therapist                         OT Assessment/Plan     Row Name 12/13/21 1300          OT Assessment    Functional Limitations Limitations in functional capacity and performance; Performance in self-care ADL  -SG     Impairments Edema; Pain; Impaired lymphatic circulation  -SG     Assessment Comments Pt presents w the following: pain and stiffness in chest which limits ROM and participation in ADLs/IADLs; strength: decreased core stabilization and decreaed UE/ strength; posture: forward neck and shoulder posture, disrupted scapulo-humeral rhythm; increased tissue tightness over chest, stress across pectoralis major muscle, s-c joint line, c-v joint line, and associated ST through shoulders and trunk; w/ tight soft tissue restrictions and scar tissue; lymphedema in LUE. She will benefit from continuing with OP OT to address symptoms.  -SG     OT Rehab Potential Excellent  -SG     Patient/caregiver participated in  establishment of treatment plan and goals Yes  -SG     Patient would benefit from skilled therapy intervention Yes  -SG            OT Plan    OT Frequency 1x/week  -SG     Planned CPT's? OT EVAL MOD COMPLEXITY: 82773; OT THER ACT EA 15 MIN: 46632IC; OT THER PROC EA 15 MIN: 62033ZX; OT SELF CARE/MGMT/TRAIN 15 MIN: 42420; OT MANUAL THERAPY EA 15 MIN: 13036; OT BIS XTRACELL FLUID ANALYSIS: 15293  -     Planned Therapy Interventions (Optional Details) home exercise program; joint mobilization; manual therapy techniques; patient/family education; postural re-education; ROM (Range of Motion); strengthening; stretching  -SG     OT Plan Comments Continue PORi protocol and CDT including MLD, compression, skin care, and exercise. Continue ASTYM/STM as needed. Progress as tolerated.  -           User Key  (r) = Recorded By, (t) = Taken By, (c) = Cosigned By    Initials Name Provider Type    Janine Haney OTR/L Occupational Therapist                    Manual Rx (last 36 hours)     Manual Treatments     Row Name 12/13/21 1300             Total Minutes    55447 - OT Manual Therapy Minutes 53  -              Manual Rx 1    Manual Rx 1 Location Arm--Orthopedic: Forearm extensors, biceps muscles, lateral intermuscular septum tension line upper arm, pectoralis major  -      Manual Rx 1 Type Trigger Point Release, Myofascial Bending  -      Manual Rx 1 Duration Trigger Point Release for count of 3 seconds, repeat each section 5 times; Myofascial stretching w/ tissue/muscle bending 3-5 times  -              Manual Rx 2    Manual Rx 2 Location Axilla--Orthopedic: Medial Intermuscular Septum tension line upper arm, pectoralis minor, subscapularis, latissimus dorsi, teres major  -      Manual Rx 2 Type Trigger Point Release; Myofascial bending  -      Manual Rx 2 Duration Trigger Point Release for count of 3 seconds, repeat each section 5 times; Myofascial stretching w/ tissue/muscle bending 3-5 times  -               Manual Rx 3    Manual Rx 3 Location Chest: Lymph System: Zone 1--pectoral region, Zone 2--superior Chest, Zone 3--inferior Chest  -SG      Manual Rx 3 Type Fluid movement/ MLD; parasternal LN node pumps  -SG      Manual Rx 3 Duration Repeat each Line x1 and corresponding parasternal node pumps x5, repeat each zone x3  -SG              Manual Rx 4    Manual Rx 4 Location Lateral chest/trunk--Lymph System: zone 1--lateral, zone 2--inferior, zone 3--central, zone 4-superior  -SG      Manual Rx 4 Type Intercostal Lymph Node Pumps  -SG      Manual Rx 4 Duration Repeat all 4 zones x 3  -SG              Manual Rx 5    Manual Rx 5 Location Upper Arm: Lymph System  -SG      Manual Rx 5 Type Upper arm fluid clearance of axillary pathway; medial confluence fluid clearance--antecubital fossa; lateral upper arm-cephalic pathway  -SG      Manual Rx 5 Duration x3 lines of treatment  -SG              Manual Rx 6    Manual Rx 6 Location Forearm--Orthopedic: radius and ulna-interosseous membrane; carpals-joint capsules-metacarpals-interosseous membranes; thenar muscles  -SG      Manual Rx 6 Type Joint mobilization, Trigger Point Release, Drainage of forearm  -SG      Manual Rx 6 Duration Joint Mob: each section x5 in each position; drainage of forearm: perform x3-5 lines of treatment  -SG              Manual Rx 7    Manual Rx 7 Location Back--orthopedic: upper trapezius, infraspinatus, teres minor, rhomboids, quadratus lumborum; T12, L1, L2 mobilization  -SG      Manual Rx 7 Type Trigger Point Release, Joint mobilization  -SG      Manual Rx 7 Duration Trigger Point Release for count of 3 seconds, repeat each section 5 times; perform 5-10 oscillations of each vertebra successively  -SG      Additional Treatment? Yes  -SG              Manual Rx 8    Manual Rx 8 Location Back--lymph system  -SG      Manual Rx 8 Type Fluid movement; paraspinal lymph node pumps  -SG      Manual Rx 8 Duration Repeat each line x1 and corresponding  paraspinal node pumps x5; repeat each zone x3  -            User Key  (r) = Recorded By, (t) = Taken By, (c) = Cosigned By    Initials Name Provider Type    Janine Haney OTR/L Occupational Therapist                  Therapy Education  Education Details: Pt was edu to continue with HEP and lymphedema management  Given: HEP, Symptoms/condition management, Posture/body mechanics, Edema management  Program: Reinforced  How Provided: Verbal, Demonstration, Written  Provided to: Patient  Level of Understanding: Verbalized, Demonstrated                Time Calculation:   OT Start Time: 1300  Timed Charges  60548 - OT Manual Therapy Minutes: 53  Total Minutes  Timed Charges Total Minutes: 53   Total Minutes: 53     Therapy Charges for Today     Code Description Service Date Service Provider Modifiers Qty    04522188615 HC OT MANUAL THERAPY EA 15 MIN 12/13/2021 Janine Sheriff OTR/L GO 4                      CONSUELO Grullon/RAUL  12/13/2021

## 2021-12-15 LAB
CLAM IGE QN: <0.1 KU/L
CONV CLASS DESCRIPTION: NORMAL
CONV CLASS DESCRIPTION: NORMAL
CRAB IGE QN: <0.1 KU/L
LOBSTER IGE QN: <0.1 KU/L
OYSTER IGE QN: <0.1 KU/L
PEANUT (RARA H) 1 IGE QN: <0.1 KU/L
PEANUT (RARA H) 2 IGE QN: <0.1 KU/L
PEANUT (RARA H) 3 IGE QN: <0.1 KU/L
PEANUT (RARA H) 6 IGE QN: <0.1 KU/L
PEANUT (RARA H) 8 IGE QN: <0.1 KU/L
PEANUT (RARA H) 9 IGE QN: <0.1 KU/L
SCALLOP IGE QN: <0.1 KU/L
SHRIMP IGE QN: <0.1 KU/L

## 2021-12-16 RX ORDER — DIPHENHYDRAMINE HYDROCHLORIDE 50 MG/ML
INJECTION INTRAMUSCULAR; INTRAVENOUS
Qty: 10 ML | Refills: 1 | Status: SHIPPED | OUTPATIENT
Start: 2021-12-16 | End: 2022-01-19

## 2021-12-16 RX ORDER — IBUPROFEN 600 MG/1
TABLET ORAL
Qty: 270 TABLET | Refills: 1 | OUTPATIENT
Start: 2021-12-16

## 2021-12-16 RX ORDER — AMLODIPINE BESYLATE 5 MG/1
TABLET ORAL
Qty: 90 TABLET | Refills: 1 | OUTPATIENT
Start: 2021-12-16

## 2021-12-20 ENCOUNTER — HOSPITAL ENCOUNTER (OUTPATIENT)
Dept: OCCUPATIONAL THERAPY | Facility: HOSPITAL | Age: 71
Setting detail: THERAPIES SERIES
Discharge: HOME | End: 2021-12-20

## 2021-12-20 DIAGNOSIS — I97.2 POSTMASTECTOMY LYMPHEDEMA SYNDROME OF LEFT UPPER EXTREMITY: ICD-10-CM

## 2021-12-20 DIAGNOSIS — C50.811 MALIGNANT NEOPLASM OF OVERLAPPING SITES OF RIGHT BREAST IN FEMALE, ESTROGEN RECEPTOR POSITIVE (HCC): Primary | ICD-10-CM

## 2021-12-20 DIAGNOSIS — L90.5 ADHERENT SCAR, SKIN: ICD-10-CM

## 2021-12-20 DIAGNOSIS — Z17.0 MALIGNANT NEOPLASM OF OVERLAPPING SITES OF RIGHT BREAST IN FEMALE, ESTROGEN RECEPTOR POSITIVE (HCC): Primary | ICD-10-CM

## 2021-12-20 PROCEDURE — 97535 SELF CARE MNGMENT TRAINING: CPT

## 2021-12-20 PROCEDURE — 97140 MANUAL THERAPY 1/> REGIONS: CPT

## 2021-12-20 NOTE — THERAPY PROGRESS REPORT/RE-CERT
Outpatient Occupational Therapy Lymphedema Progress Note  ADITHYA Temple     Patient Name: Sarah Raphael  : 1950  MRN: [id number]  Today's Date: 2021      Visit Date: 2021    Patient Active Problem List   Diagnosis   • Anxiety   • Gastroesophageal reflux disease without esophagitis   • Hyperlipidemia   • Hypertension   • Hypothyroidism   • Cataract   • Cancer of overlapping sites of right female breast (HCC)   • Estrogen receptor positive   • Arthritis of right knee   • Status post total right knee replacement   • Elevated hemoglobin A1c   • Hyponatremia   • Acute postoperative pain        Past Medical History:   Diagnosis Date   • Arthritis    • Back pain    • Breast cancer (HCC)    • Celiac disease    • Disease of thyroid gland    • GERD (gastroesophageal reflux disease)    • Hepatitis     NOT SURE OF TYPE, CAN'T DONATE BLOOD   • History of transfusion    • Hypertension         Past Surgical History:   Procedure Laterality Date   • COLONOSCOPY         • EYE SURGERY      bilateral cataracts removed   • HYSTERECTOMY  1983   • KNEE ARTHROPLASTY, PARTIAL REPLACEMENT Right 10/7/2020    Procedure: TOTAL KNEE ARTHROPLASTY RIGHT;  Surgeon: Adrian Cortes MD;  Location: [facility];  Service: Orthopedics;  Laterality: Right;   • MASTECTOMY Bilateral 2017   • MASTECTOMY WITH SENTINEL NODE BIOPSY AND AXILLARY NODE DISSECTION Bilateral 2017    Procedure: LEFT BREAST MASTECTOMY WITH LEFT  SENTINEL NODE BIOPSY AND RIGHT PROPHYLACTIC MASTECTOMY;  Surgeon: Caitlin Green MD;  Location: [facility];  Service:    • OOPHORECTOMY     • SECONDARY INTRAOCULAR LENSE IMPLANTATION  2015    also in 2016   • SHOULDER ARTHROSCOPY Left 2014   • SKIN CANCER EXCISION  10/2015    basal cell   • THYROID SURGERY  1996   • TONSILLECTOMY  1976         Visit Dx:      ICD-10-CM ICD-9-CM   1. Malignant neoplasm of overlapping sites of right breast in female, estrogen receptor positive (HCC)   C50.811 174.8    Z17.0 V86.0   2. 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 Adherent scar, skin  L90.5 709.2        Lymphedema     Row Name 12/20/21 1100             Subjective Pain    Able to rate subjective pain? yes  -SG      Pre-Treatment Pain Level 0  -SG      Post-Treatment Pain Level 0  -SG              Subjective Comments    Subjective Comments Pt reports that she is doing well, continuing with wearing compression sleeve and doing exercises each morning  -SG              Lymphedema Assessment    Lymphedema Classification LUE:; stage 1 (Spontaneously Reversible)  -SG      Lymphedema Cancer Related Sx bilateral; simple mastectomy; left; sentinel node biopsy  -SG      Lymphedema Surgery Comments 2017  -SG      Chemo Received no  Adjuvant tamoxifen started 9/27/17  -SG      Radiation Therapy Received no  -SG      Infections or Cellulitis? no  -SG              Posture/Observations    Alignment Options Forward head; Thoracic kyphosis; Rounded shoulders  -SG      Posture/Observations Comments Forward protective posture  -SG              General ROM    GENERAL ROM COMMENTS BUE WNL  -SG              MMT (Manual Muscle Testing)    General MMT Comments BUE WNL  -SG              Lymphedema Edema Assessment    Edema Assessment Comment Edema LUE and L axilla/trunk  -SG              Skin Changes/Observations    Location/Assessment Upper Extremity; Upper Quadrant  -SG      Upper Extremity Conditions bilateral:; normal; intact; clean  -SG      Upper Extremity Color/Pigment bilateral:; normal  -SG      Upper Quadrant Conditions bilateral:; normal; intact; clean  -SG      Upper Quadrant Color/Pigment bilateral:; normal  -SG      Skin Observations Comment Tight soft tissue restrictions/scar tissue present bilateral chest  -SG              Lymphedema Measurements    Measurement Type(s) Quick Girth  -SG      Quick Girth Areas Upper extremities  -SG              Manual Lymphatic Drainage     Manual Lymphatic Drainage opened regional lymph nodes; initial sequence; extremity treatment; astym  -SG      Initial Sequence supraclavicular  -SG      Supraclavicular left; right  -SG      Opened Regional Lymph Nodes axillary; ribs; paraspinal  -SG      Axillary left; right  -SG      Extremity Treatment MLD to full limb  -SG      Astym mastectomy protocol  -SG      Mastectomy Protocol supine  -SG      Mastectomy Protocol Comment left  -SG      Manual Therapy See manual rx for PORi protocol  -SG              Compression/Skin Care    Compression/Skin Care compression garment  -SG      Compression Garment Comments Jobst Suzan Lite 20-30 mmHg  -SG              L-Dex Bioimpedence Screening    L-Dex Measurement Extremity LUE  -SG      L-Dex Patient Position Standing  -SG      L-Dex UE Dominate Side Right  -SG      L-Dex UE At Risk Side Left  -SG      L-Dex UE Pre Surgical Value No  -SG      L-Dex UE Score 1.3  -SG      L-Dex UE Baseline Score 8  -SG      L-Dex UE Value Change -6.7  -SG      L-Dex UE Comment The patient had a SOZO measurement which I reviewed today. The score is 1.3, see scanned to EMR. Bioimpedance spectroscopy helps identify the onset of lymphedema in an arm or leg before patients experience noticeable swelling. Research has shown that the early detection of lymphedema using L-Dex combined with treatment can reduce progression to chronic lymphedema by 95% in breast cancer patients. Whenever possible, patients are tested for baseline L-Dex score before cancer treatment begins and then are reassessed during regular follow-up visits using the SOZO device. Otherwise, this can be started postoperatively and continued during regular follow-up visits. If the patient’s L-Dex score increases above normal levels, that is a sign that lymphedema is developing and a referral is made to occupational/physical therapy for further evaluation and early compression treatment. Lymphedema assessment with the SOZO L-Dex  score is recommended to be done every 3 months for the first 3 years and then every 6 months for years 4 and 5 followed by annually afterwards.  -SG            User Key  (r) = Recorded By, (t) = Taken By, (c) = Cosigned By    Initials Name Provider Type    Janine Haney OTR/L Occupational Therapist                         OT Assessment/Plan     Row Name 12/20/21 1100          OT Assessment    Functional Limitations Limitations in functional capacity and performance; Performance in self-care ADL  -SG     Impairments Edema; Pain; Impaired lymphatic circulation  -SG     Assessment Comments Pt presents w the following: pain and stiffness in chest which limits ROM and participation in ADLs/IADLs; strength: decreased core stabilization and decreaed UE/ strength; posture: forward neck and shoulder posture, disrupted scapulo-humeral rhythm; increased tissue tightness over chest, stress across pectoralis major muscle, s-c joint line, c-v joint line, and associated ST through shoulders and trunk; w/ tight soft tissue restrictions and scar tissue; lymphedema in LUE. She will benefit from continuing with OP OT to address symptoms.  -SG     OT Rehab Potential Excellent  -SG     Patient/caregiver participated in establishment of treatment plan and goals Yes  -SG     Patient would benefit from skilled therapy intervention Yes  -SG            OT Plan    OT Frequency 1x/week  -     Planned CPT's? OT EVAL MOD COMPLEXITY: 24704; OT THER ACT EA 15 MIN: 76794AI; OT THER PROC EA 15 MIN: 84429EL; OT SELF CARE/MGMT/TRAIN 15 MIN: 64677; OT MANUAL THERAPY EA 15 MIN: 55308; OT BIS XTRACELL FLUID ANALYSIS: 73612  -     Planned Therapy Interventions (Optional Details) home exercise program; joint mobilization; manual therapy techniques; patient/family education; postural re-education; ROM (Range of Motion); strengthening; stretching  -SG     OT Plan Comments Continue PORi protocol and CDT including MLD, compression, skin care, and  exercise. Continue ASTYM/STM as needed. Progress as tolerated.  -           User Key  (r) = Recorded By, (t) = Taken By, (c) = Cosigned By    Initials Name Provider Type    Janine Haney OTR/L Occupational Therapist                    Manual Rx (last 36 hours)     Manual Treatments     Row Name 12/20/21 1100             Total Minutes    37934 - OT Manual Therapy Minutes 43  -              Manual Rx 1    Manual Rx 1 Location Arm--Orthopedic: Forearm extensors, biceps muscles, lateral intermuscular septum tension line upper arm, pectoralis major  -      Manual Rx 1 Type Trigger Point Release, Myofascial Bending  -      Manual Rx 1 Duration Trigger Point Release for count of 3 seconds, repeat each section 5 times; Myofascial stretching w/ tissue/muscle bending 3-5 times  -              Manual Rx 2    Manual Rx 2 Location Axilla--Orthopedic: Medial Intermuscular Septum tension line upper arm, pectoralis minor, subscapularis, latissimus dorsi, teres major  -      Manual Rx 2 Type Trigger Point Release; Myofascial bending  -      Manual Rx 2 Duration Trigger Point Release for count of 3 seconds, repeat each section 5 times; Myofascial stretching w/ tissue/muscle bending 3-5 times  -              Manual Rx 3    Manual Rx 3 Location Chest: Lymph System: Zone 1--pectoral region, Zone 2--superior Chest, Zone 3--inferior Chest  -      Manual Rx 3 Type Fluid movement/ MLD; parasternal LN node pumps  -      Manual Rx 3 Duration Repeat each Line x1 and corresponding parasternal node pumps x5, repeat each zone x3  -SG              Manual Rx 4    Manual Rx 4 Location Lateral chest/trunk--Lymph System: zone 1--lateral, zone 2--inferior, zone 3--central, zone 4-superior  -      Manual Rx 4 Type Intercostal Lymph Node Pumps  -      Manual Rx 4 Duration Repeat all 4 zones x 3  -SG              Manual Rx 5    Manual Rx 5 Location Upper Arm: Lymph System  -      Manual Rx 5 Type Upper arm fluid clearance of  "axillary pathway; medial confluence fluid clearance--antecubital fossa; lateral upper arm-cephalic pathway  -      Manual Rx 5 Duration x3 lines of treatment  -              Manual Rx 6    Manual Rx 6 Location Forearm--Orthopedic: radius and ulna-interosseous membrane; carpals-joint capsules-metacarpals-interosseous membranes; thenar muscles  -SG      Manual Rx 6 Type Joint mobilization, Trigger Point Release, Drainage of forearm  -SG      Manual Rx 6 Duration Joint Mob: each section x5 in each position; drainage of forearm: perform x3-5 lines of treatment  -              Manual Rx 7    Manual Rx 7 Location Back--orthopedic: upper trapezius, infraspinatus, teres minor, rhomboids, quadratus lumborum; T12, L1, L2 mobilization  -SG      Manual Rx 7 Type Trigger Point Release, Joint mobilization  -SG      Manual Rx 7 Duration Trigger Point Release for count of 3 seconds, repeat each section 5 times; perform 5-10 oscillations of each vertebra successively  -SG      Additional Treatment? Yes  -SG              Manual Rx 8    Manual Rx 8 Location Back--lymph system  -SG      Manual Rx 8 Type Fluid movement; paraspinal lymph node pumps  -SG      Manual Rx 8 Duration Repeat each line x1 and corresponding paraspinal node pumps x5; repeat each zone x3  -SG            User Key  (r) = Recorded By, (t) = Taken By, (c) = Cosigned By    Initials Name Provider Type    Janine Haney OTR/L Occupational Therapist               OT Goals     Row Name 12/20/21 1100          OT Short Term Goals    STG Date to Achieve 08/19/21  -     STG 1 \"Pt will understand lymphedema precautions to decrease the risk of infection and exacerbation of the lymphedema.  -     STG 1 Progress Met  -SG     STG 2 \"Pt will develop a tolerance for wearing compression between treatment sessions to facilitate limb decongestion.  -     STG 2 Progress Met  -SG     STG 3 \"Pt will be independent with HEP for shoulder ROM and soft tissue flexibility.  -SG  " "   STG 3 Progress Met  -SG     STG 4 Left chest soft tissue restrictions will demo at least 25% improvement.  -SG     STG 4 Progress Met  -SG            Long Term Goals    LTG 1 \"Treatment will achieve maximum edema and/or lymphedema reduction to enable functional improvements and a return to PLOF  -SG     LTG 1 Progress Progressing  -SG     LTG 2 \"Pt will demonstrate awareness of individualized lymphedema precautions based on personal risk factors and when to seek medical attn. for assessment of early signs of lymphedema or cellulitis of the affected region  -SG     LTG 2 Progress Progressing  -SG     LTG 3 Left chest soft tissue restrictions will demo at least 75% improvement.  -SG     LTG 3 Progress Progressing  -SG     LTG 4 Ldex score will decrease from 8 to at least 6.5 to reduce LUE lymphedema.  -SG     LTG 4 Progress Progressing  -SG            Time Calculation    OT Goal Re-Cert Due Date 01/31/22  -SG           User Key  (r) = Recorded By, (t) = Taken By, (c) = Cosigned By    Initials Name Provider Type    Janine Haney OTR/L Occupational Therapist                Therapy Education  Education Details: Pt edu on Ldex score of 1.3, which is the lowest that it has ever been. Pt encouraged to continue with the CDT and HEP that she is doing  Given: HEP, Symptoms/condition management, Posture/body mechanics, Edema management  Program: Reinforced  How Provided: Verbal, Demonstration, Written  Provided to: Patient  Level of Understanding: Verbalized, Demonstrated  63392 - OT Self Care/Mgmt Minutes: 10                Time Calculation:   OT Start Time: 1100  Timed Charges  37649 - OT Manual Therapy Minutes: 43  56024 - OT Self Care/Mgmt Minutes: 10  Total Minutes  Timed Charges Total Minutes: 53   Total Minutes: 53     Therapy Charges for Today     Code Description Service Date Service Provider Modifiers Qty    50555823092 HC OT MANUAL THERAPY EA 15 MIN 12/20/2021 Janine Sheriff OTR/L GO 3    [id number]  OT " SELF CARE/MGMT/TRAIN EA 15 MIN 12/20/2021 Janine Sheriff, OTR/L GO 1                      Janine Sheriff, OTR/L  12/20/2021

## 2022-01-10 DIAGNOSIS — C50.811 MALIGNANT NEOPLASM OF OVERLAPPING SITES OF RIGHT BREAST IN FEMALE, ESTROGEN RECEPTOR POSITIVE: Primary | ICD-10-CM

## 2022-01-10 DIAGNOSIS — Z17.0 MALIGNANT NEOPLASM OF OVERLAPPING SITES OF RIGHT BREAST IN FEMALE, ESTROGEN RECEPTOR POSITIVE: Primary | ICD-10-CM

## 2022-01-10 RX ORDER — TAMOXIFEN CITRATE 20 MG/1
20 TABLET ORAL DAILY
Qty: 90 TABLET | Refills: 0 | Status: SHIPPED | OUTPATIENT
Start: 2022-01-10 | End: 2022-05-16

## 2022-01-10 NOTE — TELEPHONE ENCOUNTER
Caller: Sarah Raphael    Relationship: Self    Best call back number: [phone]    What is the best time to reach you: ANYTIME    Who are you requesting to speak with (clinical staff, provider,  specific staff member): CA HARRISON     What was the call regarding: PTS DAUGHTER-IN-LAW HAD A STROKE IN [place] SO PT IS THERE TAKING CARE OF HER GRANDKIDS. SHE WILL BE THERE FOR A FEW MONTHS. SHE WOULD LIKE TO KNOW HOW SHE WILL GET HER TAMOXIFEN 20 MG TABLETS FILLED. SHE HAS ENOUGH FOR THE REST OF THIS MONTH AND HAS 2 REFILLS ON HER CURRENT PRESCRIPTION BUT WILL NEED A NEW SCRIPT SENT TO THE PHARMACY IN [place].     THE PHARMACY SHE WILL BE USING THERE IS: [facility] [address] [place] [address] [phone] [facility] [phone] FX  [phone]    Do you require a callback: YES, PLS CALL PT TO ADVISE.

## 2022-01-11 ENCOUNTER — DOCUMENTATION (OUTPATIENT)
Dept: OCCUPATIONAL THERAPY | Facility: HOSPITAL | Age: 72
End: 2022-01-11

## 2022-01-11 DIAGNOSIS — I97.2 POSTMASTECTOMY LYMPHEDEMA SYNDROME OF LEFT UPPER EXTREMITY: ICD-10-CM

## 2022-01-11 DIAGNOSIS — L90.5 ADHERENT SCAR, SKIN: ICD-10-CM

## 2022-01-11 DIAGNOSIS — C50.811 MALIGNANT NEOPLASM OF OVERLAPPING SITES OF RIGHT BREAST IN FEMALE, ESTROGEN RECEPTOR POSITIVE: Primary | ICD-10-CM

## 2022-01-11 DIAGNOSIS — Z17.0 MALIGNANT NEOPLASM OF OVERLAPPING SITES OF RIGHT BREAST IN FEMALE, ESTROGEN RECEPTOR POSITIVE: Primary | ICD-10-CM

## 2022-01-11 NOTE — THERAPY DISCHARGE NOTE
Outpatient Occupational Therapy Discharge Summary         Patient Name: Sarah Raphael  : 1950  MRN: [id number]    Today's Date: 2022      Visit Date: 2022           OP OT Discharge Summary  Date of Discharge: 22  Discharge Instructions: Pt is being d/c from OP OT at this time. Her daughter in law was diagnosed w/ COVID and has had a CVA. Pt is in [place] helping take care of her family for the next foreseeable future. She will continue w/ lymphedema management and HEP. Pt encouraged to contact me should she ever need anything. It has been a pleasure treating Ms. Raphael and I would be happy to see her again in the future with a new order.            Janine Sheriff, OTR/L   2022

## 2022-01-18 ENCOUNTER — TELEPHONE (OUTPATIENT)
Dept: INTERNAL MEDICINE | Facility: CLINIC | Age: 72
End: 2022-01-18

## 2022-01-18 NOTE — TELEPHONE ENCOUNTER
Caller: Sarah Raphael    Relationship: Self    Best call back number: [phone]     Who are you requesting to speak with (clinical staff, provider,  specific staff member): MALCOLM OR DR BUCKLEY    What was the call regarding: PATIENT DID NOT WANT TO GIVE WHAT THE CALL IS ABOUT.    Do you require a callback: YES

## 2022-01-18 NOTE — TELEPHONE ENCOUNTER
S/W pt, states she is having to move to [facility] and wanted to let us know.  States her son, dgt in law and granddgt had Covid.  States they were all really sick but that her son and granddgt are home and doing much better, however her dgt in law had a stroke while in ICU and they did not think she was going to pull through but that she did come out of her coma and they plan to transfer her to rehad later this week.  States she will be living there and taking care of the girls.  States she is already homeschooling them instead of sending them to school as they are not mandating masks.  States she has already contacted the [facility] pharmacy there and they will be sending refill requests to us soon and that she will be searching for new PCP there but will need refills in the meantime.

## 2022-01-19 RX ORDER — DIPHENHYDRAMINE HYDROCHLORIDE 50 MG/ML
INJECTION INTRAMUSCULAR; INTRAVENOUS
Qty: 10 ML | Refills: 3 | Status: SHIPPED | OUTPATIENT
Start: 2022-01-19 | End: 2022-03-11

## 2022-01-21 ENCOUNTER — TELEPHONE (OUTPATIENT)
Dept: INTERNAL MEDICINE | Facility: CLINIC | Age: 72
End: 2022-01-21

## 2022-01-21 NOTE — TELEPHONE ENCOUNTER
Incoming Refill Request      Medication requested (name and dose): IBUPROFEN 600 MG, 1 EVERY 8 HRS FOR PAIN.  THIS MEDICINE IS NOT ON HER LIST, BUT STATES THAT DR BUCKLEY HAS FILLED THIS FOR HER.    Pharmacy where request should be sent: [facility], [place], [place]    Additional details provided by patient: PATIENT HAS 5 PILLS LEFT    Best call back number: [phone]    Does the patient have less than a 3 day supply:  [x] Yes  [] No    Edouard Cuellar Rep  01/21/22, 14:20 EST

## 2022-01-24 RX ORDER — IBUPROFEN 600 MG/1
600 TABLET ORAL EVERY 8 HOURS PRN
Qty: 50 TABLET | Refills: 0 | Status: SHIPPED | OUTPATIENT
Start: 2022-01-24 | End: 2022-02-24

## 2022-02-24 RX ORDER — IBUPROFEN 600 MG/1
TABLET ORAL
Qty: 50 TABLET | Refills: 0 | Status: SHIPPED | OUTPATIENT
Start: 2022-02-24 | End: 2022-03-11

## 2022-02-28 RX ORDER — TRIAMTERENE AND HYDROCHLOROTHIAZIDE 37.5; 25 MG/1; MG/1
1 TABLET ORAL DAILY
Qty: 90 TABLET | Refills: 1 | Status: SHIPPED | OUTPATIENT
Start: 2022-02-28

## 2022-02-28 RX ORDER — LEVOTHYROXINE SODIUM 0.2 MG/1
200 TABLET ORAL DAILY
Qty: 90 TABLET | Refills: 1 | Status: SHIPPED | OUTPATIENT
Start: 2022-02-28

## 2022-02-28 RX ORDER — CITALOPRAM 40 MG/1
60 TABLET ORAL DAILY
Qty: 135 TABLET | Refills: 1 | Status: SHIPPED | OUTPATIENT
Start: 2022-02-28 | End: 2022-10-20

## 2022-02-28 RX ORDER — MONTELUKAST SODIUM 10 MG/1
10 TABLET ORAL NIGHTLY
Qty: 90 TABLET | Refills: 1 | Status: SHIPPED | OUTPATIENT
Start: 2022-02-28 | End: 2022-10-20

## 2022-02-28 RX ORDER — HYDROXYZINE HYDROCHLORIDE 25 MG/1
25 TABLET, FILM COATED ORAL NIGHTLY PRN
Qty: 90 TABLET | Refills: 1 | Status: SHIPPED | OUTPATIENT
Start: 2022-02-28

## 2022-03-10 ENCOUNTER — TELEPHONE (OUTPATIENT)
Dept: INTERNAL MEDICINE | Facility: CLINIC | Age: 72
End: 2022-03-10

## 2022-03-10 NOTE — TELEPHONE ENCOUNTER
PATIENT HAS CALLED AND STATED SHE IS NEEDING A SLEEP AID. PATIENT STATES OVER THE COUNTER MELATONIN IS NOT WORKING FOR HER.  PATIENT HAS AN APPOINTMENT WITH A PHYSICIAN IN 3 WEEKS.    PATIENT USES [facility] IN [facility].

## 2022-03-11 RX ORDER — IBUPROFEN 600 MG/1
TABLET ORAL
Qty: 50 TABLET | Refills: 0 | Status: SHIPPED | OUTPATIENT
Start: 2022-03-11 | End: 2022-04-11

## 2022-03-11 RX ORDER — DIPHENHYDRAMINE HYDROCHLORIDE 50 MG/ML
INJECTION INTRAMUSCULAR; INTRAVENOUS
Qty: 10 ML | Refills: 0 | Status: SHIPPED | OUTPATIENT
Start: 2022-03-11 | End: 2022-04-04

## 2022-03-14 RX ORDER — DOXEPIN HYDROCHLORIDE 25 MG/1
25 CAPSULE ORAL NIGHTLY
Qty: 30 CAPSULE | Refills: 0 | Status: SHIPPED | OUTPATIENT
Start: 2022-03-14

## 2022-03-14 NOTE — TELEPHONE ENCOUNTER
I have sent in a prescription for Doxepin 25 mg po at bedtime.  Tyrese Recinos MD  07:13 EDT  03/14/22

## 2022-03-14 NOTE — TELEPHONE ENCOUNTER
S/W pt, informed that Dr Watters has sent in rx for Doxepin or her to take at bedtime to aid with sleep.  Verb good understanding and great apprec. Enc to call if further problems.  Agreed.

## 2022-03-31 ENCOUNTER — TELEPHONE (OUTPATIENT)
Dept: INTERNAL MEDICINE | Facility: CLINIC | Age: 72
End: 2022-03-31

## 2022-03-31 NOTE — TELEPHONE ENCOUNTER
Caller: Sarah Raphael    Relationship: Self    Best call back number: [phone]    What medication are you requesting: SLEEP AID    What are your current symptoms: TROUBLE FALLING ASLEEP    How long have you been experiencing symptoms: 2 MONTHS    Have you had these symptoms before:    [x] Yes  [] No    Have you been treated for these symptoms before:   [x] Yes  [] No    If a prescription is needed, what is your preferred pharmacy and phone number: [address] [phone] [facility] [phone]      Additional notes:  PATIENT STATED SHE IS CURRENTLY PRESCRIBED A MEDICATION FOR THIS, SHE DOES NOT KNOW THE NAME OF THE MEDICATION, AND IT IS NOT HELPING.

## 2022-04-04 RX ORDER — DIPHENHYDRAMINE HYDROCHLORIDE 50 MG/ML
INJECTION INTRAMUSCULAR; INTRAVENOUS
Qty: 10 ML | Refills: 0 | Status: SHIPPED | OUTPATIENT
Start: 2022-04-04 | End: 2022-04-18

## 2022-04-11 RX ORDER — IBUPROFEN 600 MG/1
TABLET ORAL
Qty: 50 TABLET | Refills: 0 | Status: SHIPPED | OUTPATIENT
Start: 2022-04-11 | End: 2022-05-16

## 2022-04-18 RX ORDER — DIPHENHYDRAMINE HYDROCHLORIDE 50 MG/ML
INJECTION INTRAMUSCULAR; INTRAVENOUS
Qty: 10 ML | Refills: 0 | Status: SHIPPED | OUTPATIENT
Start: 2022-04-18 | End: 2022-05-02

## 2022-05-02 RX ORDER — DIPHENHYDRAMINE HYDROCHLORIDE 50 MG/ML
INJECTION INTRAMUSCULAR; INTRAVENOUS
Qty: 10 ML | Refills: 0 | Status: SHIPPED | OUTPATIENT
Start: 2022-05-02 | End: 2022-05-16

## 2022-05-15 DIAGNOSIS — C50.811 MALIGNANT NEOPLASM OF OVERLAPPING SITES OF RIGHT BREAST IN FEMALE, ESTROGEN RECEPTOR POSITIVE: ICD-10-CM

## 2022-05-15 DIAGNOSIS — Z17.0 MALIGNANT NEOPLASM OF OVERLAPPING SITES OF RIGHT BREAST IN FEMALE, ESTROGEN RECEPTOR POSITIVE: ICD-10-CM

## 2022-05-15 DIAGNOSIS — K21.9 GASTROESOPHAGEAL REFLUX DISEASE WITHOUT ESOPHAGITIS: ICD-10-CM

## 2022-05-16 RX ORDER — DIPHENHYDRAMINE HYDROCHLORIDE 50 MG/ML
INJECTION INTRAMUSCULAR; INTRAVENOUS
Qty: 10 ML | Refills: 0 | Status: SHIPPED | OUTPATIENT
Start: 2022-05-16 | End: 2022-06-03

## 2022-05-16 RX ORDER — LEVOTHYROXINE SODIUM 25 UG/1
TABLET ORAL
Qty: 90 TABLET | Refills: 0 | Status: SHIPPED | OUTPATIENT
Start: 2022-05-16

## 2022-05-16 RX ORDER — TAMOXIFEN CITRATE 20 MG/1
TABLET ORAL
Qty: 30 TABLET | Refills: 3 | Status: SHIPPED | OUTPATIENT
Start: 2022-05-16

## 2022-05-16 RX ORDER — PANTOPRAZOLE SODIUM 40 MG/1
TABLET, DELAYED RELEASE ORAL
Qty: 180 TABLET | Refills: 0 | Status: SHIPPED | OUTPATIENT
Start: 2022-05-16

## 2022-05-16 RX ORDER — IBUPROFEN 600 MG/1
TABLET ORAL
Qty: 50 TABLET | Refills: 0 | Status: SHIPPED | OUTPATIENT
Start: 2022-05-16 | End: 2022-06-23

## 2022-06-03 RX ORDER — DIPHENHYDRAMINE HYDROCHLORIDE 50 MG/ML
INJECTION INTRAMUSCULAR; INTRAVENOUS
Qty: 10 ML | Refills: 0 | Status: SHIPPED | OUTPATIENT
Start: 2022-06-03 | End: 2022-06-23

## 2022-06-23 RX ORDER — IBUPROFEN 600 MG/1
TABLET ORAL
Qty: 50 TABLET | Refills: 0 | Status: SHIPPED | OUTPATIENT
Start: 2022-06-23

## 2022-06-23 RX ORDER — DIPHENHYDRAMINE HYDROCHLORIDE 50 MG/ML
INJECTION INTRAMUSCULAR; INTRAVENOUS
Qty: 10 ML | Refills: 0 | Status: SHIPPED | OUTPATIENT
Start: 2022-06-23

## 2022-07-06 NOTE — TELEPHONE ENCOUNTER
LOV 11/24/2021   NOV     Tried to reach patient no answer left voicemail to return call.    HUB OK TO READ: Patient needs to be scheduled for follow up appointment

## 2022-07-13 RX ORDER — DIPHENHYDRAMINE HYDROCHLORIDE 50 MG/ML
INJECTION INTRAMUSCULAR; INTRAVENOUS
Qty: 10 ML | Refills: 0 | OUTPATIENT
Start: 2022-07-13

## 2022-07-13 NOTE — TELEPHONE ENCOUNTER
Spoke with patient, states she has had to permanently move and has finally found a new PCP and just saw her this week and that she will prescribing her meds now.  Explained we will miss her and wish her the very best.  Verbalized appreciation.

## 2022-10-20 RX ORDER — CITALOPRAM 40 MG/1
TABLET ORAL
Qty: 135 TABLET | Refills: 0 | Status: SHIPPED | OUTPATIENT
Start: 2022-10-20

## 2022-10-20 RX ORDER — MONTELUKAST SODIUM 10 MG/1
TABLET ORAL
Qty: 90 TABLET | Refills: 0 | Status: SHIPPED | OUTPATIENT
Start: 2022-10-20

## 2023-02-08 RX ORDER — TRIAMTERENE AND HYDROCHLOROTHIAZIDE 37.5; 25 MG/1; MG/1
TABLET ORAL
Qty: 90 TABLET | Refills: 0 | OUTPATIENT
Start: 2023-02-08

## 2023-06-02 RX ORDER — MONTELUKAST SODIUM 10 MG/1
TABLET ORAL
Qty: 90 TABLET | Refills: 0 | Status: SHIPPED | OUTPATIENT
Start: 2023-06-02

## 2023-08-01 RX ORDER — MONTELUKAST SODIUM 10 MG/1
TABLET ORAL
Qty: 90 TABLET | Refills: 0 | Status: SHIPPED | OUTPATIENT
Start: 2023-08-01

## 2023-12-14 RX ORDER — MONTELUKAST SODIUM 10 MG/1
TABLET ORAL
Qty: 90 TABLET | Refills: 0 | OUTPATIENT
Start: 2023-12-14

## 2024-02-05 RX ORDER — MONTELUKAST SODIUM 10 MG/1
TABLET ORAL
Qty: 90 TABLET | Refills: 0 | OUTPATIENT
Start: 2024-02-05

## (undated) DEVICE — 3M™ STERI-STRIP™ REINFORCED ADHESIVE SKIN CLOSURES, R1547, 1/2 IN X 4 IN (12 MM X 100 MM), 6 STRIPS/ENVELOPE: Brand: 3M™ STERI-STRIP™

## (undated) DEVICE — GLV SURG SENSICARE PI MIC PF SZ6 LF STRL

## (undated) DEVICE — BNDG ELAS CO-FLEX SLF ADHR 6IN 5YD LF STRL

## (undated) DEVICE — STRYKER PERFORMANCE SERIES SAGITTAL BLADE: Brand: STRYKER PERFORMANCE SERIES

## (undated) DEVICE — BNDG ELAS W/CLIP 6IN 10YD LF STRL

## (undated) DEVICE — UNDERCAST PADDING: Brand: DEROYAL

## (undated) DEVICE — CVR HNDL LIGHT RIGID

## (undated) DEVICE — DRSNG WND BORDR/ADHS NONADHR/GZ LF 2X2IN STRL

## (undated) DEVICE — SYR CONTRL LUERLOK 10CC

## (undated) DEVICE — CAMERA/LASER ARM DRAPE: Brand: DEROYAL

## (undated) DEVICE — ANTIBACTERIAL UNDYED BRAIDED (POLYGLACTIN 910), SYNTHETIC ABSORBABLE SUTURE: Brand: COATED VICRYL

## (undated) DEVICE — 450 ML BOTTLE OF 0.05% CHLORHEXIDINE GLUCONATE IN 99.95% STERILE WATER FOR IRRIGATION, USP AND APPLICATOR.: Brand: IRRISEPT ANTIMICROBIAL WOUND LAVAGE

## (undated) DEVICE — AIRWY 90MM NO9

## (undated) DEVICE — PROXIMATE RH ROTATING HEAD SKIN STAPLERS (35 WIDE) CONTAINS 35 STAINLESS STEEL STAPLES: Brand: PROXIMATE

## (undated) DEVICE — PULLOVER TOGA, X-LARGE: Brand: FLYTE, SURGICOOL

## (undated) DEVICE — GLV SURG PREMIERPRO MIC LTX PF SZ7 BRN

## (undated) DEVICE — SOL HYDROGEN PEROX 3PCT 4OZ

## (undated) DEVICE — SUT SILK 3/0 TIES 18IN A184H

## (undated) DEVICE — SPNG LAP PREWASH SFTPK 18X18IN 5PK STRL

## (undated) DEVICE — LEGGINGS, PAIR, 29X43, STERILE: Brand: MEDLINE

## (undated) DEVICE — GLV SURG SENSICARE PI ORTHO SZ9 LF STRL

## (undated) DEVICE — GLV SURG DERMASSURE GRN LF PF 7.0

## (undated) DEVICE — GLV SURG DERMASSURE GRN LF PF SZ 6.5

## (undated) DEVICE — SUT MNCRYL PLS ANTIB UD 3/0 PS2 27IN

## (undated) DEVICE — SHEET, DRAPE, SPLIT, STERILE: Brand: MEDLINE

## (undated) DEVICE — DRSNG WND STRIP OPTIFOAM AG SUPRABS A/MIC 4X12IN STRL

## (undated) DEVICE — PK KN TOTL 10

## (undated) DEVICE — NDL HYPO ECLPS SFTY 18G 1 1/2IN

## (undated) DEVICE — UNDERGLV SURG BIOGEL INDICAT PI SZ8.5 BLU

## (undated) DEVICE — DRAIN JACKSON PRATT ROUND 15FR: Brand: CARDINAL HEALTH

## (undated) DEVICE — CANNULA,ADULT,SOFT-TOUCH,7TUBE,SC: Brand: MEDLINE

## (undated) DEVICE — MARKER,SKIN,W/RULER,DUAL,STOP: Brand: MEDLINE

## (undated) DEVICE — MEDI-VAC NON-CONDUCTIVE SUCTION TUBING: Brand: CARDINAL HEALTH

## (undated) DEVICE — GOWN,NON-REINFORCED,SIRUS,SET IN SLV,XL: Brand: MEDLINE

## (undated) DEVICE — CEMENT MIXING SYSTEM WITH MIS FEMORAL BREAKAWAY NOZZLE: Brand: REVOLUTION

## (undated) DEVICE — ELECTRD NDL EDGE/INSUL/PFTE.787MM 2.84IN

## (undated) DEVICE — GLV SURG PREMIERPRO MIC LTX PF SZ6.5 BRN

## (undated) DEVICE — SYR LL 3CC

## (undated) DEVICE — NDL HYPO ECLPS SFTY 22G 1 1/2IN

## (undated) DEVICE — ISO/ALC 70PCT 16OZ

## (undated) DEVICE — MEDI-VAC YANKAUER SUCTION HANDLE W/BULBOUS TIP: Brand: CARDINAL HEALTH

## (undated) DEVICE — LEX GENERAL BREAST: Brand: MEDLINE INDUSTRIES, INC.

## (undated) DEVICE — SWABSTK SKINPREP PVPI PRE/SAT 8IN STRL

## (undated) DEVICE — GLV SURG SENSICARE PI LF PF 7.0

## (undated) DEVICE — GLV SURG PREMIERPRO MIC LTX PF SZ8.5 BRN

## (undated) DEVICE — JACKSON-PRATT 100CC BULB RESERVOIR: Brand: CARDINAL HEALTH

## (undated) DEVICE — SYR LUERLOK 50ML

## (undated) DEVICE — GLV SURG TRIUMPH ORTHO W/ALOE PF LTX 7.5 STRL

## (undated) DEVICE — DISH PETRI 3.5IN MD STRL LF

## (undated) DEVICE — SHEET,DRAPE,53X77,STERILE: Brand: MEDLINE

## (undated) DEVICE — GLV SURG SENSICARE PI MIC PF SZ6.5 LF STRL

## (undated) DEVICE — PUMP PAIN AUTOFUSER AUTO SELCT NOBOLUS 1TO14ML/HR 550ML DISP

## (undated) DEVICE — ADAPT ST INFUS ADMIN SYR 70IN

## (undated) DEVICE — PREMIUM DRY TRAY LF: Brand: MEDLINE INDUSTRIES, INC.

## (undated) DEVICE — DRSNG WND BORDR/ADHS NONADHR/GZ LF 4X14IN STRL

## (undated) DEVICE — GLV SURG SENSICARE PI LF PF 7.5

## (undated) DEVICE — ELECTRD BLD EXT EDGE/INSUL 1P 4IN

## (undated) DEVICE — HDRST POSTIN FM CRDL TRACH SLOT 9X8X4IN

## (undated) DEVICE — SUT SILK 2/0 PS 18IN 1588H